# Patient Record
Sex: MALE | Race: WHITE | NOT HISPANIC OR LATINO | Employment: OTHER | URBAN - METROPOLITAN AREA
[De-identification: names, ages, dates, MRNs, and addresses within clinical notes are randomized per-mention and may not be internally consistent; named-entity substitution may affect disease eponyms.]

---

## 2018-07-09 ENCOUNTER — OFFICE VISIT (OUTPATIENT)
Dept: PHYSICAL THERAPY | Facility: CLINIC | Age: 73
End: 2018-07-09
Payer: MEDICARE

## 2018-07-09 DIAGNOSIS — M25.512 LEFT SHOULDER PAIN, UNSPECIFIED CHRONICITY: Primary | ICD-10-CM

## 2018-07-09 PROCEDURE — 97110 THERAPEUTIC EXERCISES: CPT

## 2018-07-09 PROCEDURE — G8985 CARRY GOAL STATUS: HCPCS

## 2018-07-09 PROCEDURE — 97140 MANUAL THERAPY 1/> REGIONS: CPT

## 2018-07-09 PROCEDURE — G8984 CARRY CURRENT STATUS: HCPCS

## 2018-07-09 NOTE — PROGRESS NOTES
Daily Note     Today's date: 2018  Patient name: Valente Rodgers  : 1945  MRN: [id number]  Referring provider: Gerald Webb PA-C  Dx:   Encounter Diagnosis     ICD-10-CM    1  Left shoulder pain, unspecified chronicity M25 512        Start Time: 1430  Stop Time: 1525  Total time in clinic (min): 55 minutes    Subjective: patient reports lifting  Or reaching across the body increases pain 6/10  Notes at rest little pain  Objective: See treatment diary below       Assessment: Tolerated treatment well, tightness and pain with end ranges on all planes gerard FF and rotations  Patient exhibited good technique with therapeutic exercises and would benefit from continued PT      Plan: Continue per plan of care  Progress treatment as tolerated      Precautions: gentle PROM    Daily Treatment Diary     Manual              Left shoulder 10                                                                    Exercise Diary              UE ball fwd,back,cw,ccw 30 ea            Cane press 30x            Cane FF 30x            UBE 4 min            biceps 20/30            triceps 40/30            Ball renard 30x each                                                                                                                                                                                         Modalities               left sh 10  HC

## 2018-07-26 ENCOUNTER — OFFICE VISIT (OUTPATIENT)
Dept: PHYSICAL THERAPY | Facility: CLINIC | Age: 73
End: 2018-07-26
Payer: MEDICARE

## 2018-07-26 DIAGNOSIS — M25.512 LEFT SHOULDER PAIN, UNSPECIFIED CHRONICITY: Primary | ICD-10-CM

## 2018-07-26 PROCEDURE — 97140 MANUAL THERAPY 1/> REGIONS: CPT | Performed by: PHYSICAL THERAPIST

## 2018-07-26 PROCEDURE — 97110 THERAPEUTIC EXERCISES: CPT | Performed by: PHYSICAL THERAPIST

## 2018-07-26 NOTE — PROGRESS NOTES
Daily Note     Today's date: 2018  Patient name: Agnes Elias  : 1945  MRN: [id number]  Referring provider: Juma Gardner PA-C  Dx:   Encounter Diagnosis     ICD-10-CM    1  Left shoulder pain, unspecified chronicity M25 512        Start Time: 1030  Stop Time: 1125  Total time in clinic (min): 55 minutes    Subjective: Patient reports his wife has been ill and unable to make PT appointments  Patient reports no pain at rest but pain with movement, especially the higher he goes  Objective: See treatment diary below  Precautions: gentle PROM    Daily Treatment Diary   Modalities             MH left sh/ HC 10  HC 10'                                           Manual             Left shoulder 10 10'                                                                   Exercise Diary             UE ball fwd,back,cw,ccw 30 ea 30x ea           Cane press 30x 30x           Cane FF 30x 30x                                     UBE 4 min 4'           biceps 20/30 30# 30x           triceps 40/30 50# 30x           Ball renard 30x each 30x ea                                                                                                                                                                                            Assessment: Tolerated treatment fair  Significant pain with PROM with tightness at end ranges  Patient c/o pain with stretching  Patient would benefit from continued PT      Plan: Continue per plan of care

## 2018-07-30 ENCOUNTER — OFFICE VISIT (OUTPATIENT)
Dept: PHYSICAL THERAPY | Facility: CLINIC | Age: 73
End: 2018-07-30
Payer: MEDICARE

## 2018-07-30 DIAGNOSIS — M25.512 LEFT SHOULDER PAIN, UNSPECIFIED CHRONICITY: Primary | ICD-10-CM

## 2018-07-30 PROCEDURE — 97140 MANUAL THERAPY 1/> REGIONS: CPT | Performed by: PHYSICAL THERAPIST

## 2018-07-30 PROCEDURE — 97110 THERAPEUTIC EXERCISES: CPT | Performed by: PHYSICAL THERAPIST

## 2018-07-30 PROCEDURE — G8985 CARRY GOAL STATUS: HCPCS | Performed by: PHYSICAL THERAPIST

## 2018-07-30 PROCEDURE — G8984 CARRY CURRENT STATUS: HCPCS | Performed by: PHYSICAL THERAPIST

## 2018-07-30 NOTE — PROGRESS NOTES
Daily Note     Today's date: 2018  Patient name: Santosh Martinez  : 1945  MRN: [id number]  Referring provider: Rasta Louis PA-C  Dx:   Encounter Diagnosis     ICD-10-CM    1  Left shoulder pain, unspecified chronicity M25 512        Start Time: 1115  Stop Time: 1210  Total time in clinic (min): 55 minutes    Subjective: Patient reports he is stiff, sore but no significant pain, unless he rolls over on his left side  Objective: See treatment diary below  Precautions: gentle PROM    Daily Treatment Diary   Modalities            MH left sh/ HC 10  HC 10' 10'                                          Manual            Left shoulder 10 10' 10'                                                                  Exercise Diary            UE ball fwd,back,cw,ccw 30 ea 30x ea 30x          Cane press 30x 30x 30x          Cane FF 30x 30x 30x                                    UBE 4 min 4' 4'          biceps 20/30 30# 30x 30# 30x          triceps 40/30 50# 30x 50# 30x          Ball renard 30x each 30x ea 20x ea                                                                                                                                                                                         Assessment: Tolerated treatment fair  Advised patient to cushion his bed at shoulder height  Stiff end ranges especially abduction and IR  Patient would benefit from continued PT      Plan: Continue per plan of care

## 2018-08-03 ENCOUNTER — OFFICE VISIT (OUTPATIENT)
Dept: PHYSICAL THERAPY | Facility: CLINIC | Age: 73
End: 2018-08-03
Payer: MEDICARE

## 2018-08-03 DIAGNOSIS — M25.512 LEFT SHOULDER PAIN, UNSPECIFIED CHRONICITY: Primary | ICD-10-CM

## 2018-08-03 PROCEDURE — G8985 CARRY GOAL STATUS: HCPCS

## 2018-08-03 PROCEDURE — G8984 CARRY CURRENT STATUS: HCPCS

## 2018-08-03 PROCEDURE — 97110 THERAPEUTIC EXERCISES: CPT

## 2018-08-03 PROCEDURE — 97140 MANUAL THERAPY 1/> REGIONS: CPT

## 2018-08-03 NOTE — PROGRESS NOTES
1 specimen for pathology, 1 cao brushing to be sent out   Daily Note     Today's date: 8/3/2018  Patient name: Rolan Allen  : 1945  MRN: [id number]  Referring provider: Severo Lien, PA-C  Dx:   Encounter Diagnosis     ICD-10-CM    1  Left shoulder pain, unspecified chronicity M25 512        Start Time:   Stop Time: 1050  Total time in clinic (min): 55 minutes    Subjective: patient reports increased soreness, and tightness today in the left shoulder  Objective: See treatment diary below      Assessment: Tolerated treatment well, increased tightness and pain with end ranges all planes today  Patient demonstrated fatigue post treatment and would benefit from continued PT      Plan: Continue per plan of care  Progress treatment as tolerated          Precautions: gentle PROM    Daily Treatment Diary   Modalities  6/9 7/26 7/30 8/3         MH left sh/ HC 10  HC 10' 10' 10'                                         Manual  6/9 7/26 7/30 8/3         Left shoulder 10 10' 10' 10'                                                                 Exercise Diary  6/9 7/26 7/30 8/3         UE ball fwd,back,cw,ccw 30 ea 30x ea 30x 30x  ea         Cane press 30x 30x 30x 30x         Cane FF 30x 30x 30x 30x                                   UBE 4 min 4' 4' 4'         biceps 20/30 30# 30x 30# 30x 30#  30x         triceps 40/30 50# 30x 50# 30x 50#  30x         Ball renard 30x each 30x ea 20x ea 20x ea

## 2018-10-10 ENCOUNTER — TRANSCRIBE ORDERS (OUTPATIENT)
Dept: ADMINISTRATIVE | Facility: HOSPITAL | Age: 73
End: 2018-10-10

## 2018-10-10 ENCOUNTER — APPOINTMENT (OUTPATIENT)
Dept: LAB | Facility: CLINIC | Age: 73
End: 2018-10-10
Payer: MEDICARE

## 2018-10-10 DIAGNOSIS — D47.2 MONOCLONAL PARAPROTEINEMIA: Primary | ICD-10-CM

## 2018-10-10 DIAGNOSIS — D47.2 MONOCLONAL PARAPROTEINEMIA: ICD-10-CM

## 2018-10-10 LAB
ALBUMIN SERPL BCP-MCNC: 3.4 G/DL (ref 3.5–5)
ALP SERPL-CCNC: 51 U/L (ref 46–116)
ALT SERPL W P-5'-P-CCNC: 18 U/L (ref 12–78)
ANION GAP SERPL CALCULATED.3IONS-SCNC: 3 MMOL/L (ref 4–13)
AST SERPL W P-5'-P-CCNC: 16 U/L (ref 5–45)
BASOPHILS # BLD AUTO: 0.05 THOUSANDS/ΜL (ref 0–0.1)
BASOPHILS NFR BLD AUTO: 1 % (ref 0–1)
BILIRUB SERPL-MCNC: 0.28 MG/DL (ref 0.2–1)
BUN SERPL-MCNC: 19 MG/DL (ref 5–25)
CALCIUM SERPL-MCNC: 9 MG/DL (ref 8.3–10.1)
CHLORIDE SERPL-SCNC: 105 MMOL/L (ref 100–108)
CO2 SERPL-SCNC: 28 MMOL/L (ref 21–32)
CREAT SERPL-MCNC: 1.44 MG/DL (ref 0.6–1.3)
EOSINOPHIL # BLD AUTO: 0.19 THOUSAND/ΜL (ref 0–0.61)
EOSINOPHIL NFR BLD AUTO: 4 % (ref 0–6)
ERYTHROCYTE [DISTWIDTH] IN BLOOD BY AUTOMATED COUNT: 14.6 % (ref 11.6–15.1)
GFR SERPL CREATININE-BSD FRML MDRD: 49 ML/MIN/1.73SQ M
GLUCOSE P FAST SERPL-MCNC: 82 MG/DL (ref 65–99)
HCT VFR BLD AUTO: 39 % (ref 36.5–49.3)
HGB BLD-MCNC: 12.2 G/DL (ref 12–17)
IGA SERPL-MCNC: 63 MG/DL (ref 70–400)
IGG SERPL-MCNC: 1740 MG/DL (ref 700–1600)
IGM SERPL-MCNC: 22 MG/DL (ref 40–230)
IMM GRANULOCYTES # BLD AUTO: 0.01 THOUSAND/UL (ref 0–0.2)
IMM GRANULOCYTES NFR BLD AUTO: 0 % (ref 0–2)
LYMPHOCYTES # BLD AUTO: 1.05 THOUSANDS/ΜL (ref 0.6–4.47)
LYMPHOCYTES NFR BLD AUTO: 21 % (ref 14–44)
MCH RBC QN AUTO: 25.9 PG (ref 26.8–34.3)
MCHC RBC AUTO-ENTMCNC: 31.3 G/DL (ref 31.4–37.4)
MCV RBC AUTO: 83 FL (ref 82–98)
MONOCYTES # BLD AUTO: 0.4 THOUSAND/ΜL (ref 0.17–1.22)
MONOCYTES NFR BLD AUTO: 8 % (ref 4–12)
NEUTROPHILS # BLD AUTO: 3.22 THOUSANDS/ΜL (ref 1.85–7.62)
NEUTS SEG NFR BLD AUTO: 66 % (ref 43–75)
NRBC BLD AUTO-RTO: 0 /100 WBCS
PLATELET # BLD AUTO: 378 THOUSANDS/UL (ref 149–390)
PMV BLD AUTO: 9.3 FL (ref 8.9–12.7)
POTASSIUM SERPL-SCNC: 4.5 MMOL/L (ref 3.5–5.3)
PROT SERPL-MCNC: 7.2 G/DL (ref 6.4–8.2)
RBC # BLD AUTO: 4.71 MILLION/UL (ref 3.88–5.62)
SODIUM SERPL-SCNC: 136 MMOL/L (ref 136–145)
WBC # BLD AUTO: 4.92 THOUSAND/UL (ref 4.31–10.16)

## 2018-10-10 PROCEDURE — 84165 PROTEIN E-PHORESIS SERUM: CPT

## 2018-10-10 PROCEDURE — 82232 ASSAY OF BETA-2 PROTEIN: CPT

## 2018-10-10 PROCEDURE — 84165 PROTEIN E-PHORESIS SERUM: CPT | Performed by: PATHOLOGY

## 2018-10-10 PROCEDURE — 82784 ASSAY IGA/IGD/IGG/IGM EACH: CPT

## 2018-10-10 PROCEDURE — 86334 IMMUNOFIX E-PHORESIS SERUM: CPT

## 2018-10-10 PROCEDURE — 85025 COMPLETE CBC W/AUTO DIFF WBC: CPT

## 2018-10-10 PROCEDURE — 83883 ASSAY NEPHELOMETRY NOT SPEC: CPT

## 2018-10-10 PROCEDURE — 36415 COLL VENOUS BLD VENIPUNCTURE: CPT

## 2018-10-10 PROCEDURE — 80053 COMPREHEN METABOLIC PANEL: CPT

## 2018-10-11 LAB
ALBUMIN SERPL ELPH-MCNC: 3.83 G/DL (ref 3.5–5)
ALBUMIN SERPL ELPH-MCNC: 54.7 % (ref 52–65)
ALPHA1 GLOB SERPL ELPH-MCNC: 0.34 G/DL (ref 0.1–0.4)
ALPHA1 GLOB SERPL ELPH-MCNC: 4.8 % (ref 2.5–5)
ALPHA2 GLOB SERPL ELPH-MCNC: 0.71 G/DL (ref 0.4–1.2)
ALPHA2 GLOB SERPL ELPH-MCNC: 10.2 % (ref 7–13)
B2 MICROGLOB SERPL-MCNC: 2.1 MG/L (ref 0.6–2.4)
BETA GLOB ABNORMAL SERPL ELPH-MCNC: 0.41 G/DL (ref 0.4–0.8)
BETA1 GLOB SERPL ELPH-MCNC: 5.8 % (ref 5–13)
BETA2 GLOB SERPL ELPH-MCNC: 3.5 % (ref 2–8)
BETA2+GAMMA GLOB SERPL ELPH-MCNC: 0.25 G/DL (ref 0.2–0.5)
GAMMA GLOB ABNORMAL SERPL ELPH-MCNC: 1.47 G/DL (ref 0.5–1.6)
GAMMA GLOB SERPL ELPH-MCNC: 21 % (ref 12–22)
IGG/ALB SER: 1.21 {RATIO} (ref 1.1–1.8)
INTERPRETATION UR IFE-IMP: NORMAL
KAPPA LC FREE SER-MCNC: 31.5 MG/L (ref 3.3–19.4)
KAPPA LC FREE/LAMBDA FREE SER: 0.48 {RATIO} (ref 0.26–1.65)
LAMBDA LC FREE SERPL-MCNC: 65.7 MG/L (ref 5.7–26.3)
M PROTEIN 1 MFR SERPL ELPH: 9.2 %
M PROTEIN 1 SERPL ELPH-MCNC: 0.64 G/DL
PROT PATTERN SERPL ELPH-IMP: NORMAL
PROT SERPL-MCNC: 7 G/DL (ref 6.4–8.2)

## 2019-01-10 ENCOUNTER — OFFICE VISIT (OUTPATIENT)
Dept: FAMILY MEDICINE CLINIC | Facility: CLINIC | Age: 74
End: 2019-01-10
Payer: MEDICARE

## 2019-01-10 VITALS
SYSTOLIC BLOOD PRESSURE: 138 MMHG | HEART RATE: 88 BPM | OXYGEN SATURATION: 99 % | DIASTOLIC BLOOD PRESSURE: 94 MMHG | TEMPERATURE: 98.1 F | WEIGHT: 159 LBS | HEIGHT: 69 IN | BODY MASS INDEX: 23.55 KG/M2

## 2019-01-10 DIAGNOSIS — K11.7 EXCESSIVE SALIVATION: Primary | ICD-10-CM

## 2019-01-10 DIAGNOSIS — N28.9 ABNORMAL KIDNEY FUNCTION: ICD-10-CM

## 2019-01-10 DIAGNOSIS — Z11.59 ENCOUNTER FOR HEPATITIS C SCREENING TEST FOR LOW RISK PATIENT: ICD-10-CM

## 2019-01-10 DIAGNOSIS — Z13.220 SCREENING, LIPID: ICD-10-CM

## 2019-01-10 DIAGNOSIS — Z12.11 COLON CANCER SCREENING: ICD-10-CM

## 2019-01-10 DIAGNOSIS — Z00.00 MEDICARE ANNUAL WELLNESS VISIT, SUBSEQUENT: ICD-10-CM

## 2019-01-10 PROCEDURE — G0439 PPPS, SUBSEQ VISIT: HCPCS | Performed by: FAMILY MEDICINE

## 2019-01-10 PROCEDURE — 99203 OFFICE O/P NEW LOW 30 MIN: CPT | Performed by: FAMILY MEDICINE

## 2019-01-10 RX ORDER — GLYCOPYRROLATE 1 MG/1
1 TABLET ORAL 2 TIMES DAILY
Qty: 60 TABLET | Refills: 1 | Status: SHIPPED | OUTPATIENT
Start: 2019-01-10 | End: 2019-02-14 | Stop reason: SDUPTHER

## 2019-01-10 NOTE — PROGRESS NOTES
Assessment/Plan:    No problem-specific Assessment & Plan notes found for this encounter  Diagnoses and all orders for this visit:    Excessive salivation  After discussing risks and benefits of medication along with side effects will start glycopyrrolate at this time  -     glycopyrrolate (ROBINUL) 1 mg tablet; Take 1 tablet (1 mg total) by mouth 2 (two) times a day for 30 days    Medicare annual wellness visit, subsequent  See Medicare note  Colon cancer screening  -     Cologuard; Future    Encounter for hepatitis C screening test for low risk patient  -     Hepatitis C antibody; Future    Screening, lipid  -     Lipid panel; Future    Abnormal kidney function  -     Comprehensive metabolic panel; Future    Other orders  -     aspirin 81 MG tablet; 1 cap(s)      Follow up in 6 months or as needed    Subjective:      Patient ID: Iliana Remy is a 72 y o  male  Patient is here to establish care  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 at [address] [place] [address] in [place]  He has a history of smoking for about 30 years and quit smoking 20 years ago  He said that after his surgery he has been developing a lot of increased elevation in his mouth and he is requesting a medication to stop this  Per patient he had elevated creatinine levels in the past however denies any urinary complaints at this time  He had a fecal occult blood test done several years ago which was normal   He is not interested in any vaccines at this time except Flu  The following portions of the patient's history were reviewed and updated as appropriate:   He  has no past medical history on file    He   Patient Active Problem List    Diagnosis Date Noted    Excessive salivation 01/10/2019    Medicare annual wellness visit, subsequent 01/10/2019    Colon cancer screening 01/10/2019    Encounter for hepatitis C screening test for low risk patient 01/10/2019    Screening, lipid 01/10/2019    Abnormal kidney function 01/10/2019     He  has no past surgical history on file  His family history is not on file  He  reports that he has quit smoking  He has never used smokeless tobacco  His alcohol and drug histories are not on file  Current Outpatient Prescriptions   Medication Sig Dispense Refill    aspirin 81 MG tablet 1 cap(s)      glycopyrrolate (ROBINUL) 1 mg tablet Take 1 tablet (1 mg total) by mouth 2 (two) times a day for 30 days 60 tablet 1     No current facility-administered medications for this visit  No current outpatient prescriptions on file prior to visit  No current facility-administered medications on file prior to visit  He is allergic to iodine       Review of Systems   Constitutional: Negative for activity change, appetite change, fatigue and fever  HENT: Positive for drooling  Negative for congestion and ear discharge  Respiratory: Negative for cough and shortness of breath  Cardiovascular: Negative for chest pain and palpitations  Gastrointestinal: Negative for diarrhea and nausea  Musculoskeletal: Negative for arthralgias and back pain  Skin: Negative for color change and rash  Neurological: Negative for dizziness and headaches  Psychiatric/Behavioral: Negative for agitation and behavioral problems  Objective:      /94   Pulse 88   Temp 98 1 °F (36 7 °C)   Ht 5' 9" (1 753 m)   Wt 72 1 kg (159 lb)   SpO2 99%   BMI 23 48 kg/m²          Physical Exam   Constitutional: He is oriented to person, place, and time  He appears well-developed and well-nourished  No distress  HENT:   Tongue deviated towards his left  Increased salivation noted  Eyes: Pupils are equal, round, and reactive to light  No scleral icterus  Cardiovascular: Normal rate, regular rhythm and normal heart sounds  No murmur heard  Pulmonary/Chest: Effort normal and breath sounds normal  No respiratory distress   He has no wheezes  Abdominal: Soft  Bowel sounds are normal  He exhibits no distension  There is no tenderness  Neurological: He is alert and oriented to person, place, and time  Skin: Skin is warm and dry  No rash noted  He is not diaphoretic  Psychiatric: He has a normal mood and affect

## 2019-01-10 NOTE — PROGRESS NOTES
Assessment and Plan:    Problem List Items Addressed This Visit     None        Health Maintenance Due   Topic Date Due    Hepatitis C Screening  1945    Depression Screening PHQ  1945    Medicare Annual Wellness Visit (AWV)  1945    CRC Screening: Colonoscopy  1945    DTaP,Tdap,and Td Vaccines (1 - Tdap) 02/25/1968    Fall Risk  02/25/2012    Pneumococcal PPSV23/PCV13 65+ Years / Low and Medium Risk (1 of 2 - PCV13) 02/25/2012    INFLUENZA VACCINE  07/01/2018         HPI:  Nelson Gray is a 72 y o  male here for his Subsequent Wellness Visit  There is no problem list on file for this patient  No past medical history on file  No past surgical history on file  No family history on file  History   Smoking Status    Former Smoker   Smokeless Tobacco    Never Used     History   Alcohol use Not on file      History   Drug use: Unknown       Current Outpatient Prescriptions   Medication Sig Dispense Refill    aspirin 81 MG tablet 1 cap(s)       No current facility-administered medications for this visit  Allergies   Allergen Reactions    Iodine      Immunization History   Administered Date(s) Administered    Influenza 02/13/2018, 10/17/2018       Patient Care Team:  Janelle Mcintyre MD as PCP - General (Family Medicine)    Medicare Screening Tests and Risk Assessments:  Liz Maradiaga is here for his Initial Wellness visit  Health Risk Assessment:  Patient rates overall health as good  Patient feels that their physical health rating is Same  Eyesight was rated as Same  Hearing was rated as Same  Patient feels that their emotional and mental health rating is Same  Pain experienced by patient in the last 7 days has been None  Patient states that he has experienced no weight loss or gain in last 6 months  Emotional/Mental Health:  Patient has been feeling nervous/anxious  PHQ-9 Depression Screening:    Frequency of the following problems over the past two weeks:      1  Little interest or pleasure in doing things: 0 - not at all      2  Feeling down, depressed, or hopeless: 0 - not at all  PHQ-2 Score: 0          Broken Bones/Falls: Fall Risk Assessment:    In the past year, patient has experienced: No history of falling in past year          Bladder/Bowel:  Patient has not leaked urine accidently in the last six months  Patient reports no loss of bowel control  Immunizations:  Patient has had a flu vaccination within the last year  Patient has not received a pneumonia shot  Patient has not received a shingles shot  Patient has not received tetanus/diphtheria shot  Home Safety:  Patient does not have trouble with stairs inside or outside of their home  Patient currently reports that there are no safety hazards present in home, working smoke alarms, no working carbon monoxide detectors  Preventative Screenings:   no prostate cancer screen performed, no colon cancer screen completed, no cholesterol screen completed, glaucoma eye exam completed,     Nutrition:  Current diet: Regular with servings of the following:    Medications:  Patient is currently taking over-the-counter supplements  Patient is able to manage medications  Lifestyle Choices:  Patient reports no tobacco use  Patient has not smoked or used tobacco in the past   Patient reports alcohol use  Patient drives a vehicle  Patient does not wear seat belt  Activities of Daily Living:  Can get out of bed by his or her self, able to dress self, able to make own meals, able to do own shopping, able to bathe self, can do own laundry/housekeeping, can manage own money, pay bills and track expenses    Previous Hospitalizations:  No hospitalization or ED visit in past 12 months        Advanced Directives:  Patient has not decided on power of   Patient has not completed advanced directive          Preventative Screening/Counseling:      Cardiovascular:      General: Screening Current Diabetes:      General: Screening Current          Colorectal Cancer:      Due for studies: Fecal Occult Blood          Prostate Cancer:      General: Screening Current          Osteoporosis:      General: Screening Not Indicated          HIV:      General: Screening Not Indicated          Hepatitis C:      General: Risks and Benefits Discussed        Immunizations:      Influenza: Influenza UTD This Year      Pneumococcal: Patient Declines      Shingrix: Patient Declines      Hepatitis B (Medium to high risk patients): Patient Declines      Zostavax: Patient Declines      TD: Patient Declines      TDAP: Patient Declines            Assessment and Plan:    Problem List Items Addressed This Visit     None      Visit Diagnoses     Colon cancer screening    -  Primary    Relevant Orders    Cologuard    Encounter for hepatitis C screening test for low risk patient        Relevant Orders    Hepatitis C antibody    Screening, lipid        Relevant Orders    Lipid panel    Abnormal kidney function        Relevant Orders    Comprehensive metabolic panel    Excessive salivation        Relevant Medications    glycopyrrolate (ROBINUL) 1 mg tablet    Medicare annual wellness visit, subsequent            Health Maintenance Due   Topic Date Due    Hepatitis C Screening  1945    Depression Screening PHQ  1945    Medicare Annual Wellness Visit (AWV)  1945    CRC Screening: Colonoscopy  1945    DTaP,Tdap,and Td Vaccines (1 - Tdap) 02/25/1968    Fall Risk  02/25/2012    Pneumococcal PPSV23/PCV13 65+ Years / Low and Medium Risk (1 of 2 - PCV13) 02/25/2012    INFLUENZA VACCINE  07/01/2018         HPI:  Christophe Kapoor is a 72 y o  male here for his Subsequent Wellness Visit  There is no problem list on file for this patient  No past medical history on file  No past surgical history on file  No family history on file    History   Smoking Status    Former Smoker   Smokeless Tobacco    Never Used History   Alcohol use Not on file      History   Drug use: Unknown       Current Outpatient Prescriptions   Medication Sig Dispense Refill    aspirin 81 MG tablet 1 cap(s)      glycopyrrolate (ROBINUL) 1 mg tablet Take 1 tablet (1 mg total) by mouth 2 (two) times a day for 30 days 60 tablet 1     No current facility-administered medications for this visit        Allergies   Allergen Reactions    Iodine      Immunization History   Administered Date(s) Administered    Influenza 02/13/2018, 10/17/2018       Patient Care Team:  Camilo Gonzalez MD as PCP - General (Family Medicine)    Medicare Screening Tests and Risk Assessments:  Medicare Annual Wellness Visit

## 2019-02-14 ENCOUNTER — OFFICE VISIT (OUTPATIENT)
Dept: FAMILY MEDICINE CLINIC | Facility: CLINIC | Age: 74
End: 2019-02-14
Payer: MEDICARE

## 2019-02-14 VITALS
TEMPERATURE: 97.6 F | BODY MASS INDEX: 23.7 KG/M2 | OXYGEN SATURATION: 99 % | SYSTOLIC BLOOD PRESSURE: 136 MMHG | HEART RATE: 81 BPM | WEIGHT: 160 LBS | HEIGHT: 69 IN | DIASTOLIC BLOOD PRESSURE: 86 MMHG

## 2019-02-14 DIAGNOSIS — C02.9 TONGUE CARCINOMA (HCC): ICD-10-CM

## 2019-02-14 DIAGNOSIS — K11.7 EXCESSIVE SALIVATION: Primary | ICD-10-CM

## 2019-02-14 PROCEDURE — 99213 OFFICE O/P EST LOW 20 MIN: CPT | Performed by: FAMILY MEDICINE

## 2019-02-14 RX ORDER — GLYCOPYRROLATE 2 MG/1
2 TABLET ORAL 2 TIMES DAILY
Qty: 60 TABLET | Refills: 1 | Status: SHIPPED | OUTPATIENT
Start: 2019-02-14 | End: 2019-04-30 | Stop reason: SDUPTHER

## 2019-02-14 NOTE — PROGRESS NOTES
Assessment/Plan:    No problem-specific Assessment & Plan notes found for this encounter  Diagnoses and all orders for this visit:    Excessive salivation  After discussing risks and benefits of medication along with side effects will increase glycopyrrolate to 2 mg po twice daily  -     glycopyrrolate (ROBINUL) 2 MG tablet; Take 1 tablet (2 mg total) by mouth 2 (two) times a day for 30 days    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[facility] [address] )  advised to sign a release of records from doctor at [facility]  Follow up in 3 months or as needed        Subjective:      Patient ID: Mira Thayer is a 72 y o  male  Patient is here to follow up for excessive salivation secondary to cancer removed from the base of his tongue  He says that the medication glycopyrrolate 1 mg twice daily has been helping his symptom some  He would like to see if it can be increased  He gets followed up at [facility]  The following portions of the patient's history were reviewed and updated as appropriate:   He  has no past medical history on file  He   Patient Active Problem List    Diagnosis Date Noted    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 ) 02/14/2019    Excessive salivation 01/10/2019    Medicare annual wellness visit, subsequent 01/10/2019    Colon cancer screening 01/10/2019    Encounter for hepatitis C screening test for low risk patient 01/10/2019    Screening, lipid 01/10/2019    Abnormal kidney function 01/10/2019     He  has no past surgical history on file  His family history is not on file  He  reports that he has quit smoking  He has never used smokeless tobacco  His alcohol and drug histories are not on file    Current Outpatient Medications   Medication Sig Dispense Refill    aspirin 81 MG tablet 1 cap(s)      glycopyrrolate (ROBINUL) 2 MG tablet Take 1 tablet (2 mg total) by mouth 2 (two) times a day for 30 days 60 tablet 1     No current facility-administered medications for this visit  Current Outpatient Medications on File Prior to Visit   Medication Sig    aspirin 81 MG tablet 1 cap(s)    [DISCONTINUED] glycopyrrolate (ROBINUL) 1 mg tablet Take 1 tablet (1 mg total) by mouth 2 (two) times a day for 30 days     No current facility-administered medications on file prior to visit  He is allergic to iodinated diagnostic agents and iodine       Review of Systems   Constitutional: Negative for activity change, appetite change, fatigue and fever  HENT: Positive for drooling  Negative for congestion and ear discharge  Respiratory: Negative for cough and shortness of breath  Cardiovascular: Negative for chest pain and palpitations  Gastrointestinal: Negative for diarrhea and nausea  Musculoskeletal: Negative for arthralgias and back pain  Skin: Negative for color change and rash  Neurological: Negative for dizziness and headaches  Psychiatric/Behavioral: Negative for agitation and behavioral problems  Objective:      /86   Pulse 81   Temp 97 6 °F (36 4 °C)   Ht 5' 9" (1 753 m)   Wt 72 6 kg (160 lb)   SpO2 99%   BMI 23 63 kg/m²          Physical Exam   Constitutional: He is oriented to person, place, and time  He appears well-developed and well-nourished  No distress  HENT:   Increased salivation   Eyes: Pupils are equal, round, and reactive to light  No scleral icterus  Cardiovascular: Normal rate, regular rhythm and normal heart sounds  No murmur heard  Pulmonary/Chest: Effort normal and breath sounds normal  No respiratory distress  He has no wheezes  Abdominal: Soft  Bowel sounds are normal  He exhibits no distension  There is no tenderness  Neurological: He is alert and oriented to person, place, and time  Skin: Skin is warm and dry  No rash noted  He is not diaphoretic  Psychiatric: He has a normal mood and affect

## 2019-02-24 DIAGNOSIS — K11.7 EXCESSIVE SALIVATION: ICD-10-CM

## 2019-02-25 RX ORDER — GLYCOPYRROLATE 1 MG/1
TABLET ORAL
Qty: 60 TABLET | Refills: 1 | Status: SHIPPED | OUTPATIENT
Start: 2019-02-25 | End: 2019-08-25

## 2019-04-16 ENCOUNTER — APPOINTMENT (OUTPATIENT)
Dept: LAB | Facility: CLINIC | Age: 74
End: 2019-04-16
Payer: MEDICARE

## 2019-04-16 DIAGNOSIS — Z11.59 ENCOUNTER FOR HEPATITIS C SCREENING TEST FOR LOW RISK PATIENT: ICD-10-CM

## 2019-04-16 DIAGNOSIS — N28.9 ABNORMAL KIDNEY FUNCTION: ICD-10-CM

## 2019-04-16 DIAGNOSIS — Z13.220 SCREENING, LIPID: ICD-10-CM

## 2019-04-16 LAB
ALBUMIN SERPL BCP-MCNC: 3.6 G/DL (ref 3.5–5)
ALP SERPL-CCNC: 69 U/L (ref 46–116)
ALT SERPL W P-5'-P-CCNC: 12 U/L (ref 12–78)
ANION GAP SERPL CALCULATED.3IONS-SCNC: 5 MMOL/L (ref 4–13)
AST SERPL W P-5'-P-CCNC: 13 U/L (ref 5–45)
BILIRUB SERPL-MCNC: 0.4 MG/DL (ref 0.2–1)
BUN SERPL-MCNC: 19 MG/DL (ref 5–25)
CALCIUM SERPL-MCNC: 9 MG/DL (ref 8.3–10.1)
CHLORIDE SERPL-SCNC: 109 MMOL/L (ref 100–108)
CHOLEST SERPL-MCNC: 184 MG/DL (ref 50–200)
CO2 SERPL-SCNC: 27 MMOL/L (ref 21–32)
CREAT SERPL-MCNC: 1.53 MG/DL (ref 0.6–1.3)
GFR SERPL CREATININE-BSD FRML MDRD: 45 ML/MIN/1.73SQ M
GLUCOSE P FAST SERPL-MCNC: 87 MG/DL (ref 65–99)
HCV AB SER QL: NORMAL
HDLC SERPL-MCNC: 40 MG/DL (ref 40–60)
LDLC SERPL CALC-MCNC: 123 MG/DL (ref 0–100)
NONHDLC SERPL-MCNC: 144 MG/DL
POTASSIUM SERPL-SCNC: 4.6 MMOL/L (ref 3.5–5.3)
PROT SERPL-MCNC: 8 G/DL (ref 6.4–8.2)
SODIUM SERPL-SCNC: 141 MMOL/L (ref 136–145)
TRIGL SERPL-MCNC: 105 MG/DL

## 2019-04-16 PROCEDURE — 80053 COMPREHEN METABOLIC PANEL: CPT

## 2019-04-16 PROCEDURE — 80061 LIPID PANEL: CPT

## 2019-04-16 PROCEDURE — 86803 HEPATITIS C AB TEST: CPT

## 2019-04-16 PROCEDURE — 36415 COLL VENOUS BLD VENIPUNCTURE: CPT

## 2019-04-18 DIAGNOSIS — I10 ESSENTIAL HYPERTENSION: Primary | ICD-10-CM

## 2019-04-18 RX ORDER — OLMESARTAN MEDOXOMIL 20 MG/1
20 TABLET ORAL DAILY
Qty: 30 TABLET | Refills: 3 | Status: SHIPPED | OUTPATIENT
Start: 2019-04-18 | End: 2019-04-30 | Stop reason: ALTCHOICE

## 2019-04-18 RX ORDER — OLMESARTAN MEDOXOMIL 20 MG/1
20 TABLET ORAL DAILY
COMMUNITY
End: 2019-04-18 | Stop reason: SDUPTHER

## 2019-04-30 ENCOUNTER — TELEPHONE (OUTPATIENT)
Dept: FAMILY MEDICINE CLINIC | Facility: CLINIC | Age: 74
End: 2019-04-30

## 2019-04-30 DIAGNOSIS — K11.7 EXCESSIVE SALIVATION: ICD-10-CM

## 2019-04-30 DIAGNOSIS — I10 ESSENTIAL HYPERTENSION: Primary | ICD-10-CM

## 2019-04-30 RX ORDER — GLYCOPYRROLATE 2 MG/1
2 TABLET ORAL 2 TIMES DAILY
Qty: 60 TABLET | Refills: 1 | Status: SHIPPED | OUTPATIENT
Start: 2019-04-30 | End: 2019-07-24 | Stop reason: SDUPTHER

## 2019-04-30 RX ORDER — LOSARTAN POTASSIUM 50 MG/1
50 TABLET ORAL DAILY
Qty: 30 TABLET | Refills: 1 | Status: SHIPPED | OUTPATIENT
Start: 2019-04-30 | End: 2019-05-06 | Stop reason: SDUPTHER

## 2019-05-06 ENCOUNTER — OFFICE VISIT (OUTPATIENT)
Dept: FAMILY MEDICINE CLINIC | Facility: CLINIC | Age: 74
End: 2019-05-06
Payer: COMMERCIAL

## 2019-05-06 VITALS
HEART RATE: 79 BPM | TEMPERATURE: 97.5 F | SYSTOLIC BLOOD PRESSURE: 164 MMHG | BODY MASS INDEX: 23.37 KG/M2 | DIASTOLIC BLOOD PRESSURE: 104 MMHG | WEIGHT: 157.8 LBS | OXYGEN SATURATION: 98 % | HEIGHT: 69 IN

## 2019-05-06 DIAGNOSIS — I10 ESSENTIAL HYPERTENSION: ICD-10-CM

## 2019-05-06 PROCEDURE — 99214 OFFICE O/P EST MOD 30 MIN: CPT | Performed by: FAMILY MEDICINE

## 2019-05-06 RX ORDER — LOSARTAN POTASSIUM 50 MG/1
TABLET ORAL
Qty: 30 TABLET | Refills: 1
Start: 2019-05-06 | End: 2019-05-14 | Stop reason: ALTCHOICE

## 2019-05-14 ENCOUNTER — OFFICE VISIT (OUTPATIENT)
Dept: FAMILY MEDICINE CLINIC | Facility: CLINIC | Age: 74
End: 2019-05-14
Payer: MEDICARE

## 2019-05-14 VITALS
DIASTOLIC BLOOD PRESSURE: 94 MMHG | TEMPERATURE: 98.2 F | BODY MASS INDEX: 23.49 KG/M2 | HEIGHT: 69 IN | WEIGHT: 158.6 LBS | OXYGEN SATURATION: 98 % | HEART RATE: 80 BPM | SYSTOLIC BLOOD PRESSURE: 160 MMHG

## 2019-05-14 DIAGNOSIS — I10 UNCONTROLLED HYPERTENSION: Primary | ICD-10-CM

## 2019-05-14 DIAGNOSIS — Z92.3 HISTORY OF HEAD AND NECK RADIATION: ICD-10-CM

## 2019-05-14 DIAGNOSIS — R42 DIZZINESS: ICD-10-CM

## 2019-05-14 PROCEDURE — 99214 OFFICE O/P EST MOD 30 MIN: CPT | Performed by: FAMILY MEDICINE

## 2019-05-14 RX ORDER — AMLODIPINE BESYLATE 2.5 MG/1
2.5 TABLET ORAL DAILY
Qty: 30 TABLET | Refills: 1 | Status: SHIPPED | OUTPATIENT
Start: 2019-05-14 | End: 2019-05-23 | Stop reason: SDUPTHER

## 2019-05-23 ENCOUNTER — OFFICE VISIT (OUTPATIENT)
Dept: FAMILY MEDICINE CLINIC | Facility: CLINIC | Age: 74
End: 2019-05-23
Payer: MEDICARE

## 2019-05-23 VITALS
BODY MASS INDEX: 23.46 KG/M2 | HEIGHT: 69 IN | WEIGHT: 158.4 LBS | SYSTOLIC BLOOD PRESSURE: 138 MMHG | HEART RATE: 80 BPM | DIASTOLIC BLOOD PRESSURE: 88 MMHG | TEMPERATURE: 97.4 F | OXYGEN SATURATION: 99 %

## 2019-05-23 DIAGNOSIS — I10 UNCONTROLLED HYPERTENSION: ICD-10-CM

## 2019-05-23 DIAGNOSIS — I10 ESSENTIAL HYPERTENSION: Primary | ICD-10-CM

## 2019-05-23 PROCEDURE — 99213 OFFICE O/P EST LOW 20 MIN: CPT | Performed by: FAMILY MEDICINE

## 2019-05-23 RX ORDER — AMLODIPINE BESYLATE 5 MG/1
5 TABLET ORAL DAILY
Qty: 30 TABLET | Refills: 1 | Status: SHIPPED | OUTPATIENT
Start: 2019-05-23 | End: 2019-07-25 | Stop reason: SDUPTHER

## 2019-05-28 ENCOUNTER — HOSPITAL ENCOUNTER (OUTPATIENT)
Dept: ULTRASOUND IMAGING | Facility: HOSPITAL | Age: 74
Discharge: HOME | End: 2019-05-28
Attending: FAMILY MEDICINE
Payer: MEDICARE

## 2019-05-28 DIAGNOSIS — I10 UNCONTROLLED HYPERTENSION: ICD-10-CM

## 2019-05-28 DIAGNOSIS — Z92.3 HISTORY OF HEAD AND NECK RADIATION: ICD-10-CM

## 2019-05-28 DIAGNOSIS — R42 DIZZINESS: ICD-10-CM

## 2019-05-28 PROCEDURE — 93880 EXTRACRANIAL BILAT STUDY: CPT | Performed by: SURGERY

## 2019-05-28 PROCEDURE — 93880 EXTRACRANIAL BILAT STUDY: CPT

## 2019-07-24 DIAGNOSIS — K11.7 EXCESSIVE SALIVATION: ICD-10-CM

## 2019-07-24 RX ORDER — GLYCOPYRROLATE 2 MG/1
2 TABLET ORAL 2 TIMES DAILY
Qty: 60 TABLET | Refills: 1 | Status: SHIPPED | OUTPATIENT
Start: 2019-07-24 | End: 2019-10-09 | Stop reason: ALTCHOICE

## 2019-07-25 DIAGNOSIS — I10 UNCONTROLLED HYPERTENSION: ICD-10-CM

## 2019-07-25 RX ORDER — AMLODIPINE BESYLATE 5 MG/1
TABLET ORAL
Qty: 30 TABLET | Refills: 1 | Status: SHIPPED | OUTPATIENT
Start: 2019-07-25 | End: 2019-07-31 | Stop reason: ALTCHOICE

## 2019-07-31 ENCOUNTER — OFFICE VISIT (OUTPATIENT)
Dept: FAMILY MEDICINE CLINIC | Facility: CLINIC | Age: 74
End: 2019-07-31
Payer: MEDICARE

## 2019-07-31 VITALS
OXYGEN SATURATION: 91 % | BODY MASS INDEX: 22.93 KG/M2 | HEIGHT: 69 IN | HEART RATE: 88 BPM | DIASTOLIC BLOOD PRESSURE: 64 MMHG | TEMPERATURE: 98.7 F | SYSTOLIC BLOOD PRESSURE: 104 MMHG | WEIGHT: 154.8 LBS | RESPIRATION RATE: 18 BRPM

## 2019-07-31 DIAGNOSIS — R30.0 DYSURIA: ICD-10-CM

## 2019-07-31 DIAGNOSIS — R63.4 WEIGHT LOSS: ICD-10-CM

## 2019-07-31 DIAGNOSIS — I95.0 IDIOPATHIC HYPOTENSION: Primary | ICD-10-CM

## 2019-07-31 PROCEDURE — 99214 OFFICE O/P EST MOD 30 MIN: CPT | Performed by: FAMILY MEDICINE

## 2019-07-31 NOTE — PROGRESS NOTES
Assessment/Plan:    No problem-specific Assessment & Plan notes found for this encounter  Diagnoses and all orders for this visit:    Idiopathic hypotension  after discussing risks and benefits advised to stop amlodipine and measure BP daily and call or make an appt if elevated above 140/90 mmHg or below 90/60 mmHg  Dysuria  -     Urinalysis with microscopic  -     Urine culture; Future    Weight loss  advised to increase caloric intake and supplement his diet with high calorie foods such as protein shakes with high carb high calorie   Follow up in 1 month        Subjective:      Patient ID: Becca Bunch is a 69 y o  male  Patient is here to follow-up for high blood pressure  His blood pressure has actually been running low he said that this morning it was 80/40 mm of mercury  Today at his appt was 106/64 mmhg  He is currently taking amlodipine 5 mg p  O  Once daily denies any side effects from it  He says that over the past several months he has been losing weight he does not have much of an appetite as it is hard for him to swallow given history of tongue cancer surgery  Also he has been having some urinary discomfort symptoms as well  No blood in the urine however  The following portions of the patient's history were reviewed and updated as appropriate:   He  has no past medical history on file    He   Patient Active Problem List    Diagnosis Date Noted    Idiopathic hypotension 07/31/2019    Dysuria 07/31/2019    Weight loss 07/31/2019    Uncontrolled hypertension 05/14/2019    Dizziness 05/14/2019    History of head and neck radiation 05/14/2019    Essential hypertension 05/06/2019    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[facility] [address] ) 02/14/2019    Excessive salivation 01/10/2019    Medicare annual wellness visit, subsequent 01/10/2019    Colon cancer screening 01/10/2019    Encounter for hepatitis C screening test for low risk patient 01/10/2019    Screening, lipid 01/10/2019    Abnormal kidney function 01/10/2019     He  has no past surgical history on file  His family history is not on file  He  reports that he has quit smoking  He has never used smokeless tobacco  His alcohol and drug histories are not on file  Current Outpatient Medications   Medication Sig Dispense Refill    aspirin 81 MG tablet 1 cap(s)      glycopyrrolate (ROBINUL) 2 MG tablet TAKE 1 TABLET (2 MG TOTAL) BY MOUTH 2 (TWO) TIMES A DAY FOR 30 DAYS 60 tablet 1    glycopyrrolate (ROBINUL) 1 mg tablet TAKE 1 TABLET (1 MG TOTAL) BY MOUTH 2 (TWO) TIMES A DAY (Patient not taking: Reported on 5/14/2019) 60 tablet 1     No current facility-administered medications for this visit  Current Outpatient Medications on File Prior to Visit   Medication Sig    aspirin 81 MG tablet 1 cap(s)    glycopyrrolate (ROBINUL) 2 MG tablet TAKE 1 TABLET (2 MG TOTAL) BY MOUTH 2 (TWO) TIMES A DAY FOR 30 DAYS    [DISCONTINUED] amLODIPine (NORVASC) 5 mg tablet TAKE 1 TABLET BY MOUTH EVERY DAY    glycopyrrolate (ROBINUL) 1 mg tablet TAKE 1 TABLET (1 MG TOTAL) BY MOUTH 2 (TWO) TIMES A DAY (Patient not taking: Reported on 5/14/2019)     No current facility-administered medications on file prior to visit  He is allergic to iodinated diagnostic agents and iodine       Review of Systems   Constitutional: Positive for unexpected weight change  Negative for activity change, appetite change, fatigue and fever  HENT: Negative for congestion and ear discharge  Respiratory: Negative for cough and shortness of breath  Cardiovascular: Negative for chest pain and palpitations  Gastrointestinal: Negative for diarrhea and nausea  Genitourinary: Positive for dysuria  Negative for discharge, enuresis, frequency, genital sores, hematuria, penile pain and penile swelling  Musculoskeletal: Negative for arthralgias and back pain  Skin: Negative for color change and rash  Neurological: Negative for dizziness and headaches     Psychiatric/Behavioral: Negative for agitation and behavioral problems  Objective:      /64 (BP Location: Left arm, Patient Position: Sitting, Cuff Size: Standard)   Pulse 88   Temp 98 7 °F (37 1 °C) (Tympanic)   Resp 18   Ht 5' 9" (1 753 m)   Wt 70 2 kg (154 lb 12 8 oz)   SpO2 91%   BMI 22 86 kg/m²          Physical Exam   Constitutional: He is oriented to person, place, and time  He appears well-developed and well-nourished  No distress  Eyes: Pupils are equal, round, and reactive to light  No scleral icterus  Cardiovascular: Normal rate, regular rhythm and normal heart sounds  No murmur heard  Pulmonary/Chest: Effort normal and breath sounds normal  No respiratory distress  He has no wheezes  Abdominal: Soft  Bowel sounds are normal  He exhibits no distension  There is no tenderness  Neurological: He is alert and oriented to person, place, and time  Skin: Skin is warm and dry  No rash noted  He is not diaphoretic  Psychiatric: He has a normal mood and affect

## 2019-08-02 ENCOUNTER — APPOINTMENT (OUTPATIENT)
Dept: LAB | Facility: CLINIC | Age: 74
End: 2019-08-02
Payer: MEDICARE

## 2019-08-02 DIAGNOSIS — R30.0 DYSURIA: ICD-10-CM

## 2019-08-02 PROCEDURE — 87186 SC STD MICRODIL/AGAR DIL: CPT

## 2019-08-02 PROCEDURE — 87077 CULTURE AEROBIC IDENTIFY: CPT

## 2019-08-02 PROCEDURE — 87147 CULTURE TYPE IMMUNOLOGIC: CPT

## 2019-08-02 PROCEDURE — 87086 URINE CULTURE/COLONY COUNT: CPT

## 2019-08-04 LAB — BACTERIA UR CULT: ABNORMAL

## 2019-08-05 DIAGNOSIS — R30.0 DYSURIA: Primary | ICD-10-CM

## 2019-08-05 RX ORDER — NITROFURANTOIN 25; 75 MG/1; MG/1
100 CAPSULE ORAL 2 TIMES DAILY
Qty: 10 CAPSULE | Refills: 0 | Status: SHIPPED | OUTPATIENT
Start: 2019-08-05 | End: 2019-08-10

## 2019-08-12 ENCOUNTER — TELEPHONE (OUTPATIENT)
Dept: FAMILY MEDICINE CLINIC | Facility: CLINIC | Age: 74
End: 2019-08-12

## 2019-08-12 DIAGNOSIS — R30.0 DYSURIA: Primary | ICD-10-CM

## 2019-08-12 RX ORDER — LEVOFLOXACIN 500 MG/1
500 TABLET, FILM COATED ORAL EVERY 24 HOURS
Qty: 5 TABLET | Refills: 0 | Status: SHIPPED | OUTPATIENT
Start: 2019-08-12 | End: 2019-08-17

## 2019-08-12 NOTE — TELEPHONE ENCOUNTER
Pt finished the antibiotic sat morning  He still has burning with urination, dark urine  What to do?  Call [phone] home   [facility]

## 2019-08-25 ENCOUNTER — HOSPITAL ENCOUNTER (EMERGENCY)
Facility: HOSPITAL | Age: 74
Discharge: HOME | End: 2019-08-25
Attending: EMERGENCY MEDICINE | Admitting: EMERGENCY MEDICINE
Payer: MEDICARE

## 2019-08-25 VITALS
OXYGEN SATURATION: 97 % | TEMPERATURE: 97.6 F | RESPIRATION RATE: 20 BRPM | HEART RATE: 97 BPM | WEIGHT: 160 LBS | BODY MASS INDEX: 23.7 KG/M2 | HEIGHT: 69 IN | SYSTOLIC BLOOD PRESSURE: 143 MMHG | DIASTOLIC BLOOD PRESSURE: 88 MMHG

## 2019-08-25 DIAGNOSIS — N39.0 UTI (URINARY TRACT INFECTION): ICD-10-CM

## 2019-08-25 DIAGNOSIS — R31.9 HEMATURIA: Primary | ICD-10-CM

## 2019-08-25 LAB
BACTERIA UR QL AUTO: ABNORMAL /HPF
BILIRUB UR QL STRIP: NEGATIVE
CLARITY UR: ABNORMAL
COLOR UR: ABNORMAL
GLUCOSE UR STRIP-MCNC: ABNORMAL MG/DL
HGB UR QL STRIP.AUTO: ABNORMAL
KETONES UR STRIP-MCNC: NEGATIVE MG/DL
LEUKOCYTE ESTERASE UR QL STRIP: ABNORMAL
NITRITE UR QL STRIP: NEGATIVE
NON-SQ EPI CELLS URNS QL MICRO: ABNORMAL /HPF
PH UR STRIP.AUTO: 7.5 [PH]
PROT UR STRIP-MCNC: >=300 MG/DL
RBC #/AREA URNS AUTO: ABNORMAL /HPF
SP GR UR STRIP.AUTO: 1.02 (ref 1–1.03)
UROBILINOGEN UR QL STRIP.AUTO: 0.2 E.U./DL
WBC #/AREA URNS AUTO: ABNORMAL /HPF

## 2019-08-25 PROCEDURE — 81001 URINALYSIS AUTO W/SCOPE: CPT | Performed by: EMERGENCY MEDICINE

## 2019-08-25 PROCEDURE — 99284 EMERGENCY DEPT VISIT MOD MDM: CPT | Performed by: EMERGENCY MEDICINE

## 2019-08-25 PROCEDURE — 99283 EMERGENCY DEPT VISIT LOW MDM: CPT

## 2019-08-25 RX ORDER — PHENAZOPYRIDINE HYDROCHLORIDE 100 MG/1
200 TABLET, FILM COATED ORAL ONCE
Status: COMPLETED | OUTPATIENT
Start: 2019-08-25 | End: 2019-08-25

## 2019-08-25 RX ORDER — SULFAMETHOXAZOLE AND TRIMETHOPRIM 800; 160 MG/1; MG/1
1 TABLET ORAL 2 TIMES DAILY
Qty: 28 TABLET | Refills: 0 | Status: SHIPPED | OUTPATIENT
Start: 2019-08-25 | End: 2019-09-08

## 2019-08-25 RX ORDER — SULFAMETHOXAZOLE AND TRIMETHOPRIM 800; 160 MG/1; MG/1
1 TABLET ORAL ONCE
Status: COMPLETED | OUTPATIENT
Start: 2019-08-25 | End: 2019-08-25

## 2019-08-25 RX ORDER — PHENAZOPYRIDINE HYDROCHLORIDE 200 MG/1
200 TABLET, FILM COATED ORAL 3 TIMES DAILY
Qty: 6 TABLET | Refills: 0 | Status: SHIPPED | OUTPATIENT
Start: 2019-08-25 | End: 2019-08-27

## 2019-08-25 RX ADMIN — PHENAZOPYRIDINE 200 MG: 100 TABLET ORAL at 05:19

## 2019-08-25 RX ADMIN — SULFAMETHOXAZOLE AND TRIMETHOPRIM 1 TABLET: 800; 160 TABLET ORAL at 05:19

## 2019-08-25 NOTE — ED PROVIDER NOTES
History  Chief Complaint   Patient presents with    Blood in Urine     Patient c/o blood in urine that just started today  Patient states"he is having urinary frequency and burning"     68 yo male with hx of recent UTI  Was started on macrobid and switched to levaquin on   Pt woke up at 0200 and felt dysuria and noted hematuria with clots  NO issues with flow  History provided by:  Patient   used: No    Blood in Urine   This is a recurrent problem  The current episode started today  The problem is unchanged  He describes the hematuria as gross hematuria  His pain is at a severity of 2/10  The pain is mild  He describes his urine color as dark red  Irritative symptoms include frequency and urgency  Associated symptoms include dysuria  Pertinent negatives include no abdominal pain, chills, fever, flank pain, inability to urinate, nausea or vomiting  Prior to Admission Medications   Prescriptions Last Dose Informant Patient Reported? Taking?   aspirin 81 MG tablet   Yes No   Si cap(s)   glycopyrrolate (ROBINUL) 2 MG tablet   No Yes   Sig: TAKE 1 TABLET (2 MG TOTAL) BY MOUTH 2 (TWO) TIMES A DAY FOR 30 DAYS      Facility-Administered Medications: None       Past Medical History:   Diagnosis Date    Cancer ([facility] [address] )     tongue cancer       Past Surgical History:   Procedure Laterality Date    EYELID CLOSURE      HERNIA REPAIR      TONGUE SURGERY         History reviewed  No pertinent family history  I have reviewed and agree with the history as documented  Social History     Tobacco Use    Smoking status: Former Smoker    Smokeless tobacco: Former User   Substance Use Topics    Alcohol use: Never     Frequency: Never    Drug use: Never        Review of Systems   Constitutional: Negative for chills and fever  HENT: Negative for congestion and sore throat  Eyes: Negative for visual disturbance  Respiratory: Negative for shortness of breath and wheezing  Cardiovascular: Negative for chest pain and palpitations  Gastrointestinal: Negative for abdominal pain, diarrhea, nausea and vomiting  Genitourinary: Positive for dysuria, frequency, hematuria and urgency  Negative for flank pain  Musculoskeletal: Negative for neck pain and neck stiffness  Skin: Negative for pallor and rash  Neurological: Negative for headaches  Psychiatric/Behavioral: Negative for confusion  All other systems reviewed and are negative  Physical Exam  Physical Exam   Constitutional: He is oriented to person, place, and time  He appears well-developed and well-nourished  No distress  HENT:   Head: Normocephalic and atraumatic  Mouth/Throat: Oropharynx is clear and moist    Eyes: EOM are normal    Neck: Neck supple  Cardiovascular: Normal rate and regular rhythm  No murmur heard  Pulmonary/Chest: Effort normal and breath sounds normal    Abdominal: Soft  Bowel sounds are normal  He exhibits no distension  There is no tenderness  Musculoskeletal: Normal range of motion  He exhibits no edema  Neurological: He is alert and oriented to person, place, and time  Skin: Skin is warm  No rash noted  No pallor  Psychiatric: He has a normal mood and affect  His behavior is normal    Nursing note and vitals reviewed        Vital Signs  ED Triage Vitals [08/25/19 0451]   Temperature Pulse Respirations Blood Pressure SpO2   97 6 °F (36 4 °C) 97 20 143/88 97 %      Temp Source Heart Rate Source Patient Position - Orthostatic VS BP Location FiO2 (%)   Oral Monitor -- -- --      Pain Score       No Pain           Vitals:    08/25/19 0451   BP: 143/88   Pulse: 97         Visual Acuity      ED Medications  Medications   sulfamethoxazole-trimethoprim (BACTRIM DS) 800-160 mg per tablet 1 tablet (1 tablet Oral Given 8/25/19 0519)   phenazopyridine (PYRIDIUM) tablet 200 mg (200 mg Oral Given 8/25/19 0519)       Diagnostic Studies  Results Reviewed     Procedure Component Value Units Date/Time    UA w Reflex to Microscopic w Reflex to Culture [[id number]]  (Abnormal) Collected:  08/25/19 0457    Lab Status:  Final result Specimen:  Urine, Clean Catch Updated:  08/25/19 0506     Color, UA Red     Clarity, UA Turbid     Specific [place], UA 1 020     pH, UA 7 5     Leukocytes, UA Small     Nitrite, UA Negative     Protein, UA >=300 mg/dl      Glucose,  (1/10%) mg/dl      Ketones, UA Negative mg/dl      Urobilinogen, UA 0 2 E U /dl      Bilirubin, UA Negative     Blood, UA Large    Urine Microscopic [[id number]] Collected:  08/25/19 0457    Lab Status: In process Specimen:  Urine, Clean Catch Updated:  08/25/19 0506                 No orders to display              Procedures  Procedures       ED Course  ED Course as of Aug 25 0522   Hernan Shaver Aug 25, 2019   0454 Pt seen and examined  70 yo male with hx of recent UTI  Was started on macrobid and switched to levaquin on 8/11  Pt woke up at 0200 and felt dysuria and noted hematuria with clots  NO issues with flow  Will send urine which is gross hematuria with clots for u/a, cx       [id number] Urine confirms UTI - will treat with               Identification of Seniors at Risk      Most Recent Value   (ISAR) Identification of Seniors at Risk   Before the illness or injury that brought you to the Emergency, did you need someone to help you on a regular basis? 0 Filed at: 08/25/2019 0454   In the last 24 hours, have you needed more help than usual?  0 Filed at: 08/25/2019 0454   Have you been hospitalized for one or more nights during the past 6 months? 0 Filed at: 08/25/2019 0454   In general, do you see well?  0 Filed at: 08/25/2019 0454   In general, do you have serious problems with your memory?   0 Filed at: 08/25/2019 0454   Do you take more than three different medications every day?  0 Filed at: 08/25/2019 0454   ISAR Score  0 Filed at: 08/25/2019 0454                          MDM    Disposition  Final diagnoses:   Hematuria   UTI (urinary tract infection)     Time reflects when diagnosis was documented in both MDM as applicable and the Disposition within this note     Time User Action Codes Description Comment    8/25/2019  5:15 AM Rad ESTEBAN Add [R31 9] Hematuria     8/25/2019  5:15 AM Rad ESTEBAN Add [N39 0] UTI (urinary tract infection)       ED Disposition     ED Disposition Condition Date/Time Comment    Discharge Stable Sun Aug 25, 2019  5:15 AM August Dillardkayleen discharge to home/self care  Follow-up Information     Follow up With Specialties Details Why Contact Info Additional 244 Ramses Mendieta MD Family Medicine Schedule an appointment as soon as possible for a visit   [phone] [facility] Schedule an appointment as soon as possible for a visit   [address]  [address] [facility] [address]  70  [facility], [address] [facility],  [address], [place], [place], [address]          Discharge Medication List as of 8/25/2019  5:16 AM      START taking these medications    Details   phenazopyridine (PYRIDIUM) 200 mg tablet Take 1 tablet (200 mg total) by mouth 3 (three) times a day for 2 days, Starting Sun 8/25/2019, Until Tue 8/27/2019, Print      sulfamethoxazole-trimethoprim (BACTRIM DS) 800-160 mg per tablet Take 1 tablet by mouth 2 (two) times a day for 14 days smx-tmp DS (BACTRIM) 800-160 mg tabs (1tab q12 D10), Starting Sun 8/25/2019, Until Sun 9/8/2019, Print         CONTINUE these medications which have NOT CHANGED    Details   glycopyrrolate (ROBINUL) 2 MG tablet TAKE 1 TABLET (2 MG TOTAL) BY MOUTH 2 (TWO) TIMES A DAY FOR 30 DAYS, Starting Wed 7/24/2019, Until Sun 8/25/2019, Normal      aspirin 81 MG tablet 1 cap(s), Historical Med           No discharge procedures on file      ED Provider  Electronically Signed by           Muna Carpenter DO  08/25/19 0522

## 2019-08-26 ENCOUNTER — OFFICE VISIT (OUTPATIENT)
Dept: FAMILY MEDICINE CLINIC | Facility: CLINIC | Age: 74
End: 2019-08-26
Payer: MEDICARE

## 2019-08-26 VITALS
WEIGHT: 158 LBS | SYSTOLIC BLOOD PRESSURE: 132 MMHG | DIASTOLIC BLOOD PRESSURE: 78 MMHG | HEIGHT: 69 IN | RESPIRATION RATE: 18 BRPM | TEMPERATURE: 97.7 F | BODY MASS INDEX: 23.4 KG/M2 | OXYGEN SATURATION: 96 % | HEART RATE: 84 BPM

## 2019-08-26 DIAGNOSIS — N30.01 ACUTE CYSTITIS WITH HEMATURIA: Primary | ICD-10-CM

## 2019-08-26 PROCEDURE — 99214 OFFICE O/P EST MOD 30 MIN: CPT | Performed by: FAMILY MEDICINE

## 2019-08-26 NOTE — PROGRESS NOTES
Assessment/Plan:       Diagnoses and all orders for this visit:    Acute cystitis with hematuria  Comments:  on bactrim and pyridium  Sx improved today  13 more days of Bactrim  repeat UA in 2 weeks -if persistent hematuria will need to see Urology   Orders:  -     UA (URINE) with reflex to Microscopic; Future          Subjective:      Patient ID: Spring Fenton is a 69 y o  male  Patient was in the ER over the weekend for hematuria  Was recently diagnosed with UTI 1 month ago and was originally treated with Macrobid then switched to [address]  He was in the ED and received 2 weeks of Bactrim and pyridium  States his sx have come and gone over the past month  He denies flank pain and fever  He has no hx of UTI or renal stone  Today he denies hematuria, frequency  He still endorses urgency  He is former smoker, has history tongue cancer  He says he has a history of microscopic hematuria which was attributed to an underlying blood disorder, 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  Unclear history  He says he follow up with hematology but never saw urology  The following portions of the patient's history were reviewed and updated as appropriate:   He  has a past medical history of Cancer ([facility] [address] )  He   Patient Active Problem List    Diagnosis Date Noted    Idiopathic hypotension 07/31/2019    Dysuria 07/31/2019    Weight loss 07/31/2019    Uncontrolled hypertension 05/14/2019    Dizziness 05/14/2019    History of head and neck radiation 05/14/2019    Essential hypertension 05/06/2019    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[facility] [address] ) 02/14/2019    Excessive salivation 01/10/2019    Medicare annual wellness visit, subsequent 01/10/2019    Colon cancer screening 01/10/2019    Encounter for hepatitis C screening test for low risk patient 01/10/2019    Screening, lipid 01/10/2019    Abnormal kidney function 01/10/2019     He  has a past surgical history that includes Tongue surgery; Hernia repair; and Eyelid closure    His family history is not on file  He  reports that he has quit smoking  He has quit using smokeless tobacco  He reports that he does not drink alcohol or use drugs  Current Outpatient Medications   Medication Sig Dispense Refill    aspirin 81 MG tablet 1 cap(s)      glycopyrrolate (ROBINUL) 2 MG tablet TAKE 1 TABLET (2 MG TOTAL) BY MOUTH 2 (TWO) TIMES A DAY FOR 30 DAYS 60 tablet 1    phenazopyridine (PYRIDIUM) 200 mg tablet Take 1 tablet (200 mg total) by mouth 3 (three) times a day for 2 days 6 tablet 0    sulfamethoxazole-trimethoprim (BACTRIM DS) 800-160 mg per tablet Take 1 tablet by mouth 2 (two) times a day for 14 days smx-tmp DS (BACTRIM) 800-160 mg tabs (1tab q12 D10) 28 tablet 0     No current facility-administered medications for this visit  Current Outpatient Medications on File Prior to Visit   Medication Sig    aspirin 81 MG tablet 1 cap(s)    glycopyrrolate (ROBINUL) 2 MG tablet TAKE 1 TABLET (2 MG TOTAL) BY MOUTH 2 (TWO) TIMES A DAY FOR 30 DAYS    phenazopyridine (PYRIDIUM) 200 mg tablet Take 1 tablet (200 mg total) by mouth 3 (three) times a day for 2 days    sulfamethoxazole-trimethoprim (BACTRIM DS) 800-160 mg per tablet Take 1 tablet by mouth 2 (two) times a day for 14 days smx-tmp DS (BACTRIM) 800-160 mg tabs (1tab q12 D10)     No current facility-administered medications on file prior to visit  He is allergic to iodinated diagnostic agents and iodine       Review of Systems   Constitutional: Negative for activity change, appetite change, chills, fatigue, fever and unexpected weight change  HENT: Negative for congestion, ear discharge, ear pain, postnasal drip, sinus pressure and sore throat  Eyes: Negative for discharge and visual disturbance  Respiratory: Negative for cough, shortness of breath and wheezing  Cardiovascular: Negative for chest pain, palpitations and leg swelling     Gastrointestinal: Negative for abdominal pain, constipation, diarrhea, nausea and vomiting  Endocrine: Negative for cold intolerance, heat intolerance, polydipsia and polyuria  Genitourinary: Negative for difficulty urinating, flank pain, frequency, hematuria and urgency  Musculoskeletal: Negative for arthralgias, back pain, joint swelling and myalgias  Skin: Negative for rash  Neurological: Negative for dizziness, weakness, light-headedness, numbness and headaches  Hematological: Negative for adenopathy  Psychiatric/Behavioral: Negative for behavioral problems, confusion, dysphoric mood, sleep disturbance and suicidal ideas  The patient is not nervous/anxious  Objective:      /78   Pulse 84   Temp 97 7 °F (36 5 °C)   Resp 18   Ht 5' 9" (1 753 m)   Wt 71 7 kg (158 lb)   SpO2 96%   BMI 23 33 kg/m²          Physical Exam   Constitutional: He is oriented to person, place, and time  He appears well-developed and well-nourished  No distress  HENT:   Head: Normocephalic and atraumatic  Cardiovascular: Normal rate, regular rhythm and normal heart sounds  Exam reveals no gallop and no friction rub  No murmur heard  Pulmonary/Chest: Effort normal and breath sounds normal  No respiratory distress  He has no wheezes  He has no rales  He exhibits no tenderness  Abdominal: Soft  Bowel sounds are normal  He exhibits no distension  There is no tenderness  Musculoskeletal: He exhibits no edema  Neurological: He is alert and oriented to person, place, and time  Skin: He is not diaphoretic  Psychiatric: He has a normal mood and affect  His behavior is normal  Judgment and thought content normal    Nursing note and vitals reviewed

## 2019-09-05 ENCOUNTER — TELEPHONE (OUTPATIENT)
Dept: FAMILY MEDICINE CLINIC | Facility: CLINIC | Age: 74
End: 2019-09-05

## 2019-09-05 NOTE — TELEPHONE ENCOUNTER
Pt calls asking for advice - he has been experiencing leg cramps  In both legs - worse at night but gets them during the day as well  Theres no swelling  hes not sure what to try or do   He never had this before    Call pt   Thanks rb

## 2019-09-06 NOTE — TELEPHONE ENCOUNTER
Please advise him he can take Vitamin B complex, Vitamin E, iron and magnesium for leg cramps if symptoms continue needs an appt   I believe Ranjana Strickland spoke with him already

## 2019-09-09 ENCOUNTER — APPOINTMENT (OUTPATIENT)
Dept: LAB | Facility: CLINIC | Age: 74
End: 2019-09-09
Payer: MEDICARE

## 2019-09-09 DIAGNOSIS — N30.01 ACUTE CYSTITIS WITH HEMATURIA: ICD-10-CM

## 2019-09-09 LAB
BACTERIA UR QL AUTO: ABNORMAL /HPF
BILIRUB UR QL STRIP: NEGATIVE
CLARITY UR: CLEAR
COLOR UR: YELLOW
GLUCOSE UR STRIP-MCNC: NEGATIVE MG/DL
HGB UR QL STRIP.AUTO: ABNORMAL
KETONES UR STRIP-MCNC: NEGATIVE MG/DL
LEUKOCYTE ESTERASE UR QL STRIP: NEGATIVE
NITRITE UR QL STRIP: NEGATIVE
NON-SQ EPI CELLS URNS QL MICRO: ABNORMAL /HPF
PH UR STRIP.AUTO: 6 [PH]
PROT UR STRIP-MCNC: NEGATIVE MG/DL
RBC #/AREA URNS AUTO: ABNORMAL /HPF
SP GR UR STRIP.AUTO: 1.02 (ref 1–1.03)
UROBILINOGEN UR QL STRIP.AUTO: 0.2 E.U./DL
WBC #/AREA URNS AUTO: ABNORMAL /HPF

## 2019-09-09 PROCEDURE — 81001 URINALYSIS AUTO W/SCOPE: CPT | Performed by: FAMILY MEDICINE

## 2019-09-10 DIAGNOSIS — N30.01 ACUTE CYSTITIS WITH HEMATURIA: Primary | ICD-10-CM

## 2019-09-12 ENCOUNTER — CLINICAL SUPPORT (OUTPATIENT)
Dept: FAMILY MEDICINE CLINIC | Facility: CLINIC | Age: 74
End: 2019-09-12

## 2019-09-12 DIAGNOSIS — N30.01 ACUTE CYSTITIS WITH HEMATURIA: Primary | ICD-10-CM

## 2019-09-12 PROCEDURE — RECHECK: Performed by: FAMILY MEDICINE

## 2019-09-12 PROCEDURE — 87086 URINE CULTURE/COLONY COUNT: CPT | Performed by: FAMILY MEDICINE

## 2019-09-13 LAB — BACTERIA UR CULT: NORMAL

## 2019-09-25 ENCOUNTER — TRANSCRIBE ORDERS (OUTPATIENT)
Dept: ADMINISTRATIVE | Facility: HOSPITAL | Age: 74
End: 2019-09-25

## 2019-09-25 ENCOUNTER — APPOINTMENT (OUTPATIENT)
Dept: LAB | Facility: CLINIC | Age: 74
End: 2019-09-25
Payer: MEDICARE

## 2019-09-25 DIAGNOSIS — D47.2 MONOCLONAL GAMMOPATHY OF UNKNOWN SIGNIFICANCE: ICD-10-CM

## 2019-09-25 DIAGNOSIS — D47.2 MONOCLONAL GAMMOPATHY OF UNKNOWN SIGNIFICANCE: Primary | ICD-10-CM

## 2019-09-25 LAB
ALBUMIN SERPL BCP-MCNC: 3.8 G/DL (ref 3.5–5)
ALP SERPL-CCNC: 57 U/L (ref 46–116)
ALT SERPL W P-5'-P-CCNC: 19 U/L (ref 12–78)
ANION GAP SERPL CALCULATED.3IONS-SCNC: 6 MMOL/L (ref 4–13)
AST SERPL W P-5'-P-CCNC: 14 U/L (ref 5–45)
BASOPHILS # BLD AUTO: 0.06 THOUSANDS/ΜL (ref 0–0.1)
BASOPHILS NFR BLD AUTO: 1 % (ref 0–1)
BILIRUB SERPL-MCNC: 0.26 MG/DL (ref 0.2–1)
BUN SERPL-MCNC: 27 MG/DL (ref 5–25)
CALCIUM SERPL-MCNC: 9.4 MG/DL (ref 8.3–10.1)
CHLORIDE SERPL-SCNC: 108 MMOL/L (ref 100–108)
CO2 SERPL-SCNC: 28 MMOL/L (ref 21–32)
CREAT SERPL-MCNC: 1.5 MG/DL (ref 0.6–1.3)
EOSINOPHIL # BLD AUTO: 0.23 THOUSAND/ΜL (ref 0–0.61)
EOSINOPHIL NFR BLD AUTO: 4 % (ref 0–6)
ERYTHROCYTE [DISTWIDTH] IN BLOOD BY AUTOMATED COUNT: 14.6 % (ref 11.6–15.1)
GFR SERPL CREATININE-BSD FRML MDRD: 46 ML/MIN/1.73SQ M
GLUCOSE SERPL-MCNC: 67 MG/DL (ref 65–140)
HCT VFR BLD AUTO: 39.1 % (ref 36.5–49.3)
HGB BLD-MCNC: 12.1 G/DL (ref 12–17)
IGA SERPL-MCNC: 72 MG/DL (ref 70–400)
IGG SERPL-MCNC: 1650 MG/DL (ref 700–1600)
IGM SERPL-MCNC: 29 MG/DL (ref 40–230)
IMM GRANULOCYTES # BLD AUTO: 0.02 THOUSAND/UL (ref 0–0.2)
IMM GRANULOCYTES NFR BLD AUTO: 0 % (ref 0–2)
LYMPHOCYTES # BLD AUTO: 1.02 THOUSANDS/ΜL (ref 0.6–4.47)
LYMPHOCYTES NFR BLD AUTO: 20 % (ref 14–44)
MCH RBC QN AUTO: 26.2 PG (ref 26.8–34.3)
MCHC RBC AUTO-ENTMCNC: 30.9 G/DL (ref 31.4–37.4)
MCV RBC AUTO: 85 FL (ref 82–98)
MONOCYTES # BLD AUTO: 0.48 THOUSAND/ΜL (ref 0.17–1.22)
MONOCYTES NFR BLD AUTO: 9 % (ref 4–12)
NEUTROPHILS # BLD AUTO: 3.38 THOUSANDS/ΜL (ref 1.85–7.62)
NEUTS SEG NFR BLD AUTO: 66 % (ref 43–75)
NRBC BLD AUTO-RTO: 0 /100 WBCS
PLATELET # BLD AUTO: 364 THOUSANDS/UL (ref 149–390)
PMV BLD AUTO: 9.1 FL (ref 8.9–12.7)
POTASSIUM SERPL-SCNC: 4.6 MMOL/L (ref 3.5–5.3)
PROT SERPL-MCNC: 7.6 G/DL (ref 6.4–8.2)
RBC # BLD AUTO: 4.61 MILLION/UL (ref 3.88–5.62)
SODIUM SERPL-SCNC: 142 MMOL/L (ref 136–145)
WBC # BLD AUTO: 5.19 THOUSAND/UL (ref 4.31–10.16)

## 2019-09-25 PROCEDURE — 84165 PROTEIN E-PHORESIS SERUM: CPT | Performed by: PATHOLOGY

## 2019-09-25 PROCEDURE — 82784 ASSAY IGA/IGD/IGG/IGM EACH: CPT

## 2019-09-25 PROCEDURE — 86334 IMMUNOFIX E-PHORESIS SERUM: CPT | Performed by: PATHOLOGY

## 2019-09-25 PROCEDURE — 83883 ASSAY NEPHELOMETRY NOT SPEC: CPT

## 2019-09-25 PROCEDURE — 84165 PROTEIN E-PHORESIS SERUM: CPT

## 2019-09-25 PROCEDURE — 80053 COMPREHEN METABOLIC PANEL: CPT

## 2019-09-25 PROCEDURE — 85025 COMPLETE CBC W/AUTO DIFF WBC: CPT

## 2019-09-25 PROCEDURE — 36415 COLL VENOUS BLD VENIPUNCTURE: CPT

## 2019-09-25 PROCEDURE — 82232 ASSAY OF BETA-2 PROTEIN: CPT

## 2019-09-25 PROCEDURE — 86334 IMMUNOFIX E-PHORESIS SERUM: CPT

## 2019-09-26 LAB
ALBUMIN SERPL ELPH-MCNC: 3.96 G/DL (ref 3.5–5)
ALBUMIN SERPL ELPH-MCNC: 54.2 % (ref 52–65)
ALPHA1 GLOB SERPL ELPH-MCNC: 0.37 G/DL (ref 0.1–0.4)
ALPHA1 GLOB SERPL ELPH-MCNC: 5 % (ref 2.5–5)
ALPHA2 GLOB SERPL ELPH-MCNC: 0.78 G/DL (ref 0.4–1.2)
ALPHA2 GLOB SERPL ELPH-MCNC: 10.7 % (ref 7–13)
BETA GLOB ABNORMAL SERPL ELPH-MCNC: 0.4 G/DL (ref 0.4–0.8)
BETA1 GLOB SERPL ELPH-MCNC: 5.5 % (ref 5–13)
BETA2 GLOB SERPL ELPH-MCNC: 3.7 % (ref 2–8)
BETA2+GAMMA GLOB SERPL ELPH-MCNC: 0.27 G/DL (ref 0.2–0.5)
GAMMA GLOB ABNORMAL SERPL ELPH-MCNC: 1.53 G/DL (ref 0.5–1.6)
GAMMA GLOB SERPL ELPH-MCNC: 20.9 % (ref 12–22)
IGG/ALB SER: 1.18 {RATIO} (ref 1.1–1.8)
INTERPRETATION UR IFE-IMP: NORMAL
KAPPA LC FREE SER-MCNC: 53.3 MG/L (ref 3.3–19.4)
KAPPA LC FREE/LAMBDA FREE SER: 0.77 {RATIO} (ref 0.26–1.65)
LAMBDA LC FREE SERPL-MCNC: 69.3 MG/L (ref 5.7–26.3)
M PROTEIN 1 MFR SERPL ELPH: 8 %
M PROTEIN 1 SERPL ELPH-MCNC: 0.58 G/DL
PROT PATTERN SERPL ELPH-IMP: NORMAL
PROT SERPL-MCNC: 7.3 G/DL (ref 6.4–8.2)

## 2019-09-27 LAB — B2 MICROGLOB SERPL-MCNC: 2.7 MG/L (ref 0.6–2.4)

## 2019-10-09 ENCOUNTER — OFFICE VISIT (OUTPATIENT)
Dept: FAMILY MEDICINE CLINIC | Facility: CLINIC | Age: 74
End: 2019-10-09
Payer: MEDICARE

## 2019-10-09 VITALS
DIASTOLIC BLOOD PRESSURE: 80 MMHG | TEMPERATURE: 95.6 F | HEIGHT: 69 IN | BODY MASS INDEX: 23.58 KG/M2 | WEIGHT: 159.2 LBS | RESPIRATION RATE: 18 BRPM | SYSTOLIC BLOOD PRESSURE: 142 MMHG | HEART RATE: 71 BPM | OXYGEN SATURATION: 95 %

## 2019-10-09 DIAGNOSIS — N32.81 OVERACTIVE BLADDER: Primary | ICD-10-CM

## 2019-10-09 PROCEDURE — 99214 OFFICE O/P EST MOD 30 MIN: CPT | Performed by: FAMILY MEDICINE

## 2019-10-09 RX ORDER — AMLODIPINE BESYLATE 5 MG/1
5 TABLET ORAL DAILY
COMMUNITY
End: 2020-07-13 | Stop reason: SDUPTHER

## 2019-10-09 RX ORDER — OXYBUTYNIN CHLORIDE 5 MG/1
5 TABLET ORAL 2 TIMES DAILY
Qty: 60 TABLET | Refills: 1 | Status: SHIPPED | OUTPATIENT
Start: 2019-10-09 | End: 2019-12-24 | Stop reason: SDUPTHER

## 2019-10-09 NOTE — PROGRESS NOTES
Assessment/Plan:    No problem-specific Assessment & Plan notes found for this encounter  Diagnoses and all orders for this visit:    Overactive bladder  After discussing risks and benefits of medication along with side effects will start oxybutynin at this time  -     oxybutynin (DITROPAN) 5 mg tablet; Take 1 tablet (5 mg total) by mouth 2 (two) times a day  -     Ambulatory referral to Urology; Future    Other orders  -     amLODIPine (NORVASC) 5 mg tablet; Take 5 mg by mouth daily      follow-up in 1 month or as needed  Subjective:      Patient ID: Gigi Correa is a 69 y o  male  Patient is here because he has been having urinary urgency and frequency over the past several months  He has been tested for urinary infection in the past however his urine culture has been negative  He does have evidence of trace blood in the urine which per the patient has been going on for many years he is 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 by Dr Katelyn Silva hematologist oncologist from [facility]  He denies any gross blood in the urine he denies any burning with urination he denies feelings of incomplete voiding or pushing and straining when he urinates  He denies any other complaints related to this  The following portions of the patient's history were reviewed and updated as appropriate:   He  has a past medical history of Cancer ([address] )    He   Patient Active Problem List    Diagnosis Date Noted    Overactive bladder 10/09/2019    Idiopathic hypotension 07/31/2019    Dysuria 07/31/2019    Weight loss 07/31/2019    Uncontrolled hypertension 05/14/2019    Dizziness 05/14/2019    History of head and neck radiation 05/14/2019    Essential hypertension 05/06/2019    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[facility] [address] ) 02/14/2019    Excessive salivation 01/10/2019    Medicare annual wellness visit, subsequent 01/10/2019    Colon cancer screening 01/10/2019    Encounter for hepatitis C screening test for low risk patient 01/10/2019    Screening, lipid 01/10/2019    Abnormal kidney function 01/10/2019     He  has a past surgical history that includes Tongue surgery; Hernia repair; and Eyelid closure  His family history is not on file  He  reports that he has quit smoking  He has quit using smokeless tobacco  He reports that he does not drink alcohol or use drugs  Current Outpatient Medications   Medication Sig Dispense Refill    amLODIPine (NORVASC) 5 mg tablet Take 5 mg by mouth daily      aspirin 81 MG tablet 1 cap(s)      oxybutynin (DITROPAN) 5 mg tablet Take 1 tablet (5 mg total) by mouth 2 (two) times a day 60 tablet 1     No current facility-administered medications for this visit  Current Outpatient Medications on File Prior to Visit   Medication Sig    amLODIPine (NORVASC) 5 mg tablet Take 5 mg by mouth daily    aspirin 81 MG tablet 1 cap(s)    [DISCONTINUED] glycopyrrolate (ROBINUL) 2 MG tablet TAKE 1 TABLET (2 MG TOTAL) BY MOUTH 2 (TWO) TIMES A DAY FOR 30 DAYS (Patient not taking: Reported on 10/9/2019)     No current facility-administered medications on file prior to visit  He is allergic to iodinated diagnostic agents and iodine       Review of Systems   Constitutional: Negative for activity change, appetite change, fatigue and fever  HENT: Negative for congestion and ear discharge  Respiratory: Negative for cough and shortness of breath  Cardiovascular: Negative for chest pain and palpitations  Gastrointestinal: Negative for diarrhea and nausea  Genitourinary: Positive for frequency and urgency  Negative for decreased urine volume, difficulty urinating, discharge, dysuria, enuresis, flank pain, genital sores, hematuria, penile pain, penile swelling, scrotal swelling and testicular pain  Musculoskeletal: Negative for arthralgias and back pain  Skin: Negative for color change and rash  Neurological: Negative for dizziness and headaches  Psychiatric/Behavioral: Negative for agitation and behavioral problems  Objective:      /80   Pulse 71   Temp (!) 95 6 °F (35 3 °C)   Resp 18   Ht 5' 9" (1 753 m)   Wt 72 2 kg (159 lb 3 2 oz)   SpO2 95%   BMI 23 51 kg/m²          Physical Exam   Constitutional: He is oriented to person, place, and time  He appears well-developed and well-nourished  No distress  Eyes: Pupils are equal, round, and reactive to light  No scleral icterus  Cardiovascular: Normal rate, regular rhythm and normal heart sounds  No murmur heard  Pulmonary/Chest: Effort normal and breath sounds normal  No respiratory distress  He has no wheezes  Abdominal: Soft  Bowel sounds are normal  He exhibits no distension  There is no tenderness  Neurological: He is alert and oriented to person, place, and time  Skin: Skin is warm and dry  No rash noted  He is not diaphoretic  Psychiatric: He has a normal mood and affect

## 2019-10-14 NOTE — PATIENT INSTRUCTIONS
Overactive Bladder   WHAT YOU NEED TO KNOW:   What is overactive bladder? Overactive bladder is a sudden urge to urinate that is difficult for you to control  It occurs when the muscles of the bladder contract (tighten) more than normal  This causes a frequent or sudden need to urinate  You usually have to urinate more than 8 times in 24 hours  You may need to get up more than once in the middle of the night to urinate  You may also leak urine before you are able to make it to the bathroom  What increases my risk for overactive bladder? Your risk for overactive bladder increases as you get older  Previous vaginal birth, chronic constipation, and diabetes increase your risk  Obesity, nerve injury, stroke, and spinal cord problems also increase your risk  How is overactive bladder diagnosed? Your healthcare provider will ask questions about your symptoms  He will also ask you about any medical conditions you have and the medicines you take  He may examine your pelvic area and abdomen to look for problems that may be causing your symptoms  You may need any of the following:  · Blood and urine tests  may be done to look for signs of infection or blood or glucose (sugar) in your urine  · You may be asked to keep a record  about your urination patterns for a few days  Write down the number of times you urinate over 24 hours, the amount, and if you have urine leakage  Your healthcare provider may also want you to record the type and amount of liquids you drink  · An ultrasound  of your bladder may be done  The amount of urine left in your bladder after you urinate will be measured  · A cystoscopy  may show problems inside your bladder  A cystoscope is put into your bladder through your urethra  The urethra is the tube that urine flows through when you urinate  The cystoscope is a long tube with a lens and a light on the end  · Urodynamic testing  may show how well your bladder works   A narrow tube is placed in your urethra and another is placed in your rectum  Ask for more information about this test   How is overactive bladder managed? · Limit liquids as directed  Limit liquids to decrease the amount you urinate  Ask how much liquid to drink each day and which liquids are best for you  You may need to avoid drinking liquids several hours before you go to sleep  Your healthcare provider may also recommend that you limit caffeine and alcohol  · Exercise regularly and maintain a healthy weight  Ask your healthcare provider how much you should weigh and about the best exercise plan for you  Extra weight puts pressure on your bladder and may make your symptoms worse  Ask him to help you create a weight loss plan if you are overweight  · Do pelvic muscle exercises often  Your pelvic muscles help you stop urinating  Squeeze these muscles tightly for 5 seconds, then relax for 5 seconds  Gradually work up to squeezing for 10 seconds  Do 3 sets of 15 repetitions a day, or as directed  This will help strengthen your pelvic muscles and improve bladder control  · Train your bladder  Go to the bathroom at set times, such as every 2 hours, even if you do not feel the urge to go  You can also try to hold your urine when you feel the urge to go  For example, hold your urine for 5 minutes when you feel the urge to go  As that becomes easier, hold your urine for 10 minutes  Work up to every 3 or 4 hours to help control your bladder  How is overactive bladder treated? The following treatments may be done if other methods are not working:  · Medicines  may be given to relax your bladder and decrease urination  · Sacral nerve stimulation sends electrical signals to your sacral nerve through a small device implanted under your skin  Your sacral nerve controls your bladder, sphincter, and pelvic floor muscles  · Surgery  may be done if all other treatments cannot help you control your bladder    When should I contact my healthcare provider? · Your urine is pink, or you notice blood in your urine  · You have pain with urination  · You continue to have symptoms even after you take your medicine  · You have questions or concerns about your condition or care  CARE AGREEMENT:   You have the right to help plan your care  Learn about your health condition and how it may be treated  Discuss treatment options with your caregivers to decide what care you want to receive  You always have the right to refuse treatment  The above information is an  only  It is not intended as medical advice for individual conditions or treatments  Talk to your doctor, nurse or pharmacist before following any medical regimen to see if it is safe and effective for you  © 2017 [address] Information is for End User's use only and may not be sold, redistributed or otherwise used for commercial purposes  All illustrations and images included in CareNotes® are the copyrighted property of [employer] M [employer] Inc  or [employer]  Decision Aid for Benign Prostatic Hyperplasia   WHAT YOU NEED TO KNOW:   What do I need to know about decisions for benign prostatic hyperplasia (BPH)? You can work with your healthcare provider to make decisions about being screened or treated for BPH  Screening is a test done to find BPH early  Screening is different from diagnosis because screening is used before you first start to have signs or symptoms  This means management or treatment can start early  You can also help plan treatment if BPH is found with screening, or you develop it later on  Your treatment choices include nonsurgical options and surgery  Your healthcare provider may recommend nonsurgical treatments first  Learn about the benefits and risks of nonsurgical treatment and surgery so you can make an informed choice  What do I need to know about BPH?   · BPH is an enlarged prostate    The prostate is a small gland that is part of the reproductive system  It sits around your urethra (tube that carries urine out of your bladder)  The prostate is usually about the size of a walnut  An enlarged prostate will press on the urethra  This can cause problems with storing urine or emptying your bladder completely  · BPH is common in men older than 40 years  The risk increases with age  · Benign means it is not cancer  BPH is not a life-threatening condition, but it can cause problems with your daily activities  BPH usually gets worse over time  Left untreated, BPH can also lead to blood in your urine, bladder stones, or kidney failure  · Talk to your healthcare provider if you think you have signs or symptoms of BPH  Examples include problems starting your urine to flow, or an urgent need to urinate  You may be woken from sleep by the need to urinate  Am I a good candidate for BPH screening? Screening may be helpful for you if any of the following is true:  · You are 40 years or older  · You have a family history of BPH or other prostate problems  · You have symptoms of BPH that cause problems with your daily activities or quality of life  · You want to have treatment as early as possible if needed  · You have heart disease or take a beta-blocker medicine  How is screening done? · The International Prostate Symptom Score  is a set of questions about your ability to urinate over the past month   You will be asked how often you have any of the following:     ¨ A feeling of not fully emptying your bladder when you urinate    ¨ A need to urinate again within 2 hours after you last urinated    ¨ Urine that stops and starts several times when you urinate    ¨ An urgent need to urinate that you could not put off    ¨ A weak urine stream    ¨ Trouble starting your urine flow, or a need to push or strain to get it to start    ¨ Being woken from sleep because you needed to urinate    · A digital rectal exam  is used to check the size of your prostate  Your healthcare provider will insert a gloved finger into your rectum  The provider will be able to feel your prostate  The exam may be repeated over time to check the prostate size  · A PSA test  is used to measure the amount of a protein made by your prostate gland  A blood sample is taken for this test  A high PSA level can increase your risk for more severe urination problems or the need for surgery  What are the benefits and risks of screening? Talk with your healthcare provider about the risks and benefits of screening:  · Benefits  include finding BPH early  This means you can make more decisions about treatments  The PSA test can also find prostate cancer early  Treatment of prostate cancer is more successful when it starts early  · Risks  include a false belief that you will not develop BPH if your screening result is negative  Even though your screening result is negative, you may still develop it later on  You may also need more tests if you have problems urinating but screening shows you do not have BPH  What questions should I ask my healthcare provider to help me make decisions about screening? · How high is my risk for BPH? · How often do I need to have screening? · Where is the screening done? · Do I need to do anything to get ready to have screening? What happens after I have screening? You will meet with your healthcare provider to go over the results of your screening  You may need more tests to diagnose anything that showed up on the screening test  Common tests include a urine test to check for an infection or a biopsy (tissue sample) to check for cancer  You may also need tests to measure the amount of urine left in your bladder after you urinate  The force of your urine flow may also be measured  You and your healthcare provider can talk about your treatment options   Together you can decide which treatment is right for you    How is BPH treated, and what are the benefits of treatment? · Watchful waiting  means you do not receive treatment right away  Your signs and symptoms will be monitored over time to see if they get worse  Your healthcare provider may recommend ways to improve or track your symptoms during watchful waiting  Examples include drinking less liquid or urinating on a regular schedule  Your provider may also recommend lifestyle changes  For example, your symptoms may improve if you lose weight or have less caffeine if needed  You may be asked to keep a record of your symptoms and when you urinate  The record will include when you urinate, how easy or difficult it was, and any changes in urination  You will bring the record to follow-up visits to help you and your provider decide on treatment you may want to try  · Medicines  may be given to help your symptoms and to prevent BPH from getting worse  Medicines may help relax certain muscles to make it easier for you to urinate  You may also need medicine to make your prostate smaller or to relieve an overactive bladder  Medicines may start to relieve your symptoms quickly  Medicines can improve your quality of life  You may also be able to manage your symptoms without surgery if medicine keeps your BPH from getting worse  · Surgery  may be used to relieve your symptoms if other treatments do not work  An ablation is surgery that uses a needle to destroy extra tissue that is causing your symptoms  A laser may instead be used to destroy the tissue  Your prostate may be heated  A tool that gives off heat is inserted into your urethra  Prostate tissue is destroyed, and no other tissues are damaged  Parts of your prostate may be removed during another type of surgery  What are the risks of BPH treatment? · Watchful waiting  may allow BPH to get worse and be more difficult to treat than at an early stage   If you have a high PSA level, you may need to have BPH treated right away  · Medicines  can cause certain side effects, such as erectile dysfunction (ED) or a lowered sex drive  You may also develop urinary retention (trouble starting your urine to flow)  Some medicines can cause hypotension (low blood pressure) when you stand, or dizziness  · Surgery  can damage tissue around your prostate  Surgery can also increase your risk for trouble urinating, incontinence (leaking), or urinary retention  You may bleed more than expected during surgery or develop an infection  You may also need to be treated again if the surgery you have does not relieve your symptoms  What questions should I ask my provider to help me make decisions about treatment? · How will I feel if I continue to have these symptoms for the rest of my life? · Which medicines may work best to treat my symptoms? · What other steps can I take to decrease my symptoms? · Will I have to take medicine for the rest of my life? · What side effects might happen with each medicine I can try? · Am I a good candidate for surgery? · Which surgery may work best to treat my symptoms? · Where is the surgery done? · How long is recovery after surgery? · What are the possible side effects of surgery? CARE AGREEMENT:   You have the right to help plan your care  Learn about your health condition and how it may be treated  Discuss treatment options with your caregivers to decide what care you want to receive  You always have the right to refuse treatment  The above information is an  only  It is not intended as medical advice for individual conditions or treatments  Talk to your doctor, nurse or pharmacist before following any medical regimen to see if it is safe and effective for you  © 2017 [address] Information is for End User's use only and may not be sold, redistributed or otherwise used for commercial purposes   All illustrations and images included in CareNotes® are the copyrighted property of A D A M , Inc  or Val[employer]

## 2019-10-14 NOTE — PROGRESS NOTES
Problem List Items Addressed This Visit        Genitourinary    Overactive bladder - Primary     Patient with some urgency of urination, his symptoms have improved greatly since being on oxybutynin, he does not mention any untoward side effects from this medication apart from some dry mouth  AUA symptom score is 13 with a bother score of 3, he would not want any procedural therapies at this time and is happy on his current medical therapy  Relevant Orders    POCT Measure PVR (Completed)    POCT urine dip (Completed)    Urinalysis with microscopic    Urine culture    Benign localized prostatic hyperplasia with lower urinary tract symptoms (LUTS)     Patient's prostate is 40-45 grams and smooth without nodules, a diagnostic PSA has been ordered given his lower urinary tract symptoms and he has been having  Plan:  Spoke with him briefly about procedural therapies for his lower urinary tract symptoms, his bother score is currently 3, he would like to avoid any surgeries if possible  Relevant Orders    PSA Total, Diagnostic    Urinalysis with microscopic    Urine culture       Other    Hematuria     Patient with a history of microscopic hematuria for many years per his report, also had 1 episode of gross hematuria associated with a urinary tract infection  Spoke with him about the workup for gross hematuria, unfortunately he has a severe contrast allergy as well as some chronic kidney disease, as such he is best served with a combination of cystoscopy, renal bladder ultrasound, and transrectal ultrasound which will be ordered for the coming weeks           Relevant Orders    US retroperitoneal complete    Cystoscopy    US transrectal                Assessment and plan:       Please see problem oriented charting for the assessment plan of today's urological complaints    Radha Medina MD      Chief Complaint     Chief Complaint   Patient presents with   Jeral Hose Urinary Urgency    Lower urinary tract symptoms  Enlarged prostate  Hematuria      History of Present Illness     Neris Koch is a 69 y o  gentleman referred in consultation by Dr Holly Olsen for chief complaint of urinary urgency and frequency over the past several months  A copy of today's consultation has been sent to the referring provider in the name of continuity of care  With regard to this complaint it is localized to the penis and the prostate and bladder  The quality is described as mild to moderately annoying and the severity of this complaint is described as moderate  These symptoms have been present for many years and the timing is ongoing  Previous treatments include oxybutynin 5 milligrams b i d , since starting this medication he notes that his symptoms have improved and previous work-up includes evaluation by his primary care provider  The patient mentions nothing particular and his new medication as aggravating and alleviating factors, respectively  The following associated signs and symptoms are mentioned:  None  In terms of a family history of urologic problems or malignancy he  denies these  In terms of urologic surgery history he denies this    PVR is 112 milliliters, urinalysis shows some leukocyte esterase as well as some moderate blood  The following portions of the patient's history were reviewed and updated as appropriate: allergies, current medications, past family history, past medical history, past social history, past surgical history and problem list       Detailed Urologic History     - please refer to HPI    Review of Systems     Review of Systems   Constitutional: Negative  HENT: Negative  Eyes: Negative  Respiratory: Negative  Cardiovascular: Negative  Gastrointestinal: Negative  Endocrine: Negative  Genitourinary:        As per HPI   Musculoskeletal: Negative  Skin: Negative  Allergic/Immunologic: Negative  Neurological: Negative  Hematological: Negative  Psychiatric/Behavioral: Negative  Allergies     Allergies   Allergen Reactions    Iodinated Diagnostic Agents Other (See Comments)    Iodine        Physical Exam     Physical Exam   Constitutional: He is oriented to person, place, and time  He appears well-developed and well-nourished  No distress  Patient appears stated age, appears nontoxic, well-dressed, speaks with some dysarthria due to history of tongue cancer   HENT:   Head: Normocephalic and atraumatic  Right Ear: External ear normal    Left Ear: External ear normal    Eyes: Right eye exhibits no discharge  Left eye exhibits no discharge  Neck: No tracheal deviation present  Cardiovascular: Intact distal pulses  Pulmonary/Chest: Effort normal  No stridor  No respiratory distress  Abdominal: Soft  He exhibits no distension and no mass  There is no tenderness  There is no rebound and no guarding  No hernia  Genitourinary:   Genitourinary Comments: No hernias, normal testes, normal spermatic cords bilaterally, normal penis, normal meatus, normal perineum, prostate is 40-45 grams and smooth without nodules   Musculoskeletal: He exhibits no edema, tenderness or deformity  Neurological: He is alert and oriented to person, place, and time  No cranial nerve deficit  Coordination normal    Skin: No rash noted  He is not diaphoretic  No erythema  No pallor  Psychiatric: He has a normal mood and affect  His behavior is normal  Judgment and thought content normal    Nursing note and vitals reviewed            Vital Signs  Vitals:    10/15/19 0823   BP: 136/82   BP Location: Left arm   Patient Position: Sitting   Cuff Size: Standard   Pulse: 70   Weight: 73 5 kg (162 lb)   Height: 5' 9" (1 753 m)         Current Medications       Current Outpatient Medications:     amLODIPine (NORVASC) 5 mg tablet, Take 5 mg by mouth daily, Disp: , Rfl:     aspirin 81 MG tablet, 1 cap(s), Disp: , Rfl:     oxybutynin (DITROPAN) 5 mg tablet, Take 1 tablet (5 mg total) by mouth 2 (two) times a day, Disp: 60 tablet, Rfl: 1      Active Problems     Patient Active Problem List   Diagnosis    Excessive salivation    Medicare annual wellness visit, subsequent    Colon cancer screening    Encounter for hepatitis C screening test for low risk patient    Screening, lipid    Abnormal kidney function    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[id number] )    Essential hypertension    Uncontrolled hypertension    Dizziness    History of head and neck radiation    Idiopathic hypotension    Dysuria    Weight loss    Overactive bladder    Benign localized prostatic hyperplasia with lower urinary tract symptoms (LUTS)    Hematuria         Past Medical History     Past Medical History:   Diagnosis Date    Cancer ([facility] )     tongue cancer         Surgical History     Past Surgical History:   Procedure Laterality Date    EYELID CLOSURE      HERNIA REPAIR      TONGUE SURGERY           Family History     History reviewed  No pertinent family history        Social History     Social History     Social History     Tobacco Use   Smoking Status Former Smoker   Smokeless Tobacco Former User         Pertinent Lab Values     Lab Results   Component Value Date    CREATININE 1 50 (H) 09/25/2019       No results found for: PSA          Pertinent Imaging      There is no pertinent urological imaging for my review

## 2019-10-15 ENCOUNTER — OFFICE VISIT (OUTPATIENT)
Dept: UROLOGY | Facility: CLINIC | Age: 74
End: 2019-10-15
Payer: MEDICARE

## 2019-10-15 VITALS
WEIGHT: 162 LBS | SYSTOLIC BLOOD PRESSURE: 136 MMHG | HEART RATE: 70 BPM | HEIGHT: 69 IN | DIASTOLIC BLOOD PRESSURE: 82 MMHG | BODY MASS INDEX: 23.99 KG/M2

## 2019-10-15 DIAGNOSIS — N40.1 BENIGN LOCALIZED PROSTATIC HYPERPLASIA WITH LOWER URINARY TRACT SYMPTOMS (LUTS): ICD-10-CM

## 2019-10-15 DIAGNOSIS — R31.9 HEMATURIA, UNSPECIFIED TYPE: ICD-10-CM

## 2019-10-15 DIAGNOSIS — N32.81 OVERACTIVE BLADDER: Primary | ICD-10-CM

## 2019-10-15 LAB
BACTERIA UR QL AUTO: ABNORMAL /HPF
BILIRUB UR QL STRIP: NEGATIVE
CLARITY UR: CLEAR
COLOR UR: YELLOW
GLUCOSE UR STRIP-MCNC: NEGATIVE MG/DL
HGB UR QL STRIP.AUTO: ABNORMAL
HYALINE CASTS #/AREA URNS LPF: ABNORMAL /LPF
KETONES UR STRIP-MCNC: NEGATIVE MG/DL
LEUKOCYTE ESTERASE UR QL STRIP: NEGATIVE
NITRITE UR QL STRIP: NEGATIVE
NON-SQ EPI CELLS URNS QL MICRO: ABNORMAL /HPF
PH UR STRIP.AUTO: 6.5 [PH]
POST-VOID RESIDUAL VOLUME, ML POC: 112 ML
PROT UR STRIP-MCNC: NEGATIVE MG/DL
RBC #/AREA URNS AUTO: ABNORMAL /HPF
SL AMB  POCT GLUCOSE, UA: NORMAL
SL AMB LEUKOCYTE ESTERASE,UA: NORMAL
SL AMB POCT BILIRUBIN,UA: NORMAL
SL AMB POCT BLOOD,UA: NORMAL
SL AMB POCT CLARITY,UA: CLEAR
SL AMB POCT COLOR,UA: YELLOW
SL AMB POCT KETONES,UA: NORMAL
SL AMB POCT NITRITE,UA: NORMAL
SL AMB POCT PH,UA: 6.5
SL AMB POCT SPECIFIC GRAVITY,UA: 1.01
SL AMB POCT URINE PROTEIN: NORMAL
SL AMB POCT UROBILINOGEN: 0.2
SP GR UR STRIP.AUTO: 1.02 (ref 1–1.03)
UROBILINOGEN UR QL STRIP.AUTO: 0.2 E.U./DL
WBC #/AREA URNS AUTO: ABNORMAL /HPF

## 2019-10-15 PROCEDURE — 51798 US URINE CAPACITY MEASURE: CPT | Performed by: UROLOGY

## 2019-10-15 PROCEDURE — 81002 URINALYSIS NONAUTO W/O SCOPE: CPT | Performed by: UROLOGY

## 2019-10-15 PROCEDURE — 81001 URINALYSIS AUTO W/SCOPE: CPT | Performed by: UROLOGY

## 2019-10-15 PROCEDURE — 99204 OFFICE O/P NEW MOD 45 MIN: CPT | Performed by: UROLOGY

## 2019-10-15 PROCEDURE — 87086 URINE CULTURE/COLONY COUNT: CPT | Performed by: UROLOGY

## 2019-10-15 NOTE — ASSESSMENT & PLAN NOTE
Patient's prostate is 40-45 grams and smooth without nodules, a diagnostic PSA has been ordered given his lower urinary tract symptoms and he has been having  Plan:  Spoke with him briefly about procedural therapies for his lower urinary tract symptoms, his bother score is currently 3, he would like to avoid any surgeries if possible

## 2019-10-15 NOTE — ASSESSMENT & PLAN NOTE
Patient with a history of microscopic hematuria for many years per his report, also had 1 episode of gross hematuria associated with a urinary tract infection  Spoke with him about the workup for gross hematuria, unfortunately he has a severe contrast allergy as well as some chronic kidney disease, as such he is best served with a combination of cystoscopy, renal bladder ultrasound, and transrectal ultrasound which will be ordered for the coming weeks

## 2019-10-15 NOTE — ASSESSMENT & PLAN NOTE
Patient with some urgency of urination, his symptoms have improved greatly since being on oxybutynin, he does not mention any untoward side effects from this medication apart from some dry mouth  AUA symptom score is 13 with a bother score of 3, he would not want any procedural therapies at this time and is happy on his current medical therapy

## 2019-10-16 LAB — BACTERIA UR CULT: NORMAL

## 2019-11-11 DIAGNOSIS — I10 UNCONTROLLED HYPERTENSION: ICD-10-CM

## 2019-11-11 RX ORDER — AMLODIPINE BESYLATE 5 MG/1
TABLET ORAL
Qty: 30 TABLET | Refills: 1 | Status: SHIPPED | OUTPATIENT
Start: 2019-11-11 | End: 2020-01-08

## 2019-11-15 ENCOUNTER — HOSPITAL ENCOUNTER (OUTPATIENT)
Dept: ULTRASOUND IMAGING | Facility: HOSPITAL | Age: 74
Discharge: HOME | End: 2019-11-15
Attending: UROLOGY
Payer: MEDICARE

## 2019-11-15 DIAGNOSIS — R31.9 HEMATURIA, UNSPECIFIED TYPE: ICD-10-CM

## 2019-11-15 PROCEDURE — 76770 US EXAM ABDO BACK WALL COMP: CPT

## 2019-12-24 DIAGNOSIS — N32.81 OVERACTIVE BLADDER: ICD-10-CM

## 2019-12-24 RX ORDER — OXYBUTYNIN CHLORIDE 5 MG/1
TABLET ORAL
Qty: 60 TABLET | Refills: 1 | Status: SHIPPED | OUTPATIENT
Start: 2019-12-24 | End: 2020-04-27

## 2019-12-30 ENCOUNTER — TELEPHONE (OUTPATIENT)
Dept: UROLOGY | Facility: AMBULATORY SURGERY CENTER | Age: 74
End: 2019-12-30

## 2019-12-30 NOTE — TELEPHONE ENCOUNTER
Patient managed my Dr Ulis Bence is calling for his results of his ultrasound done in November  He stated to leave message with details if no answer

## 2019-12-30 NOTE — TELEPHONE ENCOUNTER
No concerning findings on renal US, transrectal US was not completed  Renal US showed evidence of medical kidney disease but no concerns for malignancy

## 2019-12-30 NOTE — TELEPHONE ENCOUNTER
Called and spoke to patient  Explained to patient that there are No concerning findings on renal US, transrectal US was not completed  Renal US showed evidence of medical kidney disease but no concerns for malignancy  Patient understood and was happy with the news

## 2020-01-08 DIAGNOSIS — I10 UNCONTROLLED HYPERTENSION: ICD-10-CM

## 2020-01-08 RX ORDER — AMLODIPINE BESYLATE 5 MG/1
TABLET ORAL
Qty: 30 TABLET | Refills: 3 | Status: SHIPPED | OUTPATIENT
Start: 2020-01-08 | End: 2020-05-04

## 2020-04-25 DIAGNOSIS — N32.81 OVERACTIVE BLADDER: ICD-10-CM

## 2020-04-27 RX ORDER — OXYBUTYNIN CHLORIDE 5 MG/1
TABLET ORAL
Qty: 60 TABLET | Refills: 1 | Status: SHIPPED | OUTPATIENT
Start: 2020-04-27 | End: 2020-08-20

## 2020-05-04 DIAGNOSIS — I10 UNCONTROLLED HYPERTENSION: ICD-10-CM

## 2020-05-04 RX ORDER — AMLODIPINE BESYLATE 5 MG/1
TABLET ORAL
Qty: 30 TABLET | Refills: 3 | Status: SHIPPED | OUTPATIENT
Start: 2020-05-04 | End: 2020-06-29 | Stop reason: SDUPTHER

## 2020-05-26 ENCOUNTER — TELEMEDICINE (OUTPATIENT)
Dept: FAMILY MEDICINE CLINIC | Facility: CLINIC | Age: 75
End: 2020-05-26
Payer: MEDICARE

## 2020-05-26 VITALS — HEIGHT: 69 IN | WEIGHT: 160 LBS | BODY MASS INDEX: 23.7 KG/M2

## 2020-05-26 DIAGNOSIS — Z20.828 EXPOSURE TO SARS-ASSOCIATED CORONAVIRUS: ICD-10-CM

## 2020-05-26 DIAGNOSIS — N18.30 CKD (CHRONIC KIDNEY DISEASE) STAGE 3, GFR 30-59 ML/MIN (HCC): ICD-10-CM

## 2020-05-26 DIAGNOSIS — R53.83 FATIGUE, UNSPECIFIED TYPE: ICD-10-CM

## 2020-05-26 DIAGNOSIS — Z20.828 EXPOSURE TO SARS-ASSOCIATED CORONAVIRUS: Primary | ICD-10-CM

## 2020-05-26 DIAGNOSIS — Z13.220 SCREENING FOR LIPID DISORDERS: ICD-10-CM

## 2020-05-26 PROCEDURE — 99442 PR PHYS/QHP TELEPHONE EVALUATION 11-20 MIN: CPT | Performed by: PHYSICIAN ASSISTANT

## 2020-05-26 PROCEDURE — U0003 INFECTIOUS AGENT DETECTION BY NUCLEIC ACID (DNA OR RNA); SEVERE ACUTE RESPIRATORY SYNDROME CORONAVIRUS 2 (SARS-COV-2) (CORONAVIRUS DISEASE [COVID-19]), AMPLIFIED PROBE TECHNIQUE, MAKING USE OF HIGH THROUGHPUT TECHNOLOGIES AS DESCRIBED BY CMS-2020-01-R: HCPCS

## 2020-05-28 LAB — SARS-COV-2 RNA SPEC QL NAA+PROBE: NOT DETECTED

## 2020-06-01 ENCOUNTER — APPOINTMENT (OUTPATIENT)
Dept: LAB | Facility: CLINIC | Age: 75
End: 2020-06-01
Payer: MEDICARE

## 2020-06-01 DIAGNOSIS — N18.30 CKD (CHRONIC KIDNEY DISEASE) STAGE 3, GFR 30-59 ML/MIN (HCC): ICD-10-CM

## 2020-06-01 DIAGNOSIS — Z13.220 SCREENING FOR LIPID DISORDERS: ICD-10-CM

## 2020-06-01 DIAGNOSIS — N40.1 BENIGN LOCALIZED PROSTATIC HYPERPLASIA WITH LOWER URINARY TRACT SYMPTOMS (LUTS): ICD-10-CM

## 2020-06-01 DIAGNOSIS — R53.83 FATIGUE, UNSPECIFIED TYPE: ICD-10-CM

## 2020-06-01 LAB
25(OH)D3 SERPL-MCNC: 28.1 NG/ML (ref 30–100)
ALBUMIN SERPL BCP-MCNC: 3.6 G/DL (ref 3.5–5)
ALP SERPL-CCNC: 54 U/L (ref 46–116)
ALT SERPL W P-5'-P-CCNC: 15 U/L (ref 12–78)
ANION GAP SERPL CALCULATED.3IONS-SCNC: 4 MMOL/L (ref 4–13)
AST SERPL W P-5'-P-CCNC: 14 U/L (ref 5–45)
BACTERIA UR QL AUTO: ABNORMAL /HPF
BASOPHILS # BLD AUTO: 0.08 THOUSANDS/ΜL (ref 0–0.1)
BASOPHILS NFR BLD AUTO: 1 % (ref 0–1)
BILIRUB SERPL-MCNC: 0.32 MG/DL (ref 0.2–1)
BILIRUB UR QL STRIP: NEGATIVE
BUN SERPL-MCNC: 24 MG/DL (ref 5–25)
CALCIUM SERPL-MCNC: 9.6 MG/DL (ref 8.3–10.1)
CHLORIDE SERPL-SCNC: 111 MMOL/L (ref 100–108)
CHOLEST SERPL-MCNC: 189 MG/DL (ref 50–200)
CLARITY UR: CLEAR
CO2 SERPL-SCNC: 25 MMOL/L (ref 21–32)
COLOR UR: YELLOW
CREAT SERPL-MCNC: 1.64 MG/DL (ref 0.6–1.3)
EOSINOPHIL # BLD AUTO: 0.54 THOUSAND/ΜL (ref 0–0.61)
EOSINOPHIL NFR BLD AUTO: 9 % (ref 0–6)
ERYTHROCYTE [DISTWIDTH] IN BLOOD BY AUTOMATED COUNT: 14.7 % (ref 11.6–15.1)
GFR SERPL CREATININE-BSD FRML MDRD: 41 ML/MIN/1.73SQ M
GLUCOSE P FAST SERPL-MCNC: 77 MG/DL (ref 65–99)
GLUCOSE UR STRIP-MCNC: NEGATIVE MG/DL
HCT VFR BLD AUTO: 42.2 % (ref 36.5–49.3)
HDLC SERPL-MCNC: 43 MG/DL
HGB BLD-MCNC: 12.9 G/DL (ref 12–17)
HGB UR QL STRIP.AUTO: ABNORMAL
HYALINE CASTS #/AREA URNS LPF: ABNORMAL /LPF
IMM GRANULOCYTES # BLD AUTO: 0.02 THOUSAND/UL (ref 0–0.2)
IMM GRANULOCYTES NFR BLD AUTO: 0 % (ref 0–2)
KETONES UR STRIP-MCNC: NEGATIVE MG/DL
LDLC SERPL CALC-MCNC: 124 MG/DL (ref 0–100)
LEUKOCYTE ESTERASE UR QL STRIP: NEGATIVE
LYMPHOCYTES # BLD AUTO: 1.33 THOUSANDS/ΜL (ref 0.6–4.47)
LYMPHOCYTES NFR BLD AUTO: 23 % (ref 14–44)
MCH RBC QN AUTO: 25.6 PG (ref 26.8–34.3)
MCHC RBC AUTO-ENTMCNC: 30.6 G/DL (ref 31.4–37.4)
MCV RBC AUTO: 84 FL (ref 82–98)
MONOCYTES # BLD AUTO: 0.46 THOUSAND/ΜL (ref 0.17–1.22)
MONOCYTES NFR BLD AUTO: 8 % (ref 4–12)
NEUTROPHILS # BLD AUTO: 3.44 THOUSANDS/ΜL (ref 1.85–7.62)
NEUTS SEG NFR BLD AUTO: 59 % (ref 43–75)
NITRITE UR QL STRIP: NEGATIVE
NON-SQ EPI CELLS URNS QL MICRO: ABNORMAL /HPF
NRBC BLD AUTO-RTO: 0 /100 WBCS
PH UR STRIP.AUTO: 6.5 [PH]
PLATELET # BLD AUTO: 422 THOUSANDS/UL (ref 149–390)
PMV BLD AUTO: 9.2 FL (ref 8.9–12.7)
POTASSIUM SERPL-SCNC: 4.8 MMOL/L (ref 3.5–5.3)
PROT SERPL-MCNC: 8 G/DL (ref 6.4–8.2)
PROT UR STRIP-MCNC: NEGATIVE MG/DL
PSA SERPL-MCNC: 2.1 NG/ML (ref 0–4)
RBC # BLD AUTO: 5.03 MILLION/UL (ref 3.88–5.62)
RBC #/AREA URNS AUTO: ABNORMAL /HPF
SODIUM SERPL-SCNC: 140 MMOL/L (ref 136–145)
SP GR UR STRIP.AUTO: 1.02 (ref 1–1.03)
TRIGL SERPL-MCNC: 112 MG/DL
UROBILINOGEN UR QL STRIP.AUTO: 0.2 E.U./DL
WBC # BLD AUTO: 5.87 THOUSAND/UL (ref 4.31–10.16)
WBC #/AREA URNS AUTO: ABNORMAL /HPF

## 2020-06-01 PROCEDURE — 82306 VITAMIN D 25 HYDROXY: CPT

## 2020-06-01 PROCEDURE — 36415 COLL VENOUS BLD VENIPUNCTURE: CPT

## 2020-06-01 PROCEDURE — 80053 COMPREHEN METABOLIC PANEL: CPT

## 2020-06-01 PROCEDURE — 84153 ASSAY OF PSA TOTAL: CPT

## 2020-06-01 PROCEDURE — 81001 URINALYSIS AUTO W/SCOPE: CPT | Performed by: PHYSICIAN ASSISTANT

## 2020-06-01 PROCEDURE — 86618 LYME DISEASE ANTIBODY: CPT

## 2020-06-01 PROCEDURE — 85025 COMPLETE CBC W/AUTO DIFF WBC: CPT

## 2020-06-01 PROCEDURE — 80061 LIPID PANEL: CPT

## 2020-06-02 DIAGNOSIS — N18.30 CKD (CHRONIC KIDNEY DISEASE) STAGE 3, GFR 30-59 ML/MIN (HCC): Primary | ICD-10-CM

## 2020-06-02 LAB — B BURGDOR IGG+IGM SER-ACNC: <0.91 ISR (ref 0–0.9)

## 2020-06-17 ENCOUNTER — APPOINTMENT (OUTPATIENT)
Dept: RADIOLOGY | Facility: CLINIC | Age: 75
End: 2020-06-17
Payer: MEDICARE

## 2020-06-17 ENCOUNTER — OFFICE VISIT (OUTPATIENT)
Dept: FAMILY MEDICINE CLINIC | Facility: CLINIC | Age: 75
End: 2020-06-17
Payer: MEDICARE

## 2020-06-17 VITALS
BODY MASS INDEX: 23.85 KG/M2 | WEIGHT: 161 LBS | HEART RATE: 70 BPM | SYSTOLIC BLOOD PRESSURE: 132 MMHG | OXYGEN SATURATION: 96 % | HEIGHT: 69 IN | DIASTOLIC BLOOD PRESSURE: 76 MMHG | TEMPERATURE: 97.3 F

## 2020-06-17 DIAGNOSIS — R06.02 SOB (SHORTNESS OF BREATH) ON EXERTION: ICD-10-CM

## 2020-06-17 DIAGNOSIS — R06.02 SOB (SHORTNESS OF BREATH) ON EXERTION: Primary | ICD-10-CM

## 2020-06-17 PROCEDURE — 1036F TOBACCO NON-USER: CPT | Performed by: FAMILY MEDICINE

## 2020-06-17 PROCEDURE — 3008F BODY MASS INDEX DOCD: CPT | Performed by: FAMILY MEDICINE

## 2020-06-17 PROCEDURE — 3075F SYST BP GE 130 - 139MM HG: CPT | Performed by: FAMILY MEDICINE

## 2020-06-17 PROCEDURE — 1160F RVW MEDS BY RX/DR IN RCRD: CPT | Performed by: FAMILY MEDICINE

## 2020-06-17 PROCEDURE — 3078F DIAST BP <80 MM HG: CPT | Performed by: FAMILY MEDICINE

## 2020-06-17 PROCEDURE — 93000 ELECTROCARDIOGRAM COMPLETE: CPT | Performed by: FAMILY MEDICINE

## 2020-06-17 PROCEDURE — 99214 OFFICE O/P EST MOD 30 MIN: CPT | Performed by: FAMILY MEDICINE

## 2020-06-17 PROCEDURE — 71046 X-RAY EXAM CHEST 2 VIEWS: CPT

## 2020-06-17 RX ORDER — SIMVASTATIN 20 MG
20 TABLET ORAL EVERY OTHER DAY
Qty: 30 TABLET | Refills: 2 | Status: SHIPPED | OUTPATIENT
Start: 2020-06-17 | End: 2020-09-30 | Stop reason: ALTCHOICE

## 2020-06-29 ENCOUNTER — CONSULT (OUTPATIENT)
Dept: CARDIOLOGY CLINIC | Facility: CLINIC | Age: 75
End: 2020-06-29
Payer: MEDICARE

## 2020-06-29 VITALS
HEART RATE: 81 BPM | WEIGHT: 160 LBS | BODY MASS INDEX: 23.7 KG/M2 | DIASTOLIC BLOOD PRESSURE: 70 MMHG | SYSTOLIC BLOOD PRESSURE: 112 MMHG | HEIGHT: 69 IN

## 2020-06-29 DIAGNOSIS — N18.9 CKD (CHRONIC KIDNEY DISEASE): ICD-10-CM

## 2020-06-29 DIAGNOSIS — I10 HYPERTENSION: ICD-10-CM

## 2020-06-29 DIAGNOSIS — E78.5 HYPERLIPIDEMIA: ICD-10-CM

## 2020-06-29 DIAGNOSIS — R06.02 SOB (SHORTNESS OF BREATH) ON EXERTION: Primary | ICD-10-CM

## 2020-06-29 DIAGNOSIS — I44.4 LAFB (LEFT ANTERIOR FASCICULAR BLOCK): ICD-10-CM

## 2020-06-29 PROCEDURE — 1036F TOBACCO NON-USER: CPT | Performed by: INTERNAL MEDICINE

## 2020-06-29 PROCEDURE — 3078F DIAST BP <80 MM HG: CPT | Performed by: INTERNAL MEDICINE

## 2020-06-29 PROCEDURE — 99204 OFFICE O/P NEW MOD 45 MIN: CPT | Performed by: INTERNAL MEDICINE

## 2020-06-29 PROCEDURE — 3008F BODY MASS INDEX DOCD: CPT | Performed by: INTERNAL MEDICINE

## 2020-06-29 PROCEDURE — 1160F RVW MEDS BY RX/DR IN RCRD: CPT | Performed by: INTERNAL MEDICINE

## 2020-06-29 PROCEDURE — 3074F SYST BP LT 130 MM HG: CPT | Performed by: INTERNAL MEDICINE

## 2020-07-13 DIAGNOSIS — I10 UNCONTROLLED HYPERTENSION: Primary | ICD-10-CM

## 2020-07-13 RX ORDER — AMLODIPINE BESYLATE 5 MG/1
5 TABLET ORAL DAILY
Qty: 90 TABLET | Refills: 2 | Status: SHIPPED | OUTPATIENT
Start: 2020-07-13 | End: 2021-04-26 | Stop reason: ALTCHOICE

## 2020-07-23 ENCOUNTER — HOSPITAL ENCOUNTER (OUTPATIENT)
Dept: NON INVASIVE DIAGNOSTICS | Facility: HOSPITAL | Age: 75
Discharge: HOME | End: 2020-07-23
Attending: INTERNAL MEDICINE
Payer: MEDICARE

## 2020-07-23 DIAGNOSIS — E78.5 HYPERLIPIDEMIA: ICD-10-CM

## 2020-07-23 DIAGNOSIS — R06.02 SOB (SHORTNESS OF BREATH) ON EXERTION: ICD-10-CM

## 2020-07-23 DIAGNOSIS — I44.4 LAFB (LEFT ANTERIOR FASCICULAR BLOCK): ICD-10-CM

## 2020-07-23 DIAGNOSIS — N18.9 CKD (CHRONIC KIDNEY DISEASE): ICD-10-CM

## 2020-07-23 DIAGNOSIS — I10 HYPERTENSION: ICD-10-CM

## 2020-07-23 LAB
CHEST PAIN STATEMENT: NORMAL
MAX DIASTOLIC BP: 90 MMHG
MAX HEART RATE: 142 BPM
MAX PREDICTED HEART RATE: 147 BPM
MAX. SYSTOLIC BP: 170 MMHG
PROTOCOL NAME: NORMAL
REASON FOR TERMINATION: NORMAL
TARGET HR FORMULA: NORMAL
TEST INDICATION: NORMAL
TIME IN EXERCISE PHASE: NORMAL

## 2020-07-23 PROCEDURE — 93320 DOPPLER ECHO COMPLETE: CPT

## 2020-07-23 PROCEDURE — 93325 DOPPLER ECHO COLOR FLOW MAPG: CPT

## 2020-07-23 PROCEDURE — 93351 STRESS TTE COMPLETE: CPT | Performed by: INTERNAL MEDICINE

## 2020-07-23 PROCEDURE — 93351 STRESS TTE COMPLETE: CPT

## 2020-07-23 PROCEDURE — 93306 TTE W/DOPPLER COMPLETE: CPT | Performed by: INTERNAL MEDICINE

## 2020-08-10 ENCOUNTER — TELEPHONE (OUTPATIENT)
Dept: FAMILY MEDICINE CLINIC | Facility: CLINIC | Age: 75
End: 2020-08-10

## 2020-08-10 NOTE — TELEPHONE ENCOUNTER
Agus Llanes has been very depressed, clumsy and tired and thinks it is from the Oxybutynin  Is it okay to stop this for a few days to see if it is the medication causing the sx or does it need to be weaned? ?

## 2020-08-12 ENCOUNTER — TRANSCRIBE ORDERS (OUTPATIENT)
Dept: ADMINISTRATIVE | Facility: HOSPITAL | Age: 75
End: 2020-08-12

## 2020-08-12 ENCOUNTER — APPOINTMENT (OUTPATIENT)
Dept: LAB | Facility: CLINIC | Age: 75
End: 2020-08-12
Payer: MEDICARE

## 2020-08-12 DIAGNOSIS — D47.2 MONOCLONAL PARAPROTEINEMIA: Primary | ICD-10-CM

## 2020-08-12 DIAGNOSIS — N18.30 CKD (CHRONIC KIDNEY DISEASE) STAGE 3, GFR 30-59 ML/MIN (HCC): ICD-10-CM

## 2020-08-12 DIAGNOSIS — D47.2 MONOCLONAL PARAPROTEINEMIA: ICD-10-CM

## 2020-08-12 LAB
25(OH)D3 SERPL-MCNC: 33.8 NG/ML (ref 30–100)
ALBUMIN SERPL BCP-MCNC: 3.9 G/DL (ref 3.5–5)
ALP SERPL-CCNC: 45 U/L (ref 46–116)
ALT SERPL W P-5'-P-CCNC: 17 U/L (ref 12–78)
ANION GAP SERPL CALCULATED.3IONS-SCNC: 7 MMOL/L (ref 4–13)
AST SERPL W P-5'-P-CCNC: 17 U/L (ref 5–45)
BASOPHILS # BLD AUTO: 0.06 THOUSANDS/ΜL (ref 0–0.1)
BASOPHILS NFR BLD AUTO: 1 % (ref 0–1)
BILIRUB SERPL-MCNC: 0.4 MG/DL (ref 0.2–1)
BUN SERPL-MCNC: 22 MG/DL (ref 5–25)
CALCIUM SERPL-MCNC: 9.2 MG/DL (ref 8.3–10.1)
CHLORIDE SERPL-SCNC: 108 MMOL/L (ref 100–108)
CO2 SERPL-SCNC: 26 MMOL/L (ref 21–32)
CREAT SERPL-MCNC: 1.55 MG/DL (ref 0.6–1.3)
EOSINOPHIL # BLD AUTO: 0.37 THOUSAND/ΜL (ref 0–0.61)
EOSINOPHIL NFR BLD AUTO: 7 % (ref 0–6)
ERYTHROCYTE [DISTWIDTH] IN BLOOD BY AUTOMATED COUNT: 15.1 % (ref 11.6–15.1)
GFR SERPL CREATININE-BSD FRML MDRD: 44 ML/MIN/1.73SQ M
GLUCOSE SERPL-MCNC: 75 MG/DL (ref 65–140)
HCT VFR BLD AUTO: 40 % (ref 36.5–49.3)
HGB BLD-MCNC: 12.4 G/DL (ref 12–17)
IGA SERPL-MCNC: 62 MG/DL (ref 70–400)
IGG SERPL-MCNC: 1670 MG/DL (ref 700–1600)
IGM SERPL-MCNC: 22 MG/DL (ref 40–230)
IMM GRANULOCYTES # BLD AUTO: 0.02 THOUSAND/UL (ref 0–0.2)
IMM GRANULOCYTES NFR BLD AUTO: 0 % (ref 0–2)
LYMPHOCYTES # BLD AUTO: 1.11 THOUSANDS/ΜL (ref 0.6–4.47)
LYMPHOCYTES NFR BLD AUTO: 20 % (ref 14–44)
MCH RBC QN AUTO: 26.3 PG (ref 26.8–34.3)
MCHC RBC AUTO-ENTMCNC: 31 G/DL (ref 31.4–37.4)
MCV RBC AUTO: 85 FL (ref 82–98)
MONOCYTES # BLD AUTO: 0.46 THOUSAND/ΜL (ref 0.17–1.22)
MONOCYTES NFR BLD AUTO: 8 % (ref 4–12)
NEUTROPHILS # BLD AUTO: 3.62 THOUSANDS/ΜL (ref 1.85–7.62)
NEUTS SEG NFR BLD AUTO: 64 % (ref 43–75)
NRBC BLD AUTO-RTO: 0 /100 WBCS
PLATELET # BLD AUTO: 341 THOUSANDS/UL (ref 149–390)
PMV BLD AUTO: 9.4 FL (ref 8.9–12.7)
POTASSIUM SERPL-SCNC: 4.7 MMOL/L (ref 3.5–5.3)
PROT SERPL-MCNC: 7.7 G/DL (ref 6.4–8.2)
RBC # BLD AUTO: 4.72 MILLION/UL (ref 3.88–5.62)
SODIUM SERPL-SCNC: 141 MMOL/L (ref 136–145)
WBC # BLD AUTO: 5.64 THOUSAND/UL (ref 4.31–10.16)

## 2020-08-12 PROCEDURE — 83883 ASSAY NEPHELOMETRY NOT SPEC: CPT

## 2020-08-12 PROCEDURE — 82784 ASSAY IGA/IGD/IGG/IGM EACH: CPT

## 2020-08-12 PROCEDURE — 80053 COMPREHEN METABOLIC PANEL: CPT

## 2020-08-12 PROCEDURE — 84165 PROTEIN E-PHORESIS SERUM: CPT

## 2020-08-12 PROCEDURE — 85025 COMPLETE CBC W/AUTO DIFF WBC: CPT

## 2020-08-12 PROCEDURE — 82232 ASSAY OF BETA-2 PROTEIN: CPT

## 2020-08-12 PROCEDURE — 84165 PROTEIN E-PHORESIS SERUM: CPT | Performed by: PATHOLOGY

## 2020-08-12 PROCEDURE — 82306 VITAMIN D 25 HYDROXY: CPT

## 2020-08-12 PROCEDURE — 36415 COLL VENOUS BLD VENIPUNCTURE: CPT

## 2020-08-13 LAB
KAPPA LC FREE SER-MCNC: 50.4 MG/L (ref 3.3–19.4)
KAPPA LC FREE/LAMBDA FREE SER: 0.83 {RATIO} (ref 0.26–1.65)
LAMBDA LC FREE SERPL-MCNC: 60.9 MG/L (ref 5.7–26.3)

## 2020-08-14 LAB
ALBUMIN SERPL ELPH-MCNC: 4.17 G/DL (ref 3.5–5)
ALBUMIN SERPL ELPH-MCNC: 56.3 % (ref 52–65)
ALPHA1 GLOB SERPL ELPH-MCNC: 0.35 G/DL (ref 0.1–0.4)
ALPHA1 GLOB SERPL ELPH-MCNC: 4.7 % (ref 2.5–5)
ALPHA2 GLOB SERPL ELPH-MCNC: 0.72 G/DL (ref 0.4–1.2)
ALPHA2 GLOB SERPL ELPH-MCNC: 9.7 % (ref 7–13)
B2 MICROGLOB SERPL-MCNC: 2.6 MG/L (ref 0.6–2.4)
BETA GLOB ABNORMAL SERPL ELPH-MCNC: 0.39 G/DL (ref 0.4–0.8)
BETA1 GLOB SERPL ELPH-MCNC: 5.3 % (ref 5–13)
BETA2 GLOB SERPL ELPH-MCNC: 3.2 % (ref 2–8)
BETA2+GAMMA GLOB SERPL ELPH-MCNC: 0.24 G/DL (ref 0.2–0.5)
GAMMA GLOB ABNORMAL SERPL ELPH-MCNC: 1.54 G/DL (ref 0.5–1.6)
GAMMA GLOB SERPL ELPH-MCNC: 20.8 % (ref 12–22)
IGG/ALB SER: 1.29 {RATIO} (ref 1.1–1.8)
M PROTEIN 1 MFR SERPL ELPH: 9.9 %
M PROTEIN 1 SERPL ELPH-MCNC: 0.73 G/DL
PROT PATTERN SERPL ELPH-IMP: ABNORMAL
PROT SERPL-MCNC: 7.4 G/DL (ref 6.4–8.2)

## 2020-08-20 DIAGNOSIS — N32.81 OVERACTIVE BLADDER: ICD-10-CM

## 2020-08-20 RX ORDER — OXYBUTYNIN CHLORIDE 5 MG/1
TABLET ORAL
Qty: 60 TABLET | Refills: 1 | Status: SHIPPED | OUTPATIENT
Start: 2020-08-20 | End: 2021-08-31 | Stop reason: ALTCHOICE

## 2020-09-30 ENCOUNTER — OFFICE VISIT (OUTPATIENT)
Dept: CARDIOLOGY CLINIC | Facility: CLINIC | Age: 75
End: 2020-09-30
Payer: MEDICARE

## 2020-09-30 VITALS
SYSTOLIC BLOOD PRESSURE: 120 MMHG | HEIGHT: 69 IN | HEART RATE: 82 BPM | BODY MASS INDEX: 24.73 KG/M2 | DIASTOLIC BLOOD PRESSURE: 80 MMHG | WEIGHT: 167 LBS

## 2020-09-30 DIAGNOSIS — R06.02 SOB (SHORTNESS OF BREATH) ON EXERTION: ICD-10-CM

## 2020-09-30 DIAGNOSIS — E78.5 HYPERLIPIDEMIA: Primary | ICD-10-CM

## 2020-09-30 PROCEDURE — 99213 OFFICE O/P EST LOW 20 MIN: CPT | Performed by: INTERNAL MEDICINE

## 2020-09-30 RX ORDER — PRAVASTATIN SODIUM 20 MG
20 TABLET ORAL DAILY
Qty: 90 TABLET | Refills: 0 | Status: SHIPPED | OUTPATIENT
Start: 2020-09-30 | End: 2021-04-02 | Stop reason: SDUPTHER

## 2020-09-30 NOTE — PROGRESS NOTES
[facility] [address] [facility] [id number] [phone]                                              Cardiology Office Consult   Elle Vaughn, 70 y o  male  YOB: 1945  MRN: [id number] Encounter: [id number]      PCP - Luis Armando Leahy MD    Assessment and Plan  1  Shortness of breath  2  Left anterior fascicular block (LAFB)  3  Hypertension  4  Hyperlipidemia  5  CKD, baseline Cr 1 4-1 6  6  History of tongue carcinoma status post radical neck dissection, radiation  7  Monoclonal gammopathy of unknown significance    Plan  Shortness of breath, LAFB  · Shortness of breath is now resolved, was possibly related to mild diastolic dysfunction  · ECG from 06/17/2020 reviewed   Normal sinus rhythm , left anterior fascicular block  · Echo - LVEF normal, grade 1 DD, no significant valvular abnormalities  · Exercise stress echo was within normal limits  · Encouraged continued exercise and monitoring    Hypertension  · Blood pressure today 112/70  · Continue amlodipine 5 mg    Hyperlipidemia  · Lipid panel 06/01/2020 showed total cholesterol 189, triglycerides 112, HDL 43,   · Ten year ASCVD risk score is 21%  · He had previously not tolerated statins, but was tolerating simvastatin 20 mg every other day with Co Q10  · But after his stress test came back normal, he he felt he did not need this anymore and as a result was discontinued  · Considering the interaction between simvastatin and amlodipine, would prefer to use alternative  · His cholesterol levels are borderline, but his 10 year ASCVD risk is 20+%  · Suggest starting on pravastatin 20 mg every other day, and lb increase to daily    History of Present Illness   70year-old gentleman comes in as a new patient for evaluation of new onset shortness of breath over the past 2 weeks    At baseline, he is very active for his age, and mows several acres of his land with a self-propelled push mower   But over the last week, he noticed getting out of breath with lifting up a chainsaw and trying to cut a tree  He feels he is still able to climb up a flight of stair, but shortness of breath with this level of exertion was unusual for him  No complains of chest pain, palpitations, dizziness or lightheadedness  No prior history of CAD  He had tongue cancer back in 1999, and underwent radical neck dissection and radiation therapy at [facility]  He has remained in remission, and has been getting routine follow-up  Interval history - 9/30/2020  He returns for follow-up, and has been doing well overall  He does not report any active symptoms of chest pain or shortness of breath  He has been fairly active, and takes care of his 4 acres, including cutting, trimming lawn mowing  Historical Information   Past Medical History:   Diagnosis Date    Cancer ([facility] [address] )     tongue cancer     Past Surgical History:   Procedure Laterality Date    EYELID CLOSURE      HERNIA REPAIR      TONGUE SURGERY       Family History   Problem Relation Age of Onset    Hypertension Mother     Stroke Mother      Current Outpatient Medications on File Prior to Visit   Medication Sig Dispense Refill    amLODIPine (NORVASC) 5 mg tablet Take 1 tablet (5 mg total) by mouth daily 90 tablet 2    aspirin 81 MG tablet 1 cap(s)      Coenzyme Q10 (CO Q 10 PO) Take by mouth      Multiple Vitamins-Minerals (MULTIVITAMIN ADULTS 50+ PO) Take by mouth      VITAMIN D, CHOLECALCIFEROL, PO Take by mouth      oxybutynin (DITROPAN) 5 mg tablet TAKE 1 TABLET BY MOUTH TWICE A DAY (Patient not taking: Reported on 9/30/2020) 60 tablet 1    simvastatin (ZOCOR) 20 mg tablet Take 1 tablet (20 mg total) by mouth every other day (Patient not taking: Reported on 9/30/2020) 30 tablet 2     No current facility-administered medications on file prior to visit        Allergies   Allergen Reactions    Iodinated Diagnostic Agents Other (See Comments)    Iodine      Social History     Socioeconomic History    Marital status: /Civil Union     Spouse name: None    Number of children: None    Years of education: None    Highest education level: None   Occupational History    None   Social Needs    Financial resource strain: None    Food insecurity     Worry: None     Inability: None    Transportation needs     Medical: None     Non-medical: None   Tobacco Use    Smoking status: Former Smoker    Smokeless tobacco: Former User   Substance and Sexual Activity    Alcohol use: Never     Frequency: Never    Drug use: Never    Sexual activity: None   Lifestyle    Physical activity     Days per week: None     Minutes per session: None    Stress: None   Relationships    Social connections     Talks on phone: None     Gets together: None     Attends Advent service: None     Active member of club or organization: None     Attends meetings of clubs or organizations: None     Relationship status: None    Intimate partner violence     Fear of current or ex partner: None     Emotionally abused: None     Physically abused: None     Forced sexual activity: None   Other Topics Concern    None   Social History Narrative    None        Review of Systems   HENT: Positive for voice change  All other systems reviewed and are negative  Vitals:  Vitals:    09/30/20 1301   BP: 120/80   Pulse: 82   Weight: 75 8 kg (167 lb)   Height: 5' 9" (1 753 m)     BMI - Body mass index is 24 66 kg/m²  Wt Readings from Last 7 Encounters:   09/30/20 75 8 kg (167 lb)   06/29/20 72 6 kg (160 lb)   06/17/20 73 kg (161 lb)   05/26/20 72 6 kg (160 lb)   10/15/19 73 5 kg (162 lb)   10/09/19 72 2 kg (159 lb 3 2 oz)   08/26/19 71 7 kg (158 lb)       Physical Exam  Vitals signs and nursing note reviewed  Constitutional:       General: He is not in acute distress  Appearance: He is well-developed  HENT:      Head: Normocephalic and atraumatic     Eyes: General: No scleral icterus  Conjunctiva/sclera: Conjunctivae normal    Neck:      Vascular: No carotid bruit or JVD  Cardiovascular:      Rate and Rhythm: Normal rate and regular rhythm  Heart sounds: Normal heart sounds  No murmur  No friction rub  No gallop  Pulmonary:      Effort: Pulmonary effort is normal  No respiratory distress  Breath sounds: Normal breath sounds  No wheezing or rales  Chest:      Chest wall: No tenderness  Abdominal:      General: There is no distension  Palpations: Abdomen is soft  Tenderness: There is no abdominal tenderness  Musculoskeletal:         General: No deformity  Skin:     General: Skin is warm  Neurological:      General: No focal deficit present  Mental Status: He is alert and oriented to person, place, and time  Mental status is at baseline  Psychiatric:         Mood and Affect: Mood normal          Behavior: Behavior normal          Thought Content: Thought content normal            Labs:  CBC:   Lab Results   Component Value Date    WBC 5 64 08/12/2020    RBC 4 72 08/12/2020    HGB 12 4 08/12/2020    HCT 40 0 08/12/2020    MCV 85 08/12/2020     08/12/2020    RDW 15 1 08/12/2020       CMP:   Lab Results   Component Value Date    K 4 7 08/12/2020     08/12/2020    CO2 26 08/12/2020    BUN 22 08/12/2020    CREATININE 1 55 (H) 08/12/2020    EGFR 44 08/12/2020    CALCIUM 9 2 08/12/2020    AST 17 08/12/2020    ALT 17 08/12/2020    ALKPHOS 45 (L) 08/12/2020       Magnesium:  No results found for: MG    Lipid Profile:   Lab Results   Component Value Date    HDL 43 06/01/2020    TRIG 112 06/01/2020    LDLCALC 124 (H) 06/01/2020       Thyroid Studies: No results found for: KNV9NZANVSJD, T3FREE, FREET4, I7XWGDX, X9RJPMD    No components found for: HGA1C    No results found for: GTK4    Imaging: Xr Chest Pa & Lateral    Result Date: 6/17/2020  Narrative: CHEST INDICATION:   R06 02: Shortness of breath   COMPARISON:  6/25/2013 EXAM PERFORMED/VIEWS:  XR CHEST PA & LATERAL Images: 3 FINDINGS: Retrocardiac air-fluid level consistent with moderate to large hiatal hernia Strand-like opacities left lung base likely scarring, unchanged Cardiomediastinal silhouette appears unremarkable  The lungs are otherwise clear  No pneumothorax or pleural effusion  Osseous structures appear within normal limits for patient age  Impression: Persistent left lung base linear strand-like scarring Increased conspicuity of moderate to large hiatal hernia with air-fluid level No acute cardiopulmonary disease  Workstation performed: [id number]       Cardiac testing:   No results found for this or any previous visit  No results found for this or any previous visit  No results found for this or any previous visit  No results found for this or any previous visit

## 2021-01-06 ENCOUNTER — TRANSCRIBE ORDERS (OUTPATIENT)
Dept: ADMINISTRATIVE | Facility: HOSPITAL | Age: 76
End: 2021-01-06

## 2021-01-06 ENCOUNTER — LAB (OUTPATIENT)
Dept: LAB | Facility: CLINIC | Age: 76
End: 2021-01-06
Payer: MEDICARE

## 2021-01-06 DIAGNOSIS — E78.5 HYPERLIPIDEMIA: ICD-10-CM

## 2021-01-06 DIAGNOSIS — D47.2 MONOCLONAL PARAPROTEINEMIA: Primary | ICD-10-CM

## 2021-01-06 DIAGNOSIS — D47.2 MONOCLONAL PARAPROTEINEMIA: ICD-10-CM

## 2021-01-06 LAB
ALBUMIN SERPL BCP-MCNC: 4 G/DL (ref 3.5–5)
ALP SERPL-CCNC: 67 U/L (ref 46–116)
ALT SERPL W P-5'-P-CCNC: 17 U/L (ref 12–78)
ANION GAP SERPL CALCULATED.3IONS-SCNC: 1 MMOL/L (ref 4–13)
AST SERPL W P-5'-P-CCNC: 13 U/L (ref 5–45)
BASOPHILS # BLD AUTO: 0.06 THOUSANDS/ΜL (ref 0–0.1)
BASOPHILS NFR BLD AUTO: 1 % (ref 0–1)
BILIRUB SERPL-MCNC: 0.25 MG/DL (ref 0.2–1)
BUN SERPL-MCNC: 25 MG/DL (ref 5–25)
CALCIUM SERPL-MCNC: 10 MG/DL (ref 8.3–10.1)
CHLORIDE SERPL-SCNC: 111 MMOL/L (ref 100–108)
CHOLEST SERPL-MCNC: 169 MG/DL (ref 50–200)
CO2 SERPL-SCNC: 30 MMOL/L (ref 21–32)
CREAT SERPL-MCNC: 1.45 MG/DL (ref 0.6–1.3)
EOSINOPHIL # BLD AUTO: 0.31 THOUSAND/ΜL (ref 0–0.61)
EOSINOPHIL NFR BLD AUTO: 5 % (ref 0–6)
ERYTHROCYTE [DISTWIDTH] IN BLOOD BY AUTOMATED COUNT: 14.3 % (ref 11.6–15.1)
GFR SERPL CREATININE-BSD FRML MDRD: 47 ML/MIN/1.73SQ M
GLUCOSE SERPL-MCNC: 79 MG/DL (ref 65–140)
HCT VFR BLD AUTO: 44.4 % (ref 36.5–49.3)
HDLC SERPL-MCNC: 47 MG/DL
HGB BLD-MCNC: 14.1 G/DL (ref 12–17)
IGA SERPL-MCNC: 62 MG/DL (ref 70–400)
IGG SERPL-MCNC: 1450 MG/DL (ref 700–1600)
IGM SERPL-MCNC: 21 MG/DL (ref 40–230)
IMM GRANULOCYTES # BLD AUTO: 0.03 THOUSAND/UL (ref 0–0.2)
IMM GRANULOCYTES NFR BLD AUTO: 1 % (ref 0–2)
LDLC SERPL CALC-MCNC: 97 MG/DL (ref 0–100)
LYMPHOCYTES # BLD AUTO: 1.27 THOUSANDS/ΜL (ref 0.6–4.47)
LYMPHOCYTES NFR BLD AUTO: 21 % (ref 14–44)
MCH RBC QN AUTO: 27.1 PG (ref 26.8–34.3)
MCHC RBC AUTO-ENTMCNC: 31.8 G/DL (ref 31.4–37.4)
MCV RBC AUTO: 85 FL (ref 82–98)
MONOCYTES # BLD AUTO: 0.53 THOUSAND/ΜL (ref 0.17–1.22)
MONOCYTES NFR BLD AUTO: 9 % (ref 4–12)
NEUTROPHILS # BLD AUTO: 3.9 THOUSANDS/ΜL (ref 1.85–7.62)
NEUTS SEG NFR BLD AUTO: 63 % (ref 43–75)
NRBC BLD AUTO-RTO: 0 /100 WBCS
PLATELET # BLD AUTO: 350 THOUSANDS/UL (ref 149–390)
PMV BLD AUTO: 9.2 FL (ref 8.9–12.7)
POTASSIUM SERPL-SCNC: 5.2 MMOL/L (ref 3.5–5.3)
PROT SERPL-MCNC: 8 G/DL (ref 6.4–8.2)
RBC # BLD AUTO: 5.21 MILLION/UL (ref 3.88–5.62)
SODIUM SERPL-SCNC: 142 MMOL/L (ref 136–145)
TRIGL SERPL-MCNC: 126 MG/DL
WBC # BLD AUTO: 6.1 THOUSAND/UL (ref 4.31–10.16)

## 2021-01-06 PROCEDURE — 86334 IMMUNOFIX E-PHORESIS SERUM: CPT

## 2021-01-06 PROCEDURE — 86334 IMMUNOFIX E-PHORESIS SERUM: CPT | Performed by: PATHOLOGY

## 2021-01-06 PROCEDURE — 84165 PROTEIN E-PHORESIS SERUM: CPT

## 2021-01-06 PROCEDURE — 80053 COMPREHEN METABOLIC PANEL: CPT

## 2021-01-06 PROCEDURE — 80061 LIPID PANEL: CPT

## 2021-01-06 PROCEDURE — 84165 PROTEIN E-PHORESIS SERUM: CPT | Performed by: PATHOLOGY

## 2021-01-06 PROCEDURE — 85025 COMPLETE CBC W/AUTO DIFF WBC: CPT

## 2021-01-06 PROCEDURE — 36415 COLL VENOUS BLD VENIPUNCTURE: CPT

## 2021-01-06 PROCEDURE — 83883 ASSAY NEPHELOMETRY NOT SPEC: CPT

## 2021-01-06 PROCEDURE — 82784 ASSAY IGA/IGD/IGG/IGM EACH: CPT

## 2021-01-07 LAB
ALBUMIN SERPL ELPH-MCNC: 4.29 G/DL (ref 3.5–5)
ALBUMIN SERPL ELPH-MCNC: 57.2 % (ref 52–65)
ALPHA1 GLOB SERPL ELPH-MCNC: 0.35 G/DL (ref 0.1–0.4)
ALPHA1 GLOB SERPL ELPH-MCNC: 4.7 % (ref 2.5–5)
ALPHA2 GLOB SERPL ELPH-MCNC: 0.82 G/DL (ref 0.4–1.2)
ALPHA2 GLOB SERPL ELPH-MCNC: 10.9 % (ref 7–13)
BETA GLOB ABNORMAL SERPL ELPH-MCNC: 0.39 G/DL (ref 0.4–0.8)
BETA1 GLOB SERPL ELPH-MCNC: 5.2 % (ref 5–13)
BETA2 GLOB SERPL ELPH-MCNC: 3.7 % (ref 2–8)
BETA2+GAMMA GLOB SERPL ELPH-MCNC: 0.28 G/DL (ref 0.2–0.5)
GAMMA GLOB ABNORMAL SERPL ELPH-MCNC: 1.37 G/DL (ref 0.5–1.6)
GAMMA GLOB SERPL ELPH-MCNC: 18.3 % (ref 12–22)
IGG/ALB SER: 1.34 {RATIO} (ref 1.1–1.8)
INTERPRETATION UR IFE-IMP: NORMAL
KAPPA LC FREE SER-MCNC: 51.1 MG/L (ref 3.3–19.4)
KAPPA LC FREE/LAMBDA FREE SER: 0.4 {RATIO} (ref 0.26–1.65)
LAMBDA LC FREE SERPL-MCNC: 129.2 MG/L (ref 5.7–26.3)
M PROTEIN 1 MFR SERPL ELPH: 4.9 %
M PROTEIN 1 SERPL ELPH-MCNC: 0.37 G/DL
PROT PATTERN SERPL ELPH-IMP: ABNORMAL
PROT SERPL-MCNC: 7.5 G/DL (ref 6.4–8.2)

## 2021-02-19 ENCOUNTER — NURSE TRIAGE (OUTPATIENT)
Dept: OTHER | Facility: OTHER | Age: 76
End: 2021-02-19

## 2021-02-19 NOTE — TELEPHONE ENCOUNTER
Regardin69 y/o - fatigued  ----- Message from Caprice Archibald sent at 2021  6:26 PM EST -----  My  Tra Casas has been very tired and I'm not sure if he should get a Covid test

## 2021-03-04 RX ORDER — MELOXICAM 15 MG/1
15 TABLET ORAL DAILY
COMMUNITY
Start: 2020-10-15 | End: 2021-08-31 | Stop reason: ALTCHOICE

## 2021-03-04 RX ORDER — CYCLOBENZAPRINE HCL 10 MG
10 TABLET ORAL 3 TIMES DAILY
COMMUNITY
End: 2021-08-31 | Stop reason: ALTCHOICE

## 2021-03-05 DIAGNOSIS — Z23 ENCOUNTER FOR IMMUNIZATION: ICD-10-CM

## 2021-03-09 ENCOUNTER — OFFICE VISIT (OUTPATIENT)
Dept: FAMILY MEDICINE CLINIC | Facility: CLINIC | Age: 76
End: 2021-03-09
Payer: MEDICARE

## 2021-03-09 ENCOUNTER — APPOINTMENT (OUTPATIENT)
Dept: RADIOLOGY | Facility: CLINIC | Age: 76
End: 2021-03-09
Payer: MEDICARE

## 2021-03-09 ENCOUNTER — LAB (OUTPATIENT)
Dept: LAB | Facility: CLINIC | Age: 76
End: 2021-03-09
Payer: MEDICARE

## 2021-03-09 VITALS
SYSTOLIC BLOOD PRESSURE: 133 MMHG | TEMPERATURE: 97 F | DIASTOLIC BLOOD PRESSURE: 78 MMHG | OXYGEN SATURATION: 97 % | BODY MASS INDEX: 24.25 KG/M2 | WEIGHT: 164.2 LBS | HEART RATE: 80 BPM

## 2021-03-09 DIAGNOSIS — R53.83 FATIGUE, UNSPECIFIED TYPE: ICD-10-CM

## 2021-03-09 DIAGNOSIS — N18.32 STAGE 3B CHRONIC KIDNEY DISEASE (HCC): ICD-10-CM

## 2021-03-09 DIAGNOSIS — L60.9 NAIL ABNORMALITY: ICD-10-CM

## 2021-03-09 DIAGNOSIS — R53.83 FATIGUE, UNSPECIFIED TYPE: Primary | ICD-10-CM

## 2021-03-09 DIAGNOSIS — Z79.899 OTHER LONG TERM (CURRENT) DRUG THERAPY: ICD-10-CM

## 2021-03-09 DIAGNOSIS — R06.2 WHEEZING: ICD-10-CM

## 2021-03-09 DIAGNOSIS — I10 ESSENTIAL HYPERTENSION: ICD-10-CM

## 2021-03-09 LAB
25(OH)D3 SERPL-MCNC: 31.2 NG/ML (ref 30–100)
ALBUMIN SERPL BCP-MCNC: 3.6 G/DL (ref 3.5–5)
ALP SERPL-CCNC: 65 U/L (ref 46–116)
ALT SERPL W P-5'-P-CCNC: 13 U/L (ref 12–78)
ANION GAP SERPL CALCULATED.3IONS-SCNC: 2 MMOL/L (ref 4–13)
AST SERPL W P-5'-P-CCNC: 11 U/L (ref 5–45)
BACTERIA UR QL AUTO: ABNORMAL /HPF
BASOPHILS # BLD AUTO: 0.06 THOUSANDS/ΜL (ref 0–0.1)
BASOPHILS NFR BLD AUTO: 1 % (ref 0–1)
BILIRUB SERPL-MCNC: <0.1 MG/DL (ref 0.2–1)
BILIRUB UR QL STRIP: NEGATIVE
BUN SERPL-MCNC: 28 MG/DL (ref 5–25)
CALCIUM SERPL-MCNC: 9.6 MG/DL (ref 8.3–10.1)
CHLORIDE SERPL-SCNC: 109 MMOL/L (ref 100–108)
CLARITY UR: CLEAR
CO2 SERPL-SCNC: 31 MMOL/L (ref 21–32)
COLOR UR: YELLOW
CREAT SERPL-MCNC: 1.59 MG/DL (ref 0.6–1.3)
EOSINOPHIL # BLD AUTO: 0.31 THOUSAND/ΜL (ref 0–0.61)
EOSINOPHIL NFR BLD AUTO: 4 % (ref 0–6)
ERYTHROCYTE [DISTWIDTH] IN BLOOD BY AUTOMATED COUNT: 13.6 % (ref 11.6–15.1)
GFR SERPL CREATININE-BSD FRML MDRD: 42 ML/MIN/1.73SQ M
GLUCOSE SERPL-MCNC: 95 MG/DL (ref 65–140)
GLUCOSE UR STRIP-MCNC: NEGATIVE MG/DL
HCT VFR BLD AUTO: 40.7 % (ref 36.5–49.3)
HGB BLD-MCNC: 13.1 G/DL (ref 12–17)
HGB UR QL STRIP.AUTO: ABNORMAL
HYALINE CASTS #/AREA URNS LPF: ABNORMAL /LPF
IMM GRANULOCYTES # BLD AUTO: 0.03 THOUSAND/UL (ref 0–0.2)
IMM GRANULOCYTES NFR BLD AUTO: 0 % (ref 0–2)
KETONES UR STRIP-MCNC: NEGATIVE MG/DL
LEUKOCYTE ESTERASE UR QL STRIP: NEGATIVE
LYMPHOCYTES # BLD AUTO: 1.22 THOUSANDS/ΜL (ref 0.6–4.47)
LYMPHOCYTES NFR BLD AUTO: 15 % (ref 14–44)
MCH RBC QN AUTO: 27.8 PG (ref 26.8–34.3)
MCHC RBC AUTO-ENTMCNC: 32.2 G/DL (ref 31.4–37.4)
MCV RBC AUTO: 86 FL (ref 82–98)
MONOCYTES # BLD AUTO: 0.56 THOUSAND/ΜL (ref 0.17–1.22)
MONOCYTES NFR BLD AUTO: 7 % (ref 4–12)
NEUTROPHILS # BLD AUTO: 5.73 THOUSANDS/ΜL (ref 1.85–7.62)
NEUTS SEG NFR BLD AUTO: 73 % (ref 43–75)
NITRITE UR QL STRIP: NEGATIVE
NON-SQ EPI CELLS URNS QL MICRO: ABNORMAL /HPF
NRBC BLD AUTO-RTO: 0 /100 WBCS
PH UR STRIP.AUTO: 6.5 [PH]
PHOSPHATE SERPL-MCNC: 3.3 MG/DL (ref 2.3–4.1)
PLATELET # BLD AUTO: 363 THOUSANDS/UL (ref 149–390)
PMV BLD AUTO: 9.1 FL (ref 8.9–12.7)
POTASSIUM SERPL-SCNC: 4.6 MMOL/L (ref 3.5–5.3)
PROT SERPL-MCNC: 7.4 G/DL (ref 6.4–8.2)
PROT UR STRIP-MCNC: NEGATIVE MG/DL
PTH-INTACT SERPL-MCNC: 44.6 PG/ML (ref 18.4–80.1)
RBC # BLD AUTO: 4.72 MILLION/UL (ref 3.88–5.62)
RBC #/AREA URNS AUTO: ABNORMAL /HPF
SODIUM SERPL-SCNC: 142 MMOL/L (ref 136–145)
SP GR UR STRIP.AUTO: 1.02 (ref 1–1.03)
TSH SERPL DL<=0.05 MIU/L-ACNC: 1.56 UIU/ML (ref 0.36–3.74)
UROBILINOGEN UR QL STRIP.AUTO: 0.2 E.U./DL
VIT B12 SERPL-MCNC: 788 PG/ML (ref 100–900)
WBC # BLD AUTO: 7.91 THOUSAND/UL (ref 4.31–10.16)
WBC #/AREA URNS AUTO: ABNORMAL /HPF

## 2021-03-09 PROCEDURE — 82607 VITAMIN B-12: CPT

## 2021-03-09 PROCEDURE — 84100 ASSAY OF PHOSPHORUS: CPT

## 2021-03-09 PROCEDURE — 80053 COMPREHEN METABOLIC PANEL: CPT

## 2021-03-09 PROCEDURE — 83970 ASSAY OF PARATHORMONE: CPT

## 2021-03-09 PROCEDURE — 81001 URINALYSIS AUTO W/SCOPE: CPT | Performed by: PHYSICIAN ASSISTANT

## 2021-03-09 PROCEDURE — 99214 OFFICE O/P EST MOD 30 MIN: CPT | Performed by: PHYSICIAN ASSISTANT

## 2021-03-09 PROCEDURE — 82306 VITAMIN D 25 HYDROXY: CPT

## 2021-03-09 PROCEDURE — 85025 COMPLETE CBC W/AUTO DIFF WBC: CPT

## 2021-03-09 PROCEDURE — 84443 ASSAY THYROID STIM HORMONE: CPT

## 2021-03-09 PROCEDURE — 71046 X-RAY EXAM CHEST 2 VIEWS: CPT

## 2021-03-09 PROCEDURE — 36415 COLL VENOUS BLD VENIPUNCTURE: CPT

## 2021-03-09 NOTE — PROGRESS NOTES
Assessment/Plan:       Problem List Items Addressed This Visit        Cardiovascular and Mediastinum    Essential hypertension       Genitourinary    CKD (chronic kidney disease) stage 3, GFR 30-59 ml/min    Relevant Orders    Comprehensive metabolic panel    Vitamin B12    Vitamin D 25 hydroxy    Phosphorus    UA (URINE) with reflex to Scope    PTH, intact       Other    Fatigue - Primary    Relevant Orders    Comprehensive metabolic panel    CBC and differential    TSH, 3rd generation with Free T4 reflex    Vitamin B12    XR chest pa & lateral      Other Visit Diagnoses     Wheezing        Relevant Orders    XR chest pa & lateral    Other long term (current) drug therapy         Relevant Orders    Vitamin B12    Nail abnormality            unclear etiology of chronic fatigue, likely multifactoral given chronic conditions, occasional hypotension, age  Will order extended set of labs  Monitor CBC for possible splinter hemorrhages of nails  Low concern for subungual melanomas given appearance  CXR for wheezing on exam  Hold amlodipine for BP <120/80 to avoid hypotension, especially in CKD  Subjective:      Patient ID: Meghna Hernandez is a 78 y o  male  Pt presents with complaints of chronic fatigue  Pt has a hx of tongue carcinoma s/p radical dissection 20 years ago and monoclonal gammopathy of undetermined significance  Pt also has a hx of CKD3 and HTN on amlodipine  He notes his BP is occasionally low and he has been taking 1/2 tab of amlodipine (2 5mg) because of this  He shares at times his blood pressure has been as low as 80s/50s  He shares he feels "his body is fighting a virus" and then fights it off  He has been tested for lyme multiple times and it was negative  He had a recent normal cardiac workup  He denies CP, SOB, syncope       He also notes he has black linear markings on the tips of his nails, this is new within the past month, he is on ASA       The following portions of the patient's history were reviewed and updated as appropriate:   He  has a past medical history of Cancer ([facility] [address] )  He   Patient Active Problem List    Diagnosis Date Noted    SOB (shortness of breath) on exertion 06/17/2020    Exposure to SARS-associated coronavirus 05/26/2020    CKD (chronic kidney disease) stage 3, GFR 30-59 ml/min 05/26/2020    Fatigue 05/26/2020    Screening for lipid disorders 05/26/2020    Benign localized prostatic hyperplasia with lower urinary tract symptoms (LUTS) 10/15/2019    Hematuria 10/15/2019    Overactive bladder 10/09/2019    Idiopathic hypotension 07/31/2019    Dysuria 07/31/2019    Weight loss 07/31/2019    Uncontrolled hypertension 05/14/2019    Dizziness 05/14/2019    History of head and neck radiation 05/14/2019    Essential hypertension 05/06/2019    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[facility] [address] ) 02/14/2019    Excessive salivation 01/10/2019    Medicare annual wellness visit, subsequent 01/10/2019    Colon cancer screening 01/10/2019    Encounter for hepatitis C screening test for low risk patient 01/10/2019    Screening, lipid 01/10/2019    Abnormal kidney function 01/10/2019     He  has a past surgical history that includes Tongue surgery; Hernia repair; and Eyelid closure  His family history includes Hypertension in his mother; Stroke in his mother  He  reports that he has quit smoking  He has quit using smokeless tobacco  He reports that he does not drink alcohol or use drugs    Current Outpatient Medications   Medication Sig Dispense Refill    aspirin 81 MG tablet 1 cap(s)      Coenzyme Q10 (CO Q 10 PO) Take by mouth      Multiple Vitamins-Minerals (MULTIVITAMIN ADULTS 50+ PO) Take by mouth      pravastatin (PRAVACHOL) 20 mg tablet Take 1 tablet (20 mg total) by mouth daily 90 tablet 0    VITAMIN D, CHOLECALCIFEROL, PO Take by mouth      amLODIPine (NORVASC) 5 mg tablet Take 1 tablet (5 mg total) by mouth daily 90 tablet 2    cyclobenzaprine (FLEXERIL) 10 mg tablet Take 10 mg by mouth 3 (three) times a day      meloxicam (MOBIC) 15 mg tablet Take 15 mg by mouth daily      oxybutynin (DITROPAN) 5 mg tablet TAKE 1 TABLET BY MOUTH TWICE A DAY (Patient not taking: Reported on 9/30/2020) 60 tablet 1     No current facility-administered medications for this visit  Current Outpatient Medications on File Prior to Visit   Medication Sig    aspirin 81 MG tablet 1 cap(s)    Coenzyme Q10 (CO Q 10 PO) Take by mouth    Multiple Vitamins-Minerals (MULTIVITAMIN ADULTS 50+ PO) Take by mouth    pravastatin (PRAVACHOL) 20 mg tablet Take 1 tablet (20 mg total) by mouth daily    VITAMIN D, CHOLECALCIFEROL, PO Take by mouth    amLODIPine (NORVASC) 5 mg tablet Take 1 tablet (5 mg total) by mouth daily    cyclobenzaprine (FLEXERIL) 10 mg tablet Take 10 mg by mouth 3 (three) times a day    meloxicam (MOBIC) 15 mg tablet Take 15 mg by mouth daily    oxybutynin (DITROPAN) 5 mg tablet TAKE 1 TABLET BY MOUTH TWICE A DAY (Patient not taking: Reported on 9/30/2020)     No current facility-administered medications on file prior to visit  He is allergic to iodinated diagnostic agents and iodine       Review of Systems   Constitutional: Positive for fatigue  Negative for chills and fever  HENT: Negative for congestion, ear pain, hearing loss, nosebleeds, postnasal drip, rhinorrhea, sinus pressure, sinus pain, sneezing and sore throat  Eyes: Negative for pain, discharge, itching and visual disturbance  Respiratory: Negative for cough, chest tightness, shortness of breath and wheezing  Cardiovascular: Negative for chest pain, palpitations and leg swelling  Gastrointestinal: Negative for abdominal pain, blood in stool, constipation, diarrhea, nausea and vomiting  Genitourinary: Negative for frequency and urgency  Neurological: Negative for dizziness, light-headedness and numbness           Objective:      /78   Pulse 80   Temp (!) 97 °F (36 1 °C)   Wt 74 5 kg (164 lb 3 2 oz)   SpO2 97% BMI 24 25 kg/m²          Physical Exam  Vitals signs and nursing note reviewed  Constitutional:       General: He is not in acute distress  Appearance: Normal appearance  HENT:      Head: Normocephalic and atraumatic  Nose: Nose normal       Mouth/Throat:      Mouth: Mucous membranes are moist       Pharynx: Oropharynx is clear  No oropharyngeal exudate or posterior oropharyngeal erythema  Eyes:      Pupils: Pupils are equal, round, and reactive to light  Neck:      Musculoskeletal: Normal range of motion and neck supple  Cardiovascular:      Rate and Rhythm: Normal rate and regular rhythm  Heart sounds: Normal heart sounds  No murmur  Pulmonary:      Effort: Pulmonary effort is normal  No respiratory distress  Breath sounds: Normal breath sounds  No wheezing, rhonchi or rales  Abdominal:      General: Bowel sounds are normal       Palpations: Abdomen is soft  Musculoskeletal: Normal range of motion  Skin:     General: Skin is warm and dry  Coloration: Skin is not pale  Findings: No erythema or rash  Comments: Multiple small linear black steaks on tips of fingernails    Neurological:      Mental Status: He is alert and oriented to person, place, and time     Psychiatric:         Mood and Affect: Mood and affect normal

## 2021-03-09 NOTE — PROGRESS NOTES
Assessment and Plan:     Problem List Items Addressed This Visit     None           Preventive health issues were discussed with patient, and age appropriate screening tests were ordered as noted in patient's After Visit Summary  Personalized health advice and appropriate referrals for health education or preventive services given if needed, as noted in patient's After Visit Summary       History of Present Illness:     Patient presents for Medicare Annual Wellness visit    Patient Care Team:  Lacie High MD as PCP - General (Family Medicine)     Problem List:     Patient Active Problem List   Diagnosis    Excessive salivation    Medicare annual wellness visit, subsequent    Colon cancer screening    Encounter for hepatitis C screening test for low risk patient    Screening, lipid    Abnormal kidney function    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[place] [address] )    Essential hypertension    Uncontrolled hypertension    Dizziness    History of head and neck radiation    Idiopathic hypotension    Dysuria    Weight loss    Overactive bladder    Benign localized prostatic hyperplasia with lower urinary tract symptoms (LUTS)    Hematuria    Exposure to SARS-associated coronavirus    CKD (chronic kidney disease) stage 3, GFR 30-59 ml/min    Fatigue    Screening for lipid disorders    SOB (shortness of breath) on exertion      Past Medical and Surgical History:     Past Medical History:   Diagnosis Date    Cancer ([facility] )     tongue cancer     Past Surgical History:   Procedure Laterality Date    EYELID CLOSURE      HERNIA REPAIR      TONGUE SURGERY        Family History:     Family History   Problem Relation Age of Onset    Hypertension Mother     Stroke Mother       Social History:        Social History     Socioeconomic History    Marital status: /Civil Union     Spouse name: None    Number of children: None    Years of education: None    Highest education level: None   Occupational History    None   Social Needs    Financial resource strain: None    Food insecurity     Worry: None     Inability: None    Transportation needs     Medical: None     Non-medical: None   Tobacco Use    Smoking status: Former Smoker    Smokeless tobacco: Former User   Substance and Sexual Activity    Alcohol use: Never     Frequency: Never    Drug use: Never    Sexual activity: None   Lifestyle    Physical activity     Days per week: None     Minutes per session: None    Stress: None   Relationships    Social connections     Talks on phone: None     Gets together: None     Attends Bahai service: None     Active member of club or organization: None     Attends meetings of clubs or organizations: None     Relationship status: None    Intimate partner violence     Fear of current or ex partner: None     Emotionally abused: None     Physically abused: None     Forced sexual activity: None   Other Topics Concern    None   Social History Narrative    None      Medications and Allergies:     Current Outpatient Medications   Medication Sig Dispense Refill    aspirin 81 MG tablet 1 cap(s)      Coenzyme Q10 (CO Q 10 PO) Take by mouth      Multiple Vitamins-Minerals (MULTIVITAMIN ADULTS 50+ PO) Take by mouth      pravastatin (PRAVACHOL) 20 mg tablet Take 1 tablet (20 mg total) by mouth daily 90 tablet 0    VITAMIN D, CHOLECALCIFEROL, PO Take by mouth      amLODIPine (NORVASC) 5 mg tablet Take 1 tablet (5 mg total) by mouth daily 90 tablet 2    cyclobenzaprine (FLEXERIL) 10 mg tablet Take 10 mg by mouth 3 (three) times a day      meloxicam (MOBIC) 15 mg tablet Take 15 mg by mouth daily      oxybutynin (DITROPAN) 5 mg tablet TAKE 1 TABLET BY MOUTH TWICE A DAY (Patient not taking: Reported on 9/30/2020) 60 tablet 1     No current facility-administered medications for this visit        Allergies   Allergen Reactions    Iodinated Diagnostic Agents Other (See Comments)    Iodine       Immunizations:     Immunization History Administered Date(s) Administered    INFLUENZA 02/13/2018, 10/17/2018, 10/14/2019    Influenza, high dose seasonal 0 7 mL 09/15/2020      Health Maintenance:         Topic Date Due    Colorectal Cancer Screening  01/28/2022    Hepatitis C Screening  Completed         Topic Date Due    COVID-19 Vaccine (1 of 2) 02[birth year]/1963    DTaP,Tdap,and Td Vaccines (1 - Tdap) 02/25/1968    Pneumococcal Vaccine: 65+ Years (1 of 1 - PPSV23) 02/25/2012      Medicare Health Risk Assessment:     /78   Pulse 80   Temp (!) 97 °F (36 1 °C)   Wt 74 5 kg (164 lb 3 2 oz)   SpO2 97%   BMI 24 25 kg/m²      Franck Velasco is here for his Initial Wellness visit  Health Risk Assessment:   Patient rates overall health as good  Patient feels that their physical health rating is same  Eyesight was rated as same  Hearing was rated as same  Patient feels that their emotional and mental health rating is same  Pain experienced in the last 7 days has been none  Patient states that he has experienced no weight loss or gain in last 6 months  Depression Screening:   PHQ-2 Score: 0      Fall Risk Screening: In the past year, patient has experienced: no history of falling in past year      Home Safety:  Patient has trouble with stairs inside or outside of their home  Patient has working smoke alarms and has working carbon monoxide detector  Home safety hazards include: none  Nutrition:   Current diet is Regular  Medications:   Patient is currently taking over-the-counter supplements  OTC medications include: see medication list  Patient is able to manage medications  Activities of Daily Living (ADLs)/Instrumental Activities of Daily Living (IADLs):   Walk and transfer into and out of bed and chair?: Yes  Dress and groom yourself?: Yes    Bathe or shower yourself?: Yes    Feed yourself?  Yes  Do your laundry/housekeeping?: Yes  Manage your money, pay your bills and track your expenses?: Yes  Make your own meals?: Yes    Do your own shopping?: Yes    Previous Hospitalizations:   Any hospitalizations or ED visits within the last 12 months?: No      PREVENTIVE SCREENINGS      Cardiovascular Screening:    General: Screening Not Indicated and History Lipid Disorder      Diabetes Screening:     General: Screening Current      Colorectal Cancer Screening:     General: Screening Current      Prostate Cancer Screening:    General: Screening Current      Abdominal Aortic Aneurysm (AAA) Screening:    Risk factors include: age between 73-67 yo and tobacco use        Lung Cancer Screening:     General: Screening Not Indicated      Hepatitis C Screening:    General: Screening Current    Screening, Brief Intervention, and Referral to Treatment (SBIRT)    Screening  Typical number of drinks in a day: 0  Typical number of drinks in a week: 0      Chanell lEizabeth PA-C

## 2021-03-10 DIAGNOSIS — R31.29 MICROSCOPIC HEMATURIA: Primary | ICD-10-CM

## 2021-03-12 ENCOUNTER — TELEPHONE (OUTPATIENT)
Dept: FAMILY MEDICINE CLINIC | Facility: CLINIC | Age: 76
End: 2021-03-12

## 2021-03-12 NOTE — TELEPHONE ENCOUNTER
Patient would like to know if the blood work ruled out endocarditis  He googled the symptoms and was concerned

## 2021-03-12 NOTE — TELEPHONE ENCOUNTER
Do not suspect endocarditis as I would expect fevers, more heart complaints, abnormalities when examining patient and also abnormalities on his labs

## 2021-03-15 ENCOUNTER — TELEPHONE (OUTPATIENT)
Dept: UROLOGY | Facility: MEDICAL CENTER | Age: 76
End: 2021-03-15

## 2021-03-15 DIAGNOSIS — R31.29 MICROSCOPIC HEMATURIA: Primary | ICD-10-CM

## 2021-03-15 NOTE — TELEPHONE ENCOUNTER
Patient seen in consult by Dr Kim Broussard on 1/0/15/19 for OAB, microscopic hematuria and BPH  It was advised patient proceed with cysto/Trus  Patient cancelled appointment and was not seen back by urology since  Recent UA showed continued microscopic hematuria, with RBCs of 4-10  Most recent imaging was from November 2019 with showed bilateral simple cysts  Please advise on appropriate follow up

## 2021-03-15 NOTE — TELEPHONE ENCOUNTER
Patient seen By Dr Lois Causey in [facility]    Patient called stating he has an MRI done and he has a cyst on his kidney and he has blood in his urine  He  was referred to see Urology         Patient can be reached at [phone] (h) Adequate: hears normal conversation without difficulty

## 2021-03-16 NOTE — TELEPHONE ENCOUNTER
Call placed to patient to discuss  Left message to call office back  Office number provided on voicemail  When patient returns call, will advise that US order for evaluation for microscopic hematuria per provider

## 2021-03-17 NOTE — TELEPHONE ENCOUNTER
Patient called and information for ultrasound was given  He will call central scheduling to schedule appointment

## 2021-03-26 ENCOUNTER — TELEPHONE (OUTPATIENT)
Dept: UROLOGY | Facility: CLINIC | Age: 76
End: 2021-03-26

## 2021-03-26 ENCOUNTER — HOSPITAL ENCOUNTER (OUTPATIENT)
Dept: ULTRASOUND IMAGING | Facility: HOSPITAL | Age: 76
Discharge: HOME | End: 2021-03-26
Payer: MEDICARE

## 2021-03-26 DIAGNOSIS — R31.29 MICROSCOPIC HEMATURIA: ICD-10-CM

## 2021-03-26 PROCEDURE — 76770 US EXAM ABDO BACK WALL COMP: CPT

## 2021-04-02 DIAGNOSIS — E78.5 HYPERLIPIDEMIA: ICD-10-CM

## 2021-04-02 RX ORDER — PRAVASTATIN SODIUM 20 MG
20 TABLET ORAL DAILY
Qty: 90 TABLET | Refills: 0 | Status: SHIPPED | OUTPATIENT
Start: 2021-04-02 | End: 2021-06-28

## 2021-04-05 NOTE — TELEPHONE ENCOUNTER
US to eval microscopic hematuria:  IMPRESSION:        1  3 8 cm cyst lower pole the right kidney likely correlates to the recent MR findings are similar to the prior 2019 ultrasound  2   No hydronephrosis  3   Nonobstructing 0 6 cm calculus in the midpole of left kidney suspected       Workstation performed: [id number]    Please review previous notes, patient had previously cancelled cysto/TRUS  Please advise

## 2021-04-05 NOTE — TELEPHONE ENCOUNTER
Reviewed ultrasound from 2019 and current as well as MRI from last month  There is a dictation error on the most recent ultrasound I notified reading room and changing it  (The renal cyst is on the left side not the right)  It is unchanged over last few years just over 3cm  I do recommend he follow through with the cystoscopy and TRUS as previously planned for completing the the micro heme workup  He will not need additional imaging at this time

## 2021-04-05 NOTE — TELEPHONE ENCOUNTER
Called and left message for return call, number provided  When patient returns call, can advise renal cyst is unchanged over the last few years  Providers recommend rescheduling cysto/TRUS to complete microscopic hematuria work up  Can assist with scheduling next available

## 2021-04-06 NOTE — TELEPHONE ENCOUNTER
Patient called back was unsure why he needed a cysto  Tried to explain but patient was not understanding  Patient requesting call back from Jc

## 2021-04-09 ENCOUNTER — TELEPHONE (OUTPATIENT)
Dept: FAMILY MEDICINE CLINIC | Facility: CLINIC | Age: 76
End: 2021-04-09

## 2021-04-09 NOTE — TELEPHONE ENCOUNTER
Patient calling back in at this time and transferred to [facility]  Patient reports he would like a less invasive option than cystoscopy  He asked if we could do a urine cytology first and then if that is abnormal he would be agreeable to cystoscopy  Advised his US looked good, and not concerning for malignant causes of hematuria  He did state if the doctors are insistent he gets a cystoscopy he will  Reviewed it is part of the hematuria workup and we recommend he have cystoscopy  With that being said he has the right to refuse anything  Advised I will consult with providers in regards to cystology and call him back next week

## 2021-04-09 NOTE — TELEPHONE ENCOUNTER
This test was ordered by Urology, I spoke with Te Thomas  It appears that Urology wanted to follow up with him regarding their results  I gave him the number to call

## 2021-04-13 ENCOUNTER — IMMUNIZATIONS (OUTPATIENT)
Dept: FAMILY MEDICINE CLINIC | Facility: HOSPITAL | Age: 76
End: 2021-04-13

## 2021-04-13 DIAGNOSIS — Z23 ENCOUNTER FOR IMMUNIZATION: Primary | ICD-10-CM

## 2021-04-13 PROCEDURE — 0001A SARS-COV-2 / COVID-19 MRNA VACCINE (PFIZER-BIONTECH) 30 MCG: CPT

## 2021-04-13 PROCEDURE — 91300 SARS-COV-2 / COVID-19 MRNA VACCINE (PFIZER-BIONTECH) 30 MCG: CPT

## 2021-04-16 NOTE — TELEPHONE ENCOUNTER
Called and left message for patient, advised urine cytology test not indicated  If he does not wish to have a cysto, we recommend scheduling routine follow up  Asking for a call back  When patient returns call can schedule routine follow up with AP in the next few months

## 2021-04-19 NOTE — TELEPHONE ENCOUNTER
Attempted to call patient at this time, no answer  Left message with office number for call back       When patient returns call can schedule routine follow up with AP in the next few months

## 2021-04-26 ENCOUNTER — OFFICE VISIT (OUTPATIENT)
Dept: FAMILY MEDICINE CLINIC | Facility: CLINIC | Age: 76
End: 2021-04-26
Payer: MEDICARE

## 2021-04-26 VITALS
DIASTOLIC BLOOD PRESSURE: 80 MMHG | TEMPERATURE: 97 F | SYSTOLIC BLOOD PRESSURE: 128 MMHG | OXYGEN SATURATION: 96 % | WEIGHT: 160 LBS | HEART RATE: 88 BPM | HEIGHT: 69 IN | BODY MASS INDEX: 23.7 KG/M2

## 2021-04-26 DIAGNOSIS — Z00.00 MEDICARE ANNUAL WELLNESS VISIT, SUBSEQUENT: Primary | ICD-10-CM

## 2021-04-26 PROCEDURE — G0439 PPPS, SUBSEQ VISIT: HCPCS | Performed by: PHYSICIAN ASSISTANT

## 2021-04-26 PROCEDURE — 1123F ACP DISCUSS/DSCN MKR DOCD: CPT | Performed by: PHYSICIAN ASSISTANT

## 2021-04-26 NOTE — PROGRESS NOTES
Assessment and Plan:     Problem List Items Addressed This Visit        Other    Medicare annual wellness visit, subsequent - Primary           Preventive health issues were discussed with patient, and age appropriate screening tests were ordered as noted in patient's After Visit Summary  Personalized health advice and appropriate referrals for health education or preventive services given if needed, as noted in patient's After Visit Summary  History of Present Illness:     Patient presents for Welcome to Medicare visit  He has chronic fatigue otherwise no acute concerns to discuss at today's wellness visit  There have been no major changes since our last visit  Patient Care Team:  Jesus Manuel Villalta MD as PCP - General (Family Medicine)     Review of Systems:     Review of Systems   Constitutional: Positive for fatigue  Negative for chills and fever  HENT: Negative for congestion, ear pain, hearing loss, nosebleeds, postnasal drip, rhinorrhea, sinus pressure, sinus pain, sneezing and sore throat  Eyes: Negative for pain, discharge, itching and visual disturbance  Respiratory: Negative for cough, chest tightness, shortness of breath and wheezing  Cardiovascular: Negative for chest pain, palpitations and leg swelling  Gastrointestinal: Negative for abdominal pain, blood in stool, constipation, diarrhea, nausea and vomiting  Genitourinary: Negative for frequency and urgency  Neurological: Negative for dizziness, light-headedness and numbness        Problem List:     Patient Active Problem List   Diagnosis    Excessive salivation    Medicare annual wellness visit, subsequent    Colon cancer screening    Encounter for hepatitis C screening test for low risk patient    Screening, lipid    Abnormal kidney function    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[facility] [address]    Essential hypertension    Uncontrolled hypertension    Dizziness    History of head and neck radiation    Idiopathic hypotension    Dysuria    Weight loss    Overactive bladder    Benign localized prostatic hyperplasia with lower urinary tract symptoms (LUTS)    Hematuria    Exposure to SARS-associated coronavirus    CKD (chronic kidney disease) stage 3, GFR 30-59 ml/min ([facility])    Fatigue    Screening for lipid disorders    SOB (shortness of breath) on exertion      Past Medical and Surgical History:     Past Medical History:   Diagnosis Date    Cancer ([facility] [address] )     tongue cancer     Past Surgical History:   Procedure Laterality Date    EYELID CLOSURE      HERNIA REPAIR      TONGUE SURGERY        Family History:     Family History   Problem Relation Age of Onset    Hypertension Mother     Stroke Mother       Social History:        Social History     Socioeconomic History    Marital status: /Civil Union     Spouse name: None    Number of children: None    Years of education: None    Highest education level: None   Occupational History    None   Social Needs    Financial resource strain: None    Food insecurity     Worry: None     Inability: None    Transportation needs     Medical: None     Non-medical: None   Tobacco Use    Smoking status: Former Smoker    Smokeless tobacco: Former User   Substance and Sexual Activity    Alcohol use: Never     Frequency: Never    Drug use: Never    Sexual activity: None   Lifestyle    Physical activity     Days per week: None     Minutes per session: None    Stress: None   Relationships    Social connections     Talks on phone: None     Gets together: None     Attends [employer] service: None     Active member of club or organization: None     Attends meetings of clubs or organizations: None     Relationship status: None    Intimate partner violence     Fear of current or ex partner: None     Emotionally abused: None     Physically abused: None     Forced sexual activity: None   Other Topics Concern    None   Social History Narrative    None      Medications and Allergies:     Current Outpatient Medications   Medication Sig Dispense Refill    aspirin 81 MG tablet 1 cap(s)      Coenzyme Q10 (CO Q 10 PO) Take by mouth      Multiple Vitamins-Minerals (MULTIVITAMIN ADULTS 50+ PO) Take by mouth      pravastatin (PRAVACHOL) 20 mg tablet Take 1 tablet (20 mg total) by mouth daily (Patient taking differently: Take 20 mg by mouth every other day ) 90 tablet 0    VITAMIN D, CHOLECALCIFEROL, PO Take by mouth      cyclobenzaprine (FLEXERIL) 10 mg tablet Take 10 mg by mouth 3 (three) times a day      meloxicam (MOBIC) 15 mg tablet Take 15 mg by mouth daily      oxybutynin (DITROPAN) 5 mg tablet TAKE 1 TABLET BY MOUTH TWICE A DAY (Patient not taking: Reported on 9/30/2020) 60 tablet 1     No current facility-administered medications for this visit  Allergies   Allergen Reactions    Iodinated Diagnostic Agents Other (See Comments)    Iodine - Food Allergy       Immunizations:     Immunization History   Administered Date(s) Administered    INFLUENZA 02/13/2018, 10/17/2018, 10/14/2019    Influenza, high dose seasonal 0 7 mL 09/15/2020    SARS-CoV-2 / COVID-19 mRNA IM (Pfizer-BioNTech) 04/13/2021    influenza, trivalent, adjuvanted 09/15/2020      Health Maintenance:         Topic Date Due    Colorectal Cancer Screening  01/28/2022    Hepatitis C Screening  Completed         Topic Date Due    DTaP,Tdap,and Td Vaccines (1 - Tdap) Never done    Pneumococcal Vaccine: 65+ Years (1 of 1 - PPSV23) Never done      Medicare Screening Tests and Risk Assessments:     Julieta Winkler is here for his Subsequent Wellness visit  Health Risk Assessment:   Patient rates overall health as fair  Patient feels that their physical health rating is slightly worse  Patient is satisfied with their life  Eyesight was rated as same  Hearing was rated as same  Patient feels that their emotional and mental health rating is slightly worse  Patients states they are never, rarely angry   Patient states they are often unusually tired/fatigued  Pain experienced in the last 7 days has been none  Patient states that he has experienced no weight loss or gain in last 6 months  Depression Screening:   PHQ-2 Score: 0      Fall Risk Screening: In the past year, patient has experienced: no history of falling in past year      Home Safety:  Patient does not have trouble with stairs inside or outside of their home  Patient has working smoke alarms and has working carbon monoxide detector  Home safety hazards include: none  Nutrition:   Current diet is Regular  Medications:   Patient is currently taking over-the-counter supplements  OTC medications include: see medication list  Patient is able to manage medications  Activities of Daily Living (ADLs)/Instrumental Activities of Daily Living (IADLs):   Walk and transfer into and out of bed and chair?: Yes  Dress and groom yourself?: Yes    Bathe or shower yourself?: Yes    Feed yourself?  Yes  Do your laundry/housekeeping?: Yes  Manage your money, pay your bills and track your expenses?: Yes  Make your own meals?: Yes    Do your own shopping?: Yes    Previous Hospitalizations:   Any hospitalizations or ED visits within the last 12 months?: No      Advance Care Planning:   Living will: No    Durable POA for healthcare: No    Advanced directive: No      PREVENTIVE SCREENINGS      Cardiovascular Screening:    General: Screening Not Indicated, History Lipid Disorder and Risks and Benefits Discussed      Diabetes Screening:     General: Screening Current and Risks and Benefits Discussed      Colorectal Cancer Screening:     General: Screening Current and Risks and Benefits Discussed      Prostate Cancer Screening:    General: Screening Current and Risks and Benefits Discussed      Osteoporosis Screening:    General: Risks and Benefits Discussed and Screening Not Indicated      Abdominal Aortic Aneurysm (AAA) Screening:    Risk factors include: age between 73-67 yo and tobacco use General: Risks and Benefits Discussed and Screening Not Indicated      Lung Cancer Screening:     General: Screening Not Indicated and Risks and Benefits Discussed      Hepatitis C Screening:    General: Screening Current    Screening, Brief Intervention, and Referral to Treatment (SBIRT)    Screening  Typical number of drinks in a day: 0  Typical number of drinks in a week: 0  Interpretation: Low risk drinking behavior  Single Item Drug Screening:  How often have you used an illegal drug (including marijuana) or a prescription medication for non-medical reasons in the past year? never    Single Item Drug Screen Score: 0  Interpretation: Negative screen for possible drug use disorder    No exam data present     Physical Exam:     /80   Pulse 88   Temp (!) 97 °F (36 1 °C)   Ht 5' 9" (1 753 m)   Wt 72 6 kg (160 lb)   SpO2 96%   BMI 23 63 kg/m²     Physical Exam  Vitals signs and nursing note reviewed  Constitutional:       Appearance: He is well-developed  HENT:      Head: Normocephalic and atraumatic  Right Ear: Tympanic membrane, ear canal and external ear normal       Left Ear: Tympanic membrane, ear canal and external ear normal    Eyes:      Conjunctiva/sclera: Conjunctivae normal    Neck:      Musculoskeletal: Normal range of motion and neck supple  Cardiovascular:      Rate and Rhythm: Normal rate and regular rhythm  Pulses: Normal pulses  Heart sounds: No murmur  Pulmonary:      Effort: Pulmonary effort is normal  No respiratory distress  Breath sounds: Normal breath sounds  No wheezing, rhonchi or rales  Abdominal:      General: Abdomen is flat  There is no distension  Palpations: Abdomen is soft  Tenderness: There is no abdominal tenderness  There is no guarding  Hernia: No hernia is present  Musculoskeletal: Normal range of motion  Right lower leg: No edema  Left lower leg: No edema  Skin:     General: Skin is warm and dry  Neurological:      Mental Status: He is alert and oriented to person, place, and time     Psychiatric:         Mood and Affect: Mood normal          Behavior: Behavior normal           Aggie Phelan PA-C

## 2021-04-26 NOTE — PATIENT INSTRUCTIONS
Medicare Preventive Visit Patient Instructions  Thank you for completing your Welcome to Medicare Visit or Medicare Annual Wellness Visit today  Your next wellness visit will be due in one year (4/27/2022)  The screening/preventive services that you may require over the next 5-10 years are detailed below  Some tests may not apply to you based off risk factors and/or age  Screening tests ordered at today's visit but not completed yet may show as past due  Also, please note that scanned in results may not display below  Preventive Screenings:  Service Recommendations Previous Testing/Comments   Colorectal Cancer Screening  · Colonoscopy    · Fecal Occult Blood Test (FOBT)/Fecal Immunochemical Test (FIT)  · Fecal DNA/Cologuard Test  · Flexible Sigmoidoscopy Age: 54-65 years old   Colonoscopy: every 10 years (May be performed more frequently if at higher risk)  OR  FOBT/FIT: every 1 year  OR  Cologuard: every 3 years  OR  Sigmoidoscopy: every 5 years  Screening may be recommended earlier than age 48 if at higher risk for colorectal cancer  Also, an individualized decision between you and your healthcare provider will decide whether screening between the ages of 74-80 would be appropriate   Colonoscopy: Not on file  FOBT/FIT: Not on file  Cologuard: 01/28/2019  Sigmoidoscopy: Not on file    Screening Current     Prostate Cancer Screening Individualized decision between patient and health care provider in men between ages of 53-78   Medicare will cover every 12 months beginning on the day after your 50th birthday PSA: 2 1 ng/mL     Screening Current     Hepatitis C Screening Once for adults born between 1945 and 1965  More frequently in patients at high risk for Hepatitis C Hep C Antibody: 04/16/2019    Screening Current   Diabetes Screening 1-2 times per year if you're at risk for diabetes or have pre-diabetes Fasting glucose: 77 mg/dL   A1C: No results in last 5 years    Screening Current   Cholesterol Screening Once every 5 years if you don't have a lipid disorder  May order more often based on risk factors  Lipid panel: 01/06/2021    Screening Not Indicated  History Lipid Disorder      Other Preventive Screenings Covered by Medicare:  1  Abdominal Aortic Aneurysm (AAA) Screening: covered once if your at risk  You're considered to be at risk if you have a family history of AAA or a male between the age of 73-68 who smoking at least 100 cigarettes in your lifetime  2  Lung Cancer Screening: covers low dose CT scan once per year if you meet all of the following conditions: (1) Age 50-69; (2) No signs or symptoms of lung cancer; (3) Current smoker or have quit smoking within the last 15 years; (4) You have a tobacco smoking history of at least 30 pack years (packs per day x number of years you smoked); (5) You get a written order from a healthcare provider  3  Glaucoma Screening: covered annually if you're considered high risk: (1) You have diabetes OR (2) Family history of glaucoma OR (3)  aged 48 and older OR (3)  American aged 72 and older  3  Osteoporosis Screening: covered every 2 years if you meet one of the following conditions: (1) Have a vertebral abnormality; (2) On glucocorticoid therapy for more than 3 months; (3) Have primary hyperparathyroidism; (4) On osteoporosis medications and need to assess response to drug therapy  5  HIV Screening: covered annually if you're between the age of 12-76  Also covered annually if you are younger than 13 and older than 72 with risk factors for HIV infection  For pregnant patients, it is covered up to 3 times per pregnancy      Immunizations:  Immunization Recommendations   Influenza Vaccine Annual influenza vaccination during flu season is recommended for all persons aged >= 6 months who do not have contraindications   Pneumococcal Vaccine (Prevnar and Pneumovax)  * Prevnar = PCV13  * Pneumovax = PPSV23 Adults 25-60 years old: 1-3 doses may be recommended based on certain risk factors  Adults 72 years old: Prevnar (PCV13) vaccine recommended followed by Pneumovax (PPSV23) vaccine  If already received PPSV23 since turning 65, then PCV13 recommended at least one year after PPSV23 dose  Hepatitis B Vaccine 3 dose series if at intermediate or high risk (ex: diabetes, end stage renal disease, liver disease)   Tetanus (Td) Vaccine - COST NOT COVERED BY MEDICARE PART B Following completion of primary series, a booster dose should be given every 10 years to maintain immunity against tetanus  Td may also be given as tetanus wound prophylaxis  Tdap Vaccine - COST NOT COVERED BY MEDICARE PART B Recommended at least once for all adults  For pregnant patients, recommended with each pregnancy  Shingles Vaccine (Shingrix) - COST NOT COVERED BY MEDICARE PART B  2 shot series recommended in those aged 48 and above     Health Maintenance Due:      Topic Date Due    Colorectal Cancer Screening  01/28/2022    Hepatitis C Screening  Completed     Immunizations Due:      Topic Date Due    DTaP,Tdap,and Td Vaccines (1 - Tdap) Never done    Pneumococcal Vaccine: 65+ Years (1 of 1 - PPSV23) Never done     Advance Directives   What are advance directives? Advance directives are legal documents that state your wishes and plans for medical care  These plans are made ahead of time in case you lose your ability to make decisions for yourself  Advance directives can apply to any medical decision, such as the treatments you want, and if you want to donate organs  What are the types of advance directives? There are many types of advance directives, and each state has rules about how to use them  You may choose a combination of any of the following:  · Living will: This is a written record of the treatment you want  You can also choose which treatments you do not want, which to limit, and which to stop at a certain time  This includes surgery, medicine, IV fluid, and tube feedings  · Durable power of  for healthcare [facility]): This is a written record that states who you want to make healthcare choices for you when you are unable to make them for yourself  This person, called a proxy, is usually a family member or a friend  You may choose more than 1 proxy  · Do not resuscitate (DNR) order:  A DNR order is used in case your heart stops beating or you stop breathing  It is a request not to have certain forms of treatment, such as CPR  A DNR order may be included in other types of advance directives  · Medical directive: This covers the care that you want if you are in a coma, near death, or unable to make decisions for yourself  You can list the treatments you want for each condition  Treatment may include pain medicine, surgery, blood transfusions, dialysis, IV or tube feedings, and a ventilator (breathing machine)  · Values history: This document has questions about your views, beliefs, and how you feel and think about life  This information can help others choose the care that you would choose  Why are advance directives important? An advance directive helps you control your care  Although spoken wishes may be used, it is better to have your wishes written down  Spoken wishes can be misunderstood, or not followed  Treatments may be given even if you do not want them  An advance directive may make it easier for your family to make difficult choices about your care  © Copyright [employer] 2018 Information is for End User's use only and may not be sold, redistributed or otherwise used for commercial purposes  All illustrations and images included in CareNotes® are the copyrighted property of [employer]  or [facility] Preventive Visit Patient Instructions  Thank you for completing your Welcome to Medicare Visit or Medicare Annual Wellness Visit today  Your next wellness visit will be due in one year (4/27/2022)    The screening/preventive services that you may require over the next 5-10 years are detailed below  Some tests may not apply to you based off risk factors and/or age  Screening tests ordered at today's visit but not completed yet may show as past due  Also, please note that scanned in results may not display below  Preventive Screenings:  Service Recommendations Previous Testing/Comments   Colorectal Cancer Screening  · Colonoscopy    · Fecal Occult Blood Test (FOBT)/Fecal Immunochemical Test (FIT)  · Fecal DNA/Cologuard Test  · Flexible Sigmoidoscopy Age: 54-65 years old   Colonoscopy: every 10 years (May be performed more frequently if at higher risk)  OR  FOBT/FIT: every 1 year  OR  Cologuard: every 3 years  OR  Sigmoidoscopy: every 5 years  Screening may be recommended earlier than age 48 if at higher risk for colorectal cancer  Also, an individualized decision between you and your healthcare provider will decide whether screening between the ages of 74-80 would be appropriate  Colonoscopy: Not on file  FOBT/FIT: Not on file  Cologuard: 01/28/2019  Sigmoidoscopy: Not on file    Screening Current  Screening Current     Prostate Cancer Screening Individualized decision between patient and health care provider in men between ages of 53-78   Medicare will cover every 12 months beginning on the day after your 50th birthday PSA: 2 1 ng/mL     Screening Current  Risks and Benefits Discussed  Screening Current     Hepatitis C Screening Once for adults born between 1945 and 1965  More frequently in patients at high risk for Hepatitis C Hep C Antibody: 04/16/2019    Screening Current  Screening Current   Diabetes Screening 1-2 times per year if you're at risk for diabetes or have pre-diabetes Fasting glucose: 77 mg/dL   A1C: No results in last 5 years    Screening Current  Risks and Benefits Discussed  Screening Current   Cholesterol Screening Once every 5 years if you don't have a lipid disorder  May order more often based on risk factors   Lipid panel: 01/06/2021    Screening Not Indicated  History Lipid Disorder  Screening Not Indicated  History Lipid Disorder      Other Preventive Screenings Covered by Medicare:  6  Abdominal Aortic Aneurysm (AAA) Screening: covered once if your at risk  You're considered to be at risk if you have a family history of AAA or a male between the age of 73-68 who smoking at least 100 cigarettes in your lifetime  7  Lung Cancer Screening: covers low dose CT scan once per year if you meet all of the following conditions: (1) Age 50-69; (2) No signs or symptoms of lung cancer; (3) Current smoker or have quit smoking within the last 15 years; (4) You have a tobacco smoking history of at least 30 pack years (packs per day x number of years you smoked); (5) You get a written order from a healthcare provider  8  Glaucoma Screening: covered annually if you're considered high risk: (1) You have diabetes OR (2) Family history of glaucoma OR (3)  aged 48 and older OR (3)  American aged 72 and older  5  Osteoporosis Screening: covered every 2 years if you meet one of the following conditions: (1) Have a vertebral abnormality; (2) On glucocorticoid therapy for more than 3 months; (3) Have primary hyperparathyroidism; (4) On osteoporosis medications and need to assess response to drug therapy  10  HIV Screening: covered annually if you're between the age of 12-76  Also covered annually if you are younger than 13 and older than 72 with risk factors for HIV infection  For pregnant patients, it is covered up to 3 times per pregnancy      Immunizations:  Immunization Recommendations   Influenza Vaccine Annual influenza vaccination during flu season is recommended for all persons aged >= 6 months who do not have contraindications   Pneumococcal Vaccine (Prevnar and Pneumovax)  * Prevnar = PCV13  * Pneumovax = PPSV23 Adults 25-60 years old: 1-3 doses may be recommended based on certain risk factors  Adults 72 years old: Prevnar (PCV13) vaccine recommended followed by Pneumovax (PPSV23) vaccine  If already received PPSV23 since turning 65, then PCV13 recommended at least one year after PPSV23 dose  Hepatitis B Vaccine 3 dose series if at intermediate or high risk (ex: diabetes, end stage renal disease, liver disease)   Tetanus (Td) Vaccine - COST NOT COVERED BY MEDICARE PART B Following completion of primary series, a booster dose should be given every 10 years to maintain immunity against tetanus  Td may also be given as tetanus wound prophylaxis  Tdap Vaccine - COST NOT COVERED BY MEDICARE PART B Recommended at least once for all adults  For pregnant patients, recommended with each pregnancy  Shingles Vaccine (Shingrix) - COST NOT COVERED BY MEDICARE PART B  2 shot series recommended in those aged 48 and above     Health Maintenance Due:      Topic Date Due    Colorectal Cancer Screening  01/28/2022    Hepatitis C Screening  Completed     Immunizations Due:      Topic Date Due    DTaP,Tdap,and Td Vaccines (1 - Tdap) Never done    Pneumococcal Vaccine: 65+ Years (1 of 1 - PPSV23) Never done     Advance Directives   What are advance directives? Advance directives are legal documents that state your wishes and plans for medical care  These plans are made ahead of time in case you lose your ability to make decisions for yourself  Advance directives can apply to any medical decision, such as the treatments you want, and if you want to donate organs  What are the types of advance directives? There are many types of advance directives, and each state has rules about how to use them  You may choose a combination of any of the following:  · Living will: This is a written record of the treatment you want  You can also choose which treatments you do not want, which to limit, and which to stop at a certain time  This includes surgery, medicine, IV fluid, and tube feedings     · Durable power of  for healthcare [facility]): This is a written record that states who you want to make healthcare choices for you when you are unable to make them for yourself  This person, called a proxy, is usually a family member or a friend  You may choose more than 1 proxy  · Do not resuscitate (DNR) order:  A DNR order is used in case your heart stops beating or you stop breathing  It is a request not to have certain forms of treatment, such as CPR  A DNR order may be included in other types of advance directives  · Medical directive: This covers the care that you want if you are in a coma, near death, or unable to make decisions for yourself  You can list the treatments you want for each condition  Treatment may include pain medicine, surgery, blood transfusions, dialysis, IV or tube feedings, and a ventilator (breathing machine)  · Values history: This document has questions about your views, beliefs, and how you feel and think about life  This information can help others choose the care that you would choose  Why are advance directives important? An advance directive helps you control your care  Although spoken wishes may be used, it is better to have your wishes written down  Spoken wishes can be misunderstood, or not followed  Treatments may be given even if you do not want them  An advance directive may make it easier for your family to make difficult choices about your care  © Copyright [employer] 2018 Information is for End User's use only and may not be sold, redistributed or otherwise used for commercial purposes   All illustrations and images included in CareNotes® are the copyrighted property of [employer]  or [address] [place] [address] [employer]

## 2021-04-26 NOTE — PROGRESS NOTES
Assessment and Plan:     Problem List Items Addressed This Visit        Other    Medicare annual wellness visit, subsequent - Primary           Preventive health issues were discussed with patient, and age appropriate screening tests were ordered as noted in patient's After Visit Summary  Personalized health advice and appropriate referrals for health education or preventive services given if needed, as noted in patient's After Visit Summary       History of Present Illness:     Patient presents for Medicare Annual Wellness visit    Patient Care Team:  Jesus Manuel Villalta MD as PCP - General (Family Medicine)     Problem List:     Patient Active Problem List   Diagnosis    Excessive salivation    Medicare annual wellness visit, subsequent    Colon cancer screening    Encounter for hepatitis C screening test for low risk patient    Screening, lipid    Abnormal kidney function    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[facility] )    Essential hypertension    Uncontrolled hypertension    Dizziness    History of head and neck radiation    Idiopathic hypotension    Dysuria    Weight loss    Overactive bladder    Benign localized prostatic hyperplasia with lower urinary tract symptoms (LUTS)    Hematuria    Exposure to SARS-associated coronavirus    CKD (chronic kidney disease) stage 3, GFR 30-59 ml/min (HCC)    Fatigue    Screening for lipid disorders    SOB (shortness of breath) on exertion      Past Medical and Surgical History:     Past Medical History:   Diagnosis Date    Cancer ([address] )     tongue cancer     Past Surgical History:   Procedure Laterality Date    EYELID CLOSURE      HERNIA REPAIR      TONGUE SURGERY        Family History:     Family History   Problem Relation Age of Onset    Hypertension Mother     Stroke Mother       Social History:        Social History     Socioeconomic History    Marital status: /Civil Union     Spouse name: None    Number of children: None    Years of education: None    Highest education level: None   Occupational History    None   Social Needs    Financial resource strain: None    Food insecurity     Worry: None     Inability: None    Transportation needs     Medical: None     Non-medical: None   Tobacco Use    Smoking status: Former Smoker    Smokeless tobacco: Former User   Substance and Sexual Activity    Alcohol use: Never     Frequency: Never    Drug use: Never    Sexual activity: None   Lifestyle    Physical activity     Days per week: None     Minutes per session: None    Stress: None   Relationships    Social connections     Talks on phone: None     Gets together: None     Attends [employer] service: None     Active member of club or organization: None     Attends meetings of clubs or organizations: None     Relationship status: None    Intimate partner violence     Fear of current or ex partner: None     Emotionally abused: None     Physically abused: None     Forced sexual activity: None   Other Topics Concern    None   Social History Narrative    None      Medications and Allergies:     Current Outpatient Medications   Medication Sig Dispense Refill    aspirin 81 MG tablet 1 cap(s)      Coenzyme Q10 (CO Q 10 PO) Take by mouth      Multiple Vitamins-Minerals (MULTIVITAMIN ADULTS 50+ PO) Take by mouth      pravastatin (PRAVACHOL) 20 mg tablet Take 1 tablet (20 mg total) by mouth daily (Patient taking differently: Take 20 mg by mouth every other day ) 90 tablet 0    VITAMIN D, CHOLECALCIFEROL, PO Take by mouth      cyclobenzaprine (FLEXERIL) 10 mg tablet Take 10 mg by mouth 3 (three) times a day      meloxicam (MOBIC) 15 mg tablet Take 15 mg by mouth daily      oxybutynin (DITROPAN) 5 mg tablet TAKE 1 TABLET BY MOUTH TWICE A DAY (Patient not taking: Reported on 9/30/2020) 60 tablet 1     No current facility-administered medications for this visit        Allergies   Allergen Reactions    Iodinated Diagnostic Agents Other (See Comments)    Iodine - Food Allergy       Immunizations:     Immunization History   Administered Date(s) Administered    INFLUENZA 02/13/2018, 10/17/2018, 10/14/2019    Influenza, high dose seasonal 0 7 mL 09/15/2020    SARS-CoV-2 / COVID-19 mRNA IM (Pfizer-BioNTech) 04/13/2021    influenza, trivalent, adjuvanted 09/15/2020      Health Maintenance:         Topic Date Due    Colorectal Cancer Screening  01/28/2022    Hepatitis C Screening  Completed         Topic Date Due    DTaP,Tdap,and Td Vaccines (1 - Tdap) Never done    Pneumococcal Vaccine: 65+ Years (1 of 1 - PPSV23) Never done      Medicare Health Risk Assessment:     /80   Pulse 88   Temp (!) 97 °F (36 1 °C)   Ht 5' 9" (1 753 m)   Wt 72 6 kg (160 lb)   SpO2 96%   BMI 23 63 kg/m²      Leah Gan is here for his Subsequent Wellness visit  Health Risk Assessment:   Patient rates overall health as fair  Patient feels that their physical health rating is slightly worse  Patient is satisfied with their life  Eyesight was rated as same  Hearing was rated as same  Patient feels that their emotional and mental health rating is slightly worse  Patients states they are never, rarely angry  Patient states they are often unusually tired/fatigued  Pain experienced in the last 7 days has been none  Patient states that he has experienced no weight loss or gain in last 6 months  Depression Screening:   PHQ-2 Score: 0      Fall Risk Screening: In the past year, patient has experienced: no history of falling in past year      Home Safety:  Patient does not have trouble with stairs inside or outside of their home  Patient has working smoke alarms and has working carbon monoxide detector  Home safety hazards include: none  Nutrition:   Current diet is Regular  Medications:   Patient is currently taking over-the-counter supplements  OTC medications include: see medication list  Patient is able to manage medications       Activities of Daily Living (ADLs)/Instrumental Activities of Daily Living (IADLs):   Walk and transfer into and out of bed and chair?: Yes  Dress and groom yourself?: Yes    Bathe or shower yourself?: Yes    Feed yourself? Yes  Do your laundry/housekeeping?: Yes  Manage your money, pay your bills and track your expenses?: Yes  Make your own meals?: Yes    Do your own shopping?: Yes    Previous Hospitalizations:   Any hospitalizations or ED visits within the last 12 months?: No      PREVENTIVE SCREENINGS      Cardiovascular Screening:    General: Screening Not Indicated and History Lipid Disorder      Diabetes Screening:     General: Screening Current and Risks and Benefits Discussed      Colorectal Cancer Screening:     General: Screening Current      Prostate Cancer Screening:    General: Screening Current and Risks and Benefits Discussed      Osteoporosis Screening:    General: Risks and Benefits Discussed and Screening Not Indicated      Abdominal Aortic Aneurysm (AAA) Screening:    Risk factors include: age between 73-69 yo and tobacco use        General: Risks and Benefits Discussed and Screening Not Indicated      Lung Cancer Screening:     General: Screening Not Indicated      Hepatitis C Screening:    General: Screening Current    Screening, Brief Intervention, and Referral to Treatment (SBIRT)    Screening  Typical number of drinks in a day: 0  Typical number of drinks in a week: 0  Interpretation: Low risk drinking behavior      Single Item Drug Screening:  How often have you used an illegal drug (including marijuana) or a prescription medication for non-medical reasons in the past year? never    Single Item Drug Screen Score: 0  Interpretation: Negative screen for possible drug use disorder      Violetta Smith PA-C

## 2021-05-04 ENCOUNTER — IMMUNIZATIONS (OUTPATIENT)
Dept: FAMILY MEDICINE CLINIC | Facility: HOSPITAL | Age: 76
End: 2021-05-04

## 2021-05-04 DIAGNOSIS — Z23 ENCOUNTER FOR IMMUNIZATION: Primary | ICD-10-CM

## 2021-05-04 PROCEDURE — 91300 SARS-COV-2 / COVID-19 MRNA VACCINE (PFIZER-BIONTECH) 30 MCG: CPT

## 2021-05-04 PROCEDURE — 0002A SARS-COV-2 / COVID-19 MRNA VACCINE (PFIZER-BIONTECH) 30 MCG: CPT

## 2021-05-27 ENCOUNTER — OFFICE VISIT (OUTPATIENT)
Dept: UROLOGY | Facility: CLINIC | Age: 76
End: 2021-05-27
Payer: MEDICARE

## 2021-05-27 VITALS
DIASTOLIC BLOOD PRESSURE: 80 MMHG | BODY MASS INDEX: 23.4 KG/M2 | HEIGHT: 69 IN | HEART RATE: 82 BPM | SYSTOLIC BLOOD PRESSURE: 132 MMHG | WEIGHT: 158 LBS

## 2021-05-27 DIAGNOSIS — R31.29 MICROSCOPIC HEMATURIA: Primary | ICD-10-CM

## 2021-05-27 LAB
SL AMB  POCT GLUCOSE, UA: ABNORMAL
SL AMB LEUKOCYTE ESTERASE,UA: ABNORMAL
SL AMB POCT BILIRUBIN,UA: ABNORMAL
SL AMB POCT BLOOD,UA: ABNORMAL
SL AMB POCT CLARITY,UA: CLEAR
SL AMB POCT COLOR,UA: YELLOW
SL AMB POCT KETONES,UA: ABNORMAL
SL AMB POCT NITRITE,UA: ABNORMAL
SL AMB POCT PH,UA: 5
SL AMB POCT SPECIFIC GRAVITY,UA: 1.03
SL AMB POCT URINE PROTEIN: ABNORMAL
SL AMB POCT UROBILINOGEN: 0.2

## 2021-05-27 PROCEDURE — 81002 URINALYSIS NONAUTO W/O SCOPE: CPT | Performed by: PHYSICIAN ASSISTANT

## 2021-05-27 PROCEDURE — 99213 OFFICE O/P EST LOW 20 MIN: CPT | Performed by: PHYSICIAN ASSISTANT

## 2021-05-27 NOTE — PROGRESS NOTES
UROLOGY PROGRESS NOTE   Patient Identifiers: Natanael Cazares (MRN [id number])  Date of Service: 5/27/2021    Subjective:     81-year-old man with history of BPH and microscopic hematuria  He was last seen 2019  Because his chronic kidney disease ultrasound was done which was essentially unremarkable  He was scheduled for cystoscopy at that time which he canceled  His PSA was 2 4  He has no significant outlet complaints  He is here today for follow-up  He reports no new problem  His ultrasound is stable  He has  ahistory of head and neck cancer and a former smoker        Reason for visit:  Microscopic hematuria and BPH follow-up     Objective:     VITALS:    Vitals:    05/27/21 1130   BP: 132/80   Pulse: 82           LABS:  Lab Results   Component Value Date    HGB 13 1 03/09/2021    HCT 40 7 03/09/2021    WBC 7 91 03/09/2021     03/09/2021   ]    Lab Results   Component Value Date    K 4 6 03/09/2021     (H) 03/09/2021    CO2 31 03/09/2021    BUN 28 (H) 03/09/2021    CREATININE 1 59 (H) 03/09/2021    CALCIUM 9 6 03/09/2021   ]        INPATIENT MEDS:    Current Outpatient Medications:     aspirin 81 MG tablet, 1 cap(s), Disp: , Rfl:     Coenzyme Q10 (CO Q 10 PO), Take by mouth, Disp: , Rfl:     Multiple Vitamins-Minerals (MULTIVITAMIN ADULTS 50+ PO), Take by mouth, Disp: , Rfl:     pravastatin (PRAVACHOL) 20 mg tablet, Take 1 tablet (20 mg total) by mouth daily (Patient taking differently: Take 20 mg by mouth every other day ), Disp: 90 tablet, Rfl: 0    VITAMIN D, CHOLECALCIFEROL, PO, Take by mouth, Disp: , Rfl:     cyclobenzaprine (FLEXERIL) 10 mg tablet, Take 10 mg by mouth 3 (three) times a day, Disp: , Rfl:     meloxicam (MOBIC) 15 mg tablet, Take 15 mg by mouth daily, Disp: , Rfl:     oxybutynin (DITROPAN) 5 mg tablet, TAKE 1 TABLET BY MOUTH TWICE A DAY (Patient not taking: Reported on 9/30/2020), Disp: 60 tablet, Rfl: 1      Physical Exam:   /80 (BP Location: Left arm, Patient Position: Sitting, Cuff Size: Adult)   Pulse 82   Ht 5' 9" (1 753 m)   Wt 71 7 kg (158 lb)   BMI 23 33 kg/m²   GEN: no acute distress    RESP: breathing comfortably with no accessory muscle use    ABD: soft, non-tender, non-distended   INCISION:    EXT: no significant peripheral edema   (Male): Penis circumcised, phallus normal, meatus patent  Testicles descended into scrotum bilaterally without masses nor tenderness  No inguinal hernias bilaterally  ROSY: Prostate is enlarged at 40 grams  The prostate is not boggy  The prostate is not tender  No nodules noted      RADIOLOGY:   RENAL ULTRASOUND   ADDENDUM:     Please note there are typographical errors in the 1st impression  The word RIGHT should be replaced with the word LEFT  Additionally the words 'in the" and "of" were omitted as well as a " "  The corrected impression should read:     1   3 8 cm cyst in the lower pole of the LEFT kidney likely correlates to the recent MR  Findings are similar to the prior 2019 ultrasound  IMPRESSION:        1   3 8 cm cyst lower pole the right kidney likely correlates to the recent MR findings are similar to the prior 2019 ultrasound  2   No hydronephrosis  3   Nonobstructing 0 6 cm calculus in the midpole of left kidney suspected      Assessment:    1  Microscopic hematuria   2   BPH     Plan:   - patient will follow-up in 3 months for office cystoscopy  - he agrees to the plan  -  -

## 2021-06-08 ENCOUNTER — OFFICE VISIT (OUTPATIENT)
Dept: FAMILY MEDICINE CLINIC | Facility: CLINIC | Age: 76
End: 2021-06-08
Payer: MEDICARE

## 2021-06-08 ENCOUNTER — APPOINTMENT (OUTPATIENT)
Dept: LAB | Facility: CLINIC | Age: 76
End: 2021-06-08
Payer: MEDICARE

## 2021-06-08 VITALS
TEMPERATURE: 98 F | HEIGHT: 69 IN | HEART RATE: 82 BPM | RESPIRATION RATE: 18 BRPM | OXYGEN SATURATION: 97 % | WEIGHT: 156 LBS | DIASTOLIC BLOOD PRESSURE: 90 MMHG | BODY MASS INDEX: 23.11 KG/M2 | SYSTOLIC BLOOD PRESSURE: 122 MMHG

## 2021-06-08 DIAGNOSIS — F32.A DEPRESSION, UNSPECIFIED DEPRESSION TYPE: ICD-10-CM

## 2021-06-08 DIAGNOSIS — R13.10 DYSPHAGIA, UNSPECIFIED TYPE: ICD-10-CM

## 2021-06-08 DIAGNOSIS — R63.0 LOSS OF APPETITE: ICD-10-CM

## 2021-06-08 DIAGNOSIS — R63.0 LOSS OF APPETITE: Primary | ICD-10-CM

## 2021-06-08 DIAGNOSIS — R63.4 UNINTENTIONAL WEIGHT LOSS: ICD-10-CM

## 2021-06-08 DIAGNOSIS — C02.9 TONGUE CARCINOMA (HCC): ICD-10-CM

## 2021-06-08 LAB
ALBUMIN SERPL BCP-MCNC: 3.8 G/DL (ref 3.5–5)
ALP SERPL-CCNC: 62 U/L (ref 46–116)
ALT SERPL W P-5'-P-CCNC: 14 U/L (ref 12–78)
ANION GAP SERPL CALCULATED.3IONS-SCNC: 4 MMOL/L (ref 4–13)
AST SERPL W P-5'-P-CCNC: 12 U/L (ref 5–45)
BASOPHILS # BLD AUTO: 0.06 THOUSANDS/ΜL (ref 0–0.1)
BASOPHILS NFR BLD AUTO: 1 % (ref 0–1)
BILIRUB SERPL-MCNC: 0.3 MG/DL (ref 0.2–1)
BUN SERPL-MCNC: 26 MG/DL (ref 5–25)
CALCIUM SERPL-MCNC: 9.7 MG/DL (ref 8.3–10.1)
CHLORIDE SERPL-SCNC: 109 MMOL/L (ref 100–108)
CO2 SERPL-SCNC: 29 MMOL/L (ref 21–32)
CREAT SERPL-MCNC: 1.6 MG/DL (ref 0.6–1.3)
EOSINOPHIL # BLD AUTO: 0.28 THOUSAND/ΜL (ref 0–0.61)
EOSINOPHIL NFR BLD AUTO: 5 % (ref 0–6)
ERYTHROCYTE [DISTWIDTH] IN BLOOD BY AUTOMATED COUNT: 13.4 % (ref 11.6–15.1)
GFR SERPL CREATININE-BSD FRML MDRD: 42 ML/MIN/1.73SQ M
GLUCOSE SERPL-MCNC: 77 MG/DL (ref 65–140)
HCT VFR BLD AUTO: 43.4 % (ref 36.5–49.3)
HGB BLD-MCNC: 13.8 G/DL (ref 12–17)
IMM GRANULOCYTES # BLD AUTO: 0.02 THOUSAND/UL (ref 0–0.2)
IMM GRANULOCYTES NFR BLD AUTO: 0 % (ref 0–2)
LIPASE SERPL-CCNC: 114 U/L (ref 73–393)
LYMPHOCYTES # BLD AUTO: 1.41 THOUSANDS/ΜL (ref 0.6–4.47)
LYMPHOCYTES NFR BLD AUTO: 23 % (ref 14–44)
MCH RBC QN AUTO: 27.2 PG (ref 26.8–34.3)
MCHC RBC AUTO-ENTMCNC: 31.8 G/DL (ref 31.4–37.4)
MCV RBC AUTO: 85 FL (ref 82–98)
MONOCYTES # BLD AUTO: 0.55 THOUSAND/ΜL (ref 0.17–1.22)
MONOCYTES NFR BLD AUTO: 9 % (ref 4–12)
NEUTROPHILS # BLD AUTO: 3.9 THOUSANDS/ΜL (ref 1.85–7.62)
NEUTS SEG NFR BLD AUTO: 62 % (ref 43–75)
NRBC BLD AUTO-RTO: 0 /100 WBCS
PLATELET # BLD AUTO: 355 THOUSANDS/UL (ref 149–390)
PMV BLD AUTO: 9.1 FL (ref 8.9–12.7)
POTASSIUM SERPL-SCNC: 4.5 MMOL/L (ref 3.5–5.3)
PROT SERPL-MCNC: 7.8 G/DL (ref 6.4–8.2)
RBC # BLD AUTO: 5.08 MILLION/UL (ref 3.88–5.62)
SODIUM SERPL-SCNC: 142 MMOL/L (ref 136–145)
WBC # BLD AUTO: 6.22 THOUSAND/UL (ref 4.31–10.16)

## 2021-06-08 PROCEDURE — 80053 COMPREHEN METABOLIC PANEL: CPT

## 2021-06-08 PROCEDURE — 83690 ASSAY OF LIPASE: CPT

## 2021-06-08 PROCEDURE — 36415 COLL VENOUS BLD VENIPUNCTURE: CPT

## 2021-06-08 PROCEDURE — 99214 OFFICE O/P EST MOD 30 MIN: CPT | Performed by: PHYSICIAN ASSISTANT

## 2021-06-08 PROCEDURE — 85025 COMPLETE CBC W/AUTO DIFF WBC: CPT

## 2021-06-08 RX ORDER — MIRTAZAPINE 7.5 MG/1
7.5 TABLET, FILM COATED ORAL
Qty: 30 TABLET | Refills: 5 | Status: SHIPPED | OUTPATIENT
Start: 2021-06-08 | End: 2021-08-31 | Stop reason: ALTCHOICE

## 2021-06-08 NOTE — PROGRESS NOTES
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[facility])    Relevant Orders    Ambulatory Referral to Otolaryngology      Other Visit Diagnoses     Loss of appetite    -  Primary    Relevant Medications    mirtazapine (REMERON) 7 5 MG tablet    Other Relevant Orders    Comprehensive metabolic panel    CBC and differential    Lipase    Ambulatory Referral to Otolaryngology    Unintentional weight loss        Relevant Medications    mirtazapine (REMERON) 7 5 MG tablet    Other Relevant Orders    Comprehensive metabolic panel    CBC and differential    Lipase    Depression, unspecified depression type        Relevant Medications    mirtazapine (REMERON) 7 5 MG tablet    Dysphagia, unspecified type        Relevant Orders    Ambulatory Referral to Otolaryngology        recommend starting remeron 7 5mg nightly for depression/appetite stimulation  Appetite loss possibly 2/2 restricted mechanical diet and depression? recommend follow up with ENT to r/o suspicious findings with worsening dysphagia  Update labs  Subjective:      Patient ID: Linda Franklin is a 78 y o  male  Pt with a hx of tongue carcinoma s/p radical removal presents with complaints of months of loss of appetite and progressive dysphagia  He shares food does not appeal to him  Denies overt earlier satiety  He shares eating is often embarrassing to him due to regurgitation and having to re swallow  This makes him feel down/depressed  He has no fevers, chills, nausea, vomiting, abdominal pain  Recent labs in march were unremarkable  He has no cough or SOB  A CXR in march showed no pulmonary findings  He has not seen an ENT in roughly 2 years  He had been traveling to [place] [employer] for his cancer care however has not follow up recently due to stability   He list 4 pounds in the past 1-2 months       The following portions of the patient's history were reviewed and updated as appropriate:   He has a past medical history of Cancer ([facility] )  He   Patient Active Problem List    Diagnosis Date Noted    SOB (shortness of breath) on exertion 06/17/2020    Exposure to SARS-associated coronavirus 05/26/2020    CKD (chronic kidney disease) stage 3, GFR 30-59 ml/min ([facility] ) 05/26/2020    Fatigue 05/26/2020    Screening for lipid disorders 05/26/2020    Benign localized prostatic hyperplasia with lower urinary tract symptoms (LUTS) 10/15/2019    Hematuria 10/15/2019    Overactive bladder 10/09/2019    Idiopathic hypotension 07/31/2019    Dysuria 07/31/2019    Weight loss 07/31/2019    Uncontrolled hypertension 05/14/2019    Dizziness 05/14/2019    History of head and neck radiation 05/14/2019    Essential hypertension 05/06/2019    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[facility] ) 02/14/2019    Excessive salivation 01/10/2019    Medicare annual wellness visit, subsequent 01/10/2019    Colon cancer screening 01/10/2019    Encounter for hepatitis C screening test for low risk patient 01/10/2019    Screening, lipid 01/10/2019    Abnormal kidney function 01/10/2019     He  has a past surgical history that includes Tongue surgery; Hernia repair; and Eyelid closure  His family history includes Hypertension in his mother; Stroke in his mother  He  reports that he has quit smoking  He has quit using smokeless tobacco  He reports that he does not drink alcohol or use drugs    Current Outpatient Medications   Medication Sig Dispense Refill    aspirin 81 MG tablet 1 cap(s)      Coenzyme Q10 (CO Q 10 PO) Take by mouth      cyclobenzaprine (FLEXERIL) 10 mg tablet Take 10 mg by mouth 3 (three) times a day      meloxicam (MOBIC) 15 mg tablet Take 15 mg by mouth daily      mirtazapine (REMERON) 7 5 MG tablet Take 1 tablet (7 5 mg total) by mouth daily at bedtime 30 tablet 5    Multiple Vitamins-Minerals (MULTIVITAMIN ADULTS 50+ PO) Take by mouth      oxybutynin (DITROPAN) 5 mg tablet TAKE 1 TABLET BY MOUTH TWICE A DAY (Patient not taking: Reported on 9/30/2020) 60 tablet 1    pravastatin (PRAVACHOL) 20 mg tablet Take 1 tablet (20 mg total) by mouth daily (Patient taking differently: Take 20 mg by mouth every other day ) 90 tablet 0    VITAMIN D, CHOLECALCIFEROL, PO Take by mouth       No current facility-administered medications for this visit  Current Outpatient Medications on File Prior to Visit   Medication Sig    aspirin 81 MG tablet 1 cap(s)    Coenzyme Q10 (CO Q 10 PO) Take by mouth    cyclobenzaprine (FLEXERIL) 10 mg tablet Take 10 mg by mouth 3 (three) times a day    meloxicam (MOBIC) 15 mg tablet Take 15 mg by mouth daily    Multiple Vitamins-Minerals (MULTIVITAMIN ADULTS 50+ PO) Take by mouth    oxybutynin (DITROPAN) 5 mg tablet TAKE 1 TABLET BY MOUTH TWICE A DAY (Patient not taking: Reported on 9/30/2020)    pravastatin (PRAVACHOL) 20 mg tablet Take 1 tablet (20 mg total) by mouth daily (Patient taking differently: Take 20 mg by mouth every other day )    VITAMIN D, CHOLECALCIFEROL, PO Take by mouth     No current facility-administered medications on file prior to visit  He is allergic to iodinated diagnostic agents and iodine - food allergy       Review of Systems   Constitutional: Negative for chills, fatigue and fever  HENT: Negative for congestion, ear pain, hearing loss, nosebleeds, postnasal drip, rhinorrhea, sinus pressure, sinus pain, sneezing and sore throat  Eyes: Negative for pain, discharge, itching and visual disturbance  Respiratory: Negative for cough, chest tightness, shortness of breath and wheezing  Cardiovascular: Negative for chest pain, palpitations and leg swelling  Gastrointestinal: Negative for abdominal pain, blood in stool, constipation, diarrhea, nausea and vomiting  Genitourinary: Negative for frequency and urgency  Neurological: Negative for dizziness, light-headedness and numbness           Objective:      /90 (BP Location: Left arm, Patient Position: Sitting) Pulse 82   Temp 98 °F (36 7 °C)   Resp 18   Ht 5' 9" (1 753 m)   Wt 70 8 kg (156 lb)   SpO2 97%   BMI 23 04 kg/m²          Physical Exam  Vitals signs and nursing note reviewed  Constitutional:       General: He is not in acute distress  Appearance: Normal appearance  HENT:      Head: Normocephalic and atraumatic  Nose: Nose normal       Mouth/Throat:      Mouth: Mucous membranes are moist       Pharynx: Oropharynx is clear  No oropharyngeal exudate or posterior oropharyngeal erythema  Eyes:      Pupils: Pupils are equal, round, and reactive to light  Neck:      Musculoskeletal: Normal range of motion and neck supple  Cardiovascular:      Rate and Rhythm: Normal rate and regular rhythm  Pulses: Normal pulses  Heart sounds: Normal heart sounds  No murmur  Pulmonary:      Effort: Pulmonary effort is normal  No respiratory distress  Breath sounds: Normal breath sounds  No wheezing, rhonchi or rales  Abdominal:      General: Bowel sounds are normal       Palpations: Abdomen is soft  Musculoskeletal: Normal range of motion  Skin:     General: Skin is warm and dry  Neurological:      Mental Status: He is alert and oriented to person, place, and time     Psychiatric:         Mood and Affect: Mood and affect normal

## 2021-06-27 DIAGNOSIS — E78.5 HYPERLIPIDEMIA: ICD-10-CM

## 2021-06-28 RX ORDER — PRAVASTATIN SODIUM 20 MG
20 TABLET ORAL EVERY OTHER DAY
Qty: 30 TABLET | Refills: 3 | Status: SHIPPED | OUTPATIENT
Start: 2021-06-28 | End: 2022-03-28

## 2021-07-01 ENCOUNTER — APPOINTMENT (OUTPATIENT)
Dept: LAB | Facility: CLINIC | Age: 76
End: 2021-07-01
Payer: MEDICARE

## 2021-07-01 DIAGNOSIS — D47.2 MONOCLONAL GAMMOPATHY OF UNKNOWN SIGNIFICANCE: ICD-10-CM

## 2021-07-01 LAB
ALBUMIN SERPL BCP-MCNC: 3.5 G/DL (ref 3.5–5)
ALP SERPL-CCNC: 60 U/L (ref 46–116)
ALT SERPL W P-5'-P-CCNC: 17 U/L (ref 12–78)
ANION GAP SERPL CALCULATED.3IONS-SCNC: 4 MMOL/L (ref 4–13)
AST SERPL W P-5'-P-CCNC: 9 U/L (ref 5–45)
BASOPHILS # BLD AUTO: 0.05 THOUSANDS/ΜL (ref 0–0.1)
BASOPHILS NFR BLD AUTO: 1 % (ref 0–1)
BILIRUB SERPL-MCNC: 0.38 MG/DL (ref 0.2–1)
BUN SERPL-MCNC: 23 MG/DL (ref 5–25)
CALCIUM SERPL-MCNC: 9.5 MG/DL (ref 8.3–10.1)
CHLORIDE SERPL-SCNC: 107 MMOL/L (ref 100–108)
CO2 SERPL-SCNC: 28 MMOL/L (ref 21–32)
CREAT SERPL-MCNC: 1.59 MG/DL (ref 0.6–1.3)
EOSINOPHIL # BLD AUTO: 0.3 THOUSAND/ΜL (ref 0–0.61)
EOSINOPHIL NFR BLD AUTO: 5 % (ref 0–6)
ERYTHROCYTE [DISTWIDTH] IN BLOOD BY AUTOMATED COUNT: 13.5 % (ref 11.6–15.1)
GFR SERPL CREATININE-BSD FRML MDRD: 42 ML/MIN/1.73SQ M
GLUCOSE P FAST SERPL-MCNC: 74 MG/DL (ref 65–99)
HCT VFR BLD AUTO: 43.7 % (ref 36.5–49.3)
HGB BLD-MCNC: 14 G/DL (ref 12–17)
IGA SERPL-MCNC: 61 MG/DL (ref 70–400)
IGG SERPL-MCNC: 1550 MG/DL (ref 700–1600)
IGM SERPL-MCNC: 24 MG/DL (ref 40–230)
IMM GRANULOCYTES # BLD AUTO: 0.02 THOUSAND/UL (ref 0–0.2)
IMM GRANULOCYTES NFR BLD AUTO: 0 % (ref 0–2)
LYMPHOCYTES # BLD AUTO: 1.22 THOUSANDS/ΜL (ref 0.6–4.47)
LYMPHOCYTES NFR BLD AUTO: 21 % (ref 14–44)
MCH RBC QN AUTO: 27.4 PG (ref 26.8–34.3)
MCHC RBC AUTO-ENTMCNC: 32 G/DL (ref 31.4–37.4)
MCV RBC AUTO: 86 FL (ref 82–98)
MONOCYTES # BLD AUTO: 0.59 THOUSAND/ΜL (ref 0.17–1.22)
MONOCYTES NFR BLD AUTO: 10 % (ref 4–12)
NEUTROPHILS # BLD AUTO: 3.78 THOUSANDS/ΜL (ref 1.85–7.62)
NEUTS SEG NFR BLD AUTO: 63 % (ref 43–75)
NRBC BLD AUTO-RTO: 0 /100 WBCS
PLATELET # BLD AUTO: 355 THOUSANDS/UL (ref 149–390)
PMV BLD AUTO: 8.9 FL (ref 8.9–12.7)
POTASSIUM SERPL-SCNC: 4.7 MMOL/L (ref 3.5–5.3)
PROT SERPL-MCNC: 7.8 G/DL (ref 6.4–8.2)
RBC # BLD AUTO: 5.11 MILLION/UL (ref 3.88–5.62)
SODIUM SERPL-SCNC: 139 MMOL/L (ref 136–145)
WBC # BLD AUTO: 5.96 THOUSAND/UL (ref 4.31–10.16)

## 2021-07-01 PROCEDURE — 82784 ASSAY IGA/IGD/IGG/IGM EACH: CPT

## 2021-07-01 PROCEDURE — 84165 PROTEIN E-PHORESIS SERUM: CPT

## 2021-07-01 PROCEDURE — 84165 PROTEIN E-PHORESIS SERUM: CPT | Performed by: PATHOLOGY

## 2021-07-01 PROCEDURE — 85025 COMPLETE CBC W/AUTO DIFF WBC: CPT

## 2021-07-01 PROCEDURE — 80053 COMPREHEN METABOLIC PANEL: CPT

## 2021-07-01 PROCEDURE — 36415 COLL VENOUS BLD VENIPUNCTURE: CPT

## 2021-07-01 PROCEDURE — 83883 ASSAY NEPHELOMETRY NOT SPEC: CPT

## 2021-07-02 LAB
ALBUMIN SERPL ELPH-MCNC: 4.07 G/DL (ref 3.5–5)
ALBUMIN SERPL ELPH-MCNC: 55.7 % (ref 52–65)
ALPHA1 GLOB SERPL ELPH-MCNC: 0.34 G/DL (ref 0.1–0.4)
ALPHA1 GLOB SERPL ELPH-MCNC: 4.6 % (ref 2.5–5)
ALPHA2 GLOB SERPL ELPH-MCNC: 0.77 G/DL (ref 0.4–1.2)
ALPHA2 GLOB SERPL ELPH-MCNC: 10.6 % (ref 7–13)
BETA GLOB ABNORMAL SERPL ELPH-MCNC: 0.41 G/DL (ref 0.4–0.8)
BETA1 GLOB SERPL ELPH-MCNC: 5.6 % (ref 5–13)
BETA2 GLOB SERPL ELPH-MCNC: 3.5 % (ref 2–8)
BETA2+GAMMA GLOB SERPL ELPH-MCNC: 0.26 G/DL (ref 0.2–0.5)
GAMMA GLOB ABNORMAL SERPL ELPH-MCNC: 1.46 G/DL (ref 0.5–1.6)
GAMMA GLOB SERPL ELPH-MCNC: 20 % (ref 12–22)
IGG/ALB SER: 1.26 {RATIO} (ref 1.1–1.8)
KAPPA LC FREE SER-MCNC: 49.4 MG/L (ref 3.3–19.4)
KAPPA LC FREE/LAMBDA FREE SER: 0.35 {RATIO} (ref 0.26–1.65)
LAMBDA LC FREE SERPL-MCNC: 143.1 MG/L (ref 5.7–26.3)
M PROTEIN 1 MFR SERPL ELPH: 4.4 %
M PROTEIN 1 SERPL ELPH-MCNC: 0.32 G/DL
PROT PATTERN SERPL ELPH-IMP: NORMAL
PROT SERPL-MCNC: 7.3 G/DL (ref 6.4–8.2)

## 2021-08-30 ENCOUNTER — PROCEDURE VISIT (OUTPATIENT)
Dept: UROLOGY | Facility: CLINIC | Age: 76
End: 2021-08-30
Payer: MEDICARE

## 2021-08-30 VITALS
WEIGHT: 156 LBS | SYSTOLIC BLOOD PRESSURE: 140 MMHG | DIASTOLIC BLOOD PRESSURE: 100 MMHG | HEIGHT: 69 IN | BODY MASS INDEX: 23.11 KG/M2

## 2021-08-30 DIAGNOSIS — N28.9 ABNORMAL KIDNEY FUNCTION: ICD-10-CM

## 2021-08-30 DIAGNOSIS — R31.9 HEMATURIA, UNSPECIFIED TYPE: Primary | ICD-10-CM

## 2021-08-30 DIAGNOSIS — Z12.5 SCREENING FOR PROSTATE CANCER: ICD-10-CM

## 2021-08-30 PROCEDURE — 52000 CYSTOURETHROSCOPY: CPT | Performed by: UROLOGY

## 2021-08-30 RX ORDER — UBIDECARENONE 75 MG
CAPSULE ORAL DAILY
COMMUNITY

## 2021-08-30 NOTE — ASSESSMENT & PLAN NOTE
The patient has had a negative hematuria workup  Recommend annual urinalysis with PCP and consideration for repeat workup in 3-5 years if micro hematuria persists  If the patient has gross hematuria then they should see us immediately  He had an ultrasound with this workup because of kidney disease preventing a CT scan    If he continues to have microscopic hematuria recommend axial imaging via MRI in the future

## 2021-08-30 NOTE — PATIENT INSTRUCTIONS
Cystoscopy   AMBULATORY CARE:   A cystoscopy  is a procedure to look inside of your urethra and bladder using a cystoscope  A cystoscope is a small tube with a light and magnifying camera on the end  The procedure is used to diagnose and treat conditions of the bladder, urethra, and prostate  The procedure is also done to remove stones or blood clots from the urethra or bladder  Your healthcare provider may do other tests, such as ureteroscopy, during a cystoscopy  Prepare for your cystoscopy: You may need to stop smoking several days before your procedure, if you are having general anesthesia  Tell your healthcare provider what medicines you take  Your healthcare provider will tell you what medicines to take and not to take on the day of your procedure  You may need to stop taking medicines such as anticoagulants, aspirin, and ibuprofen several days before your procedure  He may tell you stop eating after midnight the night before your procedure  You may be asked to drink a large amount of liquids before your procedure  Make plans for someone to drive you home after your procedure  During your cystoscopy:   · You may be given general anesthesia to keep you asleep and pain free during your procedure  Your healthcare provider may give you anesthesia in your spine  With spinal anesthesia the lower part of your body will be numb  You will not feel pain during your procedure  Your healthcare provider may instead use local anesthesia that is put into your urethra and bladder  You will not feel pain, but you may be able to feel some pressure during your procedure  With local anesthesia, you may feel burning or need to urinate when the cystoscope is put in and removed  · You will be placed on your back and your feet may be placed in stirrups  The cystoscope will be will be placed through your urethra and into your bladder   The urologist will look at the walls of your urethra as the scope goes through to your bladder  Your bladder may be filled with an irrigation liquid to help your urologist see inside of your bladder more clearly   Special tools may used to remove tissue or stones  Your urologist may use a special tool to stop bleeding in your bladder  If there are blood clots in your bladder, your healthcare provider will inject an irrigation fluid into your bladder  Then he will use suction to remove the fluid and blood clots  After a cystoscopy:  After you are fully awake, you will go home  After your cystoscopy, it is normal to have pink-colored urine  It is also normal to have an increased need to urinate  You may have burning when you urinate  If you had general anesthesia, it may take at least 24 hours before you feel like your usual self  Risks of a cystoscopy: You may bleed more than expected or develop an infection  Swelling caused by the cystoscopy may cause a blockage or slow urine flow  Seek care immediately if:   · Your urine turns from pink to red, or you have clots in your urine  · You cannot urinate and your bladder feels full  · Your pain or burning becomes worse or lasts longer than 2 days  Contact your healthcare provider or urologist if:   · Your urine stays pink for longer than 3 days  · Your pain or burning becomes worse  · Your skin is itchy, swollen, or has a new rash  · You have a fever and chills  · You have questions or concerns about your condition or care  After your cystoscopy: It is normal to have pink-colored urine  It is also normal to have an increased need to urinate and burning when you urinate  If you had general anesthesia, it may take at least 24 hours before you feel like your usual self  · Drink at least 3 to 4 glasses of water daily for 2 days after your procedure  Avoid acidic juices such as orange juice and lemonade  Water can help prevent blood clots from forming   It can also help decrease the amount of acid in your urine that can cause burning  · Sit in a warm tub of water  Warm baths relieve pain and bladder spasms  · Do not have sex  until your healthcare provider tells you it is okay  Sex may increase your risk for a urinary tract infection  Medicines: You may  be given any of the following:  · Antibiotics  help treat or prevent a bacterial infection  · Acetaminophen  decreases pain and fever  It is available without a doctor's order  Ask how much to take and how often to take it  Follow directions  Read the labels of all other medicines you are using to see if they also contain acetaminophen, or ask your doctor or pharmacist  Acetaminophen can cause liver damage if not taken correctly  Do not use more than 4 grams (4,000 milligrams) total of acetaminophen in one day  · Take your medicine as directed  Contact your healthcare provider if you think your medicine is not helping or if you have side effects  Tell him or her if you are allergic to any medicine  Keep a list of the medicines, vitamins, and herbs you take  Include the amounts, and when and why you take them  Bring the list or the pill bottles to follow-up visits  Carry your medicine list with you in case of an emergency  Follow up with your healthcare provider as directed: You may need to have another cystoscopy  Write down your questions so you remember to ask them during your visits  © 2017 [address] [employer]  Information is for End User's use only and may not be sold, redistributed or otherwise used for commercial purposes  All illustrations and images included in CareNotes® are the copyrighted property of [employer]  or [employer]  The above information is an  only  It is not intended as medical advice for individual conditions or treatments  Talk to your doctor, nurse or pharmacist before following any medical regimen to see if it is safe and effective for you

## 2021-08-30 NOTE — PROGRESS NOTES
Assessment/Plan:    Hematuria  The patient has had a negative hematuria workup  Recommend annual urinalysis with PCP and consideration for repeat workup in 3-5 years if micro hematuria persists  If the patient has gross hematuria then they should see us immediately  He had an ultrasound with this workup because of kidney disease preventing a CT scan  If he continues to have microscopic hematuria recommend axial imaging via MRI in the future    Screening for prostate cancer  Last PSA within normal range for his age  Recommend annual PSA with PCP         Problem List Items Addressed This Visit        Other    Abnormal kidney function    Hematuria - Primary     The patient has had a negative hematuria workup  Recommend annual urinalysis with PCP and consideration for repeat workup in 3-5 years if micro hematuria persists  If the patient has gross hematuria then they should see us immediately  He had an ultrasound with this workup because of kidney disease preventing a CT scan  If he continues to have microscopic hematuria recommend axial imaging via MRI in the future         Relevant Orders    Cystoscopy    Screening for prostate cancer     Last PSA within normal range for his age  Recommend annual PSA with PCP                 Subjective:      Patient ID: Silverio Gilmore is a 78 y o  male  HPI    42-year-old man with history of BPH and microscopic hematuria  UA in March 2021 showed 4-10 rbc/hpf  Because his chronic kidney disease ultrasound was done which was essentially unremarkable  He was scheduled for cystoscopy which he canceled  His PSA was 2 4  He has no significant outlet complaints  He has  a history of head and neck cancer and a former smoker  Review of Systems   Constitutional: Negative for chills and fever  HENT: Positive for trouble swallowing  Negative for ear pain and sore throat  Eyes: Negative for pain and visual disturbance     Respiratory: Negative for cough and shortness of breath  Cardiovascular: Negative for chest pain and palpitations  Gastrointestinal: Negative for abdominal pain and vomiting  Genitourinary: Negative for dysuria and hematuria  Musculoskeletal: Negative for arthralgias and back pain  Skin: Negative for color change and rash  Neurological: Negative for seizures and syncope  All other systems reviewed and are negative  Objective:      /100   Ht 5' 9" (1 753 m)   Wt 70 8 kg (156 lb)   BMI 23 04 kg/m²          Physical Exam  Vitals reviewed  Constitutional:       Appearance: Normal appearance  He is normal weight  HENT:      Head: Normocephalic and atraumatic  Eyes:      Pupils: Pupils are equal, round, and reactive to light  Abdominal:      General: Abdomen is flat  Genitourinary:     Prostate: Normal    Neurological:      Mental Status: He is alert  Cystoscopy     Date/Time 8/30/2021 7:24 AM     Performed by  Frantz Mccord MD     Authorized by Frantz Mccord MD      Universal Protocol:  Consent: Written consent obtained  Risks and benefits: risks, benefits and alternatives were discussed  Consent given by: patient  Time out: Immediately prior to procedure a "time out" was called to verify the correct patient, procedure, equipment, support staff and site/side marked as required  Patient understanding: patient states understanding of the procedure being performed  Patient consent: the patient's understanding of the procedure matches consent given  Procedure consent: procedure consent matches procedure scheduled  Patient identity confirmed: verbally with patient        Procedure Details:  Procedure type: cystoscopy    Patient tolerance: Patient tolerated the procedure well with no immediate complications    Additional Procedure Details: A time-out was performed identifying the correct patient site and procedure  A MA chaperone was in the room    A flexible cystoscope was introduced into the urethra  The pendulous urethra was normal   The prostatic urethra showed mild bilateral lobar hypertrophy without a median lobe  The bladder did not have any lesions concerning for malignancy  There were mild trabeculations and a small diverticula on posterior wall  The ureteral orifices were in orthotopic position

## 2021-08-31 ENCOUNTER — OFFICE VISIT (OUTPATIENT)
Dept: FAMILY MEDICINE CLINIC | Facility: CLINIC | Age: 76
End: 2021-08-31
Payer: MEDICARE

## 2021-08-31 VITALS
RESPIRATION RATE: 18 BRPM | WEIGHT: 155 LBS | DIASTOLIC BLOOD PRESSURE: 78 MMHG | TEMPERATURE: 98 F | HEART RATE: 83 BPM | SYSTOLIC BLOOD PRESSURE: 110 MMHG | BODY MASS INDEX: 22.96 KG/M2 | HEIGHT: 69 IN | OXYGEN SATURATION: 98 %

## 2021-08-31 DIAGNOSIS — I10 ESSENTIAL HYPERTENSION: Primary | ICD-10-CM

## 2021-08-31 DIAGNOSIS — R53.83 FATIGUE, UNSPECIFIED TYPE: ICD-10-CM

## 2021-08-31 DIAGNOSIS — R63.4 WEIGHT LOSS: ICD-10-CM

## 2021-08-31 DIAGNOSIS — Z13.220 SCREENING, LIPID: ICD-10-CM

## 2021-08-31 DIAGNOSIS — N18.30 STAGE 3 CHRONIC KIDNEY DISEASE, UNSPECIFIED WHETHER STAGE 3A OR 3B CKD (HCC): ICD-10-CM

## 2021-08-31 DIAGNOSIS — R13.10 DYSPHAGIA, UNSPECIFIED TYPE: ICD-10-CM

## 2021-08-31 DIAGNOSIS — Z12.5 SCREENING FOR PROSTATE CANCER: ICD-10-CM

## 2021-08-31 DIAGNOSIS — R31.9 HEMATURIA, UNSPECIFIED TYPE: ICD-10-CM

## 2021-08-31 DIAGNOSIS — Z92.3 HISTORY OF HEAD AND NECK RADIATION: ICD-10-CM

## 2021-08-31 DIAGNOSIS — K11.7 EXCESSIVE SALIVATION: ICD-10-CM

## 2021-08-31 DIAGNOSIS — R63.0 LOSS OF APPETITE: ICD-10-CM

## 2021-08-31 DIAGNOSIS — C02.9 TONGUE CARCINOMA (HCC): ICD-10-CM

## 2021-08-31 PROCEDURE — 99214 OFFICE O/P EST MOD 30 MIN: CPT | Performed by: PHYSICIAN ASSISTANT

## 2021-08-31 NOTE — PROGRESS NOTES
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[address] )       Cardiovascular and Mediastinum    Essential hypertension - Primary    Relevant Orders    Comprehensive metabolic panel       Genitourinary    CKD (chronic kidney disease) stage 3, GFR 30-59 ml/min (HCC)    Relevant Orders    Comprehensive metabolic panel       Other    Screening, lipid    Relevant Orders    Lipid Panel with Direct LDL reflex    History of head and neck radiation    Weight loss    Hematuria    Fatigue    Screening for prostate cancer    Relevant Orders    PSA, Total Screen      Other Visit Diagnoses     Loss of appetite        Dysphagia, unspecified type            BP well controlled, if uncontrolled consider ACE in setting of CKD  Renal function stable, avoid NSAIDs and dehydration  weight stable, continue small, frequent meals, soft foods  Follow up with GI for EGD  Hematuria workup and plan reviewed with pt  Update labs and return in 6 months     Subjective:      Patient ID: Krish Allen is a 78 y o  male  Pt presents for follow up    Since our last visit he has visited with ENT due to worsening dysphagia in the setting of hx of tongue cancer s/p resection and radiation  He had a normal examination and there were no concerning findings  It is believed his dysphagia is due to esophageal narrowing and he plans to undergo EDG with possible dilation in the near future  He shares he continues to have trouble swallowing solids  He had a normal hematuria workup with urology, plan to repeat UA annually  No gross hematuria  His appetite is still poor, he shares he stopped taking the remeron prescribed at last visit after a few days due to lack of benefit  He shares he is taking b12 with some benefit  His weight has been stable around 155    His BP today is well controlled at 110/78, currently not on antihypertensives       Recent labs shows Cr of 1 59 GFR 42, which is stable at his baseline  He is avoiding nsaids and does not follow with neprhology currently not on ACE      The following portions of the patient's history were reviewed and updated as appropriate:   He  has a past medical history of 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[facility] [address] ) (03/1999)  He   Patient Active Problem List    Diagnosis Date Noted    Screening for prostate cancer 08/30/2021    SOB (shortness of breath) on exertion 06/17/2020    Exposure to SARS-associated coronavirus 05/26/2020    CKD (chronic kidney disease) stage 3, GFR 30-59 ml/min ([facility] [address] ) 05/26/2020    Fatigue 05/26/2020    Screening for lipid disorders 05/26/2020    Benign localized prostatic hyperplasia with lower urinary tract symptoms (LUTS) 10/15/2019    Hematuria 10/15/2019    Overactive bladder 10/09/2019    Idiopathic hypotension 07/31/2019    Dysuria 07/31/2019    Weight loss 07/31/2019    Uncontrolled hypertension 05/14/2019    Dizziness 05/14/2019    History of head and neck radiation 05/14/2019    Essential hypertension 05/06/2019    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[facility] [address] ) 02/14/2019    Excessive salivation 01/10/2019    Medicare annual wellness visit, subsequent 01/10/2019    Colon cancer screening 01/10/2019    Encounter for hepatitis C screening test for low risk patient 01/10/2019    Screening, lipid 01/10/2019    Abnormal kidney function 01/10/2019     He  has a past surgical history that includes Hernia repair (Left); Eyelid closure (Left); Modified radical neck dissection (Left, 03/03/1999); Tonsillectomy; and Cystoscopy (08/30/2021)  His family history includes Hypertension in his mother; Stroke in his mother  He  reports that he has quit smoking  He has quit using smokeless tobacco  He reports current alcohol use  He reports that he does not use drugs    Current Outpatient Medications   Medication Sig Dispense Refill    aspirin 81 MG tablet 1 cap(s)      Coenzyme Q10 (CO Q 10 PO) Take by mouth      cyanocobalamin (VITAMIN B-12) 100 mcg tablet Take by mouth daily      Multiple Vitamins-Minerals (MULTIVITAMIN ADULTS 50+ PO) Take by mouth      pravastatin (PRAVACHOL) 20 mg tablet Take 1 tablet (20 mg total) by mouth every other day 30 tablet 3    VITAMIN D, CHOLECALCIFEROL, PO Take by mouth       No current facility-administered medications for this visit  Current Outpatient Medications on File Prior to Visit   Medication Sig    aspirin 81 MG tablet 1 cap(s)    Coenzyme Q10 (CO Q 10 PO) Take by mouth    cyanocobalamin (VITAMIN B-12) 100 mcg tablet Take by mouth daily    Multiple Vitamins-Minerals (MULTIVITAMIN ADULTS 50+ PO) Take by mouth    pravastatin (PRAVACHOL) 20 mg tablet Take 1 tablet (20 mg total) by mouth every other day    VITAMIN D, CHOLECALCIFEROL, PO Take by mouth    [DISCONTINUED] cyclobenzaprine (FLEXERIL) 10 mg tablet Take 10 mg by mouth 3 (three) times a day (Patient not taking: Reported on 7/26/2021)    [DISCONTINUED] meloxicam (MOBIC) 15 mg tablet Take 15 mg by mouth daily (Patient not taking: Reported on 7/26/2021)    [DISCONTINUED] mirtazapine (REMERON) 7 5 MG tablet Take 1 tablet (7 5 mg total) by mouth daily at bedtime (Patient not taking: Reported on 7/26/2021)    [DISCONTINUED] oxybutynin (DITROPAN) 5 mg tablet TAKE 1 TABLET BY MOUTH TWICE A DAY (Patient not taking: Reported on 9/30/2020)     No current facility-administered medications on file prior to visit  He is allergic to iodinated diagnostic agents and iodine - food allergy       Review of Systems   Constitutional: Positive for fatigue  Negative for chills and fever  HENT: Negative for congestion, ear pain, hearing loss, nosebleeds, postnasal drip, rhinorrhea, sinus pressure, sinus pain, sneezing and sore throat  Eyes: Negative for pain, discharge, itching and visual disturbance  Respiratory: Negative for cough, chest tightness, shortness of breath and wheezing      Cardiovascular: Negative for chest pain, palpitations and leg swelling  Gastrointestinal: Negative for abdominal pain, blood in stool, constipation, diarrhea, nausea and vomiting  Dysphagia   Genitourinary: Negative for frequency and urgency  Musculoskeletal: Negative  Skin: Negative  Neurological: Negative for dizziness, light-headedness and numbness  Psychiatric/Behavioral: Negative  Objective:      /78 (BP Location: Left arm, Patient Position: Sitting)   Pulse 83   Temp 98 °F (36 7 °C)   Resp 18   Ht 5' 9" (1 753 m)   Wt 70 3 kg (155 lb)   SpO2 98%   BMI 22 89 kg/m²          Physical Exam  Vitals and nursing note reviewed  Constitutional:       General: He is not in acute distress  Appearance: Normal appearance  HENT:      Head: Normocephalic and atraumatic  Nose: Nose normal       Mouth/Throat:      Mouth: Mucous membranes are moist       Pharynx: Oropharynx is clear  No oropharyngeal exudate or posterior oropharyngeal erythema  Eyes:      Pupils: Pupils are equal, round, and reactive to light  Cardiovascular:      Rate and Rhythm: Normal rate and regular rhythm  Pulses: Normal pulses  Heart sounds: Normal heart sounds  No murmur heard  Pulmonary:      Effort: Pulmonary effort is normal  No respiratory distress  Breath sounds: Normal breath sounds  No wheezing, rhonchi or rales  Musculoskeletal:         General: Normal range of motion  Cervical back: Normal range of motion and neck supple  Skin:     General: Skin is warm and dry  Neurological:      Mental Status: He is alert and oriented to person, place, and time     Psychiatric:         Mood and Affect: Mood and affect normal

## 2021-09-07 ENCOUNTER — TELEPHONE (OUTPATIENT)
Dept: SURGERY | Facility: HOSPITAL | Age: 76
End: 2021-09-07

## 2021-09-08 ENCOUNTER — ANESTHESIA (OUTPATIENT)
Dept: GASTROENTEROLOGY | Facility: HOSPITAL | Age: 76
End: 2021-09-08

## 2021-09-08 ENCOUNTER — HOSPITAL ENCOUNTER (OUTPATIENT)
Dept: GASTROENTEROLOGY | Facility: HOSPITAL | Age: 76
Setting detail: OUTPATIENT SURGERY
Discharge: HOME | End: 2021-09-08
Attending: INTERNAL MEDICINE
Payer: MEDICARE

## 2021-09-08 ENCOUNTER — ANESTHESIA EVENT (OUTPATIENT)
Dept: GASTROENTEROLOGY | Facility: HOSPITAL | Age: 76
End: 2021-09-08

## 2021-09-08 VITALS
RESPIRATION RATE: 18 BRPM | TEMPERATURE: 98 F | SYSTOLIC BLOOD PRESSURE: 170 MMHG | WEIGHT: 155 LBS | DIASTOLIC BLOOD PRESSURE: 87 MMHG | HEART RATE: 64 BPM | BODY MASS INDEX: 22.96 KG/M2 | OXYGEN SATURATION: 94 % | HEIGHT: 69 IN

## 2021-09-08 DIAGNOSIS — R13.19 ESOPHAGEAL DYSPHAGIA: Primary | ICD-10-CM

## 2021-09-08 DIAGNOSIS — R63.4 ABNORMAL WEIGHT LOSS: ICD-10-CM

## 2021-09-08 DIAGNOSIS — R13.10 DYSPHAGIA, UNSPECIFIED: ICD-10-CM

## 2021-09-08 PROCEDURE — C1726 CATH, BAL DIL, NON-VASCULAR: HCPCS

## 2021-09-08 PROCEDURE — 88312 SPECIAL STAINS GROUP 1: CPT | Performed by: PATHOLOGY

## 2021-09-08 PROCEDURE — 88305 TISSUE EXAM BY PATHOLOGIST: CPT | Performed by: PATHOLOGY

## 2021-09-08 RX ORDER — PROPOFOL 10 MG/ML
INJECTION, EMULSION INTRAVENOUS AS NEEDED
Status: DISCONTINUED | OUTPATIENT
Start: 2021-09-08 | End: 2021-09-08

## 2021-09-08 RX ORDER — SODIUM CHLORIDE, SODIUM LACTATE, POTASSIUM CHLORIDE, CALCIUM CHLORIDE 600; 310; 30; 20 MG/100ML; MG/100ML; MG/100ML; MG/100ML
125 INJECTION, SOLUTION INTRAVENOUS CONTINUOUS
Status: DISCONTINUED | OUTPATIENT
Start: 2021-09-08 | End: 2021-09-12 | Stop reason: HOSPADM

## 2021-09-08 RX ORDER — LIDOCAINE HYDROCHLORIDE 10 MG/ML
INJECTION, SOLUTION EPIDURAL; INFILTRATION; INTRACAUDAL; PERINEURAL AS NEEDED
Status: DISCONTINUED | OUTPATIENT
Start: 2021-09-08 | End: 2021-09-08

## 2021-09-08 RX ORDER — OMEPRAZOLE 20 MG/1
20 TABLET, DELAYED RELEASE ORAL DAILY
Qty: 30 TABLET | Refills: 0 | Status: SHIPPED | OUTPATIENT
Start: 2021-09-08 | End: 2022-01-19

## 2021-09-08 RX ADMIN — SODIUM CHLORIDE, SODIUM LACTATE, POTASSIUM CHLORIDE, AND CALCIUM CHLORIDE 125 ML/HR: .6; .31; .03; .02 INJECTION, SOLUTION INTRAVENOUS at 08:24

## 2021-09-08 RX ADMIN — PROPOFOL 100 MG: 10 INJECTION, EMULSION INTRAVENOUS at 09:17

## 2021-09-08 RX ADMIN — LIDOCAINE HYDROCHLORIDE 50 MG: 10 INJECTION, SOLUTION EPIDURAL; INFILTRATION; INTRACAUDAL; PERINEURAL at 09:16

## 2021-09-08 RX ADMIN — PROPOFOL 50 MG: 10 INJECTION, EMULSION INTRAVENOUS at 09:22

## 2021-09-08 NOTE — INTERVAL H&P NOTE
H&P reviewed  After examining the patient I find no changes in the patients condition since the H&P had been written      Vitals:    09/08/21 0811   BP: 170/87   Pulse: 70   Resp: 16   Temp: (!) 96 7 °F (35 9 °C)   SpO2: 99%

## 2021-09-08 NOTE — ANESTHESIA PREPROCEDURE EVALUATION
Procedure:  EGD    Relevant Problems   ANESTHESIA   (-) History of anesthesia complications      CARDIO   (+) Essential hypertension   (+) SOB (shortness of breath) on exertion   (+) Uncontrolled hypertension      ENDO (within normal limits)      GI/HEPATIC  Confirmed NPO appropriate   (+) Esophageal dysphagia      /RENAL   (+) Benign localized prostatic hyperplasia with lower urinary tract symptoms (LUTS)   (+) CKD (chronic kidney disease) stage 3, GFR 30-59 ml/min (HCC)      HEMATOLOGY  SCCA of base of tongue, s/p surgical resection and XRT      MUSCULOSKELETAL (within normal limits)      NEURO/PSYCH   (+) History of head and neck radiation      PULMONARY   (+) SOB (shortness of breath) on exertion   (-) Smoking (former smoker, quit 20+ years ago)   (-) URI (upper respiratory infection)        Physical Exam    Airway  Comment: Limited tongue mobility  Mallampati score: I  TM Distance: <3 FB  Neck ROM: full     Dental   lower dentures,     Cardiovascular  Rhythm: regular, Rate: normal,     Pulmonary  Breath sounds clear to auscultation,     Other Findings        Anesthesia Plan  ASA Score- 3     Anesthesia Type- IV sedation with anesthesia with ASA Monitors  Additional Monitors:   Airway Plan:     Comment: I discussed the risks and benefits of IV sedation anesthesia including the possibility of the need to convert to general anesthesia and the potential risk of awareness  The patient was given the opportunity to ask questions, which were answered          Plan Factors-Exercise tolerance (METS): >4 METS  Chart reviewed  Patient is not a current smoker  Induction-     Postoperative Plan-     Informed Consent- Anesthetic plan and risks discussed with patient  I personally reviewed this patient with the CRNA  Discussed and agreed on the Anesthesia Plan with the CRNA  Lucretia Alvarado

## 2021-09-15 ENCOUNTER — OFFICE VISIT (OUTPATIENT)
Dept: CARDIOLOGY CLINIC | Facility: CLINIC | Age: 76
End: 2021-09-15
Payer: MEDICARE

## 2021-09-15 VITALS
HEIGHT: 69 IN | BODY MASS INDEX: 22.96 KG/M2 | HEART RATE: 78 BPM | SYSTOLIC BLOOD PRESSURE: 142 MMHG | WEIGHT: 155 LBS | DIASTOLIC BLOOD PRESSURE: 72 MMHG

## 2021-09-15 DIAGNOSIS — N18.9 CKD (CHRONIC KIDNEY DISEASE): ICD-10-CM

## 2021-09-15 DIAGNOSIS — I44.4 LAFB (LEFT ANTERIOR FASCICULAR BLOCK): ICD-10-CM

## 2021-09-15 DIAGNOSIS — E78.5 HYPERLIPIDEMIA: Primary | ICD-10-CM

## 2021-09-15 DIAGNOSIS — I10 ESSENTIAL HYPERTENSION: ICD-10-CM

## 2021-09-15 PROCEDURE — 99213 OFFICE O/P EST LOW 20 MIN: CPT | Performed by: INTERNAL MEDICINE

## 2021-09-15 PROCEDURE — 93000 ELECTROCARDIOGRAM COMPLETE: CPT | Performed by: INTERNAL MEDICINE

## 2021-09-15 NOTE — PROGRESS NOTES
[facility] [id number] [phone]                                              Cardiology Office Follow up  Kelly Cordero, 78 y o  male  YOB: 1945  MRN: [id number] Encounter: [id number]      PCP - Ji Arroyo MD    Assessment and Plan  1  Hypertension  · Stress echo - 7/2020 - 6m17s, 96% MPHR, stress ECG and echo normal  LVEF 65%  2  Hyperlipidemia  3  Left anterior fascicular block (LAFB)  4  CKD, baseline Cr 1 4-1 6  5  H/O tongue carcinoma status post radical neck dissection, radiation  · 20+ yrs ago  · Has some dysphagia, and recently had esophageal dilatation  6  Monoclonal gammopathy of unknown significance    Plan  Hypertension  · BP today mildly elevated at 142/72, but recently has been controlled - he thinks its mostly whitecoat hypertension, and BP is better at home  · Previously was on amlodipine 5 mg, which was discontinued  · Low salt diet for now  · Continue exercise  · Monitor    Hyperlipidemia   4/16/2019 08:14 6/1/2020 07:13 1/6/2021 07:27   Cholesterol 184 189 169   Triglycerides 105 112 126   HDL 40 43 47   Non-HDL Cholesterol 144     LDL Calculated 123 (H) 124 (H) 97     · 10-yr ASCVD risk score 34 3%  · He had previously not tolerated statins, but was tolerating simvastatin 20 mg every other day with Co Q10  · Reasonably controlled  · Continue pravastatin 20 mg every other day, and uses Co-Q10 daily    Left anterior fascicular block  · Stable, asymptomatic  · no dizziness or light-headedness  · monitor    Orders Placed This Encounter   Procedures    POCT ECG     Results for orders placed or performed in visit on 09/15/21   POCT ECG    Impression    Sinus rhythm with occasional PVC   Left anterior fascicular block  Cannot rule out old septal infarct     Return in about 1 year (around 9/15/2022), or if symptoms worsen or fail to improve        History of Present Illness   70year-old gentleman comes in as a new patient for evaluation of new onset shortness of breath over the past 2 weeks  At baseline, he is very active for his age, and mows several acres of his land with a self-propelled push mower  But over the last week, he noticed getting out of breath with lifting up a chainsaw and trying to cut a tree  He feels he is still able to climb up a flight of stair, but shortness of breath with this level of exertion was unusual for him  No complains of chest pain, palpitations, dizziness or lightheadedness  No prior history of CAD  He had tongue cancer back in 1999, and underwent radical neck dissection and radiation [facility]  He has remained in remission, and has been getting routine follow-up  Interval history - 9/30/2020  He returns for follow-up, and has been doing well overall  He does not report any active symptoms of chest pain or shortness of breath  He has been fairly active, and takes care of his 4 acres, including cutting, trimming lawn mowing  Interval history - 9/15/2021   he comes back for follow-up after 1 year  He continues to do well in terms of which shortness of breath, and remains very active taking care of his for acres  No current complains of chest pain, palpitations, dizziness or lightheadedness  No recent history of near-syncope or syncope     He has had dysphagia, and as a result recently underwent EGD with esophageal dilatation        Historical Information   Past Medical History:   Diagnosis Date    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[facility] [address] ) 03/1999    SCCA left tongue base - [id number]- treated at [facility] - had surgery and XRT    Hyperlipidemia     Hypertension     Left anterior fascicular block     Stage 3 chronic kidney disease ([facility] [address] )      Past Surgical History:   Procedure Laterality Date    CYSTOSCOPY  08/30/2021    EYELID CLOSURE Left     to prevent eyelashes from rubbing cornea    HERNIA REPAIR Left     MODIFIED RADICAL NECK DISSECTION Left 03/03/1999    [facility] - with mandibulotomy and resection SCCA left tongue base - Dr Chandni Magdaleno History   Problem Relation Age of Onset    Hypertension Mother     Stroke Mother      Current Outpatient Medications on File Prior to Visit   Medication Sig Dispense Refill    aspirin 81 MG tablet 1 cap(s)      Coenzyme Q10 (CO Q 10 PO) Take by mouth      cyanocobalamin (VITAMIN B-12) 100 mcg tablet Take by mouth daily      Multiple Vitamins-Minerals (MULTIVITAMIN ADULTS 50+ PO) Take by mouth      omeprazole (PriLOSEC OTC) 20 MG tablet Take 1 tablet (20 mg total) by mouth daily 30 tablet 0    pravastatin (PRAVACHOL) 20 mg tablet Take 1 tablet (20 mg total) by mouth every other day 30 tablet 3    VITAMIN D, CHOLECALCIFEROL, PO Take by mouth       No current facility-administered medications on file prior to visit  Allergies   Allergen Reactions    Iodinated Diagnostic Agents Other (See Comments)    Iodine - Food Allergy      Social History     Socioeconomic History    Marital status: /Civil Union     Spouse name: None    Number of children: None    Years of education: None    Highest education level: None   Occupational History    None   Tobacco Use    Smoking status: Former Smoker    Smokeless tobacco: Former User   Vaping Use    Vaping Use: Never used   Substance and Sexual Activity    Alcohol use: Yes     Comment: occasional    Drug use: Never    Sexual activity: None   Other Topics Concern    None   Social History Narrative    None     Social Determinants of Health     Financial Resource Strain:     Difficulty of Paying Living Expenses:    Food Insecurity:     Worried About Running Out of Food in the Last Year:     Ran Out of Food in the Last Year:    Transportation Needs:     Lack of Transportation (Medical):      Lack of Transportation (Non-Medical):    Physical Activity:     Days of Exercise per Week:     Minutes of Exercise per Session:    Stress:     Feeling of Stress :    Social Connections:     Frequency of Communication with Friends and Family:     Frequency of Social Gatherings with Friends and Family:     Attends [employer] Services:     Active Member of Clubs or Organizations:     Attends Club or Organization Meetings:     Marital Status:    Intimate Partner Violence:     Fear of Current or Ex-Partner:     Emotionally Abused:     Physically Abused:     Sexually Abused:         Review of Systems   HENT: Positive for voice change  All other systems reviewed and are negative  Vitals:  Vitals:    09/15/21 1440   BP: 142/72   Pulse: 78   Weight: 70 3 kg (155 lb)   Height: 5' 9" (1 753 m)     BMI - Body mass index is 22 89 kg/m²  Wt Readings from Last 7 Encounters:   09/15/21 70 3 kg (155 lb)   09/08/21 70 3 kg (155 lb)   08/31/21 70 3 kg (155 lb)   08/30/21 70 8 kg (156 lb)   07/26/21 70 8 kg (156 lb)   06/08/21 70 8 kg (156 lb)   05/27/21 71 7 kg (158 lb)       Physical Exam  Vitals and nursing note reviewed  Constitutional:       General: He is not in acute distress  Appearance: He is well-developed  He is not ill-appearing or diaphoretic  HENT:      Head: Normocephalic and atraumatic  Eyes:      General: No scleral icterus  Conjunctiva/sclera: Conjunctivae normal    Neck:      Vascular: No carotid bruit or JVD  Cardiovascular:      Rate and Rhythm: Normal rate and regular rhythm  Heart sounds: Normal heart sounds  No murmur heard  No friction rub  No gallop  Pulmonary:      Effort: Pulmonary effort is normal  No respiratory distress  Breath sounds: Normal breath sounds  No wheezing or rales  Chest:      Chest wall: No tenderness  Abdominal:      General: There is no distension  Palpations: Abdomen is soft  Tenderness: There is no abdominal tenderness  Musculoskeletal:         General: No deformity  Skin:     General: Skin is warm     Neurological:      General: No focal deficit present  Mental Status: He is alert and oriented to person, place, and time  Mental status is at baseline  Comments: hoarseness   Psychiatric:         Mood and Affect: Mood normal          Behavior: Behavior normal          Thought Content: Thought content normal          Labs:  CBC:   Lab Results   Component Value Date    WBC 5 96 07/01/2021    RBC 5 11 07/01/2021    HGB 14 0 07/01/2021    HCT 43 7 07/01/2021    MCV 86 07/01/2021     07/01/2021    RDW 13 5 07/01/2021       CMP:   Lab Results   Component Value Date    K 4 7 07/01/2021     07/01/2021    CO2 28 07/01/2021    BUN 23 07/01/2021    CREATININE 1 59 (H) 07/01/2021    EGFR 42 07/01/2021    CALCIUM 9 5 07/01/2021    AST 9 07/01/2021    ALT 17 07/01/2021    ALKPHOS 60 07/01/2021       Magnesium:  No results found for: MG    Lipid Profile:   Lab Results   Component Value Date    HDL 47 01/06/2021    TRIG 126 01/06/2021    LDLCALC 97 01/06/2021       Thyroid Studies:   Lab Results   Component Value Date    CZR3VACOVJIK 1 560 03/09/2021       No components found for: HGA1C    No results found for: PDL2    Imaging: Xr Chest Pa & Lateral    Result Date: 6/17/2020  Narrative: CHEST INDICATION:   R06 02: Shortness of breath  COMPARISON:  6/25/2013 EXAM PERFORMED/VIEWS:  XR CHEST PA & LATERAL Images: 3 FINDINGS: Retrocardiac air-fluid level consistent with moderate to large hiatal hernia Strand-like opacities left lung base likely scarring, unchanged Cardiomediastinal silhouette appears unremarkable  The lungs are otherwise clear  No pneumothorax or pleural effusion  Osseous structures appear within normal limits for patient age  Impression: Persistent left lung base linear strand-like scarring Increased conspicuity of moderate to large hiatal hernia with air-fluid level No acute cardiopulmonary disease  Workstation performed: [id number]       Cardiac testing:   No results found for this or any previous visit    No results found for this or any previous visit  No results found for this or any previous visit  No results found for this or any previous visit

## 2021-12-14 ENCOUNTER — TELEPHONE (OUTPATIENT)
Dept: FAMILY MEDICINE CLINIC | Facility: CLINIC | Age: 76
End: 2021-12-14

## 2021-12-14 DIAGNOSIS — R50.9 FEVER, UNSPECIFIED FEVER CAUSE: Primary | ICD-10-CM

## 2021-12-14 PROCEDURE — U0005 INFEC AGEN DETEC AMPLI PROBE: HCPCS | Performed by: FAMILY MEDICINE

## 2021-12-14 PROCEDURE — U0003 INFECTIOUS AGENT DETECTION BY NUCLEIC ACID (DNA OR RNA); SEVERE ACUTE RESPIRATORY SYNDROME CORONAVIRUS 2 (SARS-COV-2) (CORONAVIRUS DISEASE [COVID-19]), AMPLIFIED PROBE TECHNIQUE, MAKING USE OF HIGH THROUGHPUT TECHNOLOGIES AS DESCRIBED BY CMS-2020-01-R: HCPCS | Performed by: FAMILY MEDICINE

## 2021-12-15 LAB — SARS-COV-2 RNA RESP QL NAA+PROBE: NEGATIVE

## 2021-12-18 ENCOUNTER — OFFICE VISIT (OUTPATIENT)
Dept: URGENT CARE | Facility: CLINIC | Age: 76
End: 2021-12-18
Payer: MEDICARE

## 2021-12-18 ENCOUNTER — TELEPHONE (OUTPATIENT)
Dept: OTHER | Facility: OTHER | Age: 76
End: 2021-12-18

## 2021-12-18 VITALS
RESPIRATION RATE: 18 BRPM | HEART RATE: 101 BPM | TEMPERATURE: 98.1 F | SYSTOLIC BLOOD PRESSURE: 142 MMHG | DIASTOLIC BLOOD PRESSURE: 90 MMHG | OXYGEN SATURATION: 98 %

## 2021-12-18 DIAGNOSIS — J18.9 PNEUMONIA OF LEFT LOWER LOBE DUE TO INFECTIOUS ORGANISM: Primary | ICD-10-CM

## 2021-12-18 PROCEDURE — 99213 OFFICE O/P EST LOW 20 MIN: CPT | Performed by: PHYSICIAN ASSISTANT

## 2021-12-18 PROCEDURE — G0463 HOSPITAL OUTPT CLINIC VISIT: HCPCS | Performed by: PHYSICIAN ASSISTANT

## 2021-12-18 RX ORDER — BENZONATATE 200 MG/1
200 CAPSULE ORAL 3 TIMES DAILY PRN
Qty: 20 CAPSULE | Refills: 0 | Status: SHIPPED | OUTPATIENT
Start: 2021-12-18 | End: 2022-01-19

## 2021-12-18 RX ORDER — LEVOFLOXACIN 500 MG/1
500 TABLET, FILM COATED ORAL EVERY 24 HOURS
Qty: 7 TABLET | Refills: 0 | Status: SHIPPED | OUTPATIENT
Start: 2021-12-18 | End: 2021-12-25

## 2021-12-18 RX ORDER — ALBUTEROL SULFATE 90 UG/1
2 AEROSOL, METERED RESPIRATORY (INHALATION) EVERY 6 HOURS PRN
Qty: 8.5 G | Refills: 0 | Status: SHIPPED | OUTPATIENT
Start: 2021-12-18

## 2021-12-30 ENCOUNTER — APPOINTMENT (OUTPATIENT)
Dept: RADIOLOGY | Facility: CLINIC | Age: 76
DRG: 177 | End: 2021-12-30
Payer: MEDICARE

## 2021-12-30 ENCOUNTER — OFFICE VISIT (OUTPATIENT)
Dept: FAMILY MEDICINE CLINIC | Facility: CLINIC | Age: 76
End: 2021-12-30
Payer: MEDICARE

## 2021-12-30 VITALS
DIASTOLIC BLOOD PRESSURE: 74 MMHG | SYSTOLIC BLOOD PRESSURE: 128 MMHG | OXYGEN SATURATION: 98 % | HEIGHT: 69 IN | BODY MASS INDEX: 22.07 KG/M2 | WEIGHT: 149 LBS | HEART RATE: 94 BPM | TEMPERATURE: 97.5 F

## 2021-12-30 DIAGNOSIS — B34.9 VIRAL INFECTION: Primary | ICD-10-CM

## 2021-12-30 DIAGNOSIS — B34.9 VIRAL INFECTION: ICD-10-CM

## 2021-12-30 DIAGNOSIS — Z11.59 SCREENING FOR VIRAL DISEASE: Primary | ICD-10-CM

## 2021-12-30 PROCEDURE — U0005 INFEC AGEN DETEC AMPLI PROBE: HCPCS | Performed by: FAMILY MEDICINE

## 2021-12-30 PROCEDURE — U0003 INFECTIOUS AGENT DETECTION BY NUCLEIC ACID (DNA OR RNA); SEVERE ACUTE RESPIRATORY SYNDROME CORONAVIRUS 2 (SARS-COV-2) (CORONAVIRUS DISEASE [COVID-19]), AMPLIFIED PROBE TECHNIQUE, MAKING USE OF HIGH THROUGHPUT TECHNOLOGIES AS DESCRIBED BY CMS-2020-01-R: HCPCS | Performed by: FAMILY MEDICINE

## 2021-12-30 PROCEDURE — 71046 X-RAY EXAM CHEST 2 VIEWS: CPT

## 2021-12-30 PROCEDURE — 99213 OFFICE O/P EST LOW 20 MIN: CPT | Performed by: PHYSICIAN ASSISTANT

## 2021-12-31 ENCOUNTER — HOSPITAL ENCOUNTER (INPATIENT)
Facility: HOSPITAL | Age: 76
LOS: 3 days | Discharge: HOME HEALTH | DRG: 177 | End: 2022-01-04
Attending: EMERGENCY MEDICINE | Admitting: STUDENT IN AN ORGANIZED HEALTH CARE EDUCATION/TRAINING PROGRAM
Payer: MEDICARE

## 2021-12-31 ENCOUNTER — NURSE TRIAGE (OUTPATIENT)
Dept: OTHER | Facility: OTHER | Age: 76
End: 2021-12-31

## 2021-12-31 ENCOUNTER — APPOINTMENT (EMERGENCY)
Dept: RADIOLOGY | Facility: HOSPITAL | Age: 76
DRG: 177 | End: 2021-12-31
Payer: MEDICARE

## 2021-12-31 DIAGNOSIS — N28.9 ACUTE ON CHRONIC RENAL INSUFFICIENCY: ICD-10-CM

## 2021-12-31 DIAGNOSIS — N18.30 CKD (CHRONIC KIDNEY DISEASE) STAGE 3, GFR 30-59 ML/MIN (HCC): ICD-10-CM

## 2021-12-31 DIAGNOSIS — J12.82 PNEUMONIA DUE TO COVID-19 VIRUS: Primary | ICD-10-CM

## 2021-12-31 DIAGNOSIS — R53.1 GENERALIZED WEAKNESS: ICD-10-CM

## 2021-12-31 DIAGNOSIS — N18.9 ACUTE ON CHRONIC RENAL INSUFFICIENCY: ICD-10-CM

## 2021-12-31 DIAGNOSIS — R13.19 ESOPHAGEAL DYSPHAGIA: ICD-10-CM

## 2021-12-31 DIAGNOSIS — E46 PROTEIN CALORIE MALNUTRITION (HCC): ICD-10-CM

## 2021-12-31 DIAGNOSIS — U07.1 PNEUMONIA DUE TO COVID-19 VIRUS: Primary | ICD-10-CM

## 2021-12-31 PROBLEM — N17.9 ACUTE RENAL FAILURE SUPERIMPOSED ON CHRONIC KIDNEY DISEASE (HCC): Status: ACTIVE | Noted: 2021-12-31

## 2021-12-31 PROBLEM — J18.9 PNEUMONIA DUE TO INFECTIOUS ORGANISM: Status: ACTIVE | Noted: 2021-12-31

## 2021-12-31 LAB
ALBUMIN SERPL BCP-MCNC: 3.2 G/DL (ref 3.5–5)
ALP SERPL-CCNC: 53 U/L (ref 46–116)
ALT SERPL W P-5'-P-CCNC: 14 U/L (ref 12–78)
ANION GAP SERPL CALCULATED.3IONS-SCNC: 9 MMOL/L (ref 4–13)
APTT PPP: 35 SECONDS (ref 23–37)
AST SERPL W P-5'-P-CCNC: 20 U/L (ref 5–45)
ATRIAL RATE: 86 BPM
BACTERIA UR QL AUTO: ABNORMAL /HPF
BASOPHILS # BLD AUTO: 0.01 THOUSANDS/ΜL (ref 0–0.1)
BASOPHILS NFR BLD AUTO: 0 % (ref 0–1)
BILIRUB SERPL-MCNC: 0.29 MG/DL (ref 0.2–1)
BILIRUB UR QL STRIP: NEGATIVE
BUN SERPL-MCNC: 43 MG/DL (ref 5–25)
CALCIUM ALBUM COR SERPL-MCNC: 9.2 MG/DL (ref 8.3–10.1)
CALCIUM SERPL-MCNC: 8.6 MG/DL (ref 8.3–10.1)
CARDIAC TROPONIN I PNL SERPL HS: 9 NG/L
CHLORIDE SERPL-SCNC: 103 MMOL/L (ref 100–108)
CK SERPL-CCNC: 54 U/L (ref 39–308)
CLARITY UR: CLEAR
CO2 SERPL-SCNC: 27 MMOL/L (ref 21–32)
COARSE GRAN CASTS URNS QL MICRO: ABNORMAL /LPF
COLOR UR: YELLOW
CREAT SERPL-MCNC: 2.12 MG/DL (ref 0.6–1.3)
CRP SERPL QL: 113.5 MG/L
EOSINOPHIL # BLD AUTO: 0 THOUSAND/ΜL (ref 0–0.61)
EOSINOPHIL NFR BLD AUTO: 0 % (ref 0–6)
ERYTHROCYTE [DISTWIDTH] IN BLOOD BY AUTOMATED COUNT: 14.1 % (ref 11.6–15.1)
FLUAV RNA RESP QL NAA+PROBE: NEGATIVE
FLUBV RNA RESP QL NAA+PROBE: NEGATIVE
GFR SERPL CREATININE-BSD FRML MDRD: 29 ML/MIN/1.73SQ M
GLUCOSE SERPL-MCNC: 84 MG/DL (ref 65–140)
GLUCOSE UR STRIP-MCNC: NEGATIVE MG/DL
HCT VFR BLD AUTO: 43.6 % (ref 36.5–49.3)
HGB BLD-MCNC: 14.1 G/DL (ref 12–17)
HGB UR QL STRIP.AUTO: ABNORMAL
HYALINE CASTS #/AREA URNS LPF: ABNORMAL /LPF
IMM GRANULOCYTES # BLD AUTO: 0.02 THOUSAND/UL (ref 0–0.2)
IMM GRANULOCYTES NFR BLD AUTO: 1 % (ref 0–2)
INR PPP: 1.06 (ref 0.84–1.19)
KETONES UR STRIP-MCNC: ABNORMAL MG/DL
LACTATE SERPL-SCNC: 1.1 MMOL/L (ref 0.5–2)
LEUKOCYTE ESTERASE UR QL STRIP: NEGATIVE
LYMPHOCYTES # BLD AUTO: 0.33 THOUSANDS/ΜL (ref 0.6–4.47)
LYMPHOCYTES NFR BLD AUTO: 11 % (ref 14–44)
MCH RBC QN AUTO: 27.4 PG (ref 26.8–34.3)
MCHC RBC AUTO-ENTMCNC: 32.3 G/DL (ref 31.4–37.4)
MCV RBC AUTO: 85 FL (ref 82–98)
MONOCYTES # BLD AUTO: 0.15 THOUSAND/ΜL (ref 0.17–1.22)
MONOCYTES NFR BLD AUTO: 5 % (ref 4–12)
NEUTROPHILS # BLD AUTO: 2.56 THOUSANDS/ΜL (ref 1.85–7.62)
NEUTS SEG NFR BLD AUTO: 83 % (ref 43–75)
NITRITE UR QL STRIP: NEGATIVE
NON-SQ EPI CELLS URNS QL MICRO: ABNORMAL /HPF
NRBC BLD AUTO-RTO: 0 /100 WBCS
NT-PROBNP SERPL-MCNC: 211 PG/ML
P AXIS: 44 DEGREES
PH UR STRIP.AUTO: 5.5 [PH]
PLATELET # BLD AUTO: 265 THOUSANDS/UL (ref 149–390)
PLATELET # BLD AUTO: 291 THOUSANDS/UL (ref 149–390)
PMV BLD AUTO: 8.3 FL (ref 8.9–12.7)
PMV BLD AUTO: 8.7 FL (ref 8.9–12.7)
POTASSIUM SERPL-SCNC: 4.1 MMOL/L (ref 3.5–5.3)
PR INTERVAL: 156 MS
PROT SERPL-MCNC: 7 G/DL (ref 6.4–8.2)
PROT UR STRIP-MCNC: ABNORMAL MG/DL
PROTHROMBIN TIME: 13.4 SECONDS (ref 11.6–14.5)
QRS AXIS: -58 DEGREES
QRSD INTERVAL: 94 MS
QT INTERVAL: 344 MS
QTC INTERVAL: 411 MS
RBC # BLD AUTO: 5.15 MILLION/UL (ref 3.88–5.62)
RBC #/AREA URNS AUTO: ABNORMAL /HPF
RSV RNA RESP QL NAA+PROBE: NEGATIVE
SARS-COV-2 RNA RESP QL NAA+PROBE: POSITIVE
SARS-COV-2 RNA RESP QL NAA+PROBE: POSITIVE
SODIUM SERPL-SCNC: 139 MMOL/L (ref 136–145)
SP GR UR STRIP.AUTO: 1.02 (ref 1–1.03)
T WAVE AXIS: 36 DEGREES
UROBILINOGEN UR QL STRIP.AUTO: 0.2 E.U./DL
VENTRICULAR RATE: 86 BPM
WBC # BLD AUTO: 3.07 THOUSAND/UL (ref 4.31–10.16)
WBC #/AREA URNS AUTO: ABNORMAL /HPF

## 2021-12-31 PROCEDURE — 83605 ASSAY OF LACTIC ACID: CPT | Performed by: EMERGENCY MEDICINE

## 2021-12-31 PROCEDURE — 84484 ASSAY OF TROPONIN QUANT: CPT | Performed by: EMERGENCY MEDICINE

## 2021-12-31 PROCEDURE — 71046 X-RAY EXAM CHEST 2 VIEWS: CPT

## 2021-12-31 PROCEDURE — 83880 ASSAY OF NATRIURETIC PEPTIDE: CPT | Performed by: EMERGENCY MEDICINE

## 2021-12-31 PROCEDURE — 99220 PR INITIAL OBSERVATION CARE/DAY 70 MINUTES: CPT | Performed by: STUDENT IN AN ORGANIZED HEALTH CARE EDUCATION/TRAINING PROGRAM

## 2021-12-31 PROCEDURE — 85025 COMPLETE CBC W/AUTO DIFF WBC: CPT | Performed by: EMERGENCY MEDICINE

## 2021-12-31 PROCEDURE — 87040 BLOOD CULTURE FOR BACTERIA: CPT | Performed by: EMERGENCY MEDICINE

## 2021-12-31 PROCEDURE — 80053 COMPREHEN METABOLIC PANEL: CPT | Performed by: EMERGENCY MEDICINE

## 2021-12-31 PROCEDURE — 85730 THROMBOPLASTIN TIME PARTIAL: CPT | Performed by: EMERGENCY MEDICINE

## 2021-12-31 PROCEDURE — 99285 EMERGENCY DEPT VISIT HI MDM: CPT | Performed by: EMERGENCY MEDICINE

## 2021-12-31 PROCEDURE — 86140 C-REACTIVE PROTEIN: CPT

## 2021-12-31 PROCEDURE — 0241U HB NFCT DS VIR RESP RNA 4 TRGT: CPT | Performed by: EMERGENCY MEDICINE

## 2021-12-31 PROCEDURE — 84145 PROCALCITONIN (PCT): CPT | Performed by: EMERGENCY MEDICINE

## 2021-12-31 PROCEDURE — 85049 AUTOMATED PLATELET COUNT: CPT

## 2021-12-31 PROCEDURE — 99285 EMERGENCY DEPT VISIT HI MDM: CPT

## 2021-12-31 PROCEDURE — 82550 ASSAY OF CK (CPK): CPT

## 2021-12-31 PROCEDURE — 1124F ACP DISCUSS-NO DSCNMKR DOCD: CPT | Performed by: EMERGENCY MEDICINE

## 2021-12-31 PROCEDURE — 85610 PROTHROMBIN TIME: CPT | Performed by: EMERGENCY MEDICINE

## 2021-12-31 PROCEDURE — 81001 URINALYSIS AUTO W/SCOPE: CPT

## 2021-12-31 PROCEDURE — 93005 ELECTROCARDIOGRAM TRACING: CPT

## 2021-12-31 PROCEDURE — 93010 ELECTROCARDIOGRAM REPORT: CPT | Performed by: INTERNAL MEDICINE

## 2021-12-31 PROCEDURE — 36415 COLL VENOUS BLD VENIPUNCTURE: CPT | Performed by: EMERGENCY MEDICINE

## 2021-12-31 RX ORDER — SODIUM CHLORIDE, SODIUM GLUCONATE, SODIUM ACETATE, POTASSIUM CHLORIDE, MAGNESIUM CHLORIDE, SODIUM PHOSPHATE, DIBASIC, AND POTASSIUM PHOSPHATE .53; .5; .37; .037; .03; .012; .00082 G/100ML; G/100ML; G/100ML; G/100ML; G/100ML; G/100ML; G/100ML
75 INJECTION, SOLUTION INTRAVENOUS CONTINUOUS
Status: DISCONTINUED | OUTPATIENT
Start: 2021-12-31 | End: 2021-12-31

## 2021-12-31 RX ORDER — SODIUM CHLORIDE, SODIUM LACTATE, POTASSIUM CHLORIDE, CALCIUM CHLORIDE 600; 310; 30; 20 MG/100ML; MG/100ML; MG/100ML; MG/100ML
100 INJECTION, SOLUTION INTRAVENOUS CONTINUOUS
Status: DISCONTINUED | OUTPATIENT
Start: 2021-12-31 | End: 2022-01-01

## 2021-12-31 RX ORDER — ALBUTEROL SULFATE 90 UG/1
2 AEROSOL, METERED RESPIRATORY (INHALATION) EVERY 6 HOURS PRN
Status: DISCONTINUED | OUTPATIENT
Start: 2021-12-31 | End: 2022-01-04 | Stop reason: HOSPADM

## 2021-12-31 RX ORDER — SODIUM CHLORIDE 9 MG/ML
3 INJECTION INTRAVENOUS EVERY 12 HOURS SCHEDULED
Status: DISCONTINUED | OUTPATIENT
Start: 2021-12-31 | End: 2022-01-04 | Stop reason: HOSPADM

## 2021-12-31 RX ORDER — ASPIRIN 81 MG/1
81 TABLET ORAL ONCE
Status: COMPLETED | OUTPATIENT
Start: 2021-12-31 | End: 2021-12-31

## 2021-12-31 RX ORDER — PRAVASTATIN SODIUM 20 MG
20 TABLET ORAL EVERY OTHER DAY
Status: DISCONTINUED | OUTPATIENT
Start: 2021-12-31 | End: 2022-01-04 | Stop reason: HOSPADM

## 2021-12-31 RX ORDER — HEPARIN SODIUM 5000 [USP'U]/ML
5000 INJECTION, SOLUTION INTRAVENOUS; SUBCUTANEOUS EVERY 8 HOURS SCHEDULED
Status: DISCONTINUED | OUTPATIENT
Start: 2021-12-31 | End: 2022-01-04 | Stop reason: HOSPADM

## 2021-12-31 RX ORDER — ONDANSETRON 2 MG/ML
4 INJECTION INTRAMUSCULAR; INTRAVENOUS EVERY 6 HOURS PRN
Status: DISCONTINUED | OUTPATIENT
Start: 2021-12-31 | End: 2022-01-04 | Stop reason: HOSPADM

## 2021-12-31 RX ADMIN — ASPIRIN 81 MG: 81 TABLET, COATED ORAL at 16:44

## 2021-12-31 RX ADMIN — SODIUM CHLORIDE, SODIUM LACTATE, POTASSIUM CHLORIDE, AND CALCIUM CHLORIDE 100 ML/HR: .6; .31; .03; .02 INJECTION, SOLUTION INTRAVENOUS at 18:24

## 2021-12-31 RX ADMIN — CEFTRIAXONE SODIUM 2000 MG: 10 INJECTION, POWDER, FOR SOLUTION INTRAVENOUS at 16:20

## 2021-12-31 RX ADMIN — SODIUM CHLORIDE, SODIUM GLUCONATE, SODIUM ACETATE, POTASSIUM CHLORIDE, MAGNESIUM CHLORIDE, SODIUM PHOSPHATE, DIBASIC, AND POTASSIUM PHOSPHATE 75 ML/HR: .53; .5; .37; .037; .03; .012; .00082 INJECTION, SOLUTION INTRAVENOUS at 16:21

## 2021-12-31 RX ADMIN — PRAVASTATIN SODIUM 20 MG: 20 TABLET ORAL at 16:44

## 2021-12-31 RX ADMIN — SODIUM CHLORIDE 3 ML: 9 INJECTION INTRAMUSCULAR; INTRAVENOUS; SUBCUTANEOUS at 16:16

## 2021-12-31 NOTE — ED PROVIDER NOTES
Pt Name: Gabbi Tucker  MRN: [id number]  [place] 1945  Age/Sex: 78 y o  male  Date of evaluation: 12/31/2021  PCP: Huber Pate MD    [address] [place] [address]    Chief Complaint   Patient presents with    Breathing Difficulty     PT arrives to the ED complaining of SOB, pts family member is covid+  Pt was tested yesterday, pending results  HPI    78 y o  male presenting with worsening shortness of breath  Patient's wife is noted COVID positive, patient was tested few days ago with negative results that point  He has been treated for pneumonia with level Floxin, is on day 7 that treatment with steadily worsening symptoms  He also complains of generalized weakness, worsening over the past few days  He denies fever, trauma, nausea, vomiting , diarrhea, other symptoms    HPI      Past Medical and Surgical History    Past Medical History:   Diagnosis Date    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[facility] [address] ) 03/1999    SCCA left tongue base - T2N1M0- treated at [facility] - had surgery and XRT    Hyperlipidemia     Hypertension     Left anterior fascicular block     Stage 3 chronic kidney disease ([facility] [address] )        Past Surgical History:   Procedure Laterality Date    CYSTOSCOPY  08/30/2021    EYELID CLOSURE Left     to prevent eyelashes from rubbing cornea    HERNIA REPAIR Left     MODIFIED RADICAL NECK DISSECTION Left 03/03/1999    [facility] - with mandibulotomy and resection SCCA left tongue base - Dr Lola Delcid History   Problem Relation Age of Onset    Hypertension Mother     Stroke Mother        Social History     Tobacco Use    Smoking status: Former Smoker    Smokeless tobacco: Former User   Vaping Use    Vaping Use: Never used   Substance Use Topics    Alcohol use: Yes     Comment: occasional    Drug use: Never           Allergies    Allergies   Allergen Reactions    Iodinated Diagnostic Agents Other (See Comments)    Iodine - [facility] Medications    Prior to Admission medications    Medication Sig Start Date End Date Taking? Authorizing Provider   albuterol (ProAir HFA) 90 mcg/act inhaler Inhale 2 puffs every 6 (six) hours as needed for wheezing 12/18/21   Noemi Underwood PA-C   aspirin 81 MG tablet 1 cap(s) 1/1/1901   Historical Provider, MD   benzonatate (TESSALON) 200 MG capsule Take 1 capsule (200 mg total) by mouth 3 (three) times a day as needed for cough  Patient not taking: Reported on 12/30/2021 12/18/21   Noemi Underwood PA-C   Coenzyme Q10 (CO Q 10 PO) Take by mouth    Historical Provider, MD   cyanocobalamin (VITAMIN B-12) 100 mcg tablet Take by mouth daily    Historical Provider, MD   Multiple Vitamins-Minerals (MULTIVITAMIN ADULTS 50+ PO) Take by mouth    Historical Provider, MD   omeprazole (PriLOSEC OTC) 20 MG tablet Take 1 tablet (20 mg total) by mouth daily  Patient not taking: Reported on 12/30/2021 9/8/21   Adrienne Briones MD   pravastatin (PRAVACHOL) 20 mg tablet Take 1 tablet (20 mg total) by mouth every other day 6/28/21   Johanne Hernandez MD   VITAMIN D, CHOLECALCIFEROL, PO Take by mouth    Historical Provider, MD           Review of Systems    Review of Systems   Constitutional: Positive for fatigue  Negative for appetite change, chills and diaphoresis  HENT: Negative for drooling, facial swelling, trouble swallowing and voice change  Respiratory: Positive for shortness of breath  Negative for apnea and wheezing  Cardiovascular: Negative for chest pain and leg swelling  Gastrointestinal: Negative for abdominal distention, abdominal pain, diarrhea, nausea and vomiting  Genitourinary: Negative for dysuria and urgency  Musculoskeletal: Negative for arthralgias, back pain, gait problem and neck pain  Skin: Negative for color change, rash and wound  Neurological: Negative for seizures, speech difficulty, weakness and headaches     Psychiatric/Behavioral: Negative for agitation, behavioral problems and dysphoric mood  The patient is not nervous/anxious  All other systems reviewed and negative  Physical Exam      ED Triage Vitals   Temperature Pulse Respirations Blood Pressure SpO2   12/31/21 1345 12/31/21 1344 12/31/21 1344 12/31/21 1344 12/31/21 1344   97 7 °F (36 5 °C) 93 20 116/67 96 %      Temp Source Heart Rate Source Patient Position - Orthostatic VS BP Location FiO2 (%)   12/31/21 1345 12/31/21 1344 12/31/21 1344 12/31/21 1344 --   Oral Monitor Lying Left arm       Pain Score       --                      Physical Exam  Vitals and nursing note reviewed  Constitutional:       Appearance: He is well-developed  HENT:      Head: Normocephalic and atraumatic  Right Ear: External ear normal       Left Ear: External ear normal       Mouth/Throat:      Mouth: Mucous membranes are dry  Pharynx: Oropharynx is clear  Eyes:      Conjunctiva/sclera: Conjunctivae normal       Pupils: Pupils are equal, round, and reactive to light  Neck:      Trachea: No tracheal deviation  Cardiovascular:      Rate and Rhythm: Normal rate and regular rhythm  Heart sounds: Normal heart sounds  No murmur heard  Pulmonary:      Effort: Pulmonary effort is normal  No respiratory distress  Breath sounds: No stridor  Rales present  No wheezing  Comments: Bibasilar crackles, frequent coughing, mild respiratory distress  Abdominal:      General: There is no distension  Palpations: Abdomen is soft  Tenderness: There is no abdominal tenderness  There is no guarding or rebound  Musculoskeletal:         General: No deformity  Normal range of motion  Cervical back: Normal range of motion and neck supple  Skin:     General: Skin is warm and dry  Findings: No rash  Neurological:      Mental Status: He is alert and oriented to person, place, and time  Psychiatric:         Behavior: Behavior normal          Thought Content:  Thought content normal          Judgment: Judgment normal               Diagnostic Results    EKG Interpretation    Rate:  86  BPM  Rhythm:  Sinus rhythm with occasional PVCs   Axis:  Left axis deviation  Intervals: Normal, no blocks, QTc  411 ms  Q waves:  No pathologic Q waves   T waves:  Normal   ST segments:  No significant elevations or depressions     Impression:  Sinus rhythm with occasional PVCs and left axis deviation without evidence of acute ischemia or significant arrhythmia      EKG for comparison:  None available    EKG interpreted by me       Labs:    Results Reviewed     Procedure Component Value Units Date/Time    Urinalysis with microscopic [[id number]]  (Abnormal) Collected: 12/31/21 1707    Lab Status: Final result Specimen: Urine, Clean Catch Updated: 12/31/21 1725     Clarity, UA Clear     Color, UA Yellow     Specific Gravity, UA 1 025     pH, UA 5 5     Glucose, UA Negative mg/dl      Ketones, UA Trace mg/dl      Blood, UA Small     Protein, UA 30 (1+) mg/dl      Nitrite, UA Negative     Bilirubin, UA Negative     Urobilinogen, UA 0 2 E U /dl      Leukocytes, UA Negative     WBC, UA 1-2 /hpf      RBC, UA 4-10 /hpf      Hyaline Casts, UA 0-1 /lpf      Bacteria, UA Occasional /hpf      COARSE GRANULAR CASTS 2-3 /lpf      Epithelial Cells Occasional /hpf     HS Troponin I 4hr [[id number]]     Lab Status: No result Specimen: Blood     Sputum culture and Gram stain [[id number]]     Lab Status: No result Specimen: Expectorated Sputum     Platelet count [[id number]]     Lab Status: No result Specimen: Blood     C-reactive protein [[id number]]     Lab Status: No result Specimen: Blood     CK (with reflex to MB) [[id number]]     Lab Status: No result Specimen: Blood     COVID/FLU/RSV - 2 hour TAT [[id number]]  (Abnormal) Collected: 12/31/21 1353    Lab Status: Final result Specimen: Nares from Nose Updated: 12/31/21 1447     SARS-CoV-2 Positive     INFLUENZA A PCR Negative     INFLUENZA B PCR Negative     RSV PCR Negative    Narrative:      FOR PEDIATRIC PATIENTS - copy/paste COVID Guidelines URL to browser: [email or web address]     This test has been authorized by FDA under an EUA (Emergency Use Assay) for use by authorized laboratories  Clinical caution and judgement should be used with the interpretation of these results with consideration of the clinical impression and other laboratory testing  Testing reported as "Positive" or "Negative" has been proven to be accurate according to standard laboratory validation requirements  All testing is performed with control materials showing appropriate reactivity at standard intervals  Lactic acid [[id number]]  (Normal) Collected: 12/31/21 1350    Lab Status: Final result Specimen: Blood from Arm, Left Updated: 12/31/21 1435     LACTIC ACID 1 1 mmol/L     Narrative:      Result may be elevated if tourniquet was used during collection      HS Troponin I 2hr [[id number]]     Lab Status: No result Specimen: Blood     HS Troponin 0hr (reflex protocol) [[id number]]  (Normal) Collected: 12/31/21 1350    Lab Status: Final result Specimen: Blood from Arm, Left Updated: 12/31/21 1431     hs TnI 0hr 9 ng/L     NT-BNP PRO [[id number]]  (Abnormal) Collected: 12/31/21 1350    Lab Status: Final result Specimen: Blood from Arm, Left Updated: 12/31/21 1429     NT-proBNP 211 pg/mL     Comprehensive metabolic panel [[id number]]  (Abnormal) Collected: 12/31/21 1350    Lab Status: Final result Specimen: Blood from Arm, Left Updated: 12/31/21 1420     Sodium 139 mmol/L      Potassium 4 1 mmol/L      Chloride 103 mmol/L      CO2 27 mmol/L      ANION GAP 9 mmol/L      BUN 43 mg/dL      Creatinine 2 12 mg/dL      Glucose 84 mg/dL      Calcium 8 6 mg/dL      Corrected Calcium 9 2 mg/dL      AST 20 U/L      ALT 14 U/L      Alkaline Phosphatase 53 U/L      Total Protein 7 0 g/dL      Albumin 3 2 g/dL      Total Bilirubin 0 29 mg/dL      eGFR 29 ml/min/1 73sq m     Narrative: National Kidney Disease Foundation guidelines for Chronic Kidney Disease (CKD):     Stage 1 with normal or high GFR (GFR > 90 mL/min/1 73 square meters)    Stage 2 Mild CKD (GFR = 60-89 mL/min/1 73 square meters)    Stage 3A Moderate CKD (GFR = 45-59 mL/min/1 73 square meters)    Stage 3B Moderate CKD (GFR = 30-44 mL/min/1 73 square meters)    Stage 4 Severe CKD (GFR = 15-29 mL/min/1 73 square meters)    Stage 5 End Stage CKD (GFR <15 mL/min/1 73 square meters)  Note: GFR calculation is accurate only with a steady state creatinine    Protime-INR [[id number]]  (Normal) Collected: 12/31/21 1350    Lab Status: Final result Specimen: Blood from Arm, Left Updated: 12/31/21 1415     Protime 13 4 seconds      INR 1 06    APTT [[id number]]  (Normal) Collected: 12/31/21 1350    Lab Status: Final result Specimen: Blood from Arm, Left Updated: 12/31/21 1415     PTT 35 seconds     CBC and differential [[id number]]  (Abnormal) Collected: 12/31/21 1350    Lab Status: Final result Specimen: Blood from Arm, Left Updated: 12/31/21 1402     WBC 3 07 Thousand/uL      RBC 5 15 Million/uL      Hemoglobin 14 1 g/dL      Hematocrit 43 6 %      MCV 85 fL      MCH 27 4 pg      MCHC 32 3 g/dL      RDW 14 1 %      MPV 8 7 fL      Platelets 986 Thousands/uL      nRBC 0 /100 WBCs      Neutrophils Relative 83 %      Immat GRANS % 1 %      Lymphocytes Relative 11 %      Monocytes Relative 5 %      Eosinophils Relative 0 %      Basophils Relative 0 %      Neutrophils Absolute 2 56 Thousands/µL      Immature Grans Absolute 0 02 Thousand/uL      Lymphocytes Absolute 0 33 Thousands/µL      Monocytes Absolute 0 15 Thousand/µL      Eosinophils Absolute 0 00 Thousand/µL      Basophils Absolute 0 01 Thousands/µL     Procalcitonin with AM Reflex [[id number]] Collected: 12/31/21 1350    Lab Status: In process Specimen: Blood from Arm, Left Updated: 12/31/21 1359    Blood culture #2 [[id number]] Collected: 12/31/21 1350    Lab Status:  In process Specimen: Blood from Arm, Right Updated: 12/31/21 1358    Blood culture #1 [[id number]] Collected: 12/31/21 1350    Lab Status: In process Specimen: Blood from Arm, Left Updated: 12/31/21 1358    UA w Reflex to Microscopic w Reflex to Culture [[id number]]     Lab Status: No result Specimen: Urine           All labs reviewed and utilized in the medical decision making process    Radiology:    XR chest 2 views   Final Result      Hiatal hernia and chronic parenchymal disease and left lung base which may be representative of scarring  No acute pulmonary disease detected                  Workstation performed: [id number]             All radiology studies independently viewed by me and interpreted by the radiologist     Procedure    Procedures        ED Course of Care and Re-Assessments      Patient experiencing respiratory symptoms, started on ceftriaxone based on wet read from chest x-ray, COVID test resulted positive later, suspect shortness of breath secondary to COVID  Oxygen saturation reassuring but patient generally weak and fatigued and not performing ADLs at home, acute on chronic kidney injury with significant increase in creatinine, admitted Internal Medicine    Medications   sodium chloride (PF) 0 9 % injection 3 mL (3 mL Intravenous Given 12/31/21 1616)   albuterol (PROVENTIL HFA,VENTOLIN HFA) inhaler 2 puff (has no administration in time range)   pravastatin (PRAVACHOL) tablet 20 mg (20 mg Oral Given 12/31/21 1644)   multi-electrolyte (PLASMALYTE-A/ISOLYTE-S PH 7 4) IV solution (75 mL/hr Intravenous New Bag 12/31/21 1621)   heparin (porcine) subcutaneous injection 5,000 Units (5,000 Units Subcutaneous Not Given 12/31/21 1617)   cefTRIAXone (ROCEPHIN) 2,000 mg in dextrose 5 % 50 mL IVPB (2,000 mg Intravenous New Bag 12/31/21 1620)   ondansetron (ZOFRAN) injection 4 mg (has no administration in time range)   aspirin (ECOTRIN LOW STRENGTH) EC tablet 81 mg (81 mg Oral Given 12/31/21 1644)           FINAL IMPRESSION    Final diagnoses:   Acute on chronic renal insufficiency   Pneumonia due to COVID-19 virus   Generalized weakness         DISPOSITION/PLAN    Presentation as above with generalized weakness and acute on chronic renal insufficiency in the setting of COVID-19 pneumonia  Vital signs reassuring, examination concerning for findings as above  After initial treatment and resuscitation in emergency department , admitted to Internal Medicine for further care, hemodynamically stable and comfortable at that time  Time reflects when diagnosis was documented in both MDM as applicable and the Disposition within this note     Time User Action Codes Description Comment    12/31/2021  2:40 PM [place] T Add [J18 9] Pneumonia of both lower lobes due to infectious organism     12/31/2021  2:42 PM [place] T Add [N28 9,  N18 9] Acute on chronic renal insufficiency     12/31/2021  2:42 PM [place] Angel T Add [A41 9] Sepsis ([facility] [address] )     12/31/2021  2:53 PM [place] Angel T Add [U07 1,  J12 82] Pneumonia due to COVID-19 virus     12/31/2021  2:53 PM [place] Angel T Modify [J18 9] Pneumonia of both lower lobes due to infectious organism     12/31/2021  2:53 PM [place] T Modify [U07 1,  J12 82] Pneumonia due to COVID-19 virus     12/31/2021  2:53 PM Raymond Leslie Remove [J18 9] Pneumonia of both lower lobes due to infectious organism     12/31/2021  2:53 PM Raymond Leslie Remove [A41 9] Sepsis ([facility] [address] )     12/31/2021  2:53 PM [place] Angel T Add [R53 1] Generalized weakness     12/31/2021  2:57 PM Andrea [place] Add [N18 30] CKD (chronic kidney disease) stage 3, GFR 30-59 ml/min [facility])       ED Disposition     ED Disposition Condition Date/Time Comment    Admit Stable Fri Dec 31, 2021  2:36 PM Case was discussed with MAYCOL and the patient's admission status was agreed to be Admission Status: inpatient status to the service of Dr Beatrice Rizvi           Follow-up Information None           PATIENT REFERRED TO:    No follow-up provider specified  DISCHARGE MEDICATIONS:    Patient's Medications   Discharge Prescriptions    No medications on file       No discharge procedures on file  Odin Lisa MD    Portions of the record may have been created with voice recognition software  Occasional wrong word or "sound alike" substitutions may have occurred due to the inherent limitations of voice recognition software    Please read the chart carefully and recognize, using context, where substitutions have occurred     Odin Lisa MD  12/31/21 4277

## 2021-12-31 NOTE — H&P
[address] 1945, 78 y o  male MRN: [id number]  Unit/Bed#: FT 03 Encounter: [id number]  Primary Care Provider: Herb Colon MD   Date and time admitted to hospital: 12/31/2021  1:39 PM    * Pneumonia due to infectious organism  Assessment & Plan  · Patient patient was seen in urgent care on 12/18/2021  Diagnosed with bacterial pneumonia tested negative for COVID  He was started on Levaquin 500 mg p o  Patient states that he did finish the course of the Levaquin but that he is still having symptoms of pneumonia including weakness, fevers, chills, and a productive cough with purulent sputum  · Chest x-ray showed no consolidation or ground-glass appearance  · Sputum culture ordered  · Awaiting procalcitonin results  · Lactic acid normal  · White count decreased at 3 07  · Patient started on IV Rocephin in the ER  · Will continue this course of treatment inpatient  COVID-19  Assessment & Plan  · Patient was exposed to COVID-19 on 12/25/2021  Entire family current has COVID  Patient did receive 1st 2 doses of Pfizer vaccine  · Patient tested positive today for COVID-19  · Chest x-ray showed no evidence of COVID-19 pneumonia  · Current oxygen saturation 97%  · Not on any supplemental oxygen  · Started on heparin  · According COVID protocols will not begin remdesivir, corticosteroids unless patient begins to require supplemental oxygen  · Will monitor for any increased oxygen demand  P r n  Nasal cannula oxygen order in place      Acute renal failure superimposed on chronic kidney disease [facility])  Assessment & Plan  Lab Results   Component Value Date    EGFR 29 12/31/2021    EGFR 42 07/01/2021    EGFR 42 06/08/2021    CREATININE 2 12 (H) 12/31/2021    CREATININE 1 59 (H) 07/01/2021    CREATININE 1 60 (H) 06/08/2021   · Current creatinine 2 12  · Baseline is 1 6  · Patient reports that due to illness with pneumonia he has not been drinking very much water at home  · Begun IV hydration received normal saline in ER  Will begin isolyte IV  · Will continue to monitor with a m  WADE      Esophageal dysphagia  Assessment & Plan  · Patient reports esophageal dysphagia  History was difficult to obtain due to speech difficulties  Patient did say that he needs pureed diet  · Placed on pureed diet  · Consult speech therapy    Essential hypertension  Assessment & Plan  · Patient is not on any home medications for hypertension  · Most recent blood pressure is 90/56  · Will continue to monitor blood pressures with regular vitals checks  VTE Pharmacologic Prophylaxis: VTE Score: 3 Moderate Risk (Score 3-4) - Pharmacological DVT Prophylaxis Ordered: heparin  Code Status: Level 1 - Full Code Wife  Discussion with family: Updated  (wife) via phone  Anticipated Length of Stay: Patient will be admitted on an observation basis with an anticipated length of stay of less than 2 midnights secondary to Pneumonia, SVITLANA  Total Time for Visit, including Counseling / Coordination of Care: 45 minutes Greater than 50% of this total time spent on direct patient counseling and coordination of care  Chief Complaint: Fatigue, cough     History of Present Illness:  Hiram Rg is a 78 y o  male with a PMH of cancer of the tongue, hyperlipidemia, hypertension, stage 3 chronic kidney disease, recent pneumonia who presents with cough and fatigue  The patient was seen on 12/18/2021 for similar symptoms at urgent care  He was diagnosed with bacterial pneumonia and tested negative for COVID-19  The patient was started on Levaquin 500 mg p o  For 1 week  He states that he completed the course of treatment but he is still having the symptoms  As well he report/that he was exposed to COVID at a family gathering  He reports that he is on vaccinated with his 1st 2 doses of the Pfizer vaccine  But did not receive his posterior    He reports fatigue, weakness, productive cough with purulent sputum  He denies any sore throat loss of taste or smell headaches or lightheadedness  In the ER he tested positive for COVID-19  He has not been on any oxygen since arriving in the ER, and his current saturation is 97% on room air  He states that he did have fevers and chills earlier this week but does not feel them anymore  His main symptom is the productive cough and fatigue  As well patient states that he has not been drinking any water due to feeling ill and has not been eating much since his illness began  His creatinine was at 2 12  Which is elevated from his baseline of 1 6  Review of Systems:  Review of Systems   Constitutional: Positive for appetite change and fatigue  Negative for chills and fever  HENT: Positive for trouble swallowing  Negative for rhinorrhea and sore throat  Eyes: Negative for photophobia and visual disturbance  Respiratory: Positive for cough  Negative for chest tightness, shortness of breath and wheezing  Cardiovascular: Negative for chest pain, palpitations and leg swelling  Gastrointestinal: Positive for nausea  Negative for abdominal pain, blood in stool, diarrhea and vomiting  Endocrine: Negative for polydipsia and polyuria  Genitourinary: Negative for dysuria and hematuria  Musculoskeletal: Negative for neck pain and neck stiffness  Skin: Negative for color change and pallor  Neurological: Positive for speech difficulty  Negative for dizziness, tremors, seizures, weakness, numbness and headaches  Psychiatric/Behavioral: Negative for behavioral problems and confusion         Past Medical and Surgical History:   Past Medical History:   Diagnosis Date    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[facility] ) 03/1999    SCCA left tongue base - T2N1M0- treated at [facility] - had surgery and XRT    Hyperlipidemia     Hypertension     Left anterior fascicular block     Stage 3 chronic kidney disease ([facility] )        Past Surgical History:   Procedure Laterality Date    CYSTOSCOPY  08/30/2021    EYELID CLOSURE Left     to prevent eyelashes from rubbing cornea    HERNIA REPAIR Left     MODIFIED RADICAL NECK DISSECTION Left 03/03/1999    [facility] - with mandibulotomy and resection SCCA left tongue base - Dr Chris Szymanski         Meds/Allergies:  Prior to Admission medications    Medication Sig Start Date End Date Taking? Authorizing Provider   albuterol (ProAir HFA) 90 mcg/act inhaler Inhale 2 puffs every 6 (six) hours as needed for wheezing 12/18/21   Agus Juan PA-C   aspirin 81 MG tablet 1 cap(s) 1/1/1901   Historical Provider, MD   benzonatate (TESSALON) 200 MG capsule Take 1 capsule (200 mg total) by mouth 3 (three) times a day as needed for cough  Patient not taking: Reported on 12/30/2021 12/18/21   Agus Juan PA-C   Coenzyme Q10 (CO Q 10 PO) Take by mouth    Historical Provider, MD   cyanocobalamin (VITAMIN B-12) 100 mcg tablet Take by mouth daily    Historical Provider, MD   Multiple Vitamins-Minerals (MULTIVITAMIN ADULTS 50+ PO) Take by mouth    Historical Provider, MD   omeprazole (PriLOSEC OTC) 20 MG tablet Take 1 tablet (20 mg total) by mouth daily  Patient not taking: Reported on 12/30/2021 9/8/21   Johnny Beaulieu MD   pravastatin (PRAVACHOL) 20 mg tablet Take 1 tablet (20 mg total) by mouth every other day 6/28/21   Americo Bell MD   VITAMIN D, CHOLECALCIFEROL, PO Take by mouth    Historical Provider, MD     I have reviewed home medications with patient personally  Allergies:    Allergies   Allergen Reactions    Iodinated Diagnostic Agents Other (See Comments)    Iodine - Food Allergy        Social History:  Marital Status: /Civil Union   Occupation: retired  Patient Pre-hospital Living Situation: Home  Patient Pre-hospital Level of Mobility: walks  Patient Pre-hospital Diet Restrictions: Pureed foods  Substance Use History:   Social History     Substance and Sexual Activity Alcohol Use Yes    Comment: occasional     Social History     Tobacco Use   Smoking Status Former Smoker   Smokeless Tobacco Former User     Social History     Substance and Sexual Activity   Drug Use Never       Family History:  Family History   Problem Relation Age of Onset    Hypertension Mother     Stroke Mother        Physical Exam:     Vitals:   Blood Pressure: 90/56 (12/31/21 1449)  Pulse: 85 (12/31/21 1449)  Temperature: 97 7 °F (36 5 °C) (12/31/21 1345)  Temp Source: Oral (12/31/21 1345)  Respirations: 20 (12/31/21 1449)  SpO2: 97 % (12/31/21 1449)    Physical Exam  Constitutional:       General: He is not in acute distress  Appearance: Normal appearance  He is not ill-appearing  HENT:      Head: Normocephalic and atraumatic  Nose: Nose normal  No congestion or rhinorrhea  Mouth/Throat:      Mouth: Mucous membranes are dry  Pharynx: Oropharynx is clear  Eyes:      Extraocular Movements: Extraocular movements intact  Conjunctiva/sclera: Conjunctivae normal       Pupils: Pupils are equal, round, and reactive to light  Cardiovascular:      Rate and Rhythm: Regular rhythm  Tachycardia present  Pulses: Normal pulses  Heart sounds: Normal heart sounds  No murmur heard  No friction rub  No gallop  Pulmonary:      Effort: Pulmonary effort is normal       Breath sounds: Examination of the left-lower field reveals rhonchi  Rhonchi present  No wheezing or rales  Abdominal:      General: Abdomen is flat  Bowel sounds are normal       Tenderness: There is no abdominal tenderness  Musculoskeletal:         General: Normal range of motion  Cervical back: Normal range of motion and neck supple  No tenderness  Right lower leg: No edema  Left lower leg: No edema  Skin:     General: Skin is warm and dry  Neurological:      General: No focal deficit present  Mental Status: He is alert and oriented to person, place, and time   Mental status is at baseline  Psychiatric:         Mood and Affect: Mood normal          Behavior: Behavior normal           Additional Data:     Lab Results:  Results from last 7 days   Lab Units 12/31/21  1350   WBC Thousand/uL 3 07*   HEMOGLOBIN g/dL 14 1   HEMATOCRIT % 43 6   PLATELETS Thousands/uL 291   NEUTROS PCT % 83*   LYMPHS PCT % 11*   MONOS PCT % 5   EOS PCT % 0     Results from last 7 days   Lab Units 12/31/21  1350   SODIUM mmol/L 139   POTASSIUM mmol/L 4 1   CHLORIDE mmol/L 103   CO2 mmol/L 27   BUN mg/dL 43*   CREATININE mg/dL 2 12*   ANION GAP mmol/L 9   CALCIUM mg/dL 8 6   ALBUMIN g/dL 3 2*   TOTAL BILIRUBIN mg/dL 0 29   ALK PHOS U/L 53   ALT U/L 14   AST U/L 20   GLUCOSE RANDOM mg/dL 84     Results from last 7 days   Lab Units 12/31/21  1350   INR  1 06             Results from last 7 days   Lab Units 12/31/21  1350   LACTIC ACID mmol/L 1 1       Imaging: Reviewed radiology reports from this admission including: chest xray  XR chest 2 views   Final Result by Jerry Almanzar MD (12/31 6315)      Hiatal hernia and chronic parenchymal disease and left lung base which may be representative of scarring  No acute pulmonary disease detected                  Workstation performed: [id number]             EKG and Other Studies Reviewed on Admission:   · EKG: No EKG obtained  ** Please Note: This note has been constructed using a voice recognition system   **

## 2021-12-31 NOTE — PLAN OF CARE
Problem: MOBILITY - ADULT  Goal: Maintain or return to baseline ADL function  Description: INTERVENTIONS:  -  Assess patient's ability to carry out ADLs; assess patient's baseline for ADL function and identify physical deficits which impact ability to perform ADLs (bathing, care of mouth/teeth, toileting, grooming, dressing, etc )  - Assess/evaluate cause of self-care deficits   - Assess range of motion  - Assess patient's mobility; develop plan if impaired  - Assess patient's need for assistive devices and provide as appropriate  - Encourage maximum independence but intervene and supervise when necessary  - Involve family in performance of ADLs  - Assess for home care needs following discharge   - Consider OT consult to assist with ADL evaluation and planning for discharge  - Provide patient education as appropriate  Outcome: Progressing  Goal: Maintains/Returns to pre admission functional level  Description: INTERVENTIONS:  - Perform BMAT or MOVE assessment daily    - Set and communicate daily mobility goal to care team and patient/family/caregiver  - Collaborate with rehabilitation services on mobility goals if consulted  - Perform Range of Motion  times a day  - Reposition patient every  hours    - Dangle patient  times a day  - Stand patient  times a day  - Ambulate patient  times a day  - Out of bed to chair  times a day   - Out of bed for meals  times a day  - Out of bed for toileting  - Record patient progress and toleration of activity level   Outcome: Progressing     Problem: Potential for Falls  Goal: Patient will remain free of falls  Description: INTERVENTIONS:  - Educate patient/family on patient safety including physical limitations  - Instruct patient to call for assistance with activity   - Consult OT/PT to assist with strengthening/mobility   - Keep Call bell within reach  - Keep bed low and locked with side rails adjusted as appropriate  - Keep care items and personal belongings within reach  - Initiate and maintain comfort rounds  - Make Fall Risk Sign visible to staff  - Offer Toileting every  Hours, in advance of need  - Initiate/Maintain alarm  - Obtain necessary fall risk management equipment  - Apply yellow socks and bracelet for high fall risk patients  - Consider moving patient to room near nurses station  Outcome: Progressing     Problem: PAIN - ADULT  Goal: Verbalizes/displays adequate comfort level or baseline comfort level  Description: Interventions:  - Encourage patient to monitor pain and request assistance  - Assess pain using appropriate pain scale  - Administer analgesics based on type and severity of pain and evaluate response  - Implement non-pharmacological measures as appropriate and evaluate response  - Consider cultural and social influences on pain and pain management  - Notify physician/advanced practitioner if interventions unsuccessful or patient reports new pain  Outcome: Progressing     Problem: INFECTION - ADULT  Goal: Absence or prevention of progression during hospitalization  Description: INTERVENTIONS:  - Assess and monitor for signs and symptoms of infection  - Monitor lab/diagnostic results  - Monitor all insertion sites, i e  indwelling lines, tubes, and drains  - Monitor endotracheal if appropriate and nasal secretions for changes in amount and color  - [place] appropriate cooling/warming therapies per order  - Administer medications as ordered  - Instruct and encourage patient and family to use good hand hygiene technique  - Identify and instruct in appropriate isolation precautions for identified infection/condition  Outcome: Progressing  Goal: Absence of fever/infection during neutropenic period  Description: INTERVENTIONS:  - Monitor WBC    Outcome: Progressing     Problem: DISCHARGE PLANNING  Goal: Discharge to home or other facility with appropriate resources  Description: INTERVENTIONS:  - Identify barriers to discharge w/patient and caregiver  - Arrange for needed discharge resources and transportation as appropriate  - Identify discharge learning needs (meds, wound care, etc )  - Arrange for interpretive services to assist at discharge as needed  - Refer to Case Management Department for coordinating discharge planning if the patient needs post-hospital services based on physician/advanced practitioner order or complex needs related to functional status, cognitive ability, or social support system  Outcome: Progressing     Problem: CARDIOVASCULAR - ADULT  Goal: Maintains optimal cardiac output and hemodynamic stability  Description: INTERVENTIONS:  - Monitor I/O, vital signs and rhythm  - Monitor for S/S and trends of decreased cardiac output  - Administer and titrate ordered vasoactive medications to optimize hemodynamic stability  - Assess quality of pulses, skin color and temperature  - Assess for signs of decreased coronary artery perfusion  - Instruct patient to report change in severity of symptoms  Outcome: Progressing     Problem: RESPIRATORY - ADULT  Goal: Achieves optimal ventilation and oxygenation  Description: INTERVENTIONS:  - Assess for changes in respiratory status  - Assess for changes in mentation and behavior  - Position to facilitate oxygenation and minimize respiratory effort  - Oxygen administered by appropriate delivery if ordered  - Initiate smoking cessation education as indicated  - Encourage broncho-pulmonary hygiene including cough, deep breathe, Incentive Spirometry  - Assess the need for suctioning and aspirate as needed  - Assess and instruct to report SOB or any respiratory difficulty  - Respiratory Therapy support as indicated  Outcome: Progressing     Problem: METABOLIC, FLUID AND ELECTROLYTES - ADULT  Goal: Electrolytes maintained within normal limits  Description: INTERVENTIONS:  - Monitor labs and assess patient for signs and symptoms of electrolyte imbalances  - Administer electrolyte replacement as ordered  - Monitor response to electrolyte replacements, including repeat lab results as appropriate  - Instruct patient on fluid and nutrition as appropriate  Outcome: Progressing     Problem: HEMATOLOGIC - ADULT  Goal: Maintains hematologic stability  Description: INTERVENTIONS  - Assess for signs and symptoms of bleeding or hemorrhage  - Monitor labs  - Administer supportive blood products/factors as ordered and appropriate  Outcome: Progressing

## 2021-12-31 NOTE — ASSESSMENT & PLAN NOTE
· Patient is not on any home medications for hypertension  · Most recent blood pressure is 90/56  · Will continue to monitor blood pressures with regular vitals checks

## 2021-12-31 NOTE — ASSESSMENT & PLAN NOTE
· Patient was exposed to COVID-19 on 12/25/2021  Entire family current has COVID  Patient did receive 1st 2 doses of Pfizer vaccine  · Patient tested positive today for COVID-19  · Chest x-ray showed no evidence of COVID-19 pneumonia  · Current oxygen saturation 97%  · Not on any supplemental oxygen  · Started on heparin  · According COVID protocols will not begin remdesivir, corticosteroids unless patient begins to require supplemental oxygen  · Will monitor for any increased oxygen demand  P r n  Nasal cannula oxygen order in place

## 2021-12-31 NOTE — ASSESSMENT & PLAN NOTE
Lab Results   Component Value Date    EGFR 29 12/31/2021    EGFR 42 07/01/2021    EGFR 42 06/08/2021    CREATININE 2 12 (H) 12/31/2021    CREATININE 1 59 (H) 07/01/2021    CREATININE 1 60 (H) 06/08/2021   · Current creatinine 2 12  · Baseline is 1 6  · Patient reports that due to illness with pneumonia he has not been drinking very much water at home  · Begun IV hydration received normal saline in ER  Will begin isolyte IV  · Will continue to monitor with ryan hernandez CMP

## 2021-12-31 NOTE — ASSESSMENT & PLAN NOTE
· Patient reports esophageal dysphagia  This is due to a past diagnosis of cancer of the tongue  Patient did say that he needs pureed diet  · Placed on pureed diet    · Consult speech therapy

## 2021-12-31 NOTE — ASSESSMENT & PLAN NOTE
· Patient patient was seen in urgent care on 12/18/2021  Diagnosed with bacterial pneumonia tested negative for COVID  He was started on Levaquin 500 mg p o  Patient states that he did finish the course of the Levaquin but that he is still having symptoms of pneumonia including weakness, fevers, chills, and a productive cough with purulent sputum  · Chest x-ray showed no consolidation or ground-glass appearance  · Sputum culture ordered  · Awaiting procalcitonin results  · Lactic acid normal  · White count decreased at 3 07  · Patient started on IV Rocephin in the ER  · Will continue this course of treatment inpatient

## 2022-01-01 PROBLEM — D72.819 LEUKOPENIA: Status: ACTIVE | Noted: 2022-01-01

## 2022-01-01 LAB
ALBUMIN SERPL BCP-MCNC: 2.7 G/DL (ref 3.5–5)
ALP SERPL-CCNC: 48 U/L (ref 46–116)
ALT SERPL W P-5'-P-CCNC: 11 U/L (ref 12–78)
ANION GAP SERPL CALCULATED.3IONS-SCNC: 11 MMOL/L (ref 4–13)
AST SERPL W P-5'-P-CCNC: 24 U/L (ref 5–45)
BASOPHILS # BLD MANUAL: 0.02 THOUSAND/UL (ref 0–0.1)
BASOPHILS NFR MAR MANUAL: 1 % (ref 0–1)
BILIRUB SERPL-MCNC: 0.22 MG/DL (ref 0.2–1)
BUN SERPL-MCNC: 35 MG/DL (ref 5–25)
CALCIUM ALBUM COR SERPL-MCNC: 9.4 MG/DL (ref 8.3–10.1)
CALCIUM SERPL-MCNC: 8.4 MG/DL (ref 8.3–10.1)
CHLORIDE SERPL-SCNC: 104 MMOL/L (ref 100–108)
CO2 SERPL-SCNC: 24 MMOL/L (ref 21–32)
CREAT SERPL-MCNC: 1.59 MG/DL (ref 0.6–1.3)
EOSINOPHIL # BLD MANUAL: 0 THOUSAND/UL (ref 0–0.4)
EOSINOPHIL NFR BLD MANUAL: 0 % (ref 0–6)
ERYTHROCYTE [DISTWIDTH] IN BLOOD BY AUTOMATED COUNT: 13.8 % (ref 11.6–15.1)
GFR SERPL CREATININE-BSD FRML MDRD: 42 ML/MIN/1.73SQ M
GLUCOSE SERPL-MCNC: 81 MG/DL (ref 65–140)
HCT VFR BLD AUTO: 40.9 % (ref 36.5–49.3)
HGB BLD-MCNC: 13.3 G/DL (ref 12–17)
LYMPHOCYTES # BLD AUTO: 0.46 THOUSAND/UL (ref 0.6–4.47)
LYMPHOCYTES # BLD AUTO: 25 % (ref 14–44)
MCH RBC QN AUTO: 27 PG (ref 26.8–34.3)
MCHC RBC AUTO-ENTMCNC: 32.5 G/DL (ref 31.4–37.4)
MCV RBC AUTO: 83 FL (ref 82–98)
MONOCYTES # BLD AUTO: 0.04 THOUSAND/UL (ref 0–1.22)
MONOCYTES NFR BLD: 2 % (ref 4–12)
NEUTROPHILS # BLD MANUAL: 1.31 THOUSAND/UL (ref 1.85–7.62)
NEUTS BAND NFR BLD MANUAL: 20 % (ref 0–8)
NEUTS SEG NFR BLD AUTO: 51 % (ref 43–75)
PLATELET # BLD AUTO: 265 THOUSANDS/UL (ref 149–390)
PLATELET BLD QL SMEAR: ADEQUATE
PMV BLD AUTO: 8.7 FL (ref 8.9–12.7)
POTASSIUM SERPL-SCNC: 4 MMOL/L (ref 3.5–5.3)
PROCALCITONIN SERPL-MCNC: 0.22 NG/ML
PROCALCITONIN SERPL-MCNC: 0.31 NG/ML
PROT SERPL-MCNC: 6.1 G/DL (ref 6.4–8.2)
RBC # BLD AUTO: 4.93 MILLION/UL (ref 3.88–5.62)
RBC MORPH BLD: NORMAL
SODIUM SERPL-SCNC: 139 MMOL/L (ref 136–145)
VARIANT LYMPHS # BLD AUTO: 1 %
WBC # BLD AUTO: 1.85 THOUSAND/UL (ref 4.31–10.16)

## 2022-01-01 PROCEDURE — 99233 SBSQ HOSP IP/OBS HIGH 50: CPT | Performed by: STUDENT IN AN ORGANIZED HEALTH CARE EDUCATION/TRAINING PROGRAM

## 2022-01-01 PROCEDURE — 84145 PROCALCITONIN (PCT): CPT | Performed by: EMERGENCY MEDICINE

## 2022-01-01 PROCEDURE — 92610 EVALUATE SWALLOWING FUNCTION: CPT

## 2022-01-01 PROCEDURE — XW033E5 INTRODUCTION OF REMDESIVIR ANTI-INFECTIVE INTO PERIPHERAL VEIN, PERCUTANEOUS APPROACH, NEW TECHNOLOGY GROUP 5: ICD-10-PCS | Performed by: STUDENT IN AN ORGANIZED HEALTH CARE EDUCATION/TRAINING PROGRAM

## 2022-01-01 PROCEDURE — 85027 COMPLETE CBC AUTOMATED: CPT

## 2022-01-01 PROCEDURE — 80053 COMPREHEN METABOLIC PANEL: CPT

## 2022-01-01 PROCEDURE — 85007 BL SMEAR W/DIFF WBC COUNT: CPT

## 2022-01-01 RX ORDER — ASPIRIN 81 MG/1
81 TABLET ORAL DAILY
Status: DISCONTINUED | OUTPATIENT
Start: 2022-01-01 | End: 2022-01-04 | Stop reason: HOSPADM

## 2022-01-01 RX ORDER — DEXAMETHASONE SODIUM PHOSPHATE 4 MG/ML
6 INJECTION, SOLUTION INTRA-ARTICULAR; INTRALESIONAL; INTRAMUSCULAR; INTRAVENOUS; SOFT TISSUE EVERY 24 HOURS
Status: DISCONTINUED | OUTPATIENT
Start: 2022-01-01 | End: 2022-01-04 | Stop reason: HOSPADM

## 2022-01-01 RX ADMIN — SODIUM CHLORIDE 3 ML: 9 INJECTION INTRAMUSCULAR; INTRAVENOUS; SUBCUTANEOUS at 21:11

## 2022-01-01 RX ADMIN — REMDESIVIR 200 MG: 100 INJECTION, POWDER, LYOPHILIZED, FOR SOLUTION INTRAVENOUS at 16:34

## 2022-01-01 RX ADMIN — SODIUM CHLORIDE 3 ML: 9 INJECTION INTRAMUSCULAR; INTRAVENOUS; SUBCUTANEOUS at 04:15

## 2022-01-01 RX ADMIN — ASPIRIN 81 MG: 81 TABLET, COATED ORAL at 21:10

## 2022-01-01 RX ADMIN — HEPARIN SODIUM 5000 UNITS: 5000 INJECTION INTRAVENOUS; SUBCUTANEOUS at 13:21

## 2022-01-01 RX ADMIN — CEFTRIAXONE SODIUM 2000 MG: 10 INJECTION, POWDER, FOR SOLUTION INTRAVENOUS at 16:37

## 2022-01-01 RX ADMIN — SODIUM CHLORIDE 3 ML: 9 INJECTION INTRAMUSCULAR; INTRAVENOUS; SUBCUTANEOUS at 09:40

## 2022-01-01 RX ADMIN — DEXAMETHASONE SODIUM PHOSPHATE 6 MG: 4 INJECTION, SOLUTION INTRA-ARTICULAR; INTRALESIONAL; INTRAMUSCULAR; INTRAVENOUS; SOFT TISSUE at 13:18

## 2022-01-01 NOTE — ASSESSMENT & PLAN NOTE
· Patient patient was seen in urgent care on 12/18/2021  Diagnosed with bacterial pneumonia tested negative for COVID  He was started on Levaquin 500 mg p o  Patient states that he did finish the course of the Levaquin but that he is still having symptoms of pneumonia including weakness, fevers, chills, and a productive cough with purulent sputum  · Chest x-ray showed no consolidation or ground-glass appearance  · Lactic acid normal  · Procalcitonin x 1 elevated to 0 31; Follow-up 2nd procalcitonin  · Follow-up sputum cultures  · White count further decreased to 1 85 today from 3 07 yesterday  · Continue IV Rocephin

## 2022-01-01 NOTE — ASSESSMENT & PLAN NOTE
Lab Results   Component Value Date    EGFR 42 01/01/2022    EGFR 29 12/31/2021    EGFR 42 07/01/2021    CREATININE 1 59 (H) 01/01/2022    CREATININE 2 12 (H) 12/31/2021    CREATININE 1 59 (H) 07/01/2021   · Presented with a creatinine 2 12  · Baseline is 1 6  Current creatinine approximates presumed baseline at 1 59 today  · Endorse a history of poor po intake secondary to dysphagia  · Will continue to monitor with a m  WADE

## 2022-01-01 NOTE — PLAN OF CARE
Problem: MOBILITY - ADULT  Goal: Maintain or return to baseline ADL function  Description: INTERVENTIONS:  -  Assess patient's ability to carry out ADLs; assess patient's baseline for ADL function and identify physical deficits which impact ability to perform ADLs (bathing, care of mouth/teeth, toileting, grooming, dressing, etc )  - Assess/evaluate cause of self-care deficits   - Assess range of motion  - Assess patient's mobility; develop plan if impaired  - Assess patient's need for assistive devices and provide as appropriate  - Encourage maximum independence but intervene and supervise when necessary  - Involve family in performance of ADLs  - Assess for home care needs following discharge   - Consider OT consult to assist with ADL evaluation and planning for discharge  - Provide patient education as appropriate  Outcome: Progressing  Goal: Maintains/Returns to pre admission functional level  Description: INTERVENTIONS:  - Perform BMAT or MOVE assessment daily    - Set and communicate daily mobility goal to care team and patient/family/caregiver  - Collaborate with rehabilitation services on mobility goals if consulted  - Perform Range of Motion  times a day  - Reposition patient every  hours    - Dangle patient  times a day  - Stand patient  times a day  - Ambulate patient  times a day  - Out of bed to chair  times a day   - Out of bed for meals  times a day  - Out of bed for toileting  - Record patient progress and toleration of activity level   Outcome: Progressing     Problem: Potential for Falls  Goal: Patient will remain free of falls  Description: INTERVENTIONS:  - Educate patient/family on patient safety including physical limitations  - Instruct patient to call for assistance with activity   - Consult OT/PT to assist with strengthening/mobility   - Keep Call bell within reach  - Keep bed low and locked with side rails adjusted as appropriate  - Keep care items and personal belongings within reach  - Initiate and maintain comfort rounds  - Make Fall Risk Sign visible to staff  - Offer Toileting every  Hours, in advance of need  - Initiate/Maintain alarm  - Obtain necessary fall risk management equipment:   - Apply yellow socks and bracelet for high fall risk patients  - Consider moving patient to room near nurses station  Outcome: Progressing     Problem: PAIN - ADULT  Goal: Verbalizes/displays adequate comfort level or baseline comfort level  Description: Interventions:  - Encourage patient to monitor pain and request assistance  - Assess pain using appropriate pain scale  - Administer analgesics based on type and severity of pain and evaluate response  - Implement non-pharmacological measures as appropriate and evaluate response  - Consider cultural and social influences on pain and pain management  - Notify physician/advanced practitioner if interventions unsuccessful or patient reports new pain  Outcome: Progressing     Problem: INFECTION - ADULT  Goal: Absence or prevention of progression during hospitalization  Description: INTERVENTIONS:  - Assess and monitor for signs and symptoms of infection  - Monitor lab/diagnostic results  - Monitor all insertion sites, i e  indwelling lines, tubes, and drains  - Monitor endotracheal if appropriate and nasal secretions for changes in amount and color  - [place] appropriate cooling/warming therapies per order  - Administer medications as ordered  - Instruct and encourage patient and family to use good hand hygiene technique  - Identify and instruct in appropriate isolation precautions for identified infection/condition  Outcome: Progressing  Goal: Absence of fever/infection during neutropenic period  Description: INTERVENTIONS:  - Monitor WBC    Outcome: Progressing     Problem: DISCHARGE PLANNING  Goal: Discharge to home or other facility with appropriate resources  Description: INTERVENTIONS:  - Identify barriers to discharge w/patient and caregiver  - Arrange for needed discharge resources and transportation as appropriate  - Identify discharge learning needs (meds, wound care, etc )  - Arrange for interpretive services to assist at discharge as needed  - Refer to Case Management Department for coordinating discharge planning if the patient needs post-hospital services based on physician/advanced practitioner order or complex needs related to functional status, cognitive ability, or social support system  Outcome: Progressing     Problem: METABOLIC, FLUID AND ELECTROLYTES - ADULT  Goal: Electrolytes maintained within normal limits  Description: INTERVENTIONS:  - Monitor labs and assess patient for signs and symptoms of electrolyte imbalances  - Administer electrolyte replacement as ordered  - Monitor response to electrolyte replacements, including repeat lab results as appropriate  - Instruct patient on fluid and nutrition as appropriate  Outcome: Progressing

## 2022-01-01 NOTE — ASSESSMENT & PLAN NOTE
· Patient was exposed to COVID-19 on 12/25/2021  Entire family current has COVID  Patient did receive 1st 2 doses of Pfizer vaccine  · Patient tested positive today for COVID-19  · Chest x-ray showed no evidence of COVID-19 pneumonia  · Current oxygen saturation 97% initially  Now requiring 2L/min of supplementation via NC  · Dexamethasone 6 mg qd started  Will defer from initiating remdesivir  · Encourage incentive spirometry use

## 2022-01-01 NOTE — ASSESSMENT & PLAN NOTE
· Patient is not on any home medications for hypertension  · Most recent blood pressure is 91/50  MAP of 64  Continue LR @ 100cc/hr   Monitor volume status  · Will continue to monitor blood pressures with regular vitals checks

## 2022-01-01 NOTE — PROGRESS NOTES
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), 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?)  With baseline WBC count of around 5-6  Presented with a WBC count of 3 07, with further drop to 1 85 on today's assessment  Relative lymphopenia  Acuity of drop may be secondary to acute infection with CAP and COVID, compounded by dilution in the setting of IV fluid maintenance  - Avoid marrow- suppressing agents: Dexamethasone started given current oxygen requirements in the setting of COVID 19 infection so will continue to monitor   - Will continue to monitor WBC count  Acute renal failure superimposed on chronic kidney disease [facility])  Assessment & Plan  Lab Results   Component Value Date    EGFR 42 01/01/2022    EGFR 29 12/31/2021    EGFR 42 07/01/2021    CREATININE 1 59 (H) 01/01/2022    CREATININE 2 12 (H) 12/31/2021    CREATININE 1 59 (H) 07/01/2021   · Presented with a creatinine 2 12  · Baseline is 1 6  Current creatinine approximates presumed baseline at 1 59 today  · Endorse a history of poor po intake secondary to dysphagia  · Will continue to monitor with a m  [facility]  COVID-19  Assessment & Plan  · Patient was exposed to COVID-19 on 12/25/2021  Entire family current has COVID  Patient did receive 1st 2 doses of Pfizer vaccine  · Patient tested positive today for COVID-19  · Chest x-ray showed no evidence of COVID-19 pneumonia  · Current oxygen saturation 97% initially  Now requiring 2L/min of supplementation via NC  · Dexamethasone 6 mg qd started  · Remdesivir  · Encourage incentive spirometry use        Esophageal dysphagia  Assessment & Plan  · Patient reports esophageal dysphagia  This is due to a past diagnosis of cancer of the tongue  Also has evidence of Schatzki ring and hiatal hernia on recent endoscopy  Patient did say that he needs pureed diet  · Placed on pureed diet  · SLP evaluation appreciated; converted to pureed consistency with pudding thick liquids  Essential hypertension  Assessment & Plan  · Patient is not on any home medications for hypertension  · Most recent blood pressure is 91/50  MAP of 64  Continue LR @ 100cc/hr   Monitor volume status  · Will continue to monitor blood pressures with regular vitals checks  * Pneumonia due to infectious organism  Assessment & Plan  · Patient patient was seen in urgent care on 12/18/2021  Diagnosed with bacterial pneumonia tested negative for COVID  He was started on Levaquin 500 mg p o  Patient states that he did finish the course of the Levaquin but that he is still having symptoms of pneumonia including weakness, fevers, chills, and a productive cough with purulent sputum  · Chest x-ray showed no consolidation or ground-glass appearance  · Lactic acid normal  · Procalcitonin x 1 elevated to 0 31; Follow-up 2nd procalcitonin  · Follow-up sputum cultures  · White count further decreased to 1 85 today from 3 07 yesterday  · Continue IV Rocephin  VTE Pharmacologic Prophylaxis:   Pharmacologic: Heparin  Mechanical VTE Prophylaxis in Place: Yes    Patient Centered Rounds: I have performed bedside rounds with nursing staff today  Discussions with Specialists or Other Care Team Provider:     Education and Discussions with Family / Patient:     Time Spent for Care: 45 minutes  More than 50% of total time spent on counseling and coordination of care as described above      Current Length of Stay: 0 day(s)    Current Patient Status: Inpatient   Certification Statement: The patient will continue to require additional inpatient hospital stay due to management for community acquired pneumonia  COVID 19 infection  Evaluation for lymphopenia  Discharge Plan / Estimated Discharge Date: Will continue to treatment for community acquired pneumonia  Follow-up sputum cultures  Titrate oxygen requiremement / greater than 72 hours  Code Status: Level 1 - Full Code      Subjective:   Patient interviewed today at the bedside  Dysarthric  Denies any chest pain, or shortness of breath  Objective:     Vitals:   Temp (24hrs), Av 7 °F (37 1 °C), Min:97 7 °F (36 5 °C), Max:99 4 °F (37 4 °C)    Temp:  [97 7 °F (36 5 °C)-99 4 °F (37 4 °C)] 98 4 °F (36 9 °C)  HR:  [83-96] 83  Resp:  [16-20] 16  BP: ()/(50-70) 91/50  SpO2:  [88 %-97 %] 93 %  There is no height or weight on file to calculate BMI  Input and Output Summary (last 24 hours): Intake/Output Summary (Last 24 hours) at 2022 1116  Last data filed at 2022 0900  Gross per 24 hour   Intake 1240 ml   Output 150 ml   Net 1090 ml       Physical Exam:     Physical Exam  Vitals reviewed  Constitutional:       Appearance: Normal appearance  HENT:      Head: Normocephalic and atraumatic  Mouth/Throat:      Mouth: Mucous membranes are moist    Eyes:      Extraocular Movements: Extraocular movements intact  Cardiovascular:      Rate and Rhythm: Normal rate and regular rhythm  Heart sounds: S1 normal and S2 normal    Pulmonary:      Effort: Pulmonary effort is normal  No respiratory distress  Breath sounds: Decreased breath sounds and rhonchi present  No wheezing or rales  Abdominal:      Palpations: Abdomen is soft  Musculoskeletal:         General: Normal range of motion  Cervical back: Neck supple  Right lower leg: No edema  Left lower leg: No edema  Skin:     General: Skin is warm and dry  Neurological:      Mental Status: He is alert and oriented to person, place, and time     Psychiatric:         Mood and Affect: Mood normal          Behavior: Behavior normal  Additional Data:     Labs:    Results from last 7 days   Lab Units 01/01/22  0518 12/31/21  2134 12/31/21  1350   WBC Thousand/uL 1 85*  --  3 07*   HEMOGLOBIN g/dL 13 3  --  14 1   HEMATOCRIT % 40 9  --  43 6   PLATELETS Thousands/uL 265   < > 291   NEUTROS PCT %  --   --  83*   LYMPHS PCT %  --   --  11*   LYMPHO PCT % 25  --   --    MONOS PCT %  --   --  5   MONO PCT % 2*  --   --    EOS PCT % 0  --  0    < > = values in this interval not displayed  Results from last 7 days   Lab Units 01/01/22  0518   POTASSIUM mmol/L 4 0   CHLORIDE mmol/L 104   CO2 mmol/L 24   BUN mg/dL 35*   CREATININE mg/dL 1 59*   CALCIUM mg/dL 8 4   ALK PHOS U/L 48   ALT U/L 11*   AST U/L 24     Results from last 7 days   Lab Units 12/31/21  1350   INR  1 06       * I Have Reviewed All Lab Data Listed Above  * Additional Pertinent Lab Tests Reviewed: All Labs For Current Hospital Admission Reviewed    Imaging:    Imaging Reports Reviewed Today Include: CXR PA & lateral,  Imaging Personally Reviewed by Myself Includes:  As above  Recent Cultures (last 7 days):     Results from last 7 days   Lab Units 12/31/21  1350   BLOOD CULTURE  Received in Microbiology Lab  Culture in Progress  Received in Microbiology Lab  Culture in Progress         Last 24 Hours Medication List:   Current Facility-Administered Medications   Medication Dose Route Frequency Provider Last Rate    albuterol  2 puff Inhalation Q6H PRN Nando West PA-C      cefTRIAXone  2,000 mg Intravenous Q24H Nando West PA-C 2,000 mg (12/31/21 1620)    dexamethasone  6 mg Intravenous Q24H Kenneth Ramirez MD      heparin (porcine)  5,000 Units Subcutaneous [facility] Jorgito macario PA-C      lactated ringers  100 mL/hr Intravenous Continuous Stella Thomason  mL/hr (12/31/21 1824)    ondansetron  4 mg Intravenous Q6H PRN Nando West PA-C      pravastatin  20 mg Oral Every Other Day Nando West PA-C      sodium chloride (PF)  3 mL Intravenous Q12H [facility] Cathleen Morales PA-C          Today, Patient Was Seen By: Farnaz French MD    ** Please Note: Dragon 360 Dictation voice to text software may have been used in the creation of this document   **

## 2022-01-01 NOTE — ASSESSMENT & PLAN NOTE
· Patient reports esophageal dysphagia  This is due to a past diagnosis of cancer of the tongue  Also has evidence of Schatzki ring on recent endoscopy  Patient did say that he needs pureed diet  · Placed on pureed diet  · Follow-up speech therapy assessment

## 2022-01-01 NOTE — ASSESSMENT & PLAN NOTE
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

## 2022-01-01 NOTE — PLAN OF CARE
Problem: MOBILITY - ADULT  Goal: Maintain or return to baseline ADL function  Description: INTERVENTIONS:  -  Assess patient's ability to carry out ADLs; assess patient's baseline for ADL function and identify physical deficits which impact ability to perform ADLs (bathing, care of mouth/teeth, toileting, grooming, dressing, etc )  - Assess/evaluate cause of self-care deficits   - Assess range of motion  - Assess patient's mobility; develop plan if impaired  - Assess patient's need for assistive devices and provide as appropriate  - Encourage maximum independence but intervene and supervise when necessary  - Involve family in performance of ADLs  - Assess for home care needs following discharge   - Consider OT consult to assist with ADL evaluation and planning for discharge  - Provide patient education as appropriate  Outcome: Progressing  Goal: Maintains/Returns to pre admission functional level  Description: INTERVENTIONS:  - Perform BMAT or MOVE assessment daily    - Set and communicate daily mobility goal to care team and patient/family/caregiver  - Collaborate with rehabilitation services on mobility goals if consulted  - Perform Range of Motion  times a day  - Reposition patient every  hours    - Dangle patient  times a day  - Stand patient  times a day  - Ambulate patient  times a day  - Out of bed to chair  times a day   - Out of bed for meals  times a day  - Out of bed for toileting  - Record patient progress and toleration of activity level   Outcome: Progressing     Problem: Potential for Falls  Goal: Patient will remain free of falls  Description: INTERVENTIONS:  - Educate patient/family on patient safety including physical limitations  - Instruct patient to call for assistance with activity   - Consult OT/PT to assist with strengthening/mobility   - Keep Call bell within reach  - Keep bed low and locked with side rails adjusted as appropriate  - Keep care items and personal belongings within reach  - Initiate and maintain comfort rounds  - Make Fall Risk Sign visible to staff  - Offer Toileting every  Hours, in advance of need  - Initiate/Maintain alarm  - Obtain necessary fall risk management equipment  - Apply yellow socks and bracelet for high fall risk patients  - Consider moving patient to room near nurses station  Outcome: Progressing     Problem: PAIN - ADULT  Goal: Verbalizes/displays adequate comfort level or baseline comfort level  Description: Interventions:  - Encourage patient to monitor pain and request assistance  - Assess pain using appropriate pain scale  - Administer analgesics based on type and severity of pain and evaluate response  - Implement non-pharmacological measures as appropriate and evaluate response  - Consider cultural and social influences on pain and pain management  - Notify physician/advanced practitioner if interventions unsuccessful or patient reports new pain  Outcome: Progressing     Problem: INFECTION - ADULT  Goal: Absence or prevention of progression during hospitalization  Description: INTERVENTIONS:  - Assess and monitor for signs and symptoms of infection  - Monitor lab/diagnostic results  - Monitor all insertion sites, i e  indwelling lines, tubes, and drains  - Monitor endotracheal if appropriate and nasal secretions for changes in amount and color  - [place] appropriate cooling/warming therapies per order  - Administer medications as ordered  - Instruct and encourage patient and family to use good hand hygiene technique  - Identify and instruct in appropriate isolation precautions for identified infection/condition  Outcome: Progressing  Goal: Absence of fever/infection during neutropenic period  Description: INTERVENTIONS:  - Monitor WBC    Outcome: Progressing     Problem: DISCHARGE PLANNING  Goal: Discharge to home or other facility with appropriate resources  Description: INTERVENTIONS:  - Identify barriers to discharge w/patient and caregiver  - Arrange for needed discharge resources and transportation as appropriate  - Identify discharge learning needs (meds, wound care, etc )  - Arrange for interpretive services to assist at discharge as needed  - Refer to Case Management Department for coordinating discharge planning if the patient needs post-hospital services based on physician/advanced practitioner order or complex needs related to functional status, cognitive ability, or social support system  Outcome: Progressing     Problem: CARDIOVASCULAR - ADULT  Goal: Maintains optimal cardiac output and hemodynamic stability  Description: INTERVENTIONS:  - Monitor I/O, vital signs and rhythm  - Monitor for S/S and trends of decreased cardiac output  - Administer and titrate ordered vasoactive medications to optimize hemodynamic stability  - Assess quality of pulses, skin color and temperature  - Assess for signs of decreased coronary artery perfusion  - Instruct patient to report change in severity of symptoms  Outcome: Progressing     Problem: RESPIRATORY - ADULT  Goal: Achieves optimal ventilation and oxygenation  Description: INTERVENTIONS:  - Assess for changes in respiratory status  - Assess for changes in mentation and behavior  - Position to facilitate oxygenation and minimize respiratory effort  - Oxygen administered by appropriate delivery if ordered  - Initiate smoking cessation education as indicated  - Encourage broncho-pulmonary hygiene including cough, deep breathe, Incentive Spirometry  - Assess the need for suctioning and aspirate as needed  - Assess and instruct to report SOB or any respiratory difficulty  - Respiratory Therapy support as indicated  Outcome: Progressing     Problem: METABOLIC, FLUID AND ELECTROLYTES - ADULT  Goal: Electrolytes maintained within normal limits  Description: INTERVENTIONS:  - Monitor labs and assess patient for signs and symptoms of electrolyte imbalances  - Administer electrolyte replacement as ordered  - Monitor response to electrolyte replacements, including repeat lab results as appropriate  - Instruct patient on fluid and nutrition as appropriate  Outcome: Progressing     Problem: HEMATOLOGIC - ADULT  Goal: Maintains hematologic stability  Description: INTERVENTIONS  - Assess for signs and symptoms of bleeding or hemorrhage  - Monitor labs  - Administer supportive blood products/factors as ordered and appropriate  Outcome: Progressing     Problem: Nutrition/Hydration-ADULT  Goal: Nutrient/Hydration intake appropriate for improving, restoring or maintaining nutritional needs  Description: Monitor and assess patient's nutrition/hydration status for malnutrition  Collaborate with interdisciplinary team and initiate plan and interventions as ordered  Monitor patient's weight and dietary intake as ordered or per policy  Utilize nutrition screening tool and intervene as necessary  Determine patient's food preferences and provide high-protein, high-caloric foods as appropriate       INTERVENTIONS:  - Monitor oral intake, urinary output, labs, and treatment plans  - Assess nutrition and hydration status and recommend course of action  - Evaluate amount of meals eaten  - Assist patient with eating if necessary   - Allow adequate time for meals  - Recommend/ encourage appropriate diets, oral nutritional supplements, and vitamin/mineral supplements  - Order, calculate, and assess calorie counts as needed  - Recommend, monitor, and adjust tube feedings and TPN/PPN based on assessed needs  - Assess need for intravenous fluids  - Provide specific nutrition/hydration education as appropriate  - Include patient/family/caregiver in decisions related to nutrition  Outcome: Progressing

## 2022-01-01 NOTE — PLAN OF CARE
Recommendations: puree/level 1 diet and pudding thick liquids     Recommended Form of Meds: crushed with puree     Aspiration precautions and compensatory swallowing strategies: upright posture, only feed when fully alert, slow rate of feeding and small bites/sips with smaller more frequent meals    Dysphagia Goals: pt will tolerate puree with honey thick liquids without s/s of aspiration x3

## 2022-01-01 NOTE — SPEECH THERAPY NOTE
Speech-Language Pathology Bedside Swallow Evaluation        Patient Name: Mimi Ching    JQAPO'L Date: 1/1/2022     Problem List  Patient Active Problem List   Diagnosis    Excessive salivation    Medicare annual wellness visit, subsequent    Colon cancer screening    Encounter for hepatitis C screening test for low risk patient    Screening, lipid    Abnormal kidney function    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[facility] )    Essential hypertension    Uncontrolled hypertension    Dizziness    History of head and neck radiation    Idiopathic hypotension    Dysuria    Weight loss    Overactive bladder    Benign localized prostatic hyperplasia with lower urinary tract symptoms (LUTS)    Hematuria    Exposure to SARS-associated coronavirus    CKD (chronic kidney disease) stage 3, GFR 30-59 ml/min ([facility])    Fatigue    Screening for lipid disorders    SOB (shortness of breath) on exertion    Screening for prostate cancer    Esophageal dysphagia    COVID-19    Acute renal failure superimposed on chronic kidney disease ([facility] )    Pneumonia due to infectious organism    Leukopenia       Past Medical History  Past Medical History:   Diagnosis Date    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[facility] ) 03/1999    SCCA left tongue base - T2N1M0- treated at [facility] - had surgery and XRT    Hyperlipidemia     Hypertension     Left anterior fascicular block     Stage 3 chronic kidney disease ([facility] )        Past Surgical History  Past Surgical History:   Procedure Laterality Date    CYSTOSCOPY  08/30/2021    EYELID CLOSURE Left     to prevent eyelashes from rubbing cornea    HERNIA REPAIR Left     MODIFIED RADICAL NECK DISSECTION Left 03/03/1999    [facility] - with mandibulotomy and resection SCCA left tongue base - Dr Marcin Johansen is a 78 y o  male who presented to [facility] with worsening SOB  Patient is COVID+   He has been treated for pneumonia with level Floxin, is on day 7 that treatment with steadily worsening symptoms  He also complains of generalized weakness, worsening over the past few days  Per notes he reports a history of esophageal dysphagia and is on a baseline diet of puree  He had EGD done 8/31 of this year and was noted to have a med-large hiatal hernia with Schatzki's rings x2 in upper and lower esophagus s/p dilation  He admits to coughing with liquids  *Evaluation was completed through the window with assistance from RN  *    Past medical history:   Please see H&P for details    Special Studies:  12/31 CXR: Hiatal hernia and chronic parenchymal disease and left lung base which may be representative of scarring  No acute pulmonary disease detected    12/30 CXR: No definite acute cardiopulmonary disease  Chronic left base scarring    Swallow Information   Current Risks for Dysphagia & Aspiration: known history of dysphagia and change in respiratory status     Current Symptoms/Concerns: change in respiratory status    Current Diet: puree/level 1 diet      Baseline Diet: puree/level 1 diet and thin liquids      Baseline Assessment   Behavior/Cognition: alert    Speech/Language Status: able to participate in conversation and able to follow commands    Patient Positioning: upright in bed      Swallow Mechanism Exam   Functional appearing- unable to fully assess  Consistencies Assessed and Performance   Consistencies Administered: thin liquids, nectar thick, honey thick and puree  Specific materials administered included: applesauce, ice cream, thin liquids, nectar thick liquids, honey thick liquids    Oral Stage:   Bolus formation and transfer were slow but functional appearing with no significant oral residue noted  No overt s/s reduced oral control  Pharyngeal Stage:   Swallowing initiation appeared prompt  HLE appeared functional  Delayed coughing was noted across all liquids and with ice cream (regurgitation?)   No coughing observed with puree  Esophageal Concerns: globus sensation, h/o schatzis' ring s/p dilation    Summary   Assessment was limited however s/s suggestive of minimal oral and suspected moderate-severe pharyngeal vs pharyngoesophageal dysphagia  Aspiration risk appears increased across all liquids- cannot r/o retrograde aspiration  He would benefit from additional objective imaging however given his COVID status this is not possible at this time      Recommendations: puree/level 1 diet and pudding thick liquids     Recommended Form of Meds: crushed with puree     Aspiration precautions and compensatory swallowing strategies: upright posture, only feed when fully alert, slow rate of feeding and small bites/sips with smaller more frequent meals    Results Reviewed with: RN and MD     Dysphagia Goals: pt will tolerate puree with honey thick liquids without s/s of aspiration x3    Plan  Will f/u     EULALIA Arshad , CCC-SLP  Speech Language Pathologist   Available via [address] [place] [address] #[id number]  [place] #[id number]

## 2022-01-02 LAB
ANION GAP SERPL CALCULATED.3IONS-SCNC: 14 MMOL/L (ref 4–13)
BASOPHILS # BLD AUTO: 0.01 THOUSANDS/ΜL (ref 0–0.1)
BASOPHILS NFR BLD AUTO: 1 % (ref 0–1)
BUN SERPL-MCNC: 32 MG/DL (ref 5–25)
CALCIUM SERPL-MCNC: 9.6 MG/DL (ref 8.3–10.1)
CHLORIDE SERPL-SCNC: 104 MMOL/L (ref 100–108)
CO2 SERPL-SCNC: 23 MMOL/L (ref 21–32)
CREAT SERPL-MCNC: 1.59 MG/DL (ref 0.6–1.3)
CRP SERPL QL: 88.8 MG/L
D DIMER PPP FEU-MCNC: 0.92 UG/ML FEU
EOSINOPHIL # BLD AUTO: 0 THOUSAND/ΜL (ref 0–0.61)
EOSINOPHIL NFR BLD AUTO: 0 % (ref 0–6)
ERYTHROCYTE [DISTWIDTH] IN BLOOD BY AUTOMATED COUNT: 13.7 % (ref 11.6–15.1)
GFR SERPL CREATININE-BSD FRML MDRD: 42 ML/MIN/1.73SQ M
GLUCOSE SERPL-MCNC: 107 MG/DL (ref 65–140)
HCT VFR BLD AUTO: 49.1 % (ref 36.5–49.3)
HGB BLD-MCNC: 15.6 G/DL (ref 12–17)
IMM GRANULOCYTES # BLD AUTO: 0.01 THOUSAND/UL (ref 0–0.2)
IMM GRANULOCYTES NFR BLD AUTO: 1 % (ref 0–2)
LYMPHOCYTES # BLD AUTO: 0.51 THOUSANDS/ΜL (ref 0.6–4.47)
LYMPHOCYTES NFR BLD AUTO: 31 % (ref 14–44)
MCH RBC QN AUTO: 26.9 PG (ref 26.8–34.3)
MCHC RBC AUTO-ENTMCNC: 31.8 G/DL (ref 31.4–37.4)
MCV RBC AUTO: 85 FL (ref 82–98)
MONOCYTES # BLD AUTO: 0.22 THOUSAND/ΜL (ref 0.17–1.22)
MONOCYTES NFR BLD AUTO: 13 % (ref 4–12)
NEUTROPHILS # BLD AUTO: 0.91 THOUSANDS/ΜL (ref 1.85–7.62)
NEUTS SEG NFR BLD AUTO: 54 % (ref 43–75)
NRBC BLD AUTO-RTO: 0 /100 WBCS
PLATELET # BLD AUTO: 292 THOUSANDS/UL (ref 149–390)
PMV BLD AUTO: 8.8 FL (ref 8.9–12.7)
POTASSIUM SERPL-SCNC: 4.7 MMOL/L (ref 3.5–5.3)
PROCALCITONIN SERPL-MCNC: 0.16 NG/ML
RBC # BLD AUTO: 5.8 MILLION/UL (ref 3.88–5.62)
SODIUM SERPL-SCNC: 141 MMOL/L (ref 136–145)
WBC # BLD AUTO: 1.66 THOUSAND/UL (ref 4.31–10.16)

## 2022-01-02 PROCEDURE — 80048 BASIC METABOLIC PNL TOTAL CA: CPT

## 2022-01-02 PROCEDURE — 85379 FIBRIN DEGRADATION QUANT: CPT | Performed by: INTERNAL MEDICINE

## 2022-01-02 PROCEDURE — 84145 PROCALCITONIN (PCT): CPT | Performed by: STUDENT IN AN ORGANIZED HEALTH CARE EDUCATION/TRAINING PROGRAM

## 2022-01-02 PROCEDURE — 99232 SBSQ HOSP IP/OBS MODERATE 35: CPT | Performed by: STUDENT IN AN ORGANIZED HEALTH CARE EDUCATION/TRAINING PROGRAM

## 2022-01-02 PROCEDURE — 85025 COMPLETE CBC W/AUTO DIFF WBC: CPT | Performed by: INTERNAL MEDICINE

## 2022-01-02 PROCEDURE — 86140 C-REACTIVE PROTEIN: CPT | Performed by: INTERNAL MEDICINE

## 2022-01-02 RX ADMIN — PRAVASTATIN SODIUM 20 MG: 20 TABLET ORAL at 08:47

## 2022-01-02 RX ADMIN — DEXAMETHASONE SODIUM PHOSPHATE 6 MG: 4 INJECTION, SOLUTION INTRA-ARTICULAR; INTRALESIONAL; INTRAMUSCULAR; INTRAVENOUS; SOFT TISSUE at 12:45

## 2022-01-02 RX ADMIN — SODIUM CHLORIDE 3 ML: 9 INJECTION INTRAMUSCULAR; INTRAVENOUS; SUBCUTANEOUS at 21:25

## 2022-01-02 RX ADMIN — SODIUM CHLORIDE 3 ML: 9 INJECTION INTRAMUSCULAR; INTRAVENOUS; SUBCUTANEOUS at 08:47

## 2022-01-02 RX ADMIN — ASPIRIN 81 MG: 81 TABLET, COATED ORAL at 08:47

## 2022-01-02 RX ADMIN — REMDESIVIR 100 MG: 100 INJECTION, POWDER, LYOPHILIZED, FOR SOLUTION INTRAVENOUS at 14:30

## 2022-01-02 NOTE — ASSESSMENT & PLAN NOTE
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

## 2022-01-02 NOTE — CASE MANAGEMENT
Case Management Assessment & Discharge Planning Note    Patient name Gabbi Tucker  Location /-03 MRN [id number]  : 1945 Date 2022       Current Admission Date: 2021  Current Admission Diagnosis:Pneumonia due to infectious organism   Patient Active Problem List    Diagnosis Date Noted    Leukopenia 2022    COVID-19 with hypoxic respiratory failure 2021    Acute renal failure superimposed on chronic kidney disease ([facility] ) 2021    Pneumonia due to infectious organism 2021    Esophageal dysphagia 2021    Screening for prostate cancer 2021    SOB (shortness of breath) on exertion 2020    Exposure to SARS-associated coronavirus 2020    CKD (chronic kidney disease) stage 3, GFR 30-59 ml/min ([facility] ) 2020    Fatigue 2020    Screening for lipid disorders 2020    Benign localized prostatic hyperplasia with lower urinary tract symptoms (LUTS) 10/15/2019    Hematuria 10/15/2019    Overactive bladder 10/09/2019    Idiopathic hypotension 2019    Dysuria 2019    Weight loss 2019    Uncontrolled hypertension 2019    Dizziness 2019    History of head and neck radiation 2019    Essential hypertension 2019    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[facility] ) 2019    Excessive salivation 01/10/2019    Medicare annual wellness visit, subsequent 01/10/2019    Colon cancer screening 01/10/2019    Encounter for hepatitis C screening test for low risk patient 01/10/2019    Screening, lipid 01/10/2019    Abnormal kidney function 01/10/2019      LOS (days): 1  Geometric Mean LOS (GMLOS) (days):   Days to GMLOS:     OBJECTIVE:    Risk of Unplanned Readmission Score: 14         Current admission status: Inpatient       Preferred Pharmacy:   [id number] [place] - [address]  Phone: [phone] Fax: [phone] [id number] - [place], [address]  [facility] [address]  [address]   [address] [address]  Phone: [phone] Fax: [phone]    Primary Care Provider: Oswaldo Griffin MD    Primary Insurance: MEDICARE  Secondary Insurance: [employer]    ASSESSMENT:  [facility], [facility] [address]   Primary Phone: [phone] (Home)               Advance Directives  Does patient have Advance Directives?: Yes  Advance Directives: Living will,Power of  for health care,Power of  for finance  Primary Contact: wife         Readmission Root Cause  30 Day Readmission: No    Patient Information  Admitted from[de-identified] Home  Mental Status: Alert  During Assessment patient was accompanied by: Not accompanied during assessment  Assessment information provided by[de-identified] Patient  Primary Caregiver: Self  Support Systems: Spouse/significant other  [place] of Residence: [place] [address] do you live in?: effort  Home entry access options   Select all that apply : Stairs  Number of steps to enter home : 4  Do the steps have railings?: Yes  Type of Current Residence: Bi-level  Upon entering residence, is there a bedroom on the main floor (no further steps)?: Yes  Upon entering residence, is there a bathroom on the main floor (no further steps)?: Yes  In the last 12 months, was there a time when you were not able to pay the mortgage or rent on time?: No  In the last 12 months, how many places have you lived?: 1  In the last 12 months, was there a time when you did not have a steady place to sleep or slept in a shelter (including now)?: No  Homeless/housing insecurity resource given?: N/A  Living Arrangements: Lives w/ Spouse/significant other  Is patient a ?: No    Activities of Daily Living Prior to Admission  Functional Status: Independent  Completes ADLs independently?: Yes  Ambulates independently?: Yes  Does patient use assisted devices?: No  Does patient currently own DME?: No  Does patient have a history of Outpatient Therapy (PT/OT)?: Yes  Does the patient have a history of Short-Term Rehab?: No  Does patient have a history of HHC?: No  Does patient currently have [facility]?: No         Patient Information Continued  Income Source: Unemployed  Does patient have prescription coverage?: Yes (he gets $1066 dollars a year from his union to pay for his meds)  Within the past 12 months, you worried that your food would run out before you got the money to buy more : Never true  Within the past 12 months, the food you bought just didnt last and you didnt have money to get more : Never true  Food insecurity resource given?: N/A  Does patient receive dialysis treatments?: No  Does patient have a history of substance abuse?: No  Does patient have a history of Mental Health Diagnosis?: No         Means of Transportation  Means of Transport to Appts[de-identified] Drives Self  In the past 12 months, has lack of transportation kept you from medical appointments or from getting medications?: No  Was application for public transport provided?: N/A        DISCHARGE DETAILS:    Discharge planning discussed with[de-identified] patient  Freedom of Choice: Yes  Comments - Freedom of Choice: Pt choses to speak to his wife about his care, says he speaks to her everyday  CM contacted family/caregiver?: No- see comments  Were Treatment Team discharge recommendations reviewed with patient/caregiver?: Yes  Did patient/caregiver verbalize understanding of patient care needs?: Yes  Were patient/caregiver advised of the risks associated with not following Treatment Team discharge recommendations?: Yes         Requested [address]         Is the patient interested in [facility] at discharge?: No    DME Referral Provided  Referral made for DME?: No

## 2022-01-02 NOTE — ASSESSMENT & PLAN NOTE
Lab Results   Component Value Date    EGFR 42 01/02/2022    EGFR 42 01/01/2022    EGFR 29 12/31/2021    CREATININE 1 59 (H) 01/02/2022    CREATININE 1 59 (H) 01/01/2022    CREATININE 2 12 (H) 12/31/2021   · Presented with a creatinine 2 12  · Baseline is 1 6  Current creatinine approximates presumed baseline at 1 51 today  · Endorse a history of poor po intake secondary to dysphagia  · Will continue to monitor with a david OLVERA

## 2022-01-02 NOTE — PLAN OF CARE
Problem: MOBILITY - ADULT  Goal: Maintain or return to baseline ADL function  Description: INTERVENTIONS:  -  Assess patient's ability to carry out ADLs; assess patient's baseline for ADL function and identify physical deficits which impact ability to perform ADLs (bathing, care of mouth/teeth, toileting, grooming, dressing, etc )  - Assess/evaluate cause of self-care deficits   - Assess range of motion  - Assess patient's mobility; develop plan if impaired  - Assess patient's need for assistive devices and provide as appropriate  - Encourage maximum independence but intervene and supervise when necessary  - Involve family in performance of ADLs  - Assess for home care needs following discharge   - Consider OT consult to assist with ADL evaluation and planning for discharge  - Provide patient education as appropriate  Outcome: Progressing  Goal: Maintains/Returns to pre admission functional level  Description: INTERVENTIONS:  - Perform BMAT or MOVE assessment daily    - Set and communicate daily mobility goal to care team and patient/family/caregiver  - Collaborate with rehabilitation services on mobility goals if consulted  - Perform Range of Motion  times a day  - Reposition patient every  hours    - Dangle patient  times a day  - Stand patient  times a day  - Ambulate patient  times a day  - Out of bed to chair  times a day   - Out of bed for meals  times a day  - Out of bed for toileting  - Record patient progress and toleration of activity level   Outcome: Progressing     Problem: Potential for Falls  Goal: Patient will remain free of falls  Description: INTERVENTIONS:  - Educate patient/family on patient safety including physical limitations  - Instruct patient to call for assistance with activity   - Consult OT/PT to assist with strengthening/mobility   - Keep Call bell within reach  - Keep bed low and locked with side rails adjusted as appropriate  - Keep care items and personal belongings within reach  - Initiate and maintain comfort rounds  - Make Fall Risk Sign visible to staff  - Offer Toileting every  Hours, in advance of need  - Initiate/Maintain alarm  - Obtain necessary fall risk management equipment:   - Apply yellow socks and bracelet for high fall risk patients  - Consider moving patient to room near nurses station  Outcome: Progressing     Problem: PAIN - ADULT  Goal: Verbalizes/displays adequate comfort level or baseline comfort level  Description: Interventions:  - Encourage patient to monitor pain and request assistance  - Assess pain using appropriate pain scale  - Administer analgesics based on type and severity of pain and evaluate response  - Implement non-pharmacological measures as appropriate and evaluate response  - Consider cultural and social influences on pain and pain management  - Notify physician/advanced practitioner if interventions unsuccessful or patient reports new pain  Outcome: Progressing     Problem: INFECTION - ADULT  Goal: Absence or prevention of progression during hospitalization  Description: INTERVENTIONS:  - Assess and monitor for signs and symptoms of infection  - Monitor lab/diagnostic results  - Monitor all insertion sites, i e  indwelling lines, tubes, and drains  - Monitor endotracheal if appropriate and nasal secretions for changes in amount and color  - [place] appropriate cooling/warming therapies per order  - Administer medications as ordered  - Instruct and encourage patient and family to use good hand hygiene technique  - Identify and instruct in appropriate isolation precautions for identified infection/condition  Outcome: Progressing  Goal: Absence of fever/infection during neutropenic period  Description: INTERVENTIONS:  - Monitor WBC    Outcome: Progressing     Problem: DISCHARGE PLANNING  Goal: Discharge to home or other facility with appropriate resources  Description: INTERVENTIONS:  - Identify barriers to discharge w/patient and caregiver  - Arrange for needed discharge resources and transportation as appropriate  - Identify discharge learning needs (meds, wound care, etc )  - Arrange for interpretive services to assist at discharge as needed  - Refer to Case Management Department for coordinating discharge planning if the patient needs post-hospital services based on physician/advanced practitioner order or complex needs related to functional status, cognitive ability, or social support system  Outcome: Progressing     Problem: CARDIOVASCULAR - ADULT  Goal: Maintains optimal cardiac output and hemodynamic stability  Description: INTERVENTIONS:  - Monitor I/O, vital signs and rhythm  - Monitor for S/S and trends of decreased cardiac output  - Administer and titrate ordered vasoactive medications to optimize hemodynamic stability  - Assess quality of pulses, skin color and temperature  - Assess for signs of decreased coronary artery perfusion  - Instruct patient to report change in severity of symptoms  Outcome: Progressing     Problem: RESPIRATORY - ADULT  Goal: Achieves optimal ventilation and oxygenation  Description: INTERVENTIONS:  - Assess for changes in respiratory status  - Assess for changes in mentation and behavior  - Position to facilitate oxygenation and minimize respiratory effort  - Oxygen administered by appropriate delivery if ordered  - Initiate smoking cessation education as indicated  - Encourage broncho-pulmonary hygiene including cough, deep breathe, Incentive Spirometry  - Assess the need for suctioning and aspirate as needed  - Assess and instruct to report SOB or any respiratory difficulty  - Respiratory Therapy support as indicated  Outcome: Progressing     Problem: METABOLIC, FLUID AND ELECTROLYTES - ADULT  Goal: Electrolytes maintained within normal limits  Description: INTERVENTIONS:  - Monitor labs and assess patient for signs and symptoms of electrolyte imbalances  - Administer electrolyte replacement as ordered  - Monitor response to electrolyte replacements, including repeat lab results as appropriate  - Instruct patient on fluid and nutrition as appropriate  Outcome: Progressing     Problem: HEMATOLOGIC - ADULT  Goal: Maintains hematologic stability  Description: INTERVENTIONS  - Assess for signs and symptoms of bleeding or hemorrhage  - Monitor labs  - Administer supportive blood products/factors as ordered and appropriate  Outcome: Progressing     Problem: Nutrition/Hydration-ADULT  Goal: Nutrient/Hydration intake appropriate for improving, restoring or maintaining nutritional needs  Description: Monitor and assess patient's nutrition/hydration status for malnutrition  Collaborate with interdisciplinary team and initiate plan and interventions as ordered  Monitor patient's weight and dietary intake as ordered or per policy  Utilize nutrition screening tool and intervene as necessary  Determine patient's food preferences and provide high-protein, high-caloric foods as appropriate       INTERVENTIONS:  - Monitor oral intake, urinary output, labs, and treatment plans  - Assess nutrition and hydration status and recommend course of action  - Evaluate amount of meals eaten  - Assist patient with eating if necessary   - Allow adequate time for meals  - Recommend/ encourage appropriate diets, oral nutritional supplements, and vitamin/mineral supplements  - Order, calculate, and assess calorie counts as needed  - Recommend, monitor, and adjust tube feedings and TPN/PPN based on assessed needs  - Assess need for intravenous fluids  - Provide specific nutrition/hydration education as appropriate  - Include patient/family/caregiver in decisions related to nutrition  Outcome: Progressing

## 2022-01-02 NOTE — ASSESSMENT & PLAN NOTE
· Patient was exposed to COVID-19 on 12/25/2021  Entire family current has COVID  Patient did receive 1st 2 doses of Pfizer vaccine  · Patient tested positive on 12/30 for COVID-19  · Chest x-ray showed no evidence of COVID-19 pneumonia  · Currently saturating in the low 90's and remains on 2L/min of supplementation via NC  · Continue Dexamethasone 6 mg qd and remdesivir (Day 3)  · Encourage incentive spirometry use

## 2022-01-02 NOTE — PROGRESS NOTES
INTERNAL MEDICINE RESIDENCY PROGRESS NOTE     Name: Alex Conti   Age & Sex: 78 y o  male   MRN: [id number]  Unit/Bed#: -01   Encounter: [id number]    PATIENT INFORMATION     Name: Alex Conti   Age & Sex: 78 y o  male   MRN: [id number]  Hospital Stay Days: 1    ASSESSMENT/PLAN     Principal Problem:    Pneumonia due to infectious organism  Active Problems:    COVID-19 with hypoxic respiratory failure    Essential hypertension    Esophageal dysphagia    Acute renal failure superimposed on chronic kidney disease (HCC)    Leukopenia      * Pneumonia due to infectious organism  Assessment & Plan  · Patient patient was seen in urgent care on 12/18/2021  Diagnosed with bacterial pneumonia tested negative for COVID  He was started on Levaquin 500 mg p o  Patient states that he did finish the course of the Levaquin but that he is still having symptoms of pneumonia including weakness, fevers, chills, and a productive cough with purulent sputum  · Chest x-ray showed no consolidation or ground-glass appearance  · Lactic acid normal  · Procalcitonin x 1 elevated to 0 31; Follow-up 2nd procalcitonin --> 0 22  · Follow-up sputum cultures  · BCx negative 24h  · White count further decreased to 1 66 today from 1 85 yesterday  · Ceftriaxone can cause leukopenia  Procal trending down and low suspicion for bacterial pneumonia at this point  Will discontinue ceftriaxone and monitor off AB  COVID-19 with hypoxic respiratory failure  Assessment & Plan  · Patient was exposed to COVID-19 on 12/25/2021  Entire family current has COVID  Patient did receive 1st 2 doses of Pfizer vaccine  · Patient tested positive on 12/30 for COVID-19  · Chest x-ray showed no evidence of COVID-19 pneumonia  · Current oxygen saturation 97% initially  Now requiring 2L/min of supplementation via NC  · Dexamethasone 6 mg qd and remdesivir started  · Encourage incentive spirometry use      1/2: Currently weaned to 1L O2 NC  Continue management for COVID19 as above  Leukopenia  Assessment & Plan  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 years), 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?)  With baseline WBC count of around 5-6  Presented with a WBC count of 3 07, with further drop to 1 85 > 1 66 on today's assessment  Relative lymphopenia  Acuity of drop may be secondary to acute infection with CAP and COVID, compounded by dilution in the setting of IV fluid maintenance  - Avoid marrow- suppressing agents: Dexamethasone started given current oxygen requirements in the setting of COVID 19 infection so will continue to monitor   - Will continue to monitor WBC count  1/2: dropped further to 1 66 today  Mild neutropenia with ANC of 910  Ceftriaxone discontinued  Continue to monitor  Acute renal failure superimposed on chronic kidney disease [facility])  Assessment & Plan  Lab Results   Component Value Date    EGFR 42 01/02/2022    EGFR 42 01/01/2022    EGFR 29 12/31/2021    CREATININE 1 59 (H) 01/02/2022    CREATININE 1 59 (H) 01/01/2022    CREATININE 2 12 (H) 12/31/2021   · Presented with a creatinine 2 12  · Baseline is 1 6  Current creatinine approximates presumed baseline at 1 59 today  · Endorse a history of poor po intake secondary to dysphagia  · Will continue to monitor with a m  CMP     1/2: creatinine remains stable at 1 59 today    Esophageal dysphagia  Assessment & Plan  · Patient reports esophageal dysphagia  This is due to a past diagnosis of cancer of the tongue  Also has evidence of Schatzki ring on recent endoscopy  Patient did say that he needs pureed diet  t   · Evaluated by speech therapy -->   · Dysphagia 1- pureed diet with pudding thick liquids  · Would benefit from additional objective imaging once no longer infective from COVID standpoint       1/2: Spoke to patient's wife over the phone who is very concerned about the prescribed dysphagia diet for her   She states that she has been feeding him for years and that he can not eat pureed food  She wants to bring over food items that he can eat at home s a  vanilla ice cream, peanut butter and jelly and ensure  I explained to her that our speech pathologist has recommended pureed diet with pudding thick liquid to prevent aspiration  She would like to speak to speech pathology about her concerns but understands that they are not available on   We will reach out to speech pathology tomorrow for re-evaluation  In the mean time I placed consult for nutritionist and ordered ensure TID  Essential hypertension  Assessment & Plan  · Patient is not on any home medications for hypertension  On presentation hypotensive which improved with fluid resuscitation  · Most recent blood pressure improved 106/64  MAP of 64  Now off IVF  · Will continue to monitor blood pressures with regular vitals checks  Disposition: Continue inpatient management for acute hypoxic respiratory failure in setting of COVID 19 infection    SUBJECTIVE     Patient seen and examined  No acute events overnight  Reports that his SOB has improved  Still has cough but mostly related to food intake  Occasionally coughs up small amount of clear phlegm  Denies fever/chills, CP, dizziness, abdominal discomfort, N/V/D  OBJECTIVE     Vitals:    22 2300 22 0532 22 0729 22 1525   BP:   106/64 100/61   Pulse:   65 56   Resp:   18    Temp:   (!) 97 3 °F (36 3 °C) 97 5 °F (36 4 °C)   TempSrc:       SpO2: 94%  92% 94%   Weight:  67 8 kg (149 lb 7 6 oz)     Height:          Temperature:   Temp (24hrs), Av 3 °F (36 3 °C), Min:97 2 °F (36 2 °C), Max:97 5 °F (36 4 °C)    Temperature: 97 5 °F (36 4 °C)  Intake & Output:  I/O        0701   0700  07 07 07 0700    P  O  240      I V  (mL/kg) 1000      Total Intake(mL/kg) 1240      Urine (mL/kg/hr)  150 (0 1)     Total Output  150     Net +1240 -150                Weights:   IBW (Ideal Body Weight): 70 7 kg    Body mass index is 22 07 kg/m²  Weight (last 2 days)     Date/Time Weight    01/02/22 0532 67 8 (149 47)    01/01/22 1934 67 6 (149)    01/01/22 1439 --     Comments:   Weight: 67 6 kg 12/30/21 at 01/01/22 1439       Physical Exam  Vitals reviewed  Constitutional:       General: He is not in acute distress  Appearance: Normal appearance  He is ill-appearing  He is not toxic-appearing or diaphoretic  HENT:      Mouth/Throat:      Mouth: Mucous membranes are dry  Pharynx: Oropharynx is clear  Eyes:      General: No scleral icterus  Conjunctiva/sclera: Conjunctivae normal    Cardiovascular:      Rate and Rhythm: Normal rate and regular rhythm  Heart sounds: Normal heart sounds  Pulmonary:      Breath sounds: Normal breath sounds  No wheezing, rhonchi or rales  Abdominal:      General: Bowel sounds are normal       Palpations: Abdomen is soft  Tenderness: There is no abdominal tenderness  Musculoskeletal:      Cervical back: Normal range of motion and neck supple  Right lower leg: No edema  Left lower leg: No edema  Skin:     General: Skin is warm and dry  Neurological:      General: No focal deficit present  Mental Status: He is alert and oriented to person, place, and time  Psychiatric:         Mood and Affect: Mood normal          Behavior: Behavior normal        LABORATORY DATA     Labs: I have personally reviewed pertinent reports    Results from last 7 days   Lab Units 01/02/22  0528 01/01/22  0518 12/31/21  2134 12/31/21  1350 12/31/21  1350   WBC Thousand/uL 1 66* 1 85*  --   --  3 07*   HEMOGLOBIN g/dL 15 6 13 3  --   --  14 1   HEMATOCRIT % 49 1 40 9  --   --  43 6   PLATELETS Thousands/uL 292 265 265   < > 291   NEUTROS PCT % 54  --   --   --  83*   MONOS PCT % 13*  --   --   --  5   MONO PCT %  --  2*  --   --   --     < > = values in this interval not displayed  Results from last 7 days   Lab Units 01/02/22  0528 01/01/22  0518 12/31/21  1350   POTASSIUM mmol/L 4 7 4 0 4 1   CHLORIDE mmol/L 104 104 103   CO2 mmol/L 23 24 27   BUN mg/dL 32* 35* 43*   CREATININE mg/dL 1 59* 1 59* 2 12*   CALCIUM mg/dL 9 6 8 4 8 6   ALK PHOS U/L  --  48 53   ALT U/L  --  11* 14   AST U/L  --  24 20              Results from last 7 days   Lab Units 12/31/21  1350   INR  1 06   PTT seconds 35     Results from last 7 days   Lab Units 12/31/21  1350   LACTIC ACID mmol/L 1 1           IMAGING & DIAGNOSTIC TESTING     Radiology Results: I have personally reviewed pertinent reports  XR chest 2 views    Result Date: 12/31/2021  Impression: Hiatal hernia and chronic parenchymal disease and left lung base which may be representative of scarring  No acute pulmonary disease detected Workstation performed: [id number]     Other Diagnostic Testing: I have personally reviewed pertinent reports  ACTIVE MEDICATIONS     Current Facility-Administered Medications   Medication Dose Route Frequency    albuterol (PROVENTIL HFA,VENTOLIN HFA) inhaler 2 puff  2 puff Inhalation Q6H PRN    aspirin (ECOTRIN LOW STRENGTH) EC tablet 81 mg  81 mg Oral Daily    dexamethasone (DECADRON) injection 6 mg  6 mg Intravenous Q24H    heparin (porcine) subcutaneous injection 5,000 Units  5,000 Units Subcutaneous Q8H [facility]    ondansetron (ZOFRAN) injection 4 mg  4 mg Intravenous Q6H PRN    pravastatin (PRAVACHOL) tablet 20 mg  20 mg Oral Every Other Day    remdesivir (Veklury) 100 mg in sodium chloride 0 9 % 270 mL IVPB  100 mg Intravenous Q24H    sodium chloride (PF) 0 9 % injection 3 mL  3 mL Intravenous Q12H [facility]       VTE Pharmacologic Prophylaxis: Heparin  VTE Mechanical Prophylaxis: sequential compression device    Portions of the record may have been created with voice recognition software    Occasional wrong word or "sound a like" substitutions may have occurred due to the inherent limitations of voice recognition software    Read the chart carefully and recognize, using context, where substitutions have occurred   ==  Fina Velasco MD  [address]  Internal Medicine Residency PGY-3

## 2022-01-02 NOTE — NURSING NOTE
Pt refuses to eat diet ordered for him and states he does not take thickened fluids  Pt requested food be brought from home family brought thin liquids and food from home Pt has same at bedside  Pt educated on the need for a modified diet and the dangers of aspiration    Ferny Regalado RN  6:48 PM  01/02/22

## 2022-01-02 NOTE — ASSESSMENT & PLAN NOTE
· Patient reports esophageal dysphagia  This is due to a past diagnosis of cancer of the tongue  Also has evidence of Schatzki ring on recent endoscopy  Patient did say that he needs pureed diet  t   · Evaluated by speech therapy -->   · Dysphagia 1- pureed diet with pudding thick liquids  Wife has challenged dietary recommendations  Underwent re-evaluation today, with recommendations made to consider adjusting to pureed diet plus thin liquids  · Would benefit from additional objective imaging once no longer infective from COVID standpoint

## 2022-01-02 NOTE — PLAN OF CARE
Problem: MOBILITY - ADULT  Goal: Maintain or return to baseline ADL function  Description: INTERVENTIONS:  -  Assess patient's ability to carry out ADLs; assess patient's baseline for ADL function and identify physical deficits which impact ability to perform ADLs (bathing, care of mouth/teeth, toileting, grooming, dressing, etc )  - Assess/evaluate cause of self-care deficits   - Assess range of motion  - Assess patient's mobility; develop plan if impaired  - Assess patient's need for assistive devices and provide as appropriate  - Encourage maximum independence but intervene and supervise when necessary  - Involve family in performance of ADLs  - Assess for home care needs following discharge   - Consider OT consult to assist with ADL evaluation and planning for discharge  - Provide patient education as appropriate  Outcome: Progressing  Goal: Maintains/Returns to pre admission functional level  Description: INTERVENTIONS:  - Perform BMAT or MOVE assessment daily    - Set and communicate daily mobility goal to care team and patient/family/caregiver  - Collaborate with rehabilitation services on mobility goals if consulted  - Perform Range of Motion  times a day  - Reposition patient every  hours    - Dangle patient  times a day  - Stand patient  times a day  - Ambulate patient  times a day  - Out of bed to chair  times a day   - Out of bed for meals  times a day  - Out of bed for toileting  - Record patient progress and toleration of activity level   Outcome: Progressing     Problem: Potential for Falls  Goal: Patient will remain free of falls  Description: INTERVENTIONS:  - Educate patient/family on patient safety including physical limitations  - Instruct patient to call for assistance with activity   - Consult OT/PT to assist with strengthening/mobility   - Keep Call bell within reach  - Keep bed low and locked with side rails adjusted as appropriate  - Keep care items and personal belongings within reach  - Initiate and maintain comfort rounds  - Make Fall Risk Sign visible to staff  - Offer Toileting every  Hours, in advance of need  - Initiate/Maintain alarm  - Obtain necessary fall risk management equipmen  - Apply yellow socks and bracelet for high fall risk patients  - Consider moving patient to room near nurses station  Outcome: Progressing     Problem: PAIN - ADULT  Goal: Verbalizes/displays adequate comfort level or baseline comfort level  Description: Interventions:  - Encourage patient to monitor pain and request assistance  - Assess pain using appropriate pain scale  - Administer analgesics based on type and severity of pain and evaluate response  - Implement non-pharmacological measures as appropriate and evaluate response  - Consider cultural and social influences on pain and pain management  - Notify physician/advanced practitioner if interventions unsuccessful or patient reports new pain  Outcome: Progressing     Problem: INFECTION - ADULT  Goal: Absence or prevention of progression during hospitalization  Description: INTERVENTIONS:  - Assess and monitor for signs and symptoms of infection  - Monitor lab/diagnostic results  - Monitor all insertion sites, i e  indwelling lines, tubes, and drains  - Monitor endotracheal if appropriate and nasal secretions for changes in amount and color  - [place] appropriate cooling/warming therapies per order  - Administer medications as ordered  - Instruct and encourage patient and family to use good hand hygiene technique  - Identify and instruct in appropriate isolation precautions for identified infection/condition  Outcome: Progressing  Goal: Absence of fever/infection during neutropenic period  Description: INTERVENTIONS:  - Monitor WBC    Outcome: Progressing     Problem: DISCHARGE PLANNING  Goal: Discharge to home or other facility with appropriate resources  Description: INTERVENTIONS:  - Identify barriers to discharge w/patient and caregiver  - Arrange for needed discharge resources and transportation as appropriate  - Identify discharge learning needs (meds, wound care, etc )  - Arrange for interpretive services to assist at discharge as needed  - Refer to Case Management Department for coordinating discharge planning if the patient needs post-hospital services based on physician/advanced practitioner order or complex needs related to functional status, cognitive ability, or social support system  Outcome: Progressing     Problem: CARDIOVASCULAR - ADULT  Goal: Maintains optimal cardiac output and hemodynamic stability  Description: INTERVENTIONS:  - Monitor I/O, vital signs and rhythm  - Monitor for S/S and trends of decreased cardiac output  - Administer and titrate ordered vasoactive medications to optimize hemodynamic stability  - Assess quality of pulses, skin color and temperature  - Assess for signs of decreased coronary artery perfusion  - Instruct patient to report change in severity of symptoms  Outcome: Progressing     Problem: RESPIRATORY - ADULT  Goal: Achieves optimal ventilation and oxygenation  Description: INTERVENTIONS:  - Assess for changes in respiratory status  - Assess for changes in mentation and behavior  - Position to facilitate oxygenation and minimize respiratory effort  - Oxygen administered by appropriate delivery if ordered  - Initiate smoking cessation education as indicated  - Encourage broncho-pulmonary hygiene including cough, deep breathe, Incentive Spirometry  - Assess the need for suctioning and aspirate as needed  - Assess and instruct to report SOB or any respiratory difficulty  - Respiratory Therapy support as indicated  Outcome: Progressing     Problem: METABOLIC, FLUID AND ELECTROLYTES - ADULT  Goal: Electrolytes maintained within normal limits  Description: INTERVENTIONS:  - Monitor labs and assess patient for signs and symptoms of electrolyte imbalances  - Administer electrolyte replacement as ordered  - Monitor response to electrolyte replacements, including repeat lab results as appropriate  - Instruct patient on fluid and nutrition as appropriate  Outcome: Progressing     Problem: HEMATOLOGIC - ADULT  Goal: Maintains hematologic stability  Description: INTERVENTIONS  - Assess for signs and symptoms of bleeding or hemorrhage  - Monitor labs  - Administer supportive blood products/factors as ordered and appropriate  Outcome: Progressing     Problem: Nutrition/Hydration-ADULT  Goal: Nutrient/Hydration intake appropriate for improving, restoring or maintaining nutritional needs  Description: Monitor and assess patient's nutrition/hydration status for malnutrition  Collaborate with interdisciplinary team and initiate plan and interventions as ordered  Monitor patient's weight and dietary intake as ordered or per policy  Utilize nutrition screening tool and intervene as necessary  Determine patient's food preferences and provide high-protein, high-caloric foods as appropriate       INTERVENTIONS:  - Monitor oral intake, urinary output, labs, and treatment plans  - Assess nutrition and hydration status and recommend course of action  - Evaluate amount of meals eaten  - Assist patient with eating if necessary   - Allow adequate time for meals  - Recommend/ encourage appropriate diets, oral nutritional supplements, and vitamin/mineral supplements  - Order, calculate, and assess calorie counts as needed  - Recommend, monitor, and adjust tube feedings and TPN/PPN based on assessed needs  - Assess need for intravenous fluids  - Provide specific nutrition/hydration education as appropriate  - Include patient/family/caregiver in decisions related to nutrition  Outcome: Progressing

## 2022-01-02 NOTE — ASSESSMENT & PLAN NOTE
· Patient patient was seen in urgent care on 12/18/2021  Diagnosed with bacterial pneumonia tested negative for COVID  He was started on Levaquin 500 mg p o  Patient states that he did finish the course of the Levaquin but that he is still having symptoms of pneumonia including weakness, fevers, chills, and a productive cough with purulent sputum  · CXR without consolidation or GGO  Remains afebrile  Procalcitonin slight elevated on presentation but trended down on subsequent measures  Blood cultures currently show no growth  Sputum cultures pending  Leukopenic at baseline but noted for acute drop on initial presentation, but WBC is trending up  Ceftriaxone discontinued on 1/02 after 2 doses as can contribute to leukopenia  Will continue to monitor

## 2022-01-02 NOTE — ASSESSMENT & PLAN NOTE
· Patient is not on any home medications for hypertension  On presentation hypotensive which improved with fluid resuscitation  · Hypotensive on today's assessment and reports dizziness  BP approximately 80/50 on bedside assessment  Will provide 500 cc bolus of LR and monitor for improvement in BP  · Will continue to monitor blood pressures with regular vitals checks

## 2022-01-03 LAB
ANION GAP SERPL CALCULATED.3IONS-SCNC: 9 MMOL/L (ref 4–13)
BASOPHILS # BLD AUTO: 0 THOUSANDS/ΜL (ref 0–0.1)
BASOPHILS NFR BLD AUTO: 0 % (ref 0–1)
BUN SERPL-MCNC: 44 MG/DL (ref 5–25)
CALCIUM SERPL-MCNC: 9.1 MG/DL (ref 8.3–10.1)
CHLORIDE SERPL-SCNC: 108 MMOL/L (ref 100–108)
CO2 SERPL-SCNC: 25 MMOL/L (ref 21–32)
CREAT SERPL-MCNC: 1.48 MG/DL (ref 0.6–1.3)
EOSINOPHIL # BLD AUTO: 0 THOUSAND/ΜL (ref 0–0.61)
EOSINOPHIL NFR BLD AUTO: 0 % (ref 0–6)
ERYTHROCYTE [DISTWIDTH] IN BLOOD BY AUTOMATED COUNT: 13.5 % (ref 11.6–15.1)
GFR SERPL CREATININE-BSD FRML MDRD: 45 ML/MIN/1.73SQ M
GLUCOSE SERPL-MCNC: 124 MG/DL (ref 65–140)
HCT VFR BLD AUTO: 43.3 % (ref 36.5–49.3)
HGB BLD-MCNC: 14.4 G/DL (ref 12–17)
IMM GRANULOCYTES # BLD AUTO: 0.03 THOUSAND/UL (ref 0–0.2)
IMM GRANULOCYTES NFR BLD AUTO: 1 % (ref 0–2)
LYMPHOCYTES # BLD AUTO: 0.56 THOUSANDS/ΜL (ref 0.6–4.47)
LYMPHOCYTES NFR BLD AUTO: 19 % (ref 14–44)
MCH RBC QN AUTO: 27.3 PG (ref 26.8–34.3)
MCHC RBC AUTO-ENTMCNC: 33.3 G/DL (ref 31.4–37.4)
MCV RBC AUTO: 82 FL (ref 82–98)
MONOCYTES # BLD AUTO: 0.34 THOUSAND/ΜL (ref 0.17–1.22)
MONOCYTES NFR BLD AUTO: 11 % (ref 4–12)
NEUTROPHILS # BLD AUTO: 2.07 THOUSANDS/ΜL (ref 1.85–7.62)
NEUTS SEG NFR BLD AUTO: 69 % (ref 43–75)
NRBC BLD AUTO-RTO: 0 /100 WBCS
PLATELET # BLD AUTO: 296 THOUSANDS/UL (ref 149–390)
PMV BLD AUTO: 8.8 FL (ref 8.9–12.7)
POTASSIUM SERPL-SCNC: 4.5 MMOL/L (ref 3.5–5.3)
PROCALCITONIN SERPL-MCNC: 0.16 NG/ML
RBC # BLD AUTO: 5.28 MILLION/UL (ref 3.88–5.62)
SODIUM SERPL-SCNC: 142 MMOL/L (ref 136–145)
WBC # BLD AUTO: 3 THOUSAND/UL (ref 4.31–10.16)

## 2022-01-03 PROCEDURE — 84145 PROCALCITONIN (PCT): CPT | Performed by: STUDENT IN AN ORGANIZED HEALTH CARE EDUCATION/TRAINING PROGRAM

## 2022-01-03 PROCEDURE — 97163 PT EVAL HIGH COMPLEX 45 MIN: CPT

## 2022-01-03 PROCEDURE — 85025 COMPLETE CBC W/AUTO DIFF WBC: CPT | Performed by: INTERNAL MEDICINE

## 2022-01-03 PROCEDURE — 80048 BASIC METABOLIC PNL TOTAL CA: CPT

## 2022-01-03 PROCEDURE — 97167 OT EVAL HIGH COMPLEX 60 MIN: CPT

## 2022-01-03 PROCEDURE — 99232 SBSQ HOSP IP/OBS MODERATE 35: CPT | Performed by: STUDENT IN AN ORGANIZED HEALTH CARE EDUCATION/TRAINING PROGRAM

## 2022-01-03 PROCEDURE — 97110 THERAPEUTIC EXERCISES: CPT

## 2022-01-03 PROCEDURE — 92526 ORAL FUNCTION THERAPY: CPT

## 2022-01-03 RX ADMIN — ASPIRIN 81 MG: 81 TABLET, COATED ORAL at 09:04

## 2022-01-03 RX ADMIN — SODIUM CHLORIDE 3 ML: 9 INJECTION INTRAMUSCULAR; INTRAVENOUS; SUBCUTANEOUS at 09:04

## 2022-01-03 RX ADMIN — DEXAMETHASONE SODIUM PHOSPHATE 6 MG: 4 INJECTION, SOLUTION INTRA-ARTICULAR; INTRALESIONAL; INTRAMUSCULAR; INTRAVENOUS; SOFT TISSUE at 10:19

## 2022-01-03 RX ADMIN — SODIUM CHLORIDE, SODIUM LACTATE, POTASSIUM CHLORIDE, AND CALCIUM CHLORIDE 500 ML: .6; .31; .03; .02 INJECTION, SOLUTION INTRAVENOUS at 10:17

## 2022-01-03 RX ADMIN — SODIUM CHLORIDE 3 ML: 9 INJECTION INTRAMUSCULAR; INTRAVENOUS; SUBCUTANEOUS at 20:35

## 2022-01-03 RX ADMIN — REMDESIVIR 100 MG: 100 INJECTION, POWDER, LYOPHILIZED, FOR SOLUTION INTRAVENOUS at 13:13

## 2022-01-03 NOTE — CASE MANAGEMENT
Case Management Discharge Planning Note    Patient name Duke Shah  Location /-15 MRN [id number]  : 1945 Date 1/3/2022       Current Admission Date: 2021  Current Admission Diagnosis:Pneumonia due to infectious organism   Patient Active Problem List    Diagnosis Date Noted    Leukopenia 2022    COVID-19 with hypoxic respiratory failure 2021    Acute renal failure superimposed on chronic kidney disease ([facility] ) 2021    Pneumonia due to infectious organism 2021    Esophageal dysphagia 2021    Screening for prostate cancer 2021    SOB (shortness of breath) on exertion 2020    Exposure to SARS-associated coronavirus 2020    CKD (chronic kidney disease) stage 3, GFR 30-59 ml/min ([facility] [address] ) 2020    Fatigue 2020    Screening for lipid disorders 2020    Benign localized prostatic hyperplasia with lower urinary tract symptoms (LUTS) 10/15/2019    Hematuria 10/15/2019    Overactive bladder 10/09/2019    Idiopathic hypotension 2019    Dysuria 2019    Weight loss 2019    Uncontrolled hypertension 2019    Dizziness 2019    History of head and neck radiation 2019    Essential hypertension 2019    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[facility] [address] ) 2019    Excessive salivation 01/10/2019    Medicare annual wellness visit, subsequent 01/10/2019    Colon cancer screening 01/10/2019    Encounter for hepatitis C screening test for low risk patient 01/10/2019    Screening, lipid 01/10/2019    Abnormal kidney function 01/10/2019      LOS (days): 2  Geometric Mean LOS (GMLOS) (days):   Days to GMLOS:     OBJECTIVE:  Risk of Unplanned Readmission Score: 15         Current admission status: Inpatient   Preferred Pharmacy:   [id number] [place], [place] - [address]  Phone: [phone] Fax: [place] - [address]  [address]   [address] [address]  Phone: [phone] Fax: [phone]    Primary Care Provider: Nancy Arizmendi MD    Primary Insurance: MEDICARE  Secondary Insurance: [employer]    DISCHARGE DETAILS:       Freedom of Choice: Yes  Comments - Freedom of Choice: per pt rec is c  cm s/w pt and he would like slvna  Requested [address]         [facility] the [facility]  at discharge?: Yes  [address] requested[de-identified] [address] [facility] Name[de-identified] [address] [facility] Provider[de-identified] PCP  Home Health Services Needed[de-identified] Urinary Incontinence Catheter Management,Evaluate Functional Status and Safety  Homebound Criteria Met[de-identified] Uses an Assist Device (i e  cane, walker, etc)  Supporting Clincal Findings[de-identified] Limited Endurance,Dyspnea with Exertion,Fatigues Easliy in Short Distances         Other Referral/Resources/Interventions Provided:  Interventions: [facility]  Referral Comments: slvna referral sent

## 2022-01-03 NOTE — SPEECH THERAPY NOTE
Speech Language/Pathology    Speech/Language Pathology Progress Note    Patient Name: Melva Neely  PWYMQ'M Date: 1/3/2022     Problem List  Principal Problem:    Pneumonia due to infectious organism  Active Problems:    Essential hypertension    Esophageal dysphagia    COVID-19 with hypoxic respiratory failure    Acute renal failure superimposed on chronic kidney disease ([facility] [address] )    Leukopenia       Past Medical History  Past Medical History:   Diagnosis Date    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[facility] [address] ) 03/1999    SCCA left tongue base - T2N1M0- treated at [facility] - had surgery and XRT    Hyperlipidemia     Hypertension     Left anterior fascicular block     Stage 3 chronic kidney disease ([facility] [address] )         Past Surgical History  Past Surgical History:   Procedure Laterality Date    CYSTOSCOPY  08/30/2021    EYELID CLOSURE Left     to prevent eyelashes from rubbing cornea    HERNIA REPAIR Left     MODIFIED RADICAL NECK DISSECTION Left 03/03/1999    [facility] - with mandibulotomy and resection SCCA left tongue base - Dr Elicia Bruno       Subjective:  "The swallowing was not too bad for the 1st 15 years after surgery  Have more coughing now and take me about an hour and half to eat  I have thought about putting the tube in stomach but a few doctors recommended against it "    Objective:  Pt was seen for diagnostic dysphagia therapy to assess tolerance of any/all liquids and to nmaximzie safet with intake of food and liquid  Pt was alert, fully upright and fed himself for slowly after providing a full history  He reported being diagnosed with cancer of the base of tongue in 1999  He reported follow-up in our surgery for resection and modification including base of tongue resection, neck dissection,  partial mandibulectomy and XRT  He reported that the doctors expected that he would look less than 5 years    He admits to requiring only mild diet modification for the 1st 15 years over the past few years that he has modified his diet includes mostly pureed food plus a few soft foods (including peanut butter and jelly sandwich, bananas, and mashed baked beans)  He reported that he ingests boost daily in an effort to maintain his weight    Pt was assessed with applesauce, honey thick liquid, nectar thick liquid and thin liquid/Boost supplement  Bolus formation and transfer were mild to moderately slowed and effortful appearing but effective  Swallow initiation appeared prompt  Asymmetric laryngeal rise (to left) appeared good  Secondary swallows were observed with all materials  Episodic coughing and throat clearing she was noted with all liquids  Vocal quality was clear status post cough and or throat clear  Assessment:  Patient is s/p resection of the base of tongue, neck dissection, partial mandibulectomy and XRT with suspicion for significant pharyngeal dysphagia  Patient uses multiple strategies to maximize the safety of his intake (ie slow rate, small bites and sips, multiple swallows and cough/throat clearing to clear laryngeal penetration and/or aspiration)  He reports experiencing only occasional pneumonia over the past 5 years  He denied increasing signs or symptoms of dysphagia at this time  Plan/Recommendations:  Please consider resuming baseline diet of essentially pureed food and thin liquid  Encourage intake of supplements  Patient to continue with all compensations as demonstrated today

## 2022-01-03 NOTE — PLAN OF CARE
Problem: PAIN - ADULT  Goal: Verbalizes/displays adequate comfort level or baseline comfort level  Description: Interventions:  - Encourage patient to monitor pain and request assistance  - Assess pain using appropriate pain scale  - Administer analgesics based on type and severity of pain and evaluate response  - Implement non-pharmacological measures as appropriate and evaluate response  - Consider cultural and social influences on pain and pain management  - Notify physician/advanced practitioner if interventions unsuccessful or patient reports new pain  Outcome: Progressing     Problem: INFECTION - ADULT  Goal: Absence or prevention of progression during hospitalization  Description: INTERVENTIONS:  - Assess and monitor for signs and symptoms of infection  - Monitor lab/diagnostic results  - Monitor all insertion sites, i e  indwelling lines, tubes, and drains  - Monitor endotracheal if appropriate and nasal secretions for changes in amount and color  - [place] appropriate cooling/warming therapies per order  - Administer medications as ordered  - Instruct and encourage patient and family to use good hand hygiene technique  - Identify and instruct in appropriate isolation precautions for identified infection/condition  Outcome: Progressing  Goal: Absence of fever/infection during neutropenic period  Description: INTERVENTIONS:  - Monitor WBC    Outcome: Progressing     Problem: DISCHARGE PLANNING  Goal: Discharge to home or other facility with appropriate resources  Description: INTERVENTIONS:  - Identify barriers to discharge w/patient and caregiver  - Arrange for needed discharge resources and transportation as appropriate  - Identify discharge learning needs (meds, wound care, etc )  - Arrange for interpretive services to assist at discharge as needed  - Refer to Case Management Department for coordinating discharge planning if the patient needs post-hospital services based on physician/advanced practitioner order or complex needs related to functional status, cognitive ability, or social support system  Outcome: Progressing     Problem: CARDIOVASCULAR - ADULT  Goal: Maintains optimal cardiac output and hemodynamic stability  Description: INTERVENTIONS:  - Monitor I/O, vital signs and rhythm  - Monitor for S/S and trends of decreased cardiac output  - Administer and titrate ordered vasoactive medications to optimize hemodynamic stability  - Assess quality of pulses, skin color and temperature  - Assess for signs of decreased coronary artery perfusion  - Instruct patient to report change in severity of symptoms  Outcome: Progressing     Problem: RESPIRATORY - ADULT  Goal: Achieves optimal ventilation and oxygenation  Description: INTERVENTIONS:  - Assess for changes in respiratory status  - Assess for changes in mentation and behavior  - Position to facilitate oxygenation and minimize respiratory effort  - Oxygen administered by appropriate delivery if ordered  - Initiate smoking cessation education as indicated  - Encourage broncho-pulmonary hygiene including cough, deep breathe, Incentive Spirometry  - Assess the need for suctioning and aspirate as needed  - Assess and instruct to report SOB or any respiratory difficulty  - Respiratory Therapy support as indicated  Outcome: Progressing     Problem: HEMATOLOGIC - ADULT  Goal: Maintains hematologic stability  Description: INTERVENTIONS  - Assess for signs and symptoms of bleeding or hemorrhage  - Monitor labs  - Administer supportive blood products/factors as ordered and appropriate  Outcome: Progressing     Problem: METABOLIC, FLUID AND ELECTROLYTES - ADULT  Goal: Electrolytes maintained within normal limits  Description: INTERVENTIONS:  - Monitor labs and assess patient for signs and symptoms of electrolyte imbalances  - Administer electrolyte replacement as ordered  - Monitor response to electrolyte replacements, including repeat lab results as appropriate  - Instruct patient on fluid and nutrition as appropriate  Outcome: Progressing     Problem: MOBILITY - ADULT  Goal: Maintain or return to baseline ADL function  Description: INTERVENTIONS:  -  Assess patient's ability to carry out ADLs; assess patient's baseline for ADL function and identify physical deficits which impact ability to perform ADLs (bathing, care of mouth/teeth, toileting, grooming, dressing, etc )  - Assess/evaluate cause of self-care deficits   - Assess range of motion  - Assess patient's mobility; develop plan if impaired  - Assess patient's need for assistive devices and provide as appropriate  - Encourage maximum independence but intervene and supervise when necessary  - Involve family in performance of ADLs  - Assess for home care needs following discharge   - Consider OT consult to assist with ADL evaluation and planning for discharge  - Provide patient education as appropriate  Outcome: Progressing  Goal: Maintains/Returns to pre admission functional level  Description: INTERVENTIONS:  - Perform BMAT or MOVE assessment daily    - Set and communicate daily mobility goal to care team and patient/family/caregiver  - Collaborate with rehabilitation services on mobility goals if consulted  - Perform Range of Motion  times a day  - Reposition patient every  hours    - Dangle patient  times a day  - Stand patient  times a day  - Ambulate patient  times a day  - Out of bed to chair  times a day   - Out of bed for meals  times a day  - Out of bed for toileting  - Record patient progress and toleration of activity level   Outcome: Progressing

## 2022-01-03 NOTE — OCCUPATIONAL THERAPY NOTE
Occupational Therapy Evaluation      Laura Tinsley    1/3/2022    Principal Problem:    Pneumonia due to infectious organism  Active Problems:    Essential hypertension    Esophageal dysphagia    COVID-19 with hypoxic respiratory failure    Acute renal failure superimposed on chronic kidney disease (HCC)    Leukopenia      Past Medical History:   Diagnosis Date    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[facility] [address] ) 03/1999    SCCA left tongue base - T2N1M0- treated at [facility] - had surgery and XRT    Hyperlipidemia     Hypertension     Left anterior fascicular block     Stage 3 chronic kidney disease ([facility] [address] )        Past Surgical History:   Procedure Laterality Date    CYSTOSCOPY  08/30/2021    EYELID CLOSURE Left     to prevent eyelashes from rubbing cornea    HERNIA REPAIR Left     MODIFIED RADICAL NECK DISSECTION Left 03/03/1999    [facility] - with mandibulotomy and resection SCCA left tongue base - Dr Misty Otero        01/03/22 1219   OT Last Visit   OT Visit Date 01/03/22   Note Type   Note type Evaluation   Restrictions/Precautions   Weight Bearing Precautions Per Order No   Other Precautions Contact/isolation;Droplet precautions; Airborne/isolation; Bed Alarm; Fall Risk;O2  (2L via NC)   Pain Assessment   Pain Assessment Tool 0-10   Pain Score No Pain   Home Living   Type of [address] [place] [address] Two level;Stairs to enter with rails; Performs ADLs on one level; Able to live on main level with bedroom/bathroom; Work area in [employer] in basement  (4STE; 12 steps to basement)   Bathroom Shower/Tub Tub/shower unit  ("I take a bath")   Bathroom Toilet Standard  (has both; uses low toilet)   [address] [facility] in [facility]   Additional Comments (+)    Prior Function   Level of [place] Independent with ADLs and functional mobility   Lives With Spouse   Receives Help From Family  (wife and 3 children)   ADL Assistance Independent   IADLs Independent  (shared)   Falls in the last 6 months 0   Vocational Retired  ()   Lifestyle   Autonomy Pt reports PLOF was Ind with ADLs, IADLS, and (+)    Reciprocal Relationships wife and 3 kids   Psychosocial   Psychosocial (WDL) WDL   ADL   Eating Assistance 6  Modified independent   Eating Deficit Increased time to complete   Grooming Assistance 6  Modified Independent   Grooming Deficit Increased time to complete   UB Bathing Assistance 5  Supervision/Setup   UB Bathing Deficit Increased time to complete;Supervision/safety;Setup   LB Bathing Assistance 4  Minimal Assistance   LB Bathing Deficit Increased time to complete;Supervision/safety;Verbal cueing;Steadying;Setup   UB Dressing Assistance 5  Supervision/Setup   UB Dressing Deficit Increased time to complete;Supervision/safety;Setup   LB Dressing Assistance 4  Minimal Assistance   LB Dressing Deficit Increased time to complete;Supervision/safety;Verbal cueing;Steadying;Setup   Toileting Assistance  5  Supervision/Setup   Toileting Deficit Increased time to complete;Supervison/safety;Setup   Functional Assistance 4  Minimal Assistance   Functional Deficit Increased time to complete;Supervision/safety;Verbal cueing;Steadying;Setup   Bed Mobility   Supine to Sit 5  Supervision   Additional items Assist x 1; Increased time required;Verbal cues;HOB elevated   Additional Comments (+) lightheadedness reported c transitional movements, pt requiring rest periods for recovery of same  RN notified of such   Transfers   Sit to Stand 4  Minimal assistance   Additional items Assist x 1; Impulsive;Verbal cues   Stand to Sit 4  Minimal assistance   Additional items Assist x 1; Impulsive;Verbal cues   Functional Mobility   Functional Mobility 4  Minimal assistance  (CGA)   Additional Comments abandons RW and carried at one point vs rolling   Additional items Rolling walker   Activity Tolerance   Activity Tolerance Patient tolerated treatment well   Medical Staff Made Aware care coordination with OT Alysia  notified LOREN Ibanez PT rec via TT   Nurse Made Aware yes spoke with pt's RN who stated pt was appropriate for OT and made aware ofoutcomes   RUE Assessment   RUE Assessment WFL   LUE Assessment   LUE Assessment WFL   Hand Function   Gross Motor Coordination Functional   Fine Motor Coordination Functional   Sensation   Light Touch No apparent deficits   Sharp/Dull No apparent deficits   Stereognosis No apparent deficits   Proprioception   Proprioception No apparent deficits   Vision-Basic Assessment   Current Vision Wears glasses all the time   Vision - Complex Assessment   Ocular Range of Motion [facility]   Perception   Inattention/Neglect Appears intact   Cognition   Overall Cognitive Status WFL   Arousal/Participation Alert;Arousable; Cooperative   Attention Within functional limits   Orientation Level Oriented X4   Memory Within functional limits   Following Commands Follows all commands and directions without difficulty   Comments Pt was agreeable to OT eval   Assessment   Limitation Decreased ADL status; Decreased UE strength;Decreased Safe judgement during ADL;Decreased endurance;Decreased self-care trans;Decreased high-level ADLs   Prognosis Good   Assessment Pt is a 78 y o  male seen for OT evaluation s/p admit to [facility] on 12/31/2021 w/ Pneumonia due to infectious organism  Comorbidities affecting pt's functional performance at time of assessment include:acute renal failure, leukopenia, tongue cancer, HTN and esophogeal dysphagie   Orders placed for OT evaluation and treatment  Performed at least two patient identifiers during session including name and wristband  Personal factors affecting pt at time of IE include:steps to enter environment, limited home support, difficulty performing ADLS, difficulty performing IADLS , financial barriers, health management  and environment   Prior to admission, pt reports Ind with ADLs, Ind with IADLs, and (+) driving  Upon evaluation: Pt requires S with UB ADLs, min A with LB ADLs, CG/min A with xfers and min A with functional mobility 2* the following deficits impacting occupational performance: weakness, decreased ROM, decreased strength, decreased dynamic sit/ stand balance, decreased activity tolerance, decreased standing tolerance time for self care and functional mobility, environmental deficits, decreased coping skills, decreased mobilty and requiring external assistance to complete transitional movements  Pt to benefit from continued skilled OT tx while in the hospital to address deficits as defined above and maximize level of functional independence w ADL's and functional mobility  Occupational Performance areas to address include: bathing/shower, toilet hygiene, dressing, medication management, socialization, health maintenance, functional mobility, community mobility, clothing management, cleaning and household maintenance  From OT standpoint, recommendation at time of d/c would be home OT  Plan   Treatment Interventions ADL retraining;Functional transfer training; Activityengagement; Energy conservation;Cardiac education;Continued evaluation; Compensatory technique education; Endurance training;Patient/family training;Equipment evaluation/education   Goal Expiration Date 01/16/22   OT Frequency 3-5x/wk   Recommendation   OT Discharge Recommendation Home with home health rehabilitation   AM-PAC Daily Activity Inpatient   Lower Body Dressing 3   Bathing 3   Toileting 3   Upper Body Dressing 3   Grooming 4   Eating 4   Daily Activity Raw Score 20   Daily Activity Standardized Score (Calc for Raw Score >=11) 42 03   AM-PAC Applied Cognition Inpatient   Following a Speech/Presentation 4   Understanding Ordinary Conversation 4   Taking Medications 4   Remembering Where Things Are Placed or Put Away 4   Remembering List of 4-5 Errands 4   Taking Care of Complicated Tasks 4   Applied Cognition Raw Score 24   Applied Cognition Standardized Score 62 21   Barthel Index   Feeding 10   Bathing 0   Grooming Score 5   Dressing Score 5   Bladder Score 10   Bowels Score 10   Toilet Use Score 5   Transfers (Bed/Chair) Score 10   Mobility (Level Surface) Score 0   Stairs Score 0   Barthel Index Score 55   Modified [place] Scale   Modified [place] Scale 4     Occupational therapy Goals: In 2-4 days    Patient will completed passport to independence program in order to further determine DC needs as well as any additional recommendations/ education  Patient will verbalize and demonstrate use of energy conservation/ deep breathing technique and work simplification skills during functional activity with no verbal cues  Patient will verbalize and demonstrate good body mechanics and joint protection techniques during ADLs/ IADLs with no verbal cues     Patient will increase OOB/ sitting tolerance to 4-6 hours per day for increased participation in self care and leisure tasks with no s/s of exertion  Patient will identify s/s of exertion during ADL and functional mobility with no verbal cues  Patient will verbalize/ demonstrate compensatory strategies to recover from exertion with no verbal cues  Patient will increase standing tolerance time to 10 minutes with No UE support to complete sink level ADLs @ Mod I level  Patient will increase sitting tolerance at edge of bed to 30 minutes to complete UB ADLs @ Indep  level       Patient/ Family will demonstrate competency with UE Home Exercise Program      Martha Rodriguez MS OTR/L

## 2022-01-03 NOTE — PHYSICAL THERAPY NOTE
Physical Therapy Evaluation   Time in: 1219  Time out: 1234  Total evaluation time: 15 minutes    Patient's Name: Maksim Mark    Admitting Diagnosis  CKD (chronic kidney disease) stage 3, GFR 30-59 ml/min (HCC) [N18 30]  Difficulty breathing [R06 89]  Acute on chronic renal insufficiency [N28 9, N18 9]  Generalized weakness [R53 1]  Pneumonia due to COVID-19 virus [U07 1, J12 82]    Problem List  Patient Active Problem List   Diagnosis    Excessive salivation    Medicare annual wellness visit, subsequent    Colon cancer screening    Encounter for hepatitis C screening test for low risk patient    Screening, lipid    Abnormal kidney function    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[facility] )    Essential hypertension    Uncontrolled hypertension    Dizziness    History of head and neck radiation    Idiopathic hypotension    Dysuria    Weight loss    Overactive bladder    Benign localized prostatic hyperplasia with lower urinary tract symptoms (LUTS)    Hematuria    Exposure to SARS-associated coronavirus    CKD (chronic kidney disease) stage 3, GFR 30-59 ml/min ([facility]    Fatigue    Screening for lipid disorders    SOB (shortness of breath) on exertion    Screening for prostate cancer    Esophageal dysphagia    COVID-19 with hypoxic respiratory failure    Acute renal failure superimposed on chronic kidney disease ([facility] )    Pneumonia due to infectious organism    Leukopenia       Past Medical History  Past Medical History:   Diagnosis Date    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[facility] ) 03/1999    SCCA left tongue base - T2N1M0- [facility] at [facility] - had surgery and XRT    Hyperlipidemia     Hypertension     Left anterior fascicular block     Stage 3 chronic kidney disease ([facility] )        Past Surgical History  Past Surgical History:   Procedure Laterality Date    CYSTOSCOPY  08/30/2021    EYELID CLOSURE Left     to prevent eyelashes from rubbing cornea    HERNIA REPAIR Left     MODIFIED RADICAL NECK DISSECTION Left 03/03/1999    [facility] - with mandibulotomy and resection SCCA left tongue base - Dr Yash Kirby         PT performed at least 2 patient identifiers during session: Name and wristband  01/03/22 1220   PT Last Visit   PT Visit Date 01/03/22   Note Type   Note type Evaluation   Pain Assessment   Pain Assessment Tool 0-10   Pain Score No Pain   Restrictions/Precautions   Weight Bearing Precautions Per Order No   Other Precautions Contact/isolation;Droplet precautions; Airborne/isolation; Bed Alarm; Chair Alarm; Fall Risk;O2  (2L O2 NC)   Home Living   Type of [address] [place] [address] Two level;Stairs to enter with rails; Performs ADLs on one level; Able to live on main level with bedroom/bathroom; Work area in [employer] in basement  (4S TE; 12 steps to basement-> rec room/laundry)   Bathroom Shower/Tub Tub/shower unit  ("I take a bath")   Bathroom Toilet Standard  (has both; uses low toilet)   [facility] in [facility]   Additional Comments (+)    Prior Function   Level of [place] Independent with ADLs and functional mobility   Lives With Ema Help From Family  (wife and 3 children (local dtr))   ADL Assistance Independent   IADLs Independent  (shared c wife)   Falls in the last 6 months 0   Vocational Retired  ()   General   Family/Caregiver Present No   Cognition   Overall Cognitive Status WFL   Arousal/Participation Alert   Orientation Level Oriented X4   Memory Within functional limits   Following Commands Follows all commands and directions without difficulty   Comments pt agreeable to PT eval   Subjective   Subjective "I feel wobbly"   RUE Assessment   RUE Assessment WFL   LUE Assessment   LUE Assessment WFL   RLE Assessment   RLE Assessment   (grossly 4-/5 observed c functional mobility)   LLE Assessment   LLE Assessment   (grossly 4-/5 observed c functional mobility)   Vision-Basic Assessment   Current Vision Wears glasses all the time   Coordination   Movements are Fluid and Coordinated 1   Sensation WFL   Bed Mobility   Supine to Sit 5  Supervision   Additional items Assist x 1; Increased time required;Verbal cues;HOB elevated   Additional Comments (+) lightheadedness reported c transitional movements, pt requiring rest periods for recovery of same  RN notified of such   Transfers   Sit to Stand 4  Minimal assistance  (CGA)   Additional items Assist x 1; Impulsive;Verbal cues   Stand to Sit 4  Minimal assistance  (CGA)   Additional items Assist x 1; Impulsive;Verbal cues   Ambulation/Elevation   Gait pattern Decreased foot clearance; Short stride; Step to   Gait Assistance 4  Minimal assist  (CGA)   Additional items Assist x 1;Verbal cues   Assistive Device Rolling walker   Distance 30'   Activity Tolerance   Activity Tolerance Patient tolerated treatment well   Medical Staff Made Aware care coordination with PT Florence   Nurse Made Aware RN Brendan   Assessment   Prognosis Good   Problem List Decreased strength; Impaired balance;Decreased endurance;Decreased mobility   Assessment Pt is 78 y o  male seen for high-complexity PT evaluation on 1/3/2022 s/p admit to [facility] on 12/31/2021 w/ Pneumonia due to infectious organism  PT was consulted to assess pt's functional mobility and d/c needs  Order placed for PT eval and tx, w/ up and OOB as tolerated order  PTA, pt resides c wife in bi level home c 4 ISAIAS; flight down to lower level rec room area; typically ambulates without AD, does have access to a RW; retired, (+)   At time of eval, pt performing bed mobility at SBA level, CGA for transfers and household distance gait trial without overt LOB observed  Upon evaluation, pt presenting with impaired functional mobility d/t decreased strength, decreased endurance, impaired balance, decreased mobility and activity intolerance   Pertinent PMHx and current co-morbidities affecting pt's physical performance at time of assessment include: HTN, esophageal dysphagia, COVID, tongue carcinoma, dizziness, idiopathic hypotension, hematuria, CKD stage 3, fatigue, SOB  Personal factors affecting pt at time of eval include: inaccessible home environment, ambulating w/ assistive device, stairs to enter home and inability to navigate community distances  The following objective measures performed on IE also reveal limitations: Barthel Index: 60/100, Modified [place]: 3 (moderate disability) and AM-PAC 6-Clicks: 16/94  Pt's clinical presentation is currently unstable/unpredictable seen in pt's presentation of abnormal lab value(s), ongoing medical assessment and need for O2 NC  Overall, pt's rehab potential and prognosis to return to PLOF is good as impacted by objective findings, warranting pt to receive further skilled PT interventions to address identified impairments, activity limitation(s), and participation restriction(s)  Pt to benefit from continued PT tx to address deficits as defined above and maximize level of functional independent mobility and consistency  From PT/mobility standpoint, recommendation at time of d/c would be home with home health rehabilitation pending progress in order to facilitate return to PLOF     Barriers to Discharge Inaccessible home environment;Decreased caregiver support   Goals   Patient Goals to return home   STG Expiration Date 01/13/22   Short Term Goal #1 In 7-10 days: Increase bilateral LE strength 1/2 grade to facilitate independent mobility, Perform all bed mobility tasks modified independent to decrease caregiver burden, Perform all transfers modified independent to improve independence, Ambulate > 150 ft  with least restrictive assistive device modified independent w/o LOB and w/ normalized gait pattern 100% of the time, Navigate 12 stairs modified independent with unilateral handrail to facilitate return to previous living environment/lower level rec room, Increase all balance 1/2 grade to decrease risk for falls, Complete exercise program independently, Tolerate 4 hr OOB to faciliate upright tolerance and PT provider will perform functional balance assessment to determine fall risk   PT Treatment Day 1   Plan   Treatment/Interventions Functional transfer training;LE strengthening/ROM; Therapeutic exercise; Endurance training;Patient/family training;Equipment eval/education; Bed mobility;Gait training;Elevations; Spoke to nursing;OT   PT Frequency 3-5x/wk   Recommendation   PT Discharge Recommendation Home with home health rehabilitation   Equipment Recommended Walker  (RW)   AM-PAC Basic Mobility Inpatient   Turning in Bed Without Bedrails 3   Lying on Back to Sitting on Edge of Flat Bed 3   Moving Bed to Chair 3   Standing Up From Chair 3   Walk in Room 3   Climb 3-5 Stairs 3   Basic Mobility Inpatient Raw Score 18   Basic Mobility Standardized Score 41 05   Highest Level Of Mobility   JH-HLM Goal 6: Walk 10 steps or more   JH-HLM Highest Level of Mobility 7: Walk 25 feet or more   JH-HLM Goal Achieved Yes   Modified [place] Scale   Modified [place] Scale 3   Barthel Index   Feeding 10   Bathing 0   Grooming Score 5   Dressing Score 10   Bladder Score 10   Bowels Score 10   Toilet Use Score 5   Transfers (Bed/Chair) Score 10   Mobility (Level Surface) Score 0   Stairs Score 0   Barthel Index Score 60   Additional Treatment Session   Start Time 1235   End Time 1245   Treatment Assessment Pt seen for PT treatment session this date, consisting of ther ex focused on strengthening  Current goals and POC remain appropriate, pt continues to have rehab potential and is making progress towards STGs   Pt prognosis for achieving goals is good, pending pt progress with hospitalization/medical status improvements, and indicated by [facility], ability to follow directions, motivation to walk, talk, and achieve self-help skills, recent history of independence with functional skills/High PLOF, supportive family/caregivers and previous response to intervention  PT recommends home with home health rehabilitation upon discharge  Pt continues to be functioning below baseline level, and remains limited 2* factors listed above  PT will continue to see pt during current hospitalization in order to address the deficits listed above and provide interventions consistent w/ POC in effort to achieve STGs  Exercises   Hip Flexion Sitting;10 reps;AROM; Bilateral   Hip Abduction Sitting;10 reps;AROM; Bilateral   Hip Adduction Sitting;10 reps;AROM; Bilateral   Knee AROM Long Arc Quad Sitting;10 reps;AROM; Bilateral   Ankle Pumps Sitting;10 reps;AROM; Bilateral   Marching Sitting;10 reps;AROM; Bilateral   End of Consult   Patient Position at End of Consult Bedside chair; All needs within reach;Bed/Chair alarm activated       Waqas Akbar, PT, DPT

## 2022-01-03 NOTE — PLAN OF CARE
Assessment:  Patient is s/p resection of the base of tongue, neck dissection, partial mandibulectomy and XRT with suspicion for significant pharyngeal dysphagia  Patient uses multiple strategies to maximize the safety of his intake (ie slow rate, small bites and sips, multiple swallows and cough/throat clearing to clear laryngeal penetration and/or aspiration)  He reports experiencing only occasional pneumonia over the past 5 years  He denied increasing signs or symptoms of dysphagia at this time  Plan/Recommendations:  Please consider resuming baseline diet of essentially pureed food and thin liquid  Encourage intake of supplements  Patient to continue with all compensations as demonstrated today

## 2022-01-03 NOTE — PLAN OF CARE
Problem: MOBILITY - ADULT  Goal: Maintain or return to baseline ADL function  Description: INTERVENTIONS:  -  Assess patient's ability to carry out ADLs; assess patient's baseline for ADL function and identify physical deficits which impact ability to perform ADLs (bathing, care of mouth/teeth, toileting, grooming, dressing, etc )  - Assess/evaluate cause of self-care deficits   - Assess range of motion  - Assess patient's mobility; develop plan if impaired  - Assess patient's need for assistive devices and provide as appropriate  - Encourage maximum independence but intervene and supervise when necessary  - Involve family in performance of ADLs  - Assess for home care needs following discharge   - Consider OT consult to assist with ADL evaluation and planning for discharge  - Provide patient education as appropriate  Outcome: Progressing  Goal: Maintains/Returns to pre admission functional level  Description: INTERVENTIONS:  - Perform BMAT or MOVE assessment daily    - Set and communicate daily mobility goal to care team and patient/family/caregiver  - Collaborate with rehabilitation services on mobility goals if consulted  - Perform Range of Motion  times a day  - Reposition patient every  hours    - Dangle patient times a day  - Stand patient  times a day  - Ambulate patient  times a day  - Out of bed to chair  times a day   - Out of bed for meals  times a day  - Out of bed for toileting  - Record patient progress and toleration of activity level   Outcome: Progressing     Problem: Potential for Falls  Goal: Patient will remain free of falls  Description: INTERVENTIONS:  - Educate patient/family on patient safety including physical limitations  - Instruct patient to call for assistance with activity   - Consult OT/PT to assist with strengthening/mobility   - Keep Call bell within reach  - Keep bed low and locked with side rails adjusted as appropriate  - Keep care items and personal belongings within reach  - Initiate and maintain comfort rounds  - Make Fall Risk Sign visible to staff  - Offer Toileting every  Hours, in advance of need  - Initiate/Maintain alarm  - Obtain necessary fall risk management equipment  - Apply yellow socks and bracelet for high fall risk patients  - Consider moving patient to room near nurses station  Outcome: Progressing     Problem: PAIN - ADULT  Goal: Verbalizes/displays adequate comfort level or baseline comfort level  Description: Interventions:  - Encourage patient to monitor pain and request assistance  - Assess pain using appropriate pain scale  - Administer analgesics based on type and severity of pain and evaluate response  - Implement non-pharmacological measures as appropriate and evaluate response  - Consider cultural and social influences on pain and pain management  - Notify physician/advanced practitioner if interventions unsuccessful or patient reports new pain  Outcome: Progressing     Problem: INFECTION - ADULT  Goal: Absence or prevention of progression during hospitalization  Description: INTERVENTIONS:  - Assess and monitor for signs and symptoms of infection  - Monitor lab/diagnostic results  - Monitor all insertion sites, i e  indwelling lines, tubes, and drains  - Monitor endotracheal if appropriate and nasal secretions for changes in amount and color  - [place] appropriate cooling/warming therapies per order  - Administer medications as ordered  - Instruct and encourage patient and family to use good hand hygiene technique  - Identify and instruct in appropriate isolation precautions for identified infection/condition  Outcome: Progressing  Goal: Absence of fever/infection during neutropenic period  Description: INTERVENTIONS:  - Monitor WBC    Outcome: Progressing     Problem: DISCHARGE PLANNING  Goal: Discharge to home or other facility with appropriate resources  Description: INTERVENTIONS:  - Identify barriers to discharge w/patient and caregiver  - Arrange for needed discharge resources and transportation as appropriate  - Identify discharge learning needs (meds, wound care, etc )  - Arrange for interpretive services to assist at discharge as needed  - Refer to Case Management Department for coordinating discharge planning if the patient needs post-hospital services based on physician/advanced practitioner order or complex needs related to functional status, cognitive ability, or social support system  Outcome: Progressing     Problem: CARDIOVASCULAR - ADULT  Goal: Maintains optimal cardiac output and hemodynamic stability  Description: INTERVENTIONS:  - Monitor I/O, vital signs and rhythm  - Monitor for S/S and trends of decreased cardiac output  - Administer and titrate ordered vasoactive medications to optimize hemodynamic stability  - Assess quality of pulses, skin color and temperature  - Assess for signs of decreased coronary artery perfusion  - Instruct patient to report change in severity of symptoms  Outcome: Progressing     Problem: RESPIRATORY - ADULT  Goal: Achieves optimal ventilation and oxygenation  Description: INTERVENTIONS:  - Assess for changes in respiratory status  - Assess for changes in mentation and behavior  - Position to facilitate oxygenation and minimize respiratory effort  - Oxygen administered by appropriate delivery if ordered  - Initiate smoking cessation education as indicated  - Encourage broncho-pulmonary hygiene including cough, deep breathe, Incentive Spirometry  - Assess the need for suctioning and aspirate as needed  - Assess and instruct to report SOB or any respiratory difficulty  - Respiratory Therapy support as indicated  Outcome: Progressing     Problem: METABOLIC, FLUID AND ELECTROLYTES - ADULT  Goal: Electrolytes maintained within normal limits  Description: INTERVENTIONS:  - Monitor labs and assess patient for signs and symptoms of electrolyte imbalances  - Administer electrolyte replacement as ordered  - Monitor response to electrolyte replacements, including repeat lab results as appropriate  - Instruct patient on fluid and nutrition as appropriate  Outcome: Progressing     Problem: HEMATOLOGIC - ADULT  Goal: Maintains hematologic stability  Description: INTERVENTIONS  - Assess for signs and symptoms of bleeding or hemorrhage  - Monitor labs  - Administer supportive blood products/factors as ordered and appropriate  Outcome: Progressing     Problem: Nutrition/Hydration-ADULT  Goal: Nutrient/Hydration intake appropriate for improving, restoring or maintaining nutritional needs  Description: Monitor and assess patient's nutrition/hydration status for malnutrition  Collaborate with interdisciplinary team and initiate plan and interventions as ordered  Monitor patient's weight and dietary intake as ordered or per policy  Utilize nutrition screening tool and intervene as necessary  Determine patient's food preferences and provide high-protein, high-caloric foods as appropriate       INTERVENTIONS:  - Monitor oral intake, urinary output, labs, and treatment plans  - Assess nutrition and hydration status and recommend course of action  - Evaluate amount of meals eaten  - Assist patient with eating if necessary   - Allow adequate time for meals  - Recommend/ encourage appropriate diets, oral nutritional supplements, and vitamin/mineral supplements  - Order, calculate, and assess calorie counts as needed  - Recommend, monitor, and adjust tube feedings and TPN/PPN based on assessed needs  - Assess need for intravenous fluids  - Provide specific nutrition/hydration education as appropriate  - Include patient/family/caregiver in decisions related to nutrition  Outcome: Progressing

## 2022-01-03 NOTE — MALNUTRITION/BMI
This medical record reflects one or more clinical indicators suggestive of malnutrition  Malnutrition Findings:      Adult Degree of Malnutrition: Malnutrition of mild degree  Malnutrition Characteristics: Muscle loss,Fat loss,Inadequate energy (related to inadequate energy intake as evidenced by hollow supraclavicular space, dark sunken orbital, intake less than 75% est energy needs for > 7 days per pt report  Intervention : Puree Pudding thick liquids, medical food supplements )    BMI Findings: Body mass index is 22 33 kg/m²  See Nutrition note dated 1/3/2022 for additional details  Completed nutrition assessment is viewable in the nutrition documentation

## 2022-01-03 NOTE — PLAN OF CARE
Problem: OCCUPATIONAL THERAPY ADULT  Goal: Performs self-care activities at highest level of function for planned discharge setting  See evaluation for individualized goals  Description: Treatment Interventions: ADL retraining,Functional transfer training,Activityengagement,Energy conservation,Cardiac education,Continued evaluation,Compensatory technique education,Endurance training,Patient/family training,Equipment evaluation/education          See flowsheet documentation for full assessment, interventions and recommendations  Note: Limitation: Decreased ADL status,Decreased UE strength,Decreased Safe judgement during ADL,Decreased endurance,Decreased self-care trans,Decreased high-level ADLs  Prognosis: Good  Assessment: Pt is a 78 y o  male seen for OT evaluation s/p admit to [facility] on 12/31/2021 w/ Pneumonia due to infectious organism  Comorbidities affecting pt's functional performance at time of assessment include:acute renal failure, leukopenia, tongue cancer, HTN and esophogeal dysphagie   Orders placed for OT evaluation and treatment  Performed at least two patient identifiers during session including name and wristband  Personal factors affecting pt at time of IE include:steps to enter environment, limited home support, difficulty performing ADLS, difficulty performing IADLS , financial barriers, health management  and environment  Prior to admission, pt reports Ind with ADLs, Ind with IADLs, and (+) driving    Upon evaluation: Pt requires S with UB ADLs, min A with LB ADLs, CG/min A with xfers and min A with functional mobility 2* the following deficits impacting occupational performance: weakness, decreased ROM, decreased strength, decreased dynamic sit/ stand balance, decreased activity tolerance, decreased standing tolerance time for self care and functional mobility, environmental deficits, decreased coping skills, decreased mobilty and requiring external assistance to complete transitional movements  Pt to benefit from continued skilled OT tx while in the hospital to address deficits as defined above and maximize level of functional independence w ADL's and functional mobility  Occupational Performance areas to address include: bathing/shower, toilet hygiene, dressing, medication management, socialization, health maintenance, functional mobility, community mobility, clothing management, cleaning and household maintenance  From OT standpoint, recommendation at time of d/c would be home OT       OT Discharge Recommendation: Home with home health rehabilitation

## 2022-01-03 NOTE — QUICK NOTE
Mr Antwon Dominguez wife, Peewee Santos was contacted just prior to this entry, and provided with an updated hospitalization course for her ; all questions and concerns were addressed at this time

## 2022-01-03 NOTE — CASE MANAGEMENT
Case Management Progress Note    Patient name Elle Vaughn  Location /-14 MRN [id number]  : 1945 Date 1/3/2022       LOS (days): 2  Geometric Mean LOS (GMLOS) (days):   Days to GMLOS:        OBJECTIVE:        Current admission status: Inpatient  Preferred Pharmacy:   [id number] [place], [place] - [facility] [place] [address]  Phone: [phone] Fax: [phone] [facility], [address] [place] [address]  Phone: [phone] Fax: [phone]    Primary Care Provider: Luis Armando Leahy MD    Primary Insurance: MEDICARE  Secondary Insurance: [employer]    PROGRESS NOTE:  Per SLIM pt clear for dc in next 24-48 pending pt/ot eval  Pt is on RA  Tentative dc plan per handoff was home with dgt

## 2022-01-03 NOTE — PLAN OF CARE
Problem: PHYSICAL THERAPY ADULT  Goal: Performs mobility at highest level of function for planned discharge setting  See evaluation for individualized goals  Description: Treatment/Interventions: Functional transfer training,LE strengthening/ROM,Therapeutic exercise,Endurance training,Patient/family training,Equipment eval/education,Bed mobility,Gait training,Elevations,Spoke to nursing,OT  Equipment Recommended: Cara Gonsalez (RW)       See flowsheet documentation for full assessment, interventions and recommendations  1/3/2022 1434 by Meri Ferguson, PT  Note: Prognosis: Good  Problem List: Decreased strength,Impaired balance,Decreased endurance,Decreased mobility  Assessment: Pt is 78 y o  male seen for high-complexity PT evaluation on 1/3/2022 s/p admit to [facility] on 12/31/2021 w/ Pneumonia due to infectious organism  PT was consulted to assess pt's functional mobility and d/c needs  Order placed for PT eval and tx, w/ up and OOB as tolerated order  PTA, pt resides c wife in bi level home c 4 ISAIAS; flight down to lower level rec room area; typically ambulates without AD, does have access to a RW; retired, (+)   At time of eval, pt performing bed mobility at SBA level, CGA for transfers and household distance gait trial without overt LOB observed  Upon evaluation, pt presenting with impaired functional mobility d/t decreased strength, decreased endurance, impaired balance, decreased mobility and activity intolerance  Pertinent PMHx and current co-morbidities affecting pt's physical performance at time of assessment include: HTN, esophageal dysphagia, COVID, tongue carcinoma, dizziness, idiopathic hypotension, hematuria, CKD stage 3, fatigue, SOB  Personal factors affecting pt at time of eval include: inaccessible home environment, ambulating w/ assistive device, stairs to enter home and inability to navigate community distances   The following objective measures performed on IE also reveal limitations: Barthel Index: 60/100, Modified Oliver: 3 (moderate disability) and AM-PAC 6-Clicks: 68/78  Pt's clinical presentation is currently unstable/unpredictable seen in pt's presentation of abnormal lab value(s), ongoing medical assessment and need for O2 NC  Overall, pt's rehab potential and prognosis to return to PLOF is good as impacted by objective findings, warranting pt to receive further skilled PT interventions to address identified impairments, activity limitation(s), and participation restriction(s)  Pt to benefit from continued PT tx to address deficits as defined above and maximize level of functional independent mobility and consistency  From PT/mobility standpoint, recommendation at time of d/c would be home with home health rehabilitation pending progress in order to facilitate return to PLOF  Barriers to Discharge: Inaccessible home environment,Decreased caregiver support        PT Discharge Recommendation: Home with home health rehabilitation          See flowsheet documentation for full assessment  1/3/2022 1434 by Mich Melo PT  Note: Prognosis: Good  Problem List: Decreased strength,Impaired balance,Decreased endurance,Decreased mobility  Assessment: Pt is 78 y o  male seen for high-complexity PT evaluation on 1/3/2022 s/p admit to [facility] on 12/31/2021 w/ Pneumonia due to infectious organism  PT was consulted to assess pt's functional mobility and d/c needs  Order placed for PT eval and tx, w/ up and OOB as tolerated order  PTA, pt resides c wife in bi level home c 4 ISAIAS; flight down to lower level rec room area; typically ambulates without AD, does have access to a RW; retired, (+)   At time of eval, pt performing bed mobility at SBA level, CGA for transfers and household distance gait trial without overt LOB observed   Upon evaluation, pt presenting with impaired functional mobility d/t decreased strength, decreased endurance, impaired balance, decreased mobility and activity intolerance  Pertinent PMHx and current co-morbidities affecting pt's physical performance at time of assessment include: HTN, esophageal dysphagia, COVID, tongue carcinoma, dizziness, idiopathic hypotension, hematuria, CKD stage 3, fatigue, SOB  Personal factors affecting pt at time of eval include: inaccessible home environment, ambulating w/ assistive device, stairs to enter home and inability to navigate community distances  The following objective measures performed on IE also reveal limitations: Barthel Index: 60/100, Modified Oliver: 3 (moderate disability) and AM-PAC 6-Clicks: 85/41  Pt's clinical presentation is currently unstable/unpredictable seen in pt's presentation of abnormal lab value(s), ongoing medical assessment and need for O2 NC  Overall, pt's rehab potential and prognosis to return to PLOF is good as impacted by objective findings, warranting pt to receive further skilled PT interventions to address identified impairments, activity limitation(s), and participation restriction(s)  Pt to benefit from continued PT tx to address deficits as defined above and maximize level of functional independent mobility and consistency  From PT/mobility standpoint, recommendation at time of d/c would be home with home health rehabilitation pending progress in order to facilitate return to PLOF  Barriers to Discharge: Inaccessible home environment,Decreased caregiver support        PT Discharge Recommendation: Home with home health rehabilitation          See flowsheet documentation for full assessment

## 2022-01-03 NOTE — ASSESSMENT & PLAN NOTE
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

## 2022-01-03 NOTE — PROGRESS NOTES
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), 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?)  With baseline WBC count of around 5-6  Presented with a WBC count of 3 07, with a tico of approximately 1 66, but has improved to 3 on today's assessment  Leukopenia likely multifactorial in etiology, with consideration given to previous marrow involvement of myeloma and acute infection and side-effect of ceftriaxone  - Avoid marrow- suppressing agents: Dexamethasone started given current oxygen requirements in the setting of COVID 19 infection so will continue to monitor   - Will continue to monitor WBC count  - Heme-onc follow-up on discharge  Acute renal failure superimposed on chronic kidney disease [facility])  Assessment & Plan  Lab Results   Component Value Date    EGFR 42 01/02/2022    EGFR 42 01/01/2022    EGFR 29 12/31/2021    CREATININE 1 59 (H) 01/02/2022    CREATININE 1 59 (H) 01/01/2022    CREATININE 2 12 (H) 12/31/2021   · Presented with a creatinine 2 12  · Baseline is 1 6  Current creatinine approximates presumed baseline at 1 51 today  · Endorse a history of poor po intake secondary to dysphagia  · Will continue to monitor with a m  CMP  COVID-19 with hypoxic respiratory failure  Assessment & Plan  · Patient was exposed to COVID-19 on 12/25/2021  Entire family current has COVID  Patient did receive 1st 2 doses of Pfizer vaccine  · Patient tested positive on 12/30 for COVID-19  · Chest x-ray showed no evidence of COVID-19 pneumonia  · Currently saturating in the low 90's and remains on 2L/min of supplementation via NC    · Continue Dexamethasone 6 mg qd and remdesivir (Day 3)  · Encourage incentive spirometry use  Esophageal dysphagia  Assessment & Plan  · Patient reports esophageal dysphagia  This is due to a past diagnosis of cancer of the tongue  Also has evidence of Schatzki ring on recent endoscopy  Patient did say that he needs pureed diet  t   · Evaluated by speech therapy -->   · Dysphagia 1- pureed diet with pudding thick liquids  Wife has challenged dietary recommendations  Underwent re-evaluation today, with recommendations made to consider adjusting to pureed diet plus thin liquids  · Would benefit from additional objective imaging once no longer infective from COVID standpoint  Essential hypertension  Assessment & Plan  · Patient is not on any home medications for hypertension  On presentation hypotensive which improved with fluid resuscitation  · Hypotensive on today's assessment and reports dizziness  BP approximately 80/50 on bedside assessment  Will provide 500 cc bolus of LR and monitor for improvement in BP  · Will continue to monitor blood pressures with regular vitals checks  * Pneumonia due to infectious organism  Assessment & Plan  · Patient patient was seen in urgent care on 12/18/2021  Diagnosed with bacterial pneumonia tested negative for COVID  He was started on Levaquin 500 mg p o  Patient states that he did finish the course of the Levaquin but that he is still having symptoms of pneumonia including weakness, fevers, chills, and a productive cough with purulent sputum  · CXR without consolidation or GGO  Remains afebrile  Procalcitonin slight elevated on presentation but trended down on subsequent measures  Blood cultures currently show no growth  Sputum cultures pending  Leukopenic at baseline but noted for acute drop on initial presentation, but WBC is trending up  Ceftriaxone discontinued on 1/02 after 2 doses as can contribute to leukopenia  Will continue to monitor        VTE Pharmacologic Prophylaxis:   Pharmacologic: Heparin  Mechanical VTE Prophylaxis in Place: Yes    Patient Centered Rounds: I have performed bedside rounds with nursing staff today  Discussions with Specialists or Other Care Team Provider: None  Education and Discussions with Family / Patient:     Time Spent for Care: 45 minutes  More than 50% of total time spent on counseling and coordination of care as described above  Current Length of Stay: 2 day(s)    Current Patient Status: Inpatient   Certification Statement: The patient will continue to require additional inpatient hospital stay due to Management of COVID 19 infection  Discharge Plan / Estimated Discharge Date:       Code Status: Level 1 - Full Code      Subjective:   Patient interviewed today at the bedside  States that he feels dizzy  Denies any chest pain, palpitations or shortness of breath  Objective:     Vitals:   Temp (24hrs), Av 6 °F (36 4 °C), Min:97 5 °F (36 4 °C), Max:97 7 °F (36 5 °C)    Temp:  [97 5 °F (36 4 °C)-97 7 °F (36 5 °C)] 97 6 °F (36 4 °C)  HR:  [56-65] 59  Resp:  [16-18] 18  BP: (100-111)/(61-65) 111/65  SpO2:  [90 %-94 %] 90 %  Body mass index is 22 33 kg/m²  Input and Output Summary (last 24 hours): Intake/Output Summary (Last 24 hours) at 1/3/2022 1418  Last data filed at 1/3/2022 0441  Gross per 24 hour   Intake --   Output 300 ml   Net -300 ml       Physical Exam:     Physical Exam  Vitals reviewed  Constitutional:       Appearance: Normal appearance  Interventions: Nasal cannula in place  HENT:      Head: Normocephalic and atraumatic  Mouth/Throat:      Mouth: Mucous membranes are moist    Eyes:      Extraocular Movements: Extraocular movements intact  Cardiovascular:      Rate and Rhythm: Normal rate and regular rhythm  Heart sounds: S1 normal and S2 normal    Pulmonary:      Effort: Pulmonary effort is normal       Breath sounds: Normal breath sounds     Abdominal: Palpations: Abdomen is soft  Musculoskeletal:         General: Normal range of motion  Cervical back: Neck supple  Right lower leg: No edema  Left lower leg: No edema  Skin:     General: Skin is warm and dry  Neurological:      Mental Status: He is alert and oriented to person, place, and time  Psychiatric:         Mood and Affect: Mood normal          Behavior: Behavior normal          Additional Data:     Labs:    Results from last 7 days   Lab Units 01/03/22  0434   WBC Thousand/uL 3 00*   HEMOGLOBIN g/dL 14 4   HEMATOCRIT % 43 3   PLATELETS Thousands/uL 296   NEUTROS PCT % 69   LYMPHS PCT % 19   MONOS PCT % 11   EOS PCT % 0     Results from last 7 days   Lab Units 01/03/22  0429 01/02/22  0528 01/01/22  0518   POTASSIUM mmol/L 4 5   < > 4 0   CHLORIDE mmol/L 108   < > 104   CO2 mmol/L 25   < > 24   BUN mg/dL 44*   < > 35*   CREATININE mg/dL 1 48*   < > 1 59*   CALCIUM mg/dL 9 1   < > 8 4   ALK PHOS U/L  --   --  48   ALT U/L  --   --  11*   AST U/L  --   --  24    < > = values in this interval not displayed  Results from last 7 days   Lab Units 12/31/21  1350   INR  1 06       * I Have Reviewed All Lab Data Listed Above  * Additional Pertinent Lab Tests Reviewed: All Labs Within Last 24 Hours Reviewed    Imaging:    Imaging Reports Reviewed Today Include: None  Imaging Personally Reviewed by Myself Includes:  None  Recent Cultures (last 7 days):     Results from last 7 days   Lab Units 12/31/21  1350   BLOOD CULTURE  No Growth at 48 hrs  No Growth at 48 hrs         Last 24 Hours Medication List:   Current Facility-Administered Medications   Medication Dose Route Frequency Provider Last Rate    albuterol  2 puff Inhalation Q6H PRN Nadia Michaud PA-C      aspirin  81 mg Oral Daily [address]      dexamethasone  6 mg Intravenous Q24H Aida Thakkar MD      heparin (porcine)  5,000 Units Subcutaneous [facility] Jorgito Cheng PA-C      ondansetron  4 mg Intravenous Q6H PRN Urmila Kang PA-C      pravastatin  20 mg Oral Every Other Day Urmila Kang PA-C      remdesivir  100 mg Intravenous Q24H Calvin Avilez MD      sodium chloride (PF)  3 mL Intravenous Q12H [address] Urmila Kang PA-C          Today, Patient Was Seen By: Calvin Avilez MD    ** Please Note: Dragon 360 Dictation voice to text software may have been used in the creation of this document   **

## 2022-01-04 VITALS
SYSTOLIC BLOOD PRESSURE: 117 MMHG | BODY MASS INDEX: 21.98 KG/M2 | OXYGEN SATURATION: 95 % | RESPIRATION RATE: 18 BRPM | HEART RATE: 56 BPM | WEIGHT: 148.37 LBS | TEMPERATURE: 97.6 F | HEIGHT: 69 IN | DIASTOLIC BLOOD PRESSURE: 62 MMHG

## 2022-01-04 LAB
ANION GAP SERPL CALCULATED.3IONS-SCNC: 9 MMOL/L (ref 4–13)
BASOPHILS # BLD AUTO: 0 THOUSANDS/ΜL (ref 0–0.1)
BASOPHILS NFR BLD AUTO: 0 % (ref 0–1)
BUN SERPL-MCNC: 43 MG/DL (ref 5–25)
CALCIUM SERPL-MCNC: 8.9 MG/DL (ref 8.3–10.1)
CHLORIDE SERPL-SCNC: 108 MMOL/L (ref 100–108)
CO2 SERPL-SCNC: 26 MMOL/L (ref 21–32)
CREAT SERPL-MCNC: 1.24 MG/DL (ref 0.6–1.3)
EOSINOPHIL # BLD AUTO: 0 THOUSAND/ΜL (ref 0–0.61)
EOSINOPHIL NFR BLD AUTO: 0 % (ref 0–6)
ERYTHROCYTE [DISTWIDTH] IN BLOOD BY AUTOMATED COUNT: 13.5 % (ref 11.6–15.1)
GFR SERPL CREATININE-BSD FRML MDRD: 56 ML/MIN/1.73SQ M
GLUCOSE SERPL-MCNC: 112 MG/DL (ref 65–140)
HCT VFR BLD AUTO: 42.3 % (ref 36.5–49.3)
HGB BLD-MCNC: 14 G/DL (ref 12–17)
IMM GRANULOCYTES # BLD AUTO: 0.06 THOUSAND/UL (ref 0–0.2)
IMM GRANULOCYTES NFR BLD AUTO: 2 % (ref 0–2)
LYMPHOCYTES # BLD AUTO: 0.69 THOUSANDS/ΜL (ref 0.6–4.47)
LYMPHOCYTES NFR BLD AUTO: 22 % (ref 14–44)
MCH RBC QN AUTO: 27.4 PG (ref 26.8–34.3)
MCHC RBC AUTO-ENTMCNC: 33.1 G/DL (ref 31.4–37.4)
MCV RBC AUTO: 83 FL (ref 82–98)
MONOCYTES # BLD AUTO: 0.48 THOUSAND/ΜL (ref 0.17–1.22)
MONOCYTES NFR BLD AUTO: 15 % (ref 4–12)
NEUTROPHILS # BLD AUTO: 1.93 THOUSANDS/ΜL (ref 1.85–7.62)
NEUTS SEG NFR BLD AUTO: 61 % (ref 43–75)
NRBC BLD AUTO-RTO: 0 /100 WBCS
PLATELET # BLD AUTO: 272 THOUSANDS/UL (ref 149–390)
PMV BLD AUTO: 8.9 FL (ref 8.9–12.7)
POTASSIUM SERPL-SCNC: 4.6 MMOL/L (ref 3.5–5.3)
RBC # BLD AUTO: 5.11 MILLION/UL (ref 3.88–5.62)
SODIUM SERPL-SCNC: 143 MMOL/L (ref 136–145)
WBC # BLD AUTO: 3.16 THOUSAND/UL (ref 4.31–10.16)

## 2022-01-04 PROCEDURE — 80048 BASIC METABOLIC PNL TOTAL CA: CPT

## 2022-01-04 PROCEDURE — 85025 COMPLETE CBC W/AUTO DIFF WBC: CPT | Performed by: INTERNAL MEDICINE

## 2022-01-04 PROCEDURE — 99239 HOSP IP/OBS DSCHRG MGMT >30: CPT | Performed by: INTERNAL MEDICINE

## 2022-01-04 RX ADMIN — ASPIRIN 81 MG: 81 TABLET, COATED ORAL at 08:42

## 2022-01-04 RX ADMIN — PRAVASTATIN SODIUM 20 MG: 20 TABLET ORAL at 08:42

## 2022-01-04 NOTE — PLAN OF CARE
Problem: MOBILITY - ADULT  Goal: Maintain or return to baseline ADL function  Description: INTERVENTIONS:  -  Assess patient's ability to carry out ADLs; assess patient's baseline for ADL function and identify physical deficits which impact ability to perform ADLs (bathing, care of mouth/teeth, toileting, grooming, dressing, etc )  - Assess/evaluate cause of self-care deficits   - Assess range of motion  - Assess patient's mobility; develop plan if impaired  - Assess patient's need for assistive devices and provide as appropriate  - Encourage maximum independence but intervene and supervise when necessary  - Involve family in performance of ADLs  - Assess for home care needs following discharge   - Consider OT consult to assist with ADL evaluation and planning for discharge  - Provide patient education as appropriate  Outcome: Progressing  Goal: Maintains/Returns to pre admission functional level  Description: INTERVENTIONS:  - Perform BMAT or MOVE assessment daily    - Set and communicate daily mobility goal to care team and patient/family/caregiver  - Collaborate with rehabilitation services on mobility goals if consulted  - Perform Range of Motion  times a day  - Reposition patient every  hours    - Dangle patient  times a day  - Stand patient  times a day  - Ambulate patient  times a day  - Out of bed to chair  times a day   - Out of bed for meals  times a day  - Out of bed for toileting  - Record patient progress and toleration of activity level   Outcome: Progressing     Problem: Potential for Falls  Goal: Patient will remain free of falls  Description: INTERVENTIONS:  - Educate patient/family on patient safety including physical limitations  - Instruct patient to call for assistance with activity   - Consult OT/PT to assist with strengthening/mobility   - Keep Call bell within reach  - Keep bed low and locked with side rails adjusted as appropriate  - Keep care items and personal belongings within reach  - Initiate and maintain comfort rounds  - Make Fall Risk Sign visible to staff  - Offer Toileting every  Hours, in advance of need  - Initiate/Maintain alarm  - Obtain necessary fall risk management equipment:   - Apply yellow socks and bracelet for high fall risk patients  - Consider moving patient to room near nurses station  Outcome: Progressing     Problem: PAIN - ADULT  Goal: Verbalizes/displays adequate comfort level or baseline comfort level  Description: Interventions:  - Encourage patient to monitor pain and request assistance  - Assess pain using appropriate pain scale  - Administer analgesics based on type and severity of pain and evaluate response  - Implement non-pharmacological measures as appropriate and evaluate response  - Consider cultural and social influences on pain and pain management  - Notify physician/advanced practitioner if interventions unsuccessful or patient reports new pain  Outcome: Progressing     Problem: INFECTION - ADULT  Goal: Absence or prevention of progression during hospitalization  Description: INTERVENTIONS:  - Assess and monitor for signs and symptoms of infection  - Monitor lab/diagnostic results  - Monitor all insertion sites, i e  indwelling lines, tubes, and drains  - Monitor endotracheal if appropriate and nasal secretions for changes in amount and color  - [place] appropriate cooling/warming therapies per order  - Administer medications as ordered  - Instruct and encourage patient and family to use good hand hygiene technique  - Identify and instruct in appropriate isolation precautions for identified infection/condition  Outcome: Progressing  Goal: Absence of fever/infection during neutropenic period  Description: INTERVENTIONS:  - Monitor WBC    Outcome: Progressing     Problem: CARDIOVASCULAR - ADULT  Goal: Maintains optimal cardiac output and hemodynamic stability  Description: INTERVENTIONS:  - Monitor I/O, vital signs and rhythm  - Monitor for S/S and trends of decreased cardiac output  - Administer and titrate ordered vasoactive medications to optimize hemodynamic stability  - Assess quality of pulses, skin color and temperature  - Assess for signs of decreased coronary artery perfusion  - Instruct patient to report change in severity of symptoms  Outcome: Progressing     Problem: METABOLIC, FLUID AND ELECTROLYTES - ADULT  Goal: Electrolytes maintained within normal limits  Description: INTERVENTIONS:  - Monitor labs and assess patient for signs and symptoms of electrolyte imbalances  - Administer electrolyte replacement as ordered  - Monitor response to electrolyte replacements, including repeat lab results as appropriate  - Instruct patient on fluid and nutrition as appropriate  Outcome: Progressing

## 2022-01-04 NOTE — DISCHARGE INSTR - DIET
Essentially pureed diet (OK for banana, cottage cheese, mashed baked beans),   Thin liquid  Small sips

## 2022-01-04 NOTE — PLAN OF CARE
Problem: MOBILITY - ADULT  Goal: Maintain or return to baseline ADL function  Description: INTERVENTIONS:  -  Assess patient's ability to carry out ADLs; assess patient's baseline for ADL function and identify physical deficits which impact ability to perform ADLs (bathing, care of mouth/teeth, toileting, grooming, dressing, etc )  - Assess/evaluate cause of self-care deficits   - Assess range of motion  - Assess patient's mobility; develop plan if impaired  - Assess patient's need for assistive devices and provide as appropriate  - Encourage maximum independence but intervene and supervise when necessary  - Involve family in performance of ADLs  - Assess for home care needs following discharge   - Consider OT consult to assist with ADL evaluation and planning for discharge  - Provide patient education as appropriate  1/4/2022 1516 by Eve Leiva RN  Outcome: Adequate for Discharge  1/4/2022 0856 by Eve Leiva RN  Outcome: Progressing  Goal: Maintains/Returns to pre admission functional level  Description: INTERVENTIONS:  - Perform BMAT or MOVE assessment daily    - Set and communicate daily mobility goal to care team and patient/family/caregiver  - Collaborate with rehabilitation services on mobility goals if consulted  - Perform Range of Motion  times a day  - Reposition patient every  hours    - Dangle patient  times a day  - Stand patient times a day  - Ambulate patient  times a day  - Out of bed to chair  times a day   - Out of bed for meals  times a day  - Out of bed for toileting  - Record patient progress and toleration of activity level   1/4/2022 1516 by Eve Leiva RN  Outcome: Adequate for Discharge  1/4/2022 0856 by Eve Leiva RN  Outcome: Progressing     Problem: Potential for Falls  Goal: Patient will remain free of falls  Description: INTERVENTIONS:  - Educate patient/family on patient safety including physical limitations  - Instruct patient to call for assistance with activity   - Consult OT/PT to assist with strengthening/mobility   - Keep Call bell within reach  - Keep bed low and locked with side rails adjusted as appropriate  - Keep care items and personal belongings within reach  - Initiate and maintain comfort rounds  - Make Fall Risk Sign visible to staff  - Offer Toileting every  Hours, in advance of need  - Initiate/Maintaalarm  - Obtain necessary fall risk management equipment:   - Apply yellow socks and bracelet for high fall risk patients  - Consider moving patient to room near nurses station  1/4/2022 1516 by Sania Anne RN  Outcome: Adequate for Discharge  1/4/2022 0856 by Sania Anne RN  Outcome: Progressing     Problem: PAIN - ADULT  Goal: Verbalizes/displays adequate comfort level or baseline comfort level  Description: Interventions:  - Encourage patient to monitor pain and request assistance  - Assess pain using appropriate pain scale  - Administer analgesics based on type and severity of pain and evaluate response  - Implement non-pharmacological measures as appropriate and evaluate response  - Consider cultural and social influences on pain and pain management  - Notify physician/advanced practitioner if interventions unsuccessful or patient reports new pain  1/4/2022 1516 by Sania Anne RN  Outcome: Adequate for Discharge  1/4/2022 0856 by Sania Anne RN  Outcome: Progressing     Problem: INFECTION - ADULT  Goal: Absence or prevention of progression during hospitalization  Description: INTERVENTIONS:  - Assess and monitor for signs and symptoms of infection  - Monitor lab/diagnostic results  - Monitor all insertion sites, i e  indwelling lines, tubes, and drains  - Monitor endotracheal if appropriate and nasal secretions for changes in amount and color  - Deal appropriate cooling/warming therapies per order  - Administer medications as ordered  - Instruct and encourage patient and family to use good hand hygiene technique  - Identify and instruct in appropriate isolation precautions for identified infection/condition  1/4/2022 1516 by Jaylene Haque RN  Outcome: Adequate for Discharge  1/4/2022 0856 by Jaylene Haque RN  Outcome: Progressing  Goal: Absence of fever/infection during neutropenic period  Description: INTERVENTIONS:  - Monitor WBC    1/4/2022 1516 by Jaylene Haque RN  Outcome: Adequate for Discharge  1/4/2022 0856 by Jaylene Haque RN  Outcome: Progressing     Problem: DISCHARGE PLANNING  Goal: Discharge to home or other facility with appropriate resources  Description: INTERVENTIONS:  - Identify barriers to discharge w/patient and caregiver  - Arrange for needed discharge resources and transportation as appropriate  - Identify discharge learning needs (meds, wound care, etc )  - Arrange for interpretive services to assist at discharge as needed  - Refer to Case Management Department for coordinating discharge planning if the patient needs post-hospital services based on physician/advanced practitioner order or complex needs related to functional status, cognitive ability, or social support system  1/4/2022 1516 by Jaylene Haque RN  Outcome: Adequate for Discharge  1/4/2022 0856 by Jaylene Haque RN  Outcome: Progressing     Problem: CARDIOVASCULAR - ADULT  Goal: Maintains optimal cardiac output and hemodynamic stability  Description: INTERVENTIONS:  - Monitor I/O, vital signs and rhythm  - Monitor for S/S and trends of decreased cardiac output  - Administer and titrate ordered vasoactive medications to optimize hemodynamic stability  - Assess quality of pulses, skin color and temperature  - Assess for signs of decreased coronary artery perfusion  - Instruct patient to report change in severity of symptoms  1/4/2022 1516 by Jaylene Haque RN  Outcome: Adequate for Discharge  1/4/2022 0856 by Jaylene Haque RN  Outcome: Progressing     Problem: RESPIRATORY - ADULT  Goal: Achieves optimal ventilation and oxygenation  Description: INTERVENTIONS:  - Assess for changes in respiratory status  - Assess for changes in mentation and behavior  - Position to facilitate oxygenation and minimize respiratory effort  - Oxygen administered by appropriate delivery if ordered  - Initiate smoking cessation education as indicated  - Encourage broncho-pulmonary hygiene including cough, deep breathe, Incentive Spirometry  - Assess the need for suctioning and aspirate as needed  - Assess and instruct to report SOB or any respiratory difficulty  - Respiratory Therapy support as indicated  1/4/2022 1516 by Ruddy Sandhu RN  Outcome: Adequate for Discharge  1/4/2022 0856 by Ruddy Sandhu RN  Outcome: Progressing     Problem: METABOLIC, FLUID AND ELECTROLYTES - ADULT  Goal: Electrolytes maintained within normal limits  Description: INTERVENTIONS:  - Monitor labs and assess patient for signs and symptoms of electrolyte imbalances  - Administer electrolyte replacement as ordered  - Monitor response to electrolyte replacements, including repeat lab results as appropriate  - Instruct patient on fluid and nutrition as appropriate  1/4/2022 1516 by Ruddy Sandhu RN  Outcome: Adequate for Discharge  1/4/2022 0856 by Ruddy Sandhu RN  Outcome: Progressing     Problem: HEMATOLOGIC - ADULT  Goal: Maintains hematologic stability  Description: INTERVENTIONS  - Assess for signs and symptoms of bleeding or hemorrhage  - Monitor labs  - Administer supportive blood products/factors as ordered and appropriate  1/4/2022 1516 by Ruddy Sandhu RN  Outcome: Adequate for Discharge  1/4/2022 0856 by Ruddy Sandhu RN  Outcome: Progressing     Problem: Nutrition/Hydration-ADULT  Goal: Nutrient/Hydration intake appropriate for improving, restoring or maintaining nutritional needs  Description: Monitor and assess patient's nutrition/hydration status for malnutrition  Collaborate with interdisciplinary team and initiate plan and interventions as ordered    Monitor patient's weight and dietary intake as ordered or per policy  Utilize nutrition screening tool and intervene as necessary  Determine patient's food preferences and provide high-protein, high-caloric foods as appropriate       INTERVENTIONS:  - Monitor oral intake, urinary output, labs, and treatment plans  - Assess nutrition and hydration status and recommend course of action  - Evaluate amount of meals eaten  - Assist patient with eating if necessary   - Allow adequate time for meals  - Recommend/ encourage appropriate diets, oral nutritional supplements, and vitamin/mineral supplements  - Order, calculate, and assess calorie counts as needed  - Recommend, monitor, and adjust tube feedings and TPN/PPN based on assessed needs  - Assess need for intravenous fluids  - Provide specific nutrition/hydration education as appropriate  - Include patient/family/caregiver in decisions related to nutrition  1/4/2022 1516 by Steven Perez RN  Outcome: Adequate for Discharge  1/4/2022 0856 by Steven Perez RN  Outcome: Progressing

## 2022-01-04 NOTE — ASSESSMENT & PLAN NOTE
· Patient was exposed to COVID-19 on 12/25/2021  Entire family current has COVID  Patient did receive 1st 2 doses of Pfizer vaccine  · Patient tested positive on 12/30 for COVID-19  · Chest x-ray showed no evidence of COVID-19 pneumonia  · Patient received 4 days of dexamethasone and remdesivir with improvements in respiratory status  Underwent home O2 evaluation if no requirements indicated

## 2022-01-04 NOTE — ASSESSMENT & PLAN NOTE
Lab Results   Component Value Date    EGFR 56 01/04/2022    EGFR 45 01/03/2022    EGFR 42 01/02/2022    CREATININE 1 24 01/04/2022    CREATININE 1 48 (H) 01/03/2022    CREATININE 1 59 (H) 01/02/2022   · Presented with a creatinine 2 12  Improved with IV fluids  Creatinine at baseline at the time of discharge

## 2022-01-04 NOTE — DISCHARGE SUMMARY
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?)  With baseline WBC count of around 5-6  Presented with a WBC count of 3 07, with a tico of approximately 1 66, but has improved to 3 on today's assessment  Leukopenia likely multifactorial in etiology, with consideration given to previous marrow involvement of myeloma and acute infection and side-effect of ceftriaxone  - Heme-onc follow-up on discharge  Acute renal failure superimposed on chronic kidney disease [facility])  Assessment & Plan  Lab Results   Component Value Date    EGFR 56 01/04/2022    EGFR 45 01/03/2022    EGFR 42 01/02/2022    CREATININE 1 24 01/04/2022    CREATININE 1 48 (H) 01/03/2022    CREATININE 1 59 (H) 01/02/2022   · Presented with a creatinine 2 12  Improved with IV fluids  Creatinine at baseline at the time of discharge  COVID-19 with hypoxic respiratory failure  Assessment & Plan  · Patient was exposed to COVID-19 on 12/25/2021  Entire family current has COVID  Patient did receive 1st 2 doses of Pfizer vaccine  · Patient tested positive on 12/30 for COVID-19  · Chest x-ray showed no evidence of COVID-19 pneumonia  · Patient received 4 days of dexamethasone and remdesivir with improvements in respiratory status  Underwent home O2 evaluation if no requirements indicated  Esophageal dysphagia  Assessment & Plan  · Patient reports esophageal dysphagia  This is due to a past diagnosis of cancer of the tongue  Also has evidence of Schatzki ring on recent endoscopy  Patient did say that he needs pureed diet    · Evaluated by speech therapy -->   · Dysphagia 1- pureed diet with pudding thick liquids  Wife has challenged dietary recommendations  Underwent re-evaluation today, with recommendations made to consider adjusting to pureed diet plus thin liquids  · Would benefit from additional objective imaging once no longer infective from COVID standpoint  Essential hypertension  Assessment & Plan  · Patient is not on any home medications for hypertension  On presentation hypotensive which improved with fluid resuscitation  · The patient was noted for hypertension and ascites assessment, with improvement status post bolus of lactated Ringer's  Blood pressure on today's assessment remains appropriate  * Pneumonia due to infectious organism  Assessment & Plan  · Patient patient was seen in urgent care on 12/18/2021  Diagnosed with bacterial pneumonia tested negative for COVID  He was started on Levaquin 500 mg p o  Patient states that he did finish the course of the Levaquin but that he is still having symptoms of pneumonia including weakness, fevers, chills, and a productive cough with purulent sputum  · CXR without consolidation or GGO  Remained afebrile  Procalcitonin slight elevated on presentation but trended down on subsequent measures  Blood cultures without growth for greater than 72 hours  Sputum cultures pending  Leukopenic at baseline but noted for acute drop on initial presentation, but WBC is trending up  Ceftriaxone discontinued on 1/02 after 2 doses as can contribute to leukopenia  PCP: Edwige Sorenson MD  Admission Date: 12/31/2021  Discharge Date: 01/04/22    Disposition:     Home    Reason for Admission:  Respiratory distress    Consultations During Hospital Stay:  · Speech & Language Pathology  Procedures Performed:     · None  Primary diagnosis:    COVID-19 pneumonia  Secondary diagnosis:   Community-acquired pneumonia    Significant Findings / Test Results:     · None      Incidental Findings:   · None  Test Results Pending at Discharge (will require follow up): · None  Outpatient Tests Requested:  · None  Follow-up with PCP within one week  Complications:  None  HPI:    Adapted from admission H&P; Christophe Kapoor is a 78 y o  male with a PMH of cancer of the tongue, hyperlipidemia, hypertension, stage 3 chronic kidney disease, recent pneumonia who presents with cough and fatigue  The patient was seen on 12/18/2021 for similar symptoms at urgent care  He was diagnosed with bacterial pneumonia and tested negative for COVID-19  The patient was started on Levaquin 500 mg p o  For 1 week  He states that he completed the course of treatment but he is still having the symptoms  As well he report/that he was exposed to COVID at a family gathering  He reports that he is vaccinated with his 1st 2 doses of the Pfizer vaccine  But did not receive his booster  He reports fatigue, weakness, productive cough with purulent sputum  He denies any sore throat loss of taste or smell headaches or lightheadedness  In the ER he tested positive for COVID-19  He has not been on any oxygen since arriving in the ER, and his current saturation is 97% on room air  He states that he did have fevers and chills earlier this week but does not feel them anymore  His main symptom is the productive cough and fatigue  As well patient states that he has not been drinking any water due to feeling ill and has not been eating much since his illness began  His creatinine was at 2 12  Which is elevated from his baseline     Hospital Course:     Mr Vinnie Marquez was admitted to the hospitalist service for management of community-acquired pneumonia  Patient met criteria for SVITLANA on presentation, for which IV fluids were provided  Procalcitonin was slightly elevated and trended  Patient was noted for a history of chronic leukopenia, with acute drop from his baseline   Patient initially required no supplemental oxygen, hence initiation of COVID treatment with dexamethasone and remdesivir was held  Patient was managed with ceftriaxone for community-acquired pneumonia, however on the 2nd day of admission, patient developed hypoxia requiring 1 to 2 liters/minute of supplemental oxygen  Patient was then started on remdesivir and dexamethasone 6 mg daily  Review of patient's chest x-ray, however,  showed no stereotypical findings of COVID-19 pneumonia, and was generally unremarkable  Patient was noted for a history of dysphagia, with a background of tongue cancer, hiatal hernia and skatzi ring on recent EGD  Patient underwent SLP evaluation, with recommendations made for pureed consistency with thin liquids on final assessment  Ceftriaxone was discontinued after 2 days of treatment, as procalcitonin trended downwards, and patient remained afebrile; consideration was also given to ceftriaxone contributing to leukopenia  Patient respiratory status gradually improved during hospitalization course, and patient underwent evaluation for supplemental oxygen on discharge, with no requirement as indicated  SVITLANA resolved  Patient was discharged with recommendations made follow-up with his primary care physician within 1 week of discharge  Condition at Discharge: good     Discharge Day Visit / Exam:     Subjective:  No acute issues reported prior to discharge  Vitals: Blood Pressure: 117/62 (01/04/22 0753)  Pulse: 56 (01/04/22 0753)  Temperature: 97 6 °F (36 4 °C) (01/03/22 2038)  Temp Source: Oral (01/03/22 2038)  Respirations: 18 (01/03/22 2038)  Height: 5' 9" (175 3 cm) (01/01/22 1934)  Weight - Scale: 67 3 kg (148 lb 5 9 oz) (01/04/22 0535)  SpO2: 95 % (01/04/22 0753)  Exam:   Physical Exam  Vitals reviewed  Constitutional:       Appearance: Normal appearance  HENT:      Head: Normocephalic and atraumatic        Mouth/Throat:      Mouth: Mucous membranes are moist    Eyes:      Extraocular Movements: Extraocular movements intact  Cardiovascular:      Rate and Rhythm: Normal rate and regular rhythm  Heart sounds: S1 normal and S2 normal    Pulmonary:      Effort: Pulmonary effort is normal       Breath sounds: Normal breath sounds  Abdominal:      Palpations: Abdomen is soft  Musculoskeletal:         General: Normal range of motion  Cervical back: Neck supple  Right lower leg: No edema  Left lower leg: No edema  Skin:     General: Skin is warm and dry  Neurological:      Mental Status: He is alert and oriented to person, place, and time  Psychiatric:         Mood and Affect: Mood normal          Behavior: Behavior normal        Discussion with Family: None  Discharge instructions/Information to patient and family:   See after visit summary for information provided to patient and family  Provisions for Follow-Up Care:  See after visit summary for information related to follow-up care and any pertinent home health orders  Planned Readmission: None  Discharge Medications:  See after visit summary for reconciled discharge medications provided to patient and family        ** Please Note: This note has been constructed using a voice recognition system **

## 2022-01-04 NOTE — ASSESSMENT & PLAN NOTE
· Patient patient was seen in urgent care on 12/18/2021  Diagnosed with bacterial pneumonia tested negative for COVID  He was started on Levaquin 500 mg p o  Patient states that he did finish the course of the Levaquin but that he is still having symptoms of pneumonia including weakness, fevers, chills, and a productive cough with purulent sputum  · CXR without consolidation or GGO  Remained afebrile  Procalcitonin slight elevated on presentation but trended down on subsequent measures  Blood cultures without growth for greater than 72 hours  Sputum cultures pending  Leukopenic at baseline but noted for acute drop on initial presentation, but WBC is trending up  Ceftriaxone discontinued on 1/02 after 2 doses as can contribute to leukopenia

## 2022-01-04 NOTE — ASSESSMENT & PLAN NOTE
· Patient reports esophageal dysphagia  This is due to a past diagnosis of cancer of the tongue  Also has evidence of Schatzki ring on recent endoscopy  Patient did say that he needs pureed diet  · Evaluated by speech therapy -->   · Dysphagia 1- pureed diet with pudding thick liquids  Wife has challenged dietary recommendations  Underwent re-evaluation today, with recommendations made to consider adjusting to pureed diet plus thin liquids  · Would benefit from additional objective imaging once no longer infective from COVID standpoint

## 2022-01-04 NOTE — PLAN OF CARE
Problem: MOBILITY - ADULT  Goal: Maintain or return to baseline ADL function  Description: INTERVENTIONS:  -  Assess patient's ability to carry out ADLs; assess patient's baseline for ADL function and identify physical deficits which impact ability to perform ADLs (bathing, care of mouth/teeth, toileting, grooming, dressing, etc )  - Assess/evaluate cause of self-care deficits   - Assess range of motion  - Assess patient's mobility; develop plan if impaired  - Assess patient's need for assistive devices and provide as appropriate  - Encourage maximum independence but intervene and supervise when necessary  - Involve family in performance of ADLs  - Assess for home care needs following discharge   - Consider OT consult to assist with ADL evaluation and planning for discharge  - Provide patient education as appropriate  Outcome: Progressing  Goal: Maintains/Returns to pre admission functional level  Description: INTERVENTIONS:  - Perform BMAT or MOVE assessment daily    - Set and communicate daily mobility goal to care team and patient/family/caregiver  - Collaborate with rehabilitation services on mobility goals if consulted  - Perform Range of Motion  times a day  - Reposition patient every  hours    - Dangle patient  times a day  - Stand patient  times a day  - Ambulate patient  times a day  - Out of bed to chair  times a day   - Out of bed for meals  times a day  - Out of bed for toileting  - Record patient progress and toleration of activity level   Outcome: Progressing     Problem: Potential for Falls  Goal: Patient will remain free of falls  Description: INTERVENTIONS:  - Educate patient/family on patient safety including physical limitations  - Instruct patient to call for assistance with activity   - Consult OT/PT to assist with strengthening/mobility   - Keep Call bell within reach  - Keep bed low and locked with side rails adjusted as appropriate  - Keep care items and personal belongings within reach  - Initiate and maintain comfort rounds  - Make Fall Risk Sign visible to staff  - Offer Toileting every  Hours, in advance of need  - Initiate/Maintainalarm  - Obtain necessary fall risk management equipment  - Apply yellow socks and bracelet for high fall risk patients  - Consider moving patient to room near nurses station  Outcome: Progressing     Problem: PAIN - ADULT  Goal: Verbalizes/displays adequate comfort level or baseline comfort level  Description: Interventions:  - Encourage patient to monitor pain and request assistance  - Assess pain using appropriate pain scale  - Administer analgesics based on type and severity of pain and evaluate response  - Implement non-pharmacological measures as appropriate and evaluate response  - Consider cultural and social influences on pain and pain management  - Notify physician/advanced practitioner if interventions unsuccessful or patient reports new pain  Outcome: Progressing     Problem: INFECTION - ADULT  Goal: Absence or prevention of progression during hospitalization  Description: INTERVENTIONS:  - Assess and monitor for signs and symptoms of infection  - Monitor lab/diagnostic results  - Monitor all insertion sites, i e  indwelling lines, tubes, and drains  - Monitor endotracheal if appropriate and nasal secretions for changes in amount and color  - [place] appropriate cooling/warming therapies per order  - Administer medications as ordered  - Instruct and encourage patient and family to use good hand hygiene technique  - Identify and instruct in appropriate isolation precautions for identified infection/condition  Outcome: Progressing  Goal: Absence of fever/infection during neutropenic period  Description: INTERVENTIONS:  - Monitor WBC    Outcome: Progressing     Problem: DISCHARGE PLANNING  Goal: Discharge to home or other facility with appropriate resources  Description: INTERVENTIONS:  - Identify barriers to discharge w/patient and caregiver  - Arrange for needed discharge resources and transportation as appropriate  - Identify discharge learning needs (meds, wound care, etc )  - Arrange for interpretive services to assist at discharge as needed  - Refer to Case Management Department for coordinating discharge planning if the patient needs post-hospital services based on physician/advanced practitioner order or complex needs related to functional status, cognitive ability, or social support system  Outcome: Progressing     Problem: CARDIOVASCULAR - ADULT  Goal: Maintains optimal cardiac output and hemodynamic stability  Description: INTERVENTIONS:  - Monitor I/O, vital signs and rhythm  - Monitor for S/S and trends of decreased cardiac output  - Administer and titrate ordered vasoactive medications to optimize hemodynamic stability  - Assess quality of pulses, skin color and temperature  - Assess for signs of decreased coronary artery perfusion  - Instruct patient to report change in severity of symptoms  Outcome: Progressing     Problem: RESPIRATORY - ADULT  Goal: Achieves optimal ventilation and oxygenation  Description: INTERVENTIONS:  - Assess for changes in respiratory status  - Assess for changes in mentation and behavior  - Position to facilitate oxygenation and minimize respiratory effort  - Oxygen administered by appropriate delivery if ordered  - Initiate smoking cessation education as indicated  - Encourage broncho-pulmonary hygiene including cough, deep breathe, Incentive Spirometry  - Assess the need for suctioning and aspirate as needed  - Assess and instruct to report SOB or any respiratory difficulty  - Respiratory Therapy support as indicated  Outcome: Progressing     Problem: METABOLIC, FLUID AND ELECTROLYTES - ADULT  Goal: Electrolytes maintained within normal limits  Description: INTERVENTIONS:  - Monitor labs and assess patient for signs and symptoms of electrolyte imbalances  - Administer electrolyte replacement as ordered  - Monitor response to electrolyte replacements, including repeat lab results as appropriate  - Instruct patient on fluid and nutrition as appropriate  Outcome: Progressing     Problem: HEMATOLOGIC - ADULT  Goal: Maintains hematologic stability  Description: INTERVENTIONS  - Assess for signs and symptoms of bleeding or hemorrhage  - Monitor labs  - Administer supportive blood products/factors as ordered and appropriate  Outcome: Progressing     Problem: Nutrition/Hydration-ADULT  Goal: Nutrient/Hydration intake appropriate for improving, restoring or maintaining nutritional needs  Description: Monitor and assess patient's nutrition/hydration status for malnutrition  Collaborate with interdisciplinary team and initiate plan and interventions as ordered  Monitor patient's weight and dietary intake as ordered or per policy  Utilize nutrition screening tool and intervene as necessary  Determine patient's food preferences and provide high-protein, high-caloric foods as appropriate       INTERVENTIONS:  - Monitor oral intake, urinary output, labs, and treatment plans  - Assess nutrition and hydration status and recommend course of action  - Evaluate amount of meals eaten  - Assist patient with eating if necessary   - Allow adequate time for meals  - Recommend/ encourage appropriate diets, oral nutritional supplements, and vitamin/mineral supplements  - Order, calculate, and assess calorie counts as needed  - Recommend, monitor, and adjust tube feedings and TPN/PPN based on assessed needs  - Assess need for intravenous fluids  - Provide specific nutrition/hydration education as appropriate  - Include patient/family/caregiver in decisions related to nutrition  Outcome: Progressing

## 2022-01-04 NOTE — ASSESSMENT & PLAN NOTE
· Patient is not on any home medications for hypertension  On presentation hypotensive which improved with fluid resuscitation  · The patient was noted for hypertension and ascites assessment, with improvement status post bolus of lactated Ringer's  Blood pressure on today's assessment remains appropriate

## 2022-01-05 ENCOUNTER — TRANSITIONAL CARE MANAGEMENT (OUTPATIENT)
Dept: FAMILY MEDICINE CLINIC | Facility: CLINIC | Age: 77
End: 2022-01-05

## 2022-01-06 LAB
BACTERIA BLD CULT: NORMAL
BACTERIA BLD CULT: NORMAL

## 2022-01-07 ENCOUNTER — TELEPHONE (OUTPATIENT)
Dept: FAMILY MEDICINE CLINIC | Facility: CLINIC | Age: 77
End: 2022-01-07

## 2022-01-07 DIAGNOSIS — B37.0 ORAL CANDIDIASIS: Primary | ICD-10-CM

## 2022-01-07 RX ORDER — FLUCONAZOLE 150 MG/1
150 TABLET ORAL DAILY
Qty: 7 TABLET | Refills: 0 | Status: SHIPPED | OUTPATIENT
Start: 2022-01-07 | End: 2022-01-14

## 2022-01-07 NOTE — TELEPHONE ENCOUNTER
Shaq Vail, RN with  [facility] calls stating pt is having difficulty swallowing and white bumps on his tongue  Shaq Vail believes it is thrush  He is having difficulty eating and even drinking d/t swelling  Please send medication to [facility] and notify Shaq Vail @ [phone]   Thanks

## 2022-01-13 ENCOUNTER — TELEMEDICINE (OUTPATIENT)
Dept: FAMILY MEDICINE CLINIC | Facility: CLINIC | Age: 77
End: 2022-01-13
Payer: MEDICARE

## 2022-01-13 DIAGNOSIS — R53.83 FATIGUE, UNSPECIFIED TYPE: ICD-10-CM

## 2022-01-13 DIAGNOSIS — U07.1 COVID-19: Primary | ICD-10-CM

## 2022-01-13 PROCEDURE — G2012 BRIEF CHECK IN BY MD/QHP: HCPCS | Performed by: FAMILY MEDICINE

## 2022-01-13 NOTE — PROGRESS NOTES
Patient Name: eZnon Worthing     : 1945     MRN: [id number]    Assessment/Plan:    Problem List Items Addressed This Visit        Other    Fatigue    Relevant Orders    CBC and differential    Basic metabolic panel    NT-BNP PRO    TSH, 3rd generation with Free T4 reflex    CK (with reflex to MB)    XR chest pa & lateral    COVID-19 with hypoxic respiratory failure - Primary    Relevant Orders    CBC and differential    Basic metabolic panel    NT-BNP PRO    TSH, 3rd generation with Free T4 reflex    CK (with reflex to MB)    XR chest pa & lateral        COVID-19 PASC Plan       I spent 6 minutes directly with the patient during this visit     Subjective:    COVID-19 Infection:    Date of positive test: 2021    Initial symptoms with acute illness:  Initial symptoms included: cough, shortness of breath and fatigue  New or persistent symptoms:  Patient complains of: fatigue and weakness  Patient denies: shortness of breath, cough, joint pain, headache, appetite change, dizziness, diarrhea and nausea  Patient rates severity of current symptoms as moderate  Inpatient treatment:  - Patient hospitalized?: Yes    - Dates of hospitalization:  to 2022    Admitted to the ICU?: No      Intubated?: No    - Treatments received: Oxygen, IV/PO steroids and Remdesivir    Discharged on oxygen?: No      Remain on oxygen?: No      Discharged on anticoagulation?: No      Outpatient treatment:      Did patient receive monoclonal antibody therapy?: No      Review of Systems   Constitutional: Positive for fatigue  Negative for activity change, appetite change and fever  HENT: Negative for congestion and ear discharge  Respiratory: Negative for cough and shortness of breath  Cardiovascular: Negative for chest pain and palpitations  Gastrointestinal: Negative for diarrhea and nausea  Musculoskeletal: Negative for arthralgias and back pain  Skin: Negative for color change and rash  Neurological: Positive for weakness  Negative for dizziness and headaches  Psychiatric/Behavioral: Negative for agitation and behavioral problems  Patient Active Problem List   Diagnosis    Excessive salivation    Medicare annual wellness visit, subsequent    Colon cancer screening    Encounter for hepatitis C screening test for low risk patient    Screening, lipid    Abnormal kidney function    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[facility] )    Essential hypertension    Uncontrolled hypertension    Dizziness    History of head and neck radiation    Idiopathic hypotension    Dysuria    Weight loss    Overactive bladder    Benign localized prostatic hyperplasia with lower urinary tract symptoms (LUTS)    Hematuria    Exposure to SARS-associated coronavirus    CKD (chronic kidney disease) stage 3, GFR 30-59 ml/min ([facility])    Fatigue    Screening for lipid disorders    SOB (shortness of breath) on exertion    Screening for prostate cancer    Esophageal dysphagia    COVID-19 with hypoxic respiratory failure    Acute renal failure superimposed on chronic kidney disease ([facility] )    Pneumonia due to infectious organism    Leukopenia     Social History     Tobacco Use    Smoking status: Former Smoker    Smokeless tobacco: Former User   Vaping Use    Vaping Use: Never used   Substance Use Topics    Alcohol use: Yes     Comment: occasional    Drug use: Never      Objective: There were no vitals taken for this visit  Physical Exam  Neurological:      Mental Status: He is alert and oriented to person, place, and time           Ankit Carbajal MD

## 2022-01-19 ENCOUNTER — OFFICE VISIT (OUTPATIENT)
Dept: FAMILY MEDICINE CLINIC | Facility: CLINIC | Age: 77
End: 2022-01-19
Payer: MEDICARE

## 2022-01-19 VITALS
SYSTOLIC BLOOD PRESSURE: 122 MMHG | DIASTOLIC BLOOD PRESSURE: 78 MMHG | HEIGHT: 69 IN | TEMPERATURE: 98.6 F | WEIGHT: 141 LBS | HEART RATE: 64 BPM | BODY MASS INDEX: 20.88 KG/M2
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
## 2022-01-19 PROCEDURE — 99214 OFFICE O/P EST MOD 30 MIN: CPT | Performed by: PHYSICIAN ASSISTANT

## 2022-01-19 RX ORDER — AMOXICILLIN AND CLAVULANATE POTASSIUM 875; 125 MG/1; MG/1
1 TABLET, FILM COATED ORAL EVERY 12 HOURS SCHEDULED
Qty: 14 TABLET | Refills: 0 | Status: SHIPPED | OUTPATIENT
Start: 2022-01-19 | End: 2022-01-26

## 2022-01-19 RX ORDER — AMOXICILLIN AND CLAVULANATE POTASSIUM 875; 125 MG/1; MG/1
1 TABLET, FILM COATED ORAL EVERY 12 HOURS SCHEDULED
Qty: 14 TABLET | Refills: 0 | Status: SHIPPED | OUTPATIENT
Start: 2022-01-19 | End: 2022-01-19

## 2022-01-19 NOTE — PROGRESS NOTES
Assessment/Plan:       Problem List Items Addressed This Visit     None      Visit Diagnoses     Cat bite, initial encounter    -  Primary    Relevant Medications    amoxicillin-clavulanate (Augmentin) 875-125 mg per tablet        cat bite 24 hours ago  Cat was utd with vaccines  Pt due for tetanus immunization, will have done at pharmacy  Augmentin for 7 days  Wash with soap/water and change bandage daily  Strict return precautions provided     Subjective:      Patient ID: Graciela Parks is a 78 y o  male  Pt presents with concerns of a cat bite last night  He owns the cat  The cat has its vaccines  He washed the wound out initially with peroxide  He notes redness and some pustular discharge  No fevers or chills  Minimal pain  No weakness or numbness  The following portions of the patient's history were reviewed and updated as appropriate:   He  has a past medical history of 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[facility] ) (03/1999), Hyperlipidemia, Hypertension, Left anterior fascicular block, and Stage 3 chronic kidney disease ([facility] )    He   Patient Active Problem List    Diagnosis Date Noted    Leukopenia 01/01/2022    COVID-19 with hypoxic respiratory failure 12/31/2021    Acute renal failure superimposed on chronic kidney disease ([facility] ) 12/31/2021    Pneumonia due to infectious organism 12/31/2021    Esophageal dysphagia 09/08/2021    Screening for prostate cancer 08/30/2021    SOB (shortness of breath) on exertion 06/17/2020    Exposure to SARS-associated coronavirus 05/26/2020    CKD (chronic kidney disease) stage 3, GFR 30-59 ml/min ([facility] ) 05/26/2020    Fatigue 05/26/2020    Screening for lipid disorders 05/26/2020    Benign localized prostatic hyperplasia with lower urinary tract symptoms (LUTS) 10/15/2019    Hematuria 10/15/2019    Overactive bladder 10/09/2019    Idiopathic hypotension 07/31/2019    Dysuria 07/31/2019    Weight loss 07/31/2019    Uncontrolled hypertension 05/14/2019    Dizziness 05/14/2019    History of head and neck radiation 05/14/2019    Essential hypertension 05/06/2019    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[facility] ) 02/14/2019    Excessive salivation 01/10/2019    Medicare annual wellness visit, subsequent 01/10/2019    Colon cancer screening 01/10/2019    Encounter for hepatitis C screening test for low risk patient 01/10/2019    Screening, lipid 01/10/2019    Abnormal kidney function 01/10/2019     He  has a past surgical history that includes Hernia repair (Left); Eyelid closure (Left); Modified radical neck dissection (Left, 03/03/1999); Tonsillectomy; and Cystoscopy (08/30/2021)  His family history includes Hypertension in his mother; Stroke in his mother  He  reports that he has quit smoking  He has quit using smokeless tobacco  He reports current alcohol use  He reports that he does not use drugs  Current Outpatient Medications   Medication Sig Dispense Refill    albuterol (ProAir HFA) 90 mcg/act inhaler Inhale 2 puffs every 6 (six) hours as needed for wheezing 8 5 g 0    amoxicillin-clavulanate (Augmentin) 875-125 mg per tablet Take 1 tablet by mouth every 12 (twelve) hours for 7 days 14 tablet 0    aspirin 81 MG tablet 1 cap(s)      Coenzyme Q10 (CO Q 10 PO) Take by mouth      cyanocobalamin (VITAMIN B-12) 100 mcg tablet Take by mouth daily      Multiple Vitamins-Minerals (MULTIVITAMIN ADULTS 50+ PO) Take by mouth      nystatin (MYCOSTATIN) 500,000 units/5 mL suspension Apply 5 mL (500,000 Units total) to the mouth or throat 4 (four) times a day 60 mL 2    pravastatin (PRAVACHOL) 20 mg tablet Take 1 tablet (20 mg total) by mouth every other day 30 tablet 3    VITAMIN D, CHOLECALCIFEROL, PO Take by mouth       No current facility-administered medications for this visit       Current Outpatient Medications on File Prior to Visit   Medication Sig    albuterol (ProAir HFA) 90 mcg/act inhaler Inhale 2 puffs every 6 (six) hours as needed for wheezing    aspirin 81 MG tablet 1 cap(s)  Coenzyme Q10 (CO Q 10 PO) Take by mouth    cyanocobalamin (VITAMIN B-12) 100 mcg tablet Take by mouth daily    Multiple Vitamins-Minerals (MULTIVITAMIN ADULTS 50+ PO) Take by mouth    nystatin (MYCOSTATIN) 500,000 units/5 mL suspension Apply 5 mL (500,000 Units total) to the mouth or throat 4 (four) times a day    pravastatin (PRAVACHOL) 20 mg tablet Take 1 tablet (20 mg total) by mouth every other day    VITAMIN D, CHOLECALCIFEROL, PO Take by mouth    [DISCONTINUED] benzonatate (TESSALON) 200 MG capsule Take 1 capsule (200 mg total) by mouth 3 (three) times a day as needed for cough (Patient not taking: Reported on 12/30/2021 )    [DISCONTINUED] omeprazole (PriLOSEC OTC) 20 MG tablet Take 1 tablet (20 mg total) by mouth daily (Patient not taking: Reported on 12/30/2021 )     No current facility-administered medications on file prior to visit  He is allergic to iodinated diagnostic agents and iodine - food allergy       Review of Systems   Constitutional: Negative  Skin: Positive for color change and wound  Neurological: Negative for weakness and numbness  Objective:      /78   Pulse 64   Temp 98 6 °F (37 °C)   Ht 5' 9" (1 753 m)   Wt 64 kg (141 lb)   BMI 20 82 kg/m²          Physical Exam  Vitals and nursing note reviewed  Constitutional:       General: He is not in acute distress  Appearance: Normal appearance  He is not ill-appearing  HENT:      Head: Normocephalic and atraumatic  Nose: Nose normal    Cardiovascular:      Rate and Rhythm: Normal rate and regular rhythm  Pulses: Normal pulses  Heart sounds: Normal heart sounds  No murmur heard  Pulmonary:      Effort: Pulmonary effort is normal  No respiratory distress  Breath sounds: No wheezing, rhonchi or rales  Musculoskeletal:      Cervical back: Normal range of motion  Skin:     General: Skin is warm  Findings: Erythema and wound present            Neurological:      Mental Status: He is alert  Sensory: No sensory deficit  Motor: No weakness

## 2022-01-27 ENCOUNTER — TELEPHONE (OUTPATIENT)
Dept: FAMILY MEDICINE CLINIC | Facility: CLINIC | Age: 77
End: 2022-01-27

## 2022-01-27 NOTE — TELEPHONE ENCOUNTER
Evangelina from [facility] called to report pt will only be seen 1x/week in preparation for discharge  He will be d/c'd next week

## 2022-02-02 ENCOUNTER — TELEPHONE (OUTPATIENT)
Dept: FAMILY MEDICINE CLINIC | Facility: CLINIC | Age: 77
End: 2022-02-02

## 2022-02-08 ENCOUNTER — HOSPITAL ENCOUNTER (OUTPATIENT)
Dept: NON INVASIVE DIAGNOSTICS | Facility: CLINIC | Age: 77
Discharge: HOME | End: 2022-02-08
Payer: MEDICARE

## 2022-02-08 VITALS
HEIGHT: 69 IN | DIASTOLIC BLOOD PRESSURE: 74 MMHG | BODY MASS INDEX: 20.9 KG/M2 | HEART RATE: 72 BPM | WEIGHT: 141.09 LBS | SYSTOLIC BLOOD PRESSURE: 128 MMHG

## 2022-02-08 DIAGNOSIS — U07.1 COVID-19: ICD-10-CM

## 2022-02-08 LAB
AORTIC ROOT: 3.4 CM
ASCENDING AORTA: 3.5 CM (ref 1.9–2.85)
AV REGURGITATION PRESSURE HALF TIME: 396 MS
E WAVE DECELERATION TIME: 132 MS
FRACTIONAL SHORTENING: 20 % (ref 28–44)
INTERVENTRICULAR SEPTUM IN DIASTOLE (PARASTERNAL SHORT AXIS VIEW): 0.9 CM (ref 0.51–0.95)
LAAS-AP4: 17.3 CM2
LEFT ATRIUM SIZE: 3.8 CM
LEFT INTERNAL DIMENSION IN SYSTOLE: 3.6 CM (ref 2.1–4)
LEFT VENTRICULAR INTERNAL DIMENSION IN DIASTOLE: 4.5 CM (ref 3.99–5.94)
LEFT VENTRICULAR POSTERIOR WALL IN END DIASTOLE: 0.9 CM (ref 0.49–0.94)
LEFT VENTRICULAR STROKE VOLUME: 38 ML
MV E'TISSUE VEL-LAT: 10 CM/S
MV E'TISSUE VEL-SEP: 7 CM/S
MV PEAK A VEL: 0.67 M/S
MV PEAK E VEL: 54 CM/S
RIGHT ATRIAL 2D VOLUME: 53 ML
RIGHT ATRIUM AREA SYSTOLE A4C: 20 CM2
RIGHT VENTRICLE ID DIMENSION: 2.6 CM
SL CV AV DECELERATION TIME RETROGRADE: 1364 MS
SL CV AV PEAK GRADIENT RETROGRADE: 73 MMHG
SL CV PED ECHO LEFT VENTRICLE DIASTOLIC VOLUME (MOD BIPLANE) 2D: 92 ML
SL CV PED ECHO LEFT VENTRICLE SYSTOLIC VOLUME (MOD BIPLANE) 2D: 54 ML
TR MAX PG: 6 MMHG
TRICUSPID VALVE PEAK E WAVE VELOCITY: 0.11 M/S
TRICUSPID VALVE PEAK REGURGITATION VELOCITY: 1.25 M/S
Z-SCORE OF ASCENDING AORTA: 4.71
Z-SCORE OF INTERVENTRICULAR SEPTUM IN END DIASTOLE: 1.53
Z-SCORE OF LEFT VENTRICULAR DIMENSION IN END SYSTOLE: -0.78
Z-SCORE OF LEFT VENTRICULAR POSTERIOR WALL IN END DIASTOLE: 1.64

## 2022-02-08 PROCEDURE — 93306 TTE W/DOPPLER COMPLETE: CPT | Performed by: INTERNAL MEDICINE

## 2022-02-08 PROCEDURE — 93306 TTE W/DOPPLER COMPLETE: CPT

## 2022-02-16 ENCOUNTER — APPOINTMENT (OUTPATIENT)
Dept: LAB | Facility: CLINIC | Age: 77
End: 2022-02-16
Payer: MEDICARE

## 2022-02-16 DIAGNOSIS — Z13.220 SCREENING, LIPID: ICD-10-CM

## 2022-02-16 DIAGNOSIS — N18.30 STAGE 3 CHRONIC KIDNEY DISEASE, UNSPECIFIED WHETHER STAGE 3A OR 3B CKD (HCC): ICD-10-CM

## 2022-02-16 DIAGNOSIS — R53.83 FATIGUE, UNSPECIFIED TYPE: ICD-10-CM

## 2022-02-16 DIAGNOSIS — U07.1 COVID-19: ICD-10-CM

## 2022-02-16 DIAGNOSIS — I10 ESSENTIAL HYPERTENSION: ICD-10-CM

## 2022-02-16 DIAGNOSIS — Z12.5 SCREENING FOR PROSTATE CANCER: ICD-10-CM

## 2022-02-16 LAB
ALBUMIN SERPL BCP-MCNC: 3.5 G/DL (ref 3.5–5)
ALP SERPL-CCNC: 59 U/L (ref 46–116)
ALT SERPL W P-5'-P-CCNC: 13 U/L (ref 12–78)
ANION GAP SERPL CALCULATED.3IONS-SCNC: 5 MMOL/L (ref 4–13)
AST SERPL W P-5'-P-CCNC: 13 U/L (ref 5–45)
BASOPHILS # BLD AUTO: 0.07 THOUSANDS/ΜL (ref 0–0.1)
BASOPHILS NFR BLD AUTO: 1 % (ref 0–1)
BILIRUB SERPL-MCNC: 0.47 MG/DL (ref 0.2–1)
BUN SERPL-MCNC: 20 MG/DL (ref 5–25)
CALCIUM SERPL-MCNC: 9.4 MG/DL (ref 8.3–10.1)
CHLORIDE SERPL-SCNC: 108 MMOL/L (ref 100–108)
CHOLEST SERPL-MCNC: 172 MG/DL
CK SERPL-CCNC: 63 U/L (ref 39–308)
CO2 SERPL-SCNC: 28 MMOL/L (ref 21–32)
CREAT SERPL-MCNC: 1.48 MG/DL (ref 0.6–1.3)
EOSINOPHIL # BLD AUTO: 0.37 THOUSAND/ΜL (ref 0–0.61)
EOSINOPHIL NFR BLD AUTO: 7 % (ref 0–6)
ERYTHROCYTE [DISTWIDTH] IN BLOOD BY AUTOMATED COUNT: 15.2 % (ref 11.6–15.1)
GFR SERPL CREATININE-BSD FRML MDRD: 45 ML/MIN/1.73SQ M
GLUCOSE P FAST SERPL-MCNC: 86 MG/DL (ref 65–99)
HCT VFR BLD AUTO: 44.9 % (ref 36.5–49.3)
HDLC SERPL-MCNC: 47 MG/DL
HGB BLD-MCNC: 14.2 G/DL (ref 12–17)
IMM GRANULOCYTES # BLD AUTO: 0.03 THOUSAND/UL (ref 0–0.2)
IMM GRANULOCYTES NFR BLD AUTO: 1 % (ref 0–2)
LDLC SERPL CALC-MCNC: 107 MG/DL (ref 0–100)
LYMPHOCYTES # BLD AUTO: 1.33 THOUSANDS/ΜL (ref 0.6–4.47)
LYMPHOCYTES NFR BLD AUTO: 24 % (ref 14–44)
MCH RBC QN AUTO: 27.6 PG (ref 26.8–34.3)
MCHC RBC AUTO-ENTMCNC: 31.6 G/DL (ref 31.4–37.4)
MCV RBC AUTO: 87 FL (ref 82–98)
MONOCYTES # BLD AUTO: 0.46 THOUSAND/ΜL (ref 0.17–1.22)
MONOCYTES NFR BLD AUTO: 8 % (ref 4–12)
NEUTROPHILS # BLD AUTO: 3.38 THOUSANDS/ΜL (ref 1.85–7.62)
NEUTS SEG NFR BLD AUTO: 59 % (ref 43–75)
NRBC BLD AUTO-RTO: 0 /100 WBCS
NT-PROBNP SERPL-MCNC: 136 PG/ML
PLATELET # BLD AUTO: 425 THOUSANDS/UL (ref 149–390)
PMV BLD AUTO: 8.9 FL (ref 8.9–12.7)
POTASSIUM SERPL-SCNC: 4.9 MMOL/L (ref 3.5–5.3)
PROT SERPL-MCNC: 8 G/DL (ref 6.4–8.2)
PSA SERPL-MCNC: 1.7 NG/ML (ref 0–4)
RBC # BLD AUTO: 5.15 MILLION/UL (ref 3.88–5.62)
SODIUM SERPL-SCNC: 141 MMOL/L (ref 136–145)
TRIGL SERPL-MCNC: 89 MG/DL
TSH SERPL DL<=0.05 MIU/L-ACNC: 2.76 UIU/ML (ref 0.36–3.74)
WBC # BLD AUTO: 5.64 THOUSAND/UL (ref 4.31–10.16)

## 2022-02-16 PROCEDURE — 82550 ASSAY OF CK (CPK): CPT

## 2022-02-16 PROCEDURE — 83880 ASSAY OF NATRIURETIC PEPTIDE: CPT

## 2022-02-16 PROCEDURE — 84443 ASSAY THYROID STIM HORMONE: CPT

## 2022-02-16 PROCEDURE — 80053 COMPREHEN METABOLIC PANEL: CPT

## 2022-02-16 PROCEDURE — 36415 COLL VENOUS BLD VENIPUNCTURE: CPT

## 2022-02-16 PROCEDURE — 85025 COMPLETE CBC W/AUTO DIFF WBC: CPT

## 2022-02-16 PROCEDURE — G0103 PSA SCREENING: HCPCS

## 2022-02-16 PROCEDURE — 80061 LIPID PANEL: CPT

## 2022-02-17 DIAGNOSIS — N18.31 STAGE 3A CHRONIC KIDNEY DISEASE (HCC): Primary | ICD-10-CM

## 2022-02-22 ENCOUNTER — TELEPHONE (OUTPATIENT)
Dept: NEPHROLOGY | Facility: CLINIC | Age: 77
End: 2022-02-22

## 2022-02-22 NOTE — TELEPHONE ENCOUNTER
Pt called and LM to schedule an appt with Dr Antonio Malagon and scheduled a consult referred by Dr Gaby Taylor for CKD 3a on 04/27 at 320

## 2022-03-28 DIAGNOSIS — E78.5 HYPERLIPIDEMIA: ICD-10-CM

## 2022-03-28 RX ORDER — PRAVASTATIN SODIUM 20 MG
TABLET ORAL
Qty: 30 TABLET | Refills: 3 | Status: SHIPPED | OUTPATIENT
Start: 2022-03-28

## 2022-04-19 ENCOUNTER — APPOINTMENT (OUTPATIENT)
Dept: LAB | Facility: CLINIC | Age: 77
End: 2022-04-19
Payer: MEDICARE

## 2022-04-19 DIAGNOSIS — D47.2 MONOCLONAL PARAPROTEINEMIA: ICD-10-CM

## 2022-04-19 LAB
ALBUMIN SERPL BCP-MCNC: 3.6 G/DL (ref 3.5–5)
ALP SERPL-CCNC: 59 U/L (ref 46–116)
ALT SERPL W P-5'-P-CCNC: 17 U/L (ref 12–78)
ANION GAP SERPL CALCULATED.3IONS-SCNC: 5 MMOL/L (ref 4–13)
AST SERPL W P-5'-P-CCNC: 19 U/L (ref 5–45)
BASOPHILS # BLD AUTO: 0.06 THOUSANDS/ΜL (ref 0–0.1)
BASOPHILS NFR BLD AUTO: 1 % (ref 0–1)
BILIRUB SERPL-MCNC: 0.36 MG/DL (ref 0.2–1)
BUN SERPL-MCNC: 20 MG/DL (ref 5–25)
CALCIUM SERPL-MCNC: 10.2 MG/DL (ref 8.3–10.1)
CHLORIDE SERPL-SCNC: 105 MMOL/L (ref 100–108)
CO2 SERPL-SCNC: 29 MMOL/L (ref 21–32)
CREAT SERPL-MCNC: 1.55 MG/DL (ref 0.6–1.3)
EOSINOPHIL # BLD AUTO: 0.24 THOUSAND/ΜL (ref 0–0.61)
EOSINOPHIL NFR BLD AUTO: 5 % (ref 0–6)
ERYTHROCYTE [DISTWIDTH] IN BLOOD BY AUTOMATED COUNT: 13.7 % (ref 11.6–15.1)
GFR SERPL CREATININE-BSD FRML MDRD: 43 ML/MIN/1.73SQ M
GLUCOSE P FAST SERPL-MCNC: 83 MG/DL (ref 65–99)
HCT VFR BLD AUTO: 44.9 % (ref 36.5–49.3)
HGB BLD-MCNC: 14.3 G/DL (ref 12–17)
IGA SERPL-MCNC: 70 MG/DL (ref 70–400)
IGG SERPL-MCNC: 1860 MG/DL (ref 700–1600)
IGM SERPL-MCNC: 36 MG/DL (ref 40–230)
IMM GRANULOCYTES # BLD AUTO: 0.01 THOUSAND/UL (ref 0–0.2)
IMM GRANULOCYTES NFR BLD AUTO: 0 % (ref 0–2)
LYMPHOCYTES # BLD AUTO: 1.18 THOUSANDS/ΜL (ref 0.6–4.47)
LYMPHOCYTES NFR BLD AUTO: 25 % (ref 14–44)
MCH RBC QN AUTO: 28 PG (ref 26.8–34.3)
MCHC RBC AUTO-ENTMCNC: 31.8 G/DL (ref 31.4–37.4)
MCV RBC AUTO: 88 FL (ref 82–98)
MONOCYTES # BLD AUTO: 0.45 THOUSAND/ΜL (ref 0.17–1.22)
MONOCYTES NFR BLD AUTO: 10 % (ref 4–12)
NEUTROPHILS # BLD AUTO: 2.71 THOUSANDS/ΜL (ref 1.85–7.62)
NEUTS SEG NFR BLD AUTO: 59 % (ref 43–75)
NRBC BLD AUTO-RTO: 0 /100 WBCS
PLATELET # BLD AUTO: 411 THOUSANDS/UL (ref 149–390)
PMV BLD AUTO: 9.6 FL (ref 8.9–12.7)
POTASSIUM SERPL-SCNC: 5 MMOL/L (ref 3.5–5.3)
PROT SERPL-MCNC: 8 G/DL (ref 6.4–8.2)
RBC # BLD AUTO: 5.11 MILLION/UL (ref 3.88–5.62)
SODIUM SERPL-SCNC: 139 MMOL/L (ref 136–145)
WBC # BLD AUTO: 4.65 THOUSAND/UL (ref 4.31–10.16)

## 2022-04-19 PROCEDURE — 84165 PROTEIN E-PHORESIS SERUM: CPT | Performed by: PATHOLOGY

## 2022-04-19 PROCEDURE — 80053 COMPREHEN METABOLIC PANEL: CPT

## 2022-04-19 PROCEDURE — 83521 IG LIGHT CHAINS FREE EACH: CPT

## 2022-04-19 PROCEDURE — 86334 IMMUNOFIX E-PHORESIS SERUM: CPT | Performed by: PATHOLOGY

## 2022-04-19 PROCEDURE — 82784 ASSAY IGA/IGD/IGG/IGM EACH: CPT

## 2022-04-19 PROCEDURE — 85025 COMPLETE CBC W/AUTO DIFF WBC: CPT

## 2022-04-19 PROCEDURE — 86334 IMMUNOFIX E-PHORESIS SERUM: CPT

## 2022-04-19 PROCEDURE — 84165 PROTEIN E-PHORESIS SERUM: CPT

## 2022-04-19 PROCEDURE — 36415 COLL VENOUS BLD VENIPUNCTURE: CPT

## 2022-04-20 LAB
ALBUMIN SERPL ELPH-MCNC: 4.19 G/DL (ref 3.5–5)
ALBUMIN SERPL ELPH-MCNC: 53.7 % (ref 52–65)
ALPHA1 GLOB SERPL ELPH-MCNC: 0.37 G/DL (ref 0.1–0.4)
ALPHA1 GLOB SERPL ELPH-MCNC: 4.8 % (ref 2.5–5)
ALPHA2 GLOB SERPL ELPH-MCNC: 0.85 G/DL (ref 0.4–1.2)
ALPHA2 GLOB SERPL ELPH-MCNC: 10.9 % (ref 7–13)
BETA GLOB ABNORMAL SERPL ELPH-MCNC: 0.41 G/DL (ref 0.4–0.8)
BETA1 GLOB SERPL ELPH-MCNC: 5.2 % (ref 5–13)
BETA2 GLOB SERPL ELPH-MCNC: 3.5 % (ref 2–8)
BETA2+GAMMA GLOB SERPL ELPH-MCNC: 0.27 G/DL (ref 0.2–0.5)
GAMMA GLOB ABNORMAL SERPL ELPH-MCNC: 1.71 G/DL (ref 0.5–1.6)
GAMMA GLOB SERPL ELPH-MCNC: 21.9 % (ref 12–22)
IGG/ALB SER: 1.16 {RATIO} (ref 1.1–1.8)
INTERPRETATION UR IFE-IMP: NORMAL
KAPPA LC FREE SER-MCNC: 50.8 MG/L (ref 3.3–19.4)
KAPPA LC FREE/LAMBDA FREE SER: 0.76 {RATIO} (ref 0.26–1.65)
LAMBDA LC FREE SERPL-MCNC: 67.2 MG/L (ref 5.7–26.3)
M PROTEIN 1 MFR SERPL ELPH: 6.2 %
M PROTEIN 1 SERPL ELPH-MCNC: 0.48 G/DL
PROT PATTERN SERPL ELPH-IMP: ABNORMAL
PROT SERPL-MCNC: 7.8 G/DL (ref 6.4–8.2)

## 2022-04-26 ENCOUNTER — TELEPHONE (OUTPATIENT)
Dept: NEPHROLOGY | Facility: CLINIC | Age: 77
End: 2022-04-26

## 2022-04-26 ENCOUNTER — HOSPITAL ENCOUNTER (EMERGENCY)
Facility: HOSPITAL | Age: 77
Discharge: HOME | End: 2022-04-26
Attending: EMERGENCY MEDICINE
Payer: MEDICARE

## 2022-04-26 ENCOUNTER — APPOINTMENT (EMERGENCY)
Dept: CT IMAGING | Facility: HOSPITAL | Age: 77
End: 2022-04-26
Payer: MEDICARE

## 2022-04-26 ENCOUNTER — OFFICE VISIT (OUTPATIENT)
Dept: URGENT CARE | Facility: CLINIC | Age: 77
End: 2022-04-26
Payer: MEDICARE

## 2022-04-26 VITALS
HEART RATE: 89 BPM | DIASTOLIC BLOOD PRESSURE: 92 MMHG | TEMPERATURE: 98.1 F | SYSTOLIC BLOOD PRESSURE: 151 MMHG | RESPIRATION RATE: 20 BRPM

## 2022-04-26 VITALS
DIASTOLIC BLOOD PRESSURE: 97 MMHG | WEIGHT: 141 LBS | BODY MASS INDEX: 20.88 KG/M2 | TEMPERATURE: 97.7 F | SYSTOLIC BLOOD PRESSURE: 165 MMHG | OXYGEN SATURATION: 98 % | HEART RATE: 85 BPM | RESPIRATION RATE: 21 BRPM | HEIGHT: 69 IN

## 2022-04-26 DIAGNOSIS — R42 DIZZINESS: Primary | ICD-10-CM

## 2022-04-26 DIAGNOSIS — R26.9 GAIT DISTURBANCE: ICD-10-CM

## 2022-04-26 DIAGNOSIS — S09.90XA HEAD INJURY: ICD-10-CM

## 2022-04-26 DIAGNOSIS — R55 SYNCOPE AND COLLAPSE: Primary | ICD-10-CM

## 2022-04-26 DIAGNOSIS — R42 DIZZINESS AND GIDDINESS: ICD-10-CM

## 2022-04-26 LAB
2HR DELTA HS TROPONIN: 0 NG/L
ALBUMIN SERPL BCP-MCNC: 3.5 G/DL (ref 3.5–5)
ALP SERPL-CCNC: 58 U/L (ref 46–116)
ALT SERPL W P-5'-P-CCNC: 13 U/L (ref 12–78)
ANION GAP SERPL CALCULATED.3IONS-SCNC: 5 MMOL/L (ref 4–13)
AST SERPL W P-5'-P-CCNC: 20 U/L (ref 5–45)
ATRIAL RATE: 87 BPM
ATRIAL RATE: 89 BPM
BASOPHILS # BLD AUTO: 0.05 THOUSANDS/ΜL (ref 0–0.1)
BASOPHILS NFR BLD AUTO: 1 % (ref 0–1)
BILIRUB SERPL-MCNC: 0.3 MG/DL (ref 0.2–1)
BUN SERPL-MCNC: 20 MG/DL (ref 5–25)
CALCIUM SERPL-MCNC: 9.7 MG/DL (ref 8.3–10.1)
CARDIAC TROPONIN I PNL SERPL HS: 5 NG/L
CARDIAC TROPONIN I PNL SERPL HS: 5 NG/L
CHLORIDE SERPL-SCNC: 104 MMOL/L (ref 100–108)
CO2 SERPL-SCNC: 29 MMOL/L (ref 21–32)
CREAT SERPL-MCNC: 1.39 MG/DL (ref 0.6–1.3)
EOSINOPHIL # BLD AUTO: 0.25 THOUSAND/ΜL (ref 0–0.61)
EOSINOPHIL NFR BLD AUTO: 4 % (ref 0–6)
ERYTHROCYTE [DISTWIDTH] IN BLOOD BY AUTOMATED COUNT: 13.2 % (ref 11.6–15.1)
GFR SERPL CREATININE-BSD FRML MDRD: 49 ML/MIN/1.73SQ M
GLUCOSE SERPL-MCNC: 86 MG/DL (ref 65–140)
HCT VFR BLD AUTO: 43.1 % (ref 36.5–49.3)
HGB BLD-MCNC: 14.2 G/DL (ref 12–17)
IMM GRANULOCYTES # BLD AUTO: 0.03 THOUSAND/UL (ref 0–0.2)
IMM GRANULOCYTES NFR BLD AUTO: 1 % (ref 0–2)
LYMPHOCYTES # BLD AUTO: 1.33 THOUSANDS/ΜL (ref 0.6–4.47)
LYMPHOCYTES NFR BLD AUTO: 21 % (ref 14–44)
MAGNESIUM SERPL-MCNC: 2.3 MG/DL (ref 1.6–2.6)
MCH RBC QN AUTO: 28.2 PG (ref 26.8–34.3)
MCHC RBC AUTO-ENTMCNC: 32.9 G/DL (ref 31.4–37.4)
MCV RBC AUTO: 86 FL (ref 82–98)
MONOCYTES # BLD AUTO: 0.5 THOUSAND/ΜL (ref 0.17–1.22)
MONOCYTES NFR BLD AUTO: 8 % (ref 4–12)
NEUTROPHILS # BLD AUTO: 4.33 THOUSANDS/ΜL (ref 1.85–7.62)
NEUTS SEG NFR BLD AUTO: 65 % (ref 43–75)
NRBC BLD AUTO-RTO: 0 /100 WBCS
P AXIS: 63 DEGREES
P AXIS: 63 DEGREES
PLATELET # BLD AUTO: 367 THOUSANDS/UL (ref 149–390)
PMV BLD AUTO: 8.9 FL (ref 8.9–12.7)
POTASSIUM SERPL-SCNC: 4.5 MMOL/L (ref 3.5–5.3)
PR INTERVAL: 166 MS
PR INTERVAL: 166 MS
PROT SERPL-MCNC: 7.8 G/DL (ref 6.4–8.2)
QRS AXIS: -56 DEGREES
QRS AXIS: -67 DEGREES
QRSD INTERVAL: 100 MS
QRSD INTERVAL: 98 MS
QT INTERVAL: 350 MS
QT INTERVAL: 352 MS
QTC INTERVAL: 421 MS
QTC INTERVAL: 428 MS
RBC # BLD AUTO: 5.03 MILLION/UL (ref 3.88–5.62)
SODIUM SERPL-SCNC: 138 MMOL/L (ref 136–145)
T WAVE AXIS: 53 DEGREES
T WAVE AXIS: 54 DEGREES
VENTRICULAR RATE: 87 BPM
VENTRICULAR RATE: 89 BPM
WBC # BLD AUTO: 6.49 THOUSAND/UL (ref 4.31–10.16)

## 2022-04-26 PROCEDURE — 93010 ELECTROCARDIOGRAM REPORT: CPT | Performed by: INTERNAL MEDICINE

## 2022-04-26 PROCEDURE — 85025 COMPLETE CBC W/AUTO DIFF WBC: CPT | Performed by: PHYSICIAN ASSISTANT

## 2022-04-26 PROCEDURE — 93005 ELECTROCARDIOGRAM TRACING: CPT | Performed by: EMERGENCY MEDICINE

## 2022-04-26 PROCEDURE — 93005 ELECTROCARDIOGRAM TRACING: CPT

## 2022-04-26 PROCEDURE — 84484 ASSAY OF TROPONIN QUANT: CPT | Performed by: PHYSICIAN ASSISTANT

## 2022-04-26 PROCEDURE — 83735 ASSAY OF MAGNESIUM: CPT | Performed by: PHYSICIAN ASSISTANT

## 2022-04-26 PROCEDURE — 99285 EMERGENCY DEPT VISIT HI MDM: CPT | Performed by: PHYSICIAN ASSISTANT

## 2022-04-26 PROCEDURE — 99213 OFFICE O/P EST LOW 20 MIN: CPT | Performed by: EMERGENCY MEDICINE

## 2022-04-26 PROCEDURE — 80053 COMPREHEN METABOLIC PANEL: CPT | Performed by: PHYSICIAN ASSISTANT

## 2022-04-26 PROCEDURE — 72125 CT NECK SPINE W/O DYE: CPT

## 2022-04-26 PROCEDURE — 99284 EMERGENCY DEPT VISIT MOD MDM: CPT

## 2022-04-26 PROCEDURE — 70450 CT HEAD/BRAIN W/O DYE: CPT

## 2022-04-26 PROCEDURE — G0463 HOSPITAL OUTPT CLINIC VISIT: HCPCS | Performed by: EMERGENCY MEDICINE

## 2022-04-26 PROCEDURE — 36415 COLL VENOUS BLD VENIPUNCTURE: CPT | Performed by: PHYSICIAN ASSISTANT

## 2022-04-26 RX ORDER — MECLIZINE HYDROCHLORIDE 25 MG/1
25 TABLET ORAL 3 TIMES DAILY PRN
Qty: 15 TABLET | Refills: 0 | Status: SHIPPED | OUTPATIENT
Start: 2022-04-26

## 2022-04-26 NOTE — PROGRESS NOTES
[id number]        NAME: Regina Moya is a 78 y o  male  : 1945    MRN: [id number]  DATE: 2022  TIME: 1:56 PM    Assessment and Plan   Syncope and collapse [R55]  1  Syncope and collapse  Transfer to other facility   2  Dizziness and giddiness  Transfer to other facility   3  Gait disturbance  Transfer to other facility         Patient Instructions   Patient Instructions     1  Go directly to the ER for your syncope work-up  2  F/u with Dr Lola Bae in 2-3 days  3  F/u with Dr Karla Moreno tomorrow  4  Drink lots of fluids and stay out of the hot sun/heat  Syncope   WHAT YOU NEED TO KNOW:   Syncope is also called fainting or passing out  Syncope is a sudden, temporary loss of consciousness, followed by a fall from a standing or sitting position  Syncope ranges from not serious to a sign of a more serious condition that needs to be treated  You can control some health conditions that cause syncope  Your healthcare providers can help you create a plan to manage syncope and prevent episodes  DISCHARGE INSTRUCTIONS:   Seek care immediately if:   · You are bleeding because you hit your head when you fainted  · You suddenly have double vision, difficulty speaking, numbness, and cannot move your arms or legs  · You have chest pain and trouble breathing  · You vomit blood or material that looks like coffee grounds  · You see blood in your bowel movement  Contact your healthcare provider if:   · You have new or worsening symptoms  · You have another syncope episode  · You have a headache, fast heartbeat, or feel too dizzy to stand up  · You have questions or concerns about your condition or care  Medicines:   · Medicines  may be needed to help your heart pump strongly and regularly  Your healthcare provider may also make changes to any medicines that are causing syncope  · Take your medicine as directed    Contact your healthcare provider if you think your medicine is not helping or if you have side effects  Tell him or her if you are allergic to any medicine  Keep a list of the medicines, vitamins, and herbs you take  Include the amounts, and when and why you take them  Bring the list or the pill bottles to follow-up visits  Carry your medicine list with you in case of an emergency  Follow up with your doctor as directed:  Write down your questions so you remember to ask them during your visits  Manage syncope:   · Keep a record of your syncope episodes  Include your symptoms and your activity before and after the episode  The record can help your healthcare provider find the cause of your syncope and help you manage episodes  · Sit or lie down when needed  This includes when you feel dizzy, your throat is getting tight, and your vision changes  Raise your legs above the level of your heart  · Take slow, deep breaths if you start to breathe faster with anxiety or fear  This can help decrease dizziness and the feeling that you might faint  · Check your blood pressure often  This is important if you take medicine to lower your blood pressure  Check your blood pressure when you are lying down and when you are standing  Ask how often to check during the day  Keep a record of your blood pressure numbers  Your healthcare provider may use the record to help plan your treatment  Prevent a syncope episode:   · Move slowly and let yourself get used to one position before you move to another position  This is very important when you change from a lying or sitting position to a standing position  Take some deep breaths before you stand up from a lying position  Stand up slowly  Sudden movements may cause a fainting spell  Sit on the side of the bed or couch for a few minutes before you stand up  If you are on bedrest, try to be upright for about 2 hours each day, or as directed  Do not lock your legs if you are standing for a long period of time   Move your legs and bend your knees to keep blood flowing  · Follow your healthcare provider's recommendations  Your provider may  recommend that you drink more liquids to prevent dehydration  You may also need to have more salt to keep your blood pressure from dropping too low and causing syncope  Your provider will tell you how much liquid and sodium to have each day  He or she will also tell you how much physical activity is safe for you  This will depend on what is causing your syncope  · Watch for signs of low blood sugar  These include hunger, nervousness, sweating, and fast or fluttery heartbeats  Talk with your healthcare provider about ways to keep your blood sugar level steady  · Do not strain if you are constipated  You may faint if you strain to have a bowel movement  Walking is the best way to get your bowels moving  Eat foods high in fiber to make it easier to have a bowel movement  Good examples are high-fiber cereals, beans, vegetables, and whole-grain breads  Prune juice may help make bowel movements softer  · Be careful in hot weather  Heat can cause a syncope episode  Limit activity done outside on hot days  Physical activity in hot weather can lead to dehydration  This can cause an episode  © Copyright [employer] 2022 Information is for End User's use only and may not be sold, redistributed or otherwise used for commercial purposes  All illustrations and images included in CareNotes® are the copyrighted property of [employer]  or [facility] Becka Morton   The above information is an  only  It is not intended as medical advice for individual conditions or treatments  Talk to your doctor, nurse or pharmacist before following any medical regimen to see if it is safe and effective for you  Follow up with PCP in 3-5 days  Proceed to  ER if symptoms worsen      Chief Complaint     Chief Complaint   Patient presents with    Dizziness     s/p  fall 2 weeks ago; verbalizes passing out; did not seek tx post syncope  Now having difficutly with dizzyness and balance  Speech imparied from hx of tounge CA per pt  History of Present Illness       77 yo w male with chief complaint of being a little "off"  Patient states that he took a fall about 2 weeks ago when he was outside in the sun and got up quickly and felt dizzy and put his head on the barbeque and then found himself on the ground after passing out  Patient does not know how long he was out  Does admit to a loss of consciousness  No prior history of passing out  Patient denies any numbness or tingling in his arms or legs  Patient denies any prior history of stroke  Patient has some slurred speech however he had tongue cancer removed about 20 years ago and his speech is slurred since that time  Patient states that sometimes his blood pressure runs low and due to  his swallowing difficulties does not drink enough fluids  Patient also has monoclonal gammopathy  Patient states that his dizziness gets worse when he gets up quickly and that he just feels off when ambulating  Patient has had a history of vertigo in the past but states that this feels different  Patient denies any headache or chest pain  Review of Systems   Review of Systems   Constitutional: Negative for chills and fever  HENT: Positive for trouble swallowing  Negative for congestion and rhinorrhea  Eyes: Negative for discharge and visual disturbance  Respiratory: Negative for shortness of breath and wheezing  Cardiovascular: Negative for chest pain and palpitations  Gastrointestinal: Negative for abdominal pain and vomiting  Endocrine: Negative for polydipsia and polyuria  Genitourinary: Negative for dysuria and hematuria  Musculoskeletal: Positive for gait problem  Negative for arthralgias and neck stiffness  Skin: Negative for rash and wound     Neurological: Positive for dizziness, weakness and light-headedness  Negative for headaches  Psychiatric/Behavioral: Negative for confusion, dysphoric mood and suicidal ideas           Current Medications       Current Outpatient Medications:     aspirin 81 MG tablet, 1 cap(s), Disp: , Rfl:     Coenzyme Q10 (CO Q 10 PO), Take by mouth, Disp: , Rfl:     cyanocobalamin (VITAMIN B-12) 100 mcg tablet, Take by mouth daily, Disp: , Rfl:     Multiple Vitamins-Minerals (MULTIVITAMIN ADULTS 50+ PO), Take by mouth, Disp: , Rfl:     pravastatin (PRAVACHOL) 20 mg tablet, TAKE ONE TABLET BY MOUTH EVERY OTHER DAY, Disp: 30 tablet, Rfl: 3    VITAMIN D, CHOLECALCIFEROL, PO, Take by mouth, Disp: , Rfl:     albuterol (ProAir HFA) 90 mcg/act inhaler, Inhale 2 puffs every 6 (six) hours as needed for wheezing (Patient not taking: Reported on 4/26/2022 ), Disp: 8 5 g, Rfl: 0    nystatin (MYCOSTATIN) 500,000 units/5 mL suspension, Apply 5 mL (500,000 Units total) to the mouth or throat 4 (four) times a day (Patient not taking: Reported on 4/26/2022 ), Disp: 60 mL, Rfl: 2    Current Allergies     Allergies as of 04/26/2022 - Reviewed 04/26/2022   Allergen Reaction Noted    Iodinated diagnostic agents Other (See Comments) 02/14/2019    Iodine - food allergy  11/30/2016            The following portions of the patient's history were reviewed and updated as appropriate: allergies, current medications, past family history, past medical history, past social history, past surgical history and problem list      Past Medical History:   Diagnosis Date    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[facility] [address] ) 03/1999    SCCA left tongue base - T2N1M0- treated at [facility] - had surgery and XRT    Hyperlipidemia     Hypertension     Left anterior fascicular block     Stage 3 chronic kidney disease ([address] )        Past Surgical History:   Procedure Laterality Date    CYSTOSCOPY  08/30/2021    EYELID CLOSURE Left     to prevent eyelashes from rubbing cornea    HERNIA REPAIR Left     MODIFIED RADICAL NECK DISSECTION Left 03/03/1999    [facility] - with mandibulotomy and resection SCCA left tongue base - Dr Owen Lovett History   Problem Relation Age of Onset    Hypertension Mother     Stroke Mother          Medications have been verified  Objective   /92   Pulse 89   Temp 98 1 °F (36 7 °C)   Resp 20        Physical Exam     Physical Exam  Vitals and nursing note reviewed  Constitutional:       General: He is not in acute distress  Appearance: Normal appearance  He is not ill-appearing, toxic-appearing or diaphoretic  Comments: 79-year-old white male sitting on the stretcher in no acute distress  Patient has some slurred speech due to cancer of the tongue surgery over 20 years ago  HENT:      Head: Normocephalic and atraumatic  Comments: There is a slight contusion to the parietal occipital region of the left side of his head  Right Ear: Tympanic membrane normal       Left Ear: Tympanic membrane normal       Nose: Nose normal       Mouth/Throat:      Mouth: Mucous membranes are moist       Pharynx: Oropharynx is clear  No oropharyngeal exudate or posterior oropharyngeal erythema  Comments: Patient has slurred speech with increased mucus production and a surgical removal of the base on the left  of his tongue due to carcinoma over 20 years ago   Eyes:      Extraocular Movements: Extraocular movements intact  Pupils: Pupils are equal, round, and reactive to light  Neck:      Vascular: No carotid bruit  Cardiovascular:      Rate and Rhythm: Normal rate and regular rhythm  Pulses: Normal pulses  Heart sounds: Normal heart sounds  Pulmonary:      Effort: Pulmonary effort is normal       Breath sounds: Normal breath sounds  Abdominal:      General: Abdomen is flat  Bowel sounds are normal  There is no distension  Palpations: Abdomen is soft  There is no mass  Tenderness:  There is no abdominal tenderness  There is no right CVA tenderness, left CVA tenderness, guarding or rebound  Hernia: No hernia is present  Musculoskeletal:         General: No swelling, tenderness, deformity or signs of injury  Normal range of motion  Cervical back: Normal range of motion and neck supple  No rigidity  No muscular tenderness  Right lower leg: No edema  Left lower leg: No edema  Lymphadenopathy:      Cervical: No cervical adenopathy  Skin:     General: Skin is warm and dry  Coloration: Skin is not jaundiced or pale  Findings: No bruising, erythema, lesion or rash  Neurological:      Mental Status: He is alert and oriented to person, place, and time  Cranial Nerves: No cranial nerve deficit  Motor: No weakness  Comments: Patient has a normal neurological exam with the exception of a slight lean to the left while ambulating  Patient does not display any nystagmus on exam   Patient has a negative Romberg's  Patient has equal  bilaterally and equal strength in flexion and extension of the upper and lower extremities  There is no facial droop  There is slurred speech again due to his prior history of cancer of the tongue  Psychiatric:         Mood and Affect: Mood normal         Patient's EKG shows a normal sinus rhythm however he does have Q-waves in V1 and V2  I reviewed his old EKG from December 2021 and it is unchanged  He also had Q waves at that time as well  Patient denies any prior history of septal infarct  I explained to patient that he needs a further workup for his syncope which would include a CT of his head  I offered to call an ambulance for the patient to go to the ER but patient declined  Patient states he drove in is okay to go himself  Patient has a follow-up with Dr James Sepulveda tomorrow

## 2022-04-26 NOTE — PATIENT INSTRUCTIONS
1   Go directly to the ER for your syncope work-up  2  F/u with Dr Kleber Strange in 2-3 days  3  F/u with Dr Hira Grant tomorrow  4  Drink lots of fluids and stay out of the hot sun/heat  Syncope   WHAT YOU NEED TO KNOW:   Syncope is also called fainting or passing out  Syncope is a sudden, temporary loss of consciousness, followed by a fall from a standing or sitting position  Syncope ranges from not serious to a sign of a more serious condition that needs to be treated  You can control some health conditions that cause syncope  Your healthcare providers can help you create a plan to manage syncope and prevent episodes  DISCHARGE INSTRUCTIONS:   Seek care immediately if:   · You are bleeding because you hit your head when you fainted  · You suddenly have double vision, difficulty speaking, numbness, and cannot move your arms or legs  · You have chest pain and trouble breathing  · You vomit blood or material that looks like coffee grounds  · You see blood in your bowel movement  Contact your healthcare provider if:   · You have new or worsening symptoms  · You have another syncope episode  · You have a headache, fast heartbeat, or feel too dizzy to stand up  · You have questions or concerns about your condition or care  Medicines:   · Medicines  may be needed to help your heart pump strongly and regularly  Your healthcare provider may also make changes to any medicines that are causing syncope  · Take your medicine as directed  Contact your healthcare provider if you think your medicine is not helping or if you have side effects  Tell him or her if you are allergic to any medicine  Keep a list of the medicines, vitamins, and herbs you take  Include the amounts, and when and why you take them  Bring the list or the pill bottles to follow-up visits  Carry your medicine list with you in case of an emergency      Follow up with your doctor as directed:  Write down your questions so you remember to ask them during your visits  Manage syncope:   · Keep a record of your syncope episodes  Include your symptoms and your activity before and after the episode  The record can help your healthcare provider find the cause of your syncope and help you manage episodes  · Sit or lie down when needed  This includes when you feel dizzy, your throat is getting tight, and your vision changes  Raise your legs above the level of your heart  · Take slow, deep breaths if you start to breathe faster with anxiety or fear  This can help decrease dizziness and the feeling that you might faint  · Check your blood pressure often  This is important if you take medicine to lower your blood pressure  Check your blood pressure when you are lying down and when you are standing  Ask how often to check during the day  Keep a record of your blood pressure numbers  Your healthcare provider may use the record to help plan your treatment  Prevent a syncope episode:   · Move slowly and let yourself get used to one position before you move to another position  This is very important when you change from a lying or sitting position to a standing position  Take some deep breaths before you stand up from a lying position  Stand up slowly  Sudden movements may cause a fainting spell  Sit on the side of the bed or couch for a few minutes before you stand up  If you are on bedrest, try to be upright for about 2 hours each day, or as directed  Do not lock your legs if you are standing for a long period of time  Move your legs and bend your knees to keep blood flowing  · Follow your healthcare provider's recommendations  Your provider may  recommend that you drink more liquids to prevent dehydration  You may also need to have more salt to keep your blood pressure from dropping too low and causing syncope  Your provider will tell you how much liquid and sodium to have each day   He or she will also tell you how much physical activity is safe for you  This will depend on what is causing your syncope  · Watch for signs of low blood sugar  These include hunger, nervousness, sweating, and fast or fluttery heartbeats  Talk with your healthcare provider about ways to keep your blood sugar level steady  · Do not strain if you are constipated  You may faint if you strain to have a bowel movement  Walking is the best way to get your bowels moving  Eat foods high in fiber to make it easier to have a bowel movement  Good examples are high-fiber cereals, beans, vegetables, and whole-grain breads  Prune juice may help make bowel movements softer  · Be careful in hot weather  Heat can cause a syncope episode  Limit activity done outside on hot days  Physical activity in hot weather can lead to dehydration  This can cause an episode  © Copyright [employer] 2022 Information is for End User's use only and may not be sold, redistributed or otherwise used for commercial purposes  All illustrations and images included in CareNotes® are the copyrighted property of [employer]  or [facility] Becka Morton   The above information is an  only  It is not intended as medical advice for individual conditions or treatments  Talk to your doctor, nurse or pharmacist before following any medical regimen to see if it is safe and effective for you

## 2022-04-26 NOTE — ED PROVIDER NOTES
History  Chief Complaint   Patient presents with    Fall     Patient states"he fell 10 days ago and struck the back of his head"  Since the fall he feels dizzy and off  Patient was sent over from Urgent care for CT of head  Patient on aspirin  Patient had loss of consciousness  79 yo with dizziness  Onset 10 days ago  Intermittent  At times feels lightheaded and other times feels like he's off balanced or "had too many drinks"  It's worsened by position changes and head movement  He has no extremity numbness, tingling or weakness  He has dysarthria which is chronic due to to h/o tongue cancer s/p resection  He fell 10 days ago when this first began and struck his head and neck  Has had dull headache since then  Rated mild  Intermittent  He's on aspirin  He went to urgent care and was told to come to ED for possible CT  History provided by:  Patient   used: No    Dizziness  Quality:  Lightheadedness  Severity:  Mild  Onset quality:  Gradual  Duration:  10 days  Timing:  Intermittent  Progression:  Unchanged  Chronicity:  New  Context: not when bending over, not with bowel movement, not with ear pain, not with eye movement, not with head movement, not with inactivity, not with loss of consciousness, not with medication, not with physical activity, not when standing up and not when urinating    Relieved by:  Nothing  Worsened by:  Nothing  Ineffective treatments:  None tried  Associated symptoms: headaches    Associated symptoms: no blood in stool, no chest pain, no diarrhea, no hearing loss, no nausea, no palpitations, no shortness of breath, no syncope, no tinnitus, no vision changes, no vomiting and no weakness        Prior to Admission Medications   Prescriptions Last Dose Informant Patient Reported? Taking?    Coenzyme Q10 (CO Q 10 PO)  Self Yes No   Sig: Take by mouth   Multiple Vitamins-Minerals (MULTIVITAMIN ADULTS 50+ PO)  Self Yes No   Sig: Take by mouth   VITAMIN D, CHOLECALCIFEROL, PO  Self Yes No   Sig: Take by mouth   albuterol (ProAir HFA) 90 mcg/act inhaler   No No   Sig: Inhale 2 puffs every 6 (six) hours as needed for wheezing   Patient not taking: Reported on 2022    aspirin 81 MG tablet  Self Yes No   Si cap(s)   cyanocobalamin (VITAMIN B-12) 100 mcg tablet  Self Yes No   Sig: Take by mouth daily   nystatin (MYCOSTATIN) 500,000 units/5 mL suspension   No No   Sig: Apply 5 mL (500,000 Units total) to the mouth or throat 4 (four) times a day   Patient not taking: Reported on 2022    pravastatin (PRAVACHOL) 20 mg tablet   No No   Sig: TAKE ONE TABLET BY MOUTH EVERY OTHER DAY      Facility-Administered Medications: None       Past Medical History:   Diagnosis Date    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    Hyperlipidemia     Hypertension     Left anterior fascicular block     Stage 3 chronic kidney disease ([facility] )        Past Surgical History:   Procedure Laterality Date    CYSTOSCOPY  2021    EYELID CLOSURE Left     to prevent eyelashes from rubbing cornea    HERNIA REPAIR Left     MODIFIED RADICAL NECK DISSECTION Left 1999    [facility] - with mandibulotomy and resection SCCA left tongue base - Dr Urban Edgar History   Problem Relation Age of Onset    Hypertension Mother     Stroke Mother      I have reviewed and agree with the history as documented      E-Cigarette/Vaping    E-Cigarette Use Never User      E-Cigarette/Vaping Substances    Nicotine No     THC No     CBD No     Flavoring No     Other No     Unknown No      Social History     Tobacco Use    Smoking status: Former Smoker    Smokeless tobacco: Former User    Tobacco comment: quit 20 years ago   Vaping Use    Vaping Use: Never used   Substance Use Topics    Alcohol use: Yes     Comment: occasional    Drug use: Never       Review of Systems   Constitutional: Negative for chills and fever  HENT: Negative for ear pain, hearing loss, sore throat and tinnitus  Eyes: Negative for pain and visual disturbance  Respiratory: Negative for cough and shortness of breath  Cardiovascular: Negative for chest pain, palpitations and syncope  Gastrointestinal: Negative for abdominal pain, blood in stool, diarrhea, nausea and vomiting  Genitourinary: Negative for dysuria and hematuria  Musculoskeletal: Negative for arthralgias and back pain  Skin: Negative for color change and rash  Neurological: Positive for dizziness and headaches  Negative for seizures, syncope and weakness  All other systems reviewed and are negative  Physical Exam  Physical Exam  Vitals and nursing note reviewed  Constitutional:       Appearance: He is well-developed  HENT:      Head: Normocephalic and atraumatic  Mouth/Throat:      Comments: S/p resection tongue tumor  Eyes:      Conjunctiva/sclera: Conjunctivae normal    Cardiovascular:      Rate and Rhythm: Normal rate and regular rhythm  Heart sounds: No murmur heard  Pulmonary:      Effort: Pulmonary effort is normal  No respiratory distress  Breath sounds: Normal breath sounds  Abdominal:      Palpations: Abdomen is soft  Tenderness: There is no abdominal tenderness  Musculoskeletal:      Cervical back: Neck supple  Tenderness present  No bony tenderness  Thoracic back: Normal       Lumbar back: Normal    Skin:     General: Skin is warm and dry  Neurological:      Mental Status: He is alert and oriented to person, place, and time  GCS: GCS eye subscore is 4  GCS verbal subscore is 5  GCS motor subscore is 6  Comments: GCS 15  AAOx3  No focal neuro deficits  CN II-XII grossly intact  Speech normal, no aphasia or dysarthria  No pronator drift  Cerebellar function normal  Finger to nose normal  PERRL  EOMI  Peripheral vision intact   No nystagmus  Upper and lower extremity strength 5/5 through   strength 5/5 b/l  Gross sensation intact b/l  Vital Signs  ED Triage Vitals   Temperature Pulse Respirations Blood Pressure SpO2   04/26/22 1524 04/26/22 1524 04/26/22 1524 04/26/22 1524 04/26/22 1524   97 7 °F (36 5 °C) 103 18 (!) 171/78 97 %      Temp Source Heart Rate Source Patient Position - Orthostatic VS BP Location FiO2 (%)   04/26/22 1524 04/26/22 1602 04/26/22 1602 04/26/22 1730 --   Oral Monitor Sitting Left arm       Pain Score       --                  Vitals:    04/26/22 1524 04/26/22 1602 04/26/22 1730 04/26/22 1830   BP: (!) 171/78 (!) 179/85 145/89 165/97   Pulse: 103 96 83 85   Patient Position - Orthostatic VS:  Sitting Sitting Lying         Visual Acuity      ED Medications  Medications - No data to display    Diagnostic Studies  Results Reviewed     Procedure Component Value Units Date/Time    HS Troponin I 4hr [[id number]]     Lab Status: No result Specimen: Blood     HS Troponin I 2hr [[id number]] Collected: 04/26/22 1818    Lab Status:  In process Specimen: Blood from Arm, Right Updated: 04/26/22 1821    HS Troponin 0hr (reflex protocol) [[id number]]  (Normal) Collected: 04/26/22 1626    Lab Status: Final result Specimen: Blood from Arm, Right Updated: 04/26/22 1710     hs TnI 0hr 5 ng/L     Comprehensive metabolic panel [[id number]]  (Abnormal) Collected: 04/26/22 1626    Lab Status: Final result Specimen: Blood from Arm, Right Updated: 04/26/22 1700     Sodium 138 mmol/L      Potassium 4 5 mmol/L      Chloride 104 mmol/L      CO2 29 mmol/L      ANION GAP 5 mmol/L      BUN 20 mg/dL      Creatinine 1 39 mg/dL      Glucose 86 mg/dL      Calcium 9 7 mg/dL      AST 20 U/L      ALT 13 U/L      Alkaline Phosphatase 58 U/L      Total Protein 7 8 g/dL      Albumin 3 5 g/dL      Total Bilirubin 0 30 mg/dL      eGFR 49 ml/min/1 73sq m     Narrative:      [place] guidelines for Chronic Kidney Disease (CKD):   Stage 1 with normal or high GFR (GFR > 90 mL/min/1 73 square meters)    Stage 2 Mild CKD (GFR = 60-89 mL/min/1 73 square meters)    Stage 3A Moderate CKD (GFR = 45-59 mL/min/1 73 square meters)    Stage 3B Moderate CKD (GFR = 30-44 mL/min/1 73 square meters)    Stage 4 Severe CKD (GFR = 15-29 mL/min/1 73 square meters)    Stage 5 End Stage CKD (GFR <15 mL/min/1 73 square meters)  Note: GFR calculation is accurate only with a steady state creatinine    Magnesium [[id number]]  (Normal) Collected: 04/26/22 1626    Lab Status: Final result Specimen: Blood from Arm, Right Updated: 04/26/22 1700     Magnesium 2 3 mg/dL     CBC and differential [[id number]] Collected: 04/26/22 1626    Lab Status: Final result Specimen: Blood from Arm, Right Updated: 04/26/22 1631     WBC 6 49 Thousand/uL      RBC 5 03 Million/uL      Hemoglobin 14 2 g/dL      Hematocrit 43 1 %      MCV 86 fL      MCH 28 2 pg      MCHC 32 9 g/dL      RDW 13 2 %      MPV 8 9 fL      Platelets 142 Thousands/uL      nRBC 0 /100 WBCs      Neutrophils Relative 65 %      Immat GRANS % 1 %      Lymphocytes Relative 21 %      Monocytes Relative 8 %      Eosinophils Relative 4 %      Basophils Relative 1 %      Neutrophils Absolute 4 33 Thousands/µL      Immature Grans Absolute 0 03 Thousand/uL      Lymphocytes Absolute 1 33 Thousands/µL      Monocytes Absolute 0 50 Thousand/µL      Eosinophils Absolute 0 25 Thousand/µL      Basophils Absolute 0 05 Thousands/µL                  CT head without contrast   Final Result by Carl Smith DO (04/26 1731)   No acute intracranial abnormality  Workstation performed: [id number]         CT spine cervical without contrast   Final Result by Carl Smith DO (04/26 1755)   No cervical spine fracture or traumatic malalignment  Partial mastoiditis                  Workstation performed: [id number]                    Procedures  ECG 12 Lead Documentation Only    Date/Time: 4/26/2022 5:00 PM  Performed by: Kit Chen PA-C  Authorized by: Kit Chen PA-C     ECG reviewed by me, the ED Provider: yes    Patient location:  ED  Interpretation:     Interpretation: normal    Quality:     Tracing quality:  Limited by artifact  Rate:     ECG rate:  89    ECG rate assessment: normal    Rhythm:     Rhythm: sinus rhythm    Ectopy:     Ectopy: none    QRS:     QRS axis:  Normal    QRS intervals:  Normal  Conduction:     Conduction: normal    ST segments:     ST segments:  Normal  T waves:     T waves: non-specific               ED Course                               SBIRT 22yo+      Most Recent Value   SBIRT (22 yo +)    In order to provide better care to our patients, we are screening all of our patients for alcohol and drug use  Would it be okay to ask you these screening questions? Yes Filed at: 04/26/2022 5556   Initial Alcohol Screen: US AUDIT-C     1  How often do you have a drink containing alcohol? 0 Filed at: 04/26/2022 1602   2  How many drinks containing alcohol do you have on a typical day you are drinking? 0 Filed at: 04/26/2022 1602   3a  Male UNDER 65: How often do you have five or more drinks on one occasion? 0 Filed at: 04/26/2022 1602   3b  FEMALE Any Age, or MALE 65+: How often do you have 4 or more drinks on one occassion? 0 Filed at: 04/26/2022 1602   Audit-C Score 0 Filed at: 04/26/2022 5763   LAURA: How many times in the past year have you    Used an illegal drug or used a prescription medication for non-medical reasons? Never Filed at: 04/26/2022 1602                    MDM  Number of Diagnoses or Management Options  Dizziness: new and requires workup  Head injury: new and requires workup  Diagnosis management comments: DDx including but not limited to: tension headache, cluster headache, migraine, sprain, strain, fracture, orthostatic syncope, anemia, , hypovolemia, dehydration, electrolyte abnormality, inner ear disorder such as BPPV, doubt CVA/TIA   Plan: Cardiac workup  Amount and/or Complexity of Data Reviewed  Clinical lab tests: reviewed and ordered  Tests in the radiology section of CPT®: reviewed and ordered    Risk of Complications, Morbidity, and/or Mortality  Presenting problems: moderate  Management options: low  General comments: 79 yo with dizziness and head injury  CT head unremarkable  C spine with evidence of mastoiditis however clinically the pt does not appear to have mastoiditis  We discussed warning signs and symptoms for this  Labs unremarkable  Cardiac workup unremarkable  Doubt cardiac etiology  Recommended PCP f/u  Return parameters provided  Pt understands and agrees with plan  Patient Progress  Patient progress: stable      Disposition  Final diagnoses:   Dizziness   Head injury     Time reflects when diagnosis was documented in both MDM as applicable and the Disposition within this note     Time User Action Codes Description Comment    4/26/2022  6:41 PM Carolyn Santizo [[id number]] Dizziness     4/26/2022  6:41 PM Carolyn Santizo [[id number]] Head injury       ED Disposition     ED Disposition Condition Date/Time Comment    Discharge Stable Tue Apr 26, 2022  6:40 PM Darcy Swain discharge to home/self care              Follow-up Information     Follow up With Specialties Details Why [address] Lakhani MD Family Medicine Call   21 [phone] [address] [facility]  [phone]            Discharge Medication List as of 4/26/2022  6:42 PM      START taking these medications    Details   meclizine (ANTIVERT) 25 mg tablet Take 1 tablet (25 mg total) by mouth 3 (three) times a day as needed for dizziness, Starting Tue 4/26/2022, Print         CONTINUE these medications which have NOT CHANGED    Details   albuterol (ProAir HFA) 90 mcg/act inhaler Inhale 2 puffs every 6 (six) hours as needed for wheezing, Starting Sat 12/18/2021, Normal      aspirin 81 MG tablet 1 cap(s), Historical Med      Coenzyme Q10 (CO Q 10 PO) Take by mouth, Historical Med      cyanocobalamin (VITAMIN B-12) 100 mcg tablet Take by mouth daily, Historical Med      Multiple Vitamins-Minerals (MULTIVITAMIN ADULTS 50+ PO) Take by mouth, Historical Med      nystatin (MYCOSTATIN) 500,000 units/5 mL suspension Apply 5 mL (500,000 Units total) to the mouth or throat 4 (four) times a day, Starting Fri 1/7/2022, Normal      pravastatin (PRAVACHOL) 20 mg tablet TAKE ONE TABLET BY MOUTH EVERY OTHER DAY, Normal      VITAMIN D, CHOLECALCIFEROL, PO Take by mouth, Historical Med             No discharge procedures on file      PDMP Review     None          ED Provider  Electronically Signed by           Mihir Mccray PA-C  04/26/22 1913

## 2022-04-26 NOTE — DISCHARGE INSTRUCTIONS
Return to the Emergency Department if increased vertigo, pain, fever, vomiting, dizziness, weakness, difficulty breathing or walking

## 2022-04-26 NOTE — PROGRESS NOTES
[address]        NAME: Lonnie Curry is a 78 y o  male  : 1945    MRN: [id number]  DATE: 2022  TIME: 7:06 PM    Assessment and Plan   Syncope and collapse [R55]  1  Syncope and collapse  Transfer to other facility   2  Dizziness and giddiness  Transfer to other facility   3  Gait disturbance  Transfer to other facility         Patient Instructions   Patient Instructions     1  Go directly to the ER for your syncope work-up  2  F/u with Dr Montana Lamb in 2-3 days  3  F/u with Dr Neris Sampson tomorrow  4  Drink lots of fluids and stay out of the hot sun/heat  Syncope   WHAT YOU NEED TO KNOW:   Syncope is also called fainting or passing out  Syncope is a sudden, temporary loss of consciousness, followed by a fall from a standing or sitting position  Syncope ranges from not serious to a sign of a more serious condition that needs to be treated  You can control some health conditions that cause syncope  Your healthcare providers can help you create a plan to manage syncope and prevent episodes  DISCHARGE INSTRUCTIONS:   Seek care immediately if:   · You are bleeding because you hit your head when you fainted  · You suddenly have double vision, difficulty speaking, numbness, and cannot move your arms or legs  · You have chest pain and trouble breathing  · You vomit blood or material that looks like coffee grounds  · You see blood in your bowel movement  Contact your healthcare provider if:   · You have new or worsening symptoms  · You have another syncope episode  · You have a headache, fast heartbeat, or feel too dizzy to stand up  · You have questions or concerns about your condition or care  Medicines:   · Medicines  may be needed to help your heart pump strongly and regularly  Your healthcare provider may also make changes to any medicines that are causing syncope  · Take your medicine as directed    Contact your healthcare provider if you think your medicine is not helping or if you have side effects  Tell him or her if you are allergic to any medicine  Keep a list of the medicines, vitamins, and herbs you take  Include the amounts, and when and why you take them  Bring the list or the pill bottles to follow-up visits  Carry your medicine list with you in case of an emergency  Follow up with your doctor as directed:  Write down your questions so you remember to ask them during your visits  Manage syncope:   · Keep a record of your syncope episodes  Include your symptoms and your activity before and after the episode  The record can help your healthcare provider find the cause of your syncope and help you manage episodes  · Sit or lie down when needed  This includes when you feel dizzy, your throat is getting tight, and your vision changes  Raise your legs above the level of your heart  · Take slow, deep breaths if you start to breathe faster with anxiety or fear  This can help decrease dizziness and the feeling that you might faint  · Check your blood pressure often  This is important if you take medicine to lower your blood pressure  Check your blood pressure when you are lying down and when you are standing  Ask how often to check during the day  Keep a record of your blood pressure numbers  Your healthcare provider may use the record to help plan your treatment  Prevent a syncope episode:   · Move slowly and let yourself get used to one position before you move to another position  This is very important when you change from a lying or sitting position to a standing position  Take some deep breaths before you stand up from a lying position  Stand up slowly  Sudden movements may cause a fainting spell  Sit on the side of the bed or couch for a few minutes before you stand up  If you are on bedrest, try to be upright for about 2 hours each day, or as directed  Do not lock your legs if you are standing for a long period of time   Move your legs and bend your knees to keep blood flowing  · Follow your healthcare provider's recommendations  Your provider may  recommend that you drink more liquids to prevent dehydration  You may also need to have more salt to keep your blood pressure from dropping too low and causing syncope  Your provider will tell you how much liquid and sodium to have each day  He or she will also tell you how much physical activity is safe for you  This will depend on what is causing your syncope  · Watch for signs of low blood sugar  These include hunger, nervousness, sweating, and fast or fluttery heartbeats  Talk with your healthcare provider about ways to keep your blood sugar level steady  · Do not strain if you are constipated  You may faint if you strain to have a bowel movement  Walking is the best way to get your bowels moving  Eat foods high in fiber to make it easier to have a bowel movement  Good examples are high-fiber cereals, beans, vegetables, and whole-grain breads  Prune juice may help make bowel movements softer  · Be careful in hot weather  Heat can cause a syncope episode  Limit activity done outside on hot days  Physical activity in hot weather can lead to dehydration  This can cause an episode  © Copyright [employer] 2022 Information is for End User's use only and may not be sold, redistributed or otherwise used for commercial purposes  All illustrations and images included in CareNotes® are the copyrighted property of [employer]  or [facility] Becka Morton   The above information is an  only  It is not intended as medical advice for individual conditions or treatments  Talk to your doctor, nurse or pharmacist before following any medical regimen to see if it is safe and effective for you  Follow up with PCP in 3-5 days  Proceed to  ER if symptoms worsen      Chief Complaint     Chief Complaint   Patient presents with    Dizziness     s/p  fall 2 weeks ago; verbalizes passing out; did not seek tx post syncope  Now having difficutly with dizzyness and balance  Speech imparied from hx of tounge CA per pt  History of Present Illness       HPI   79 yo w male with chief complaint of being a little "off"  Patient states that he took a fall about 2 weeks ago when he was outside in the sun and got up quickly and felt dizzy and put his head on the barbeque and then found himself on the ground after passing out  Patient does not know how long he was out  Does admit to a loss of consciousness  No prior history of passing out  Patient denies any numbness or tingling in his arms or legs  Patient denies any prior history of stroke  Patient has some slurred speech however he had tongue cancer removed about 20 years ago and his speech is slurred since that time  Patient states that sometimes his blood pressure runs low and due to  his swallowing difficulties does not drink enough fluids  Patient also has monoclonal gammopathy  Patient states that his dizziness gets worse when he gets up quickly and that he just feels off when ambulating  Patient has had a history of vertigo in the past but states that this feels different  Patient denies any headache or chest pain          Review of Systems   Review of Systems   Constitutional: Negative for chills and fever  HENT: Positive for trouble swallowing  Negative for congestion and rhinorrhea  Eyes: Negative for discharge and visual disturbance  Respiratory: Negative for shortness of breath and wheezing  Cardiovascular: Negative for chest pain and palpitations  Gastrointestinal: Negative for abdominal pain and vomiting  Endocrine: Negative for polydipsia and polyuria  Genitourinary: Negative for dysuria and hematuria  Musculoskeletal: Positive for gait problem  Negative for arthralgias and neck stiffness  Skin: Negative for rash and wound     Neurological: Positive for dizziness, weakness and light-headedness  Negative for headaches     Psychiatric/Behavioral: Negative for confusion, dysphoric mood and suicidal ideas             Current Medications         Current Outpatient Medications:     aspirin 81 MG tablet, 1 cap(s), Disp: , Rfl:     Coenzyme Q10 (CO Q 10 PO), Take by mouth, Disp: , Rfl:     cyanocobalamin (VITAMIN B-12) 100 mcg tablet, Take by mouth daily, Disp: , Rfl:     Multiple Vitamins-Minerals (MULTIVITAMIN ADULTS 50+ PO), Take by mouth, Disp: , Rfl:     pravastatin (PRAVACHOL) 20 mg tablet, TAKE ONE TABLET BY MOUTH EVERY OTHER DAY, Disp: 30 tablet, Rfl: 3    VITAMIN D, CHOLECALCIFEROL, PO, Take by mouth, Disp: , Rfl:     albuterol (ProAir HFA) 90 mcg/act inhaler, Inhale 2 puffs every 6 (six) hours as needed for wheezing (Patient not taking: Reported on 4/26/2022 ), Disp: 8 5 g, Rfl: 0    nystatin (MYCOSTATIN) 500,000 units/5 mL suspension, Apply 5 mL (500,000 Units total) to the mouth or throat 4 (four) times a day (Patient not taking: Reported on 4/26/2022 ), Disp: 60 mL, Rfl: 2     Current Allergies            Allergies as of 04/26/2022 - Reviewed 04/26/2022   Allergen Reaction Noted    Iodinated diagnostic agents Other (See Comments) 02/14/2019    Iodine - food allergy   11/30/2016                The following portions of the patient's history were reviewed and updated as appropriate: allergies, current medications, past family history, past medical history, past social history, past surgical history and problem list       Medical History        Past Medical History:   Diagnosis Date    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[facility] [address] ) 03/1999     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)              Surgical History         Past Surgical History:   Procedure Laterality Date    CYSTOSCOPY   08/30/2021    EYELID CLOSURE Left       to prevent eyelashes from rubbing cornea    HERNIA REPAIR Left      MODIFIED RADICAL NECK DISSECTION Left 03/03/1999     [facility] - with mandibulotomy and resection SCCA left tongue base - Dr Canas Elder                      Family History   Problem Relation Age of Onset    Hypertension Mother      Stroke Mother              Medications have been verified            Objective   /92   Pulse 89   Temp 98 1 °F (36 7 °C)   Resp 20         Physical Exam      Physical Exam  Vitals and nursing note reviewed  Constitutional:       General: He is not in acute distress  Appearance: Normal appearance  He is not ill-appearing, toxic-appearing or diaphoretic  Comments: 79-year-old white male sitting on the stretcher in no acute distress  Patient has some slurred speech due to cancer of the tongue surgery over 20 years ago  HENT:      Head: Normocephalic and atraumatic  Comments: There is a slight contusion to the parietal occipital region of the left side of his head  Right Ear: Tympanic membrane normal       Left Ear: Tympanic membrane normal       Nose: Nose normal       Mouth/Throat:      Mouth: Mucous membranes are moist       Pharynx: Oropharynx is clear  No oropharyngeal exudate or posterior oropharyngeal erythema  Comments: Patient has slurred speech with increased mucus production and a surgical removal of the base on the left  of his tongue due to carcinoma over 20 years ago   Eyes:      Extraocular Movements: Extraocular movements intact  Pupils: Pupils are equal, round, and reactive to light  Neck:      Vascular: No carotid bruit  Cardiovascular:      Rate and Rhythm: Normal rate and regular rhythm  Pulses: Normal pulses  Heart sounds: Normal heart sounds  Pulmonary:      Effort: Pulmonary effort is normal       Breath sounds: Normal breath sounds  Abdominal:      General: Abdomen is flat   Bowel sounds are normal  There is no distension  Palpations: Abdomen is soft  There is no mass  Tenderness: There is no abdominal tenderness  There is no right CVA tenderness, left CVA tenderness, guarding or rebound  Hernia: No hernia is present  Musculoskeletal:         General: No swelling, tenderness, deformity or signs of injury  Normal range of motion  Cervical back: Normal range of motion and neck supple  No rigidity  No muscular tenderness  Right lower leg: No edema  Left lower leg: No edema  Lymphadenopathy:      Cervical: No cervical adenopathy  Skin:     General: Skin is warm and dry  Coloration: Skin is not jaundiced or pale  Findings: No bruising, erythema, lesion or rash  Neurological:      Mental Status: He is alert and oriented to person, place, and time  Cranial Nerves: No cranial nerve deficit  Motor: No weakness  Comments: Patient has a normal neurological exam with the exception of a slight lean to the left while ambulating  Patient does not display any nystagmus on exam   Patient has a negative Romberg's  Patient has equal  bilaterally and equal strength in flexion and extension of the upper and lower extremities  There is no facial droop  There is slurred speech again due to his prior history of cancer of the tongue  Psychiatric:         Mood and Affect: Mood normal           Patient's EKG shows a normal sinus rhythm however he does have Q-waves in V1 and V2  I reviewed his old EKG from December 2021 and it is unchanged  He also had Q waves at that time as well  Patient denies any prior history of septal infarct      I explained to patient that he needs a further workup for his syncope which would include a CT of his head  I offered to call an ambulance for the patient to go to the ER but patient declined  Patient states he drove in is okay to go himself    Patient has a follow-up with Dr Jevon Harry of [facility]   Neurological: Positive for dizziness, tremors, syncope and light-headedness           Current Medications       Current Outpatient Medications:     aspirin 81 MG tablet, 1 cap(s), Disp: , Rfl:     Coenzyme Q10 (CO Q 10 PO), Take by mouth, Disp: , Rfl:     cyanocobalamin (VITAMIN B-12) 100 mcg tablet, Take by mouth daily, Disp: , Rfl:     Multiple Vitamins-Minerals (MULTIVITAMIN ADULTS 50+ PO), Take by mouth, Disp: , Rfl:     pravastatin (PRAVACHOL) 20 mg tablet, TAKE ONE TABLET BY MOUTH EVERY OTHER DAY, Disp: 30 tablet, Rfl: 3    VITAMIN D, CHOLECALCIFEROL, PO, Take by mouth, Disp: , Rfl:     albuterol (ProAir HFA) 90 mcg/act inhaler, Inhale 2 puffs every 6 (six) hours as needed for wheezing (Patient not taking: Reported on 4/26/2022 ), Disp: 8 5 g, Rfl: 0    meclizine (ANTIVERT) 25 mg tablet, Take 1 tablet (25 mg total) by mouth 3 (three) times a day as needed for dizziness, Disp: 15 tablet, Rfl: 0    nystatin (MYCOSTATIN) 500,000 units/5 mL suspension, Apply 5 mL (500,000 Units total) to the mouth or throat 4 (four) times a day (Patient not taking: Reported on 4/26/2022 ), Disp: 60 mL, Rfl: 2    Current Allergies     Allergies as of 04/26/2022 - Reviewed 04/26/2022   Allergen Reaction Noted    Iodinated diagnostic agents Other (See Comments) 02/14/2019    Iodine - food allergy  11/30/2016            The following portions of the patient's history were reviewed and updated as appropriate: allergies, current medications, past family history, past medical history, past social history, past surgical history and problem list      Past Medical History:   Diagnosis Date    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[facility] [address] ) 03/1999    SCCA left tongue base - T2N1M0- treated at [facility] - had surgery and XRT    Hyperlipidemia     Hypertension     Left anterior fascicular block     Stage 3 chronic kidney disease ([facility] [address] )        Past Surgical History:   Procedure Laterality Date    CYSTOSCOPY  08/30/2021  EYELID CLOSURE Left     to prevent eyelashes from rubbing cornea    HERNIA REPAIR Left     MODIFIED RADICAL NECK DISSECTION Left 03/03/1999    [facility] - with mandibulotomy and resection SCCA left tongue base - Dr Yohannes Dang History   Problem Relation Age of Onset    Hypertension Mother     Stroke Mother          Medications have been verified  Objective   /92   Pulse 89   Temp 98 1 °F (36 7 °C)   Resp 20        Physical Exam     Physical Exam   79 yo w male with chief complaint of being a little "off"  Patient states that he took a fall about 2 weeks ago when he was outside in the sun and got up quickly and felt dizzy and put his head on the barbeque and then found himself on the ground after passing out  Patient does not know how long he was out  Does admit to a loss of consciousness  No prior history of passing out  Patient denies any numbness or tingling in his arms or legs  Patient denies any prior history of stroke  Patient has some slurred speech however he had tongue cancer removed about 20 years ago and his speech is slurred since that time  Patient states that sometimes his blood pressure runs low and due to  his swallowing difficulties does not drink enough fluids  Patient also has monoclonal gammopathy  Patient states that his dizziness gets worse when he gets up quickly and that he just feels off when ambulating  Patient has had a history of vertigo in the past but states that this feels different  Patient denies any headache or chest pain          Review of Systems   Review of Systems   Constitutional: Negative for chills and fever  HENT: Positive for trouble swallowing  Negative for congestion and rhinorrhea  Eyes: Negative for discharge and visual disturbance  Respiratory: Negative for shortness of breath and wheezing  Cardiovascular: Negative for chest pain and palpitations  Gastrointestinal: Negative for abdominal pain and vomiting  Endocrine: Negative for polydipsia and polyuria  Genitourinary: Negative for dysuria and hematuria  Musculoskeletal: Positive for gait problem  Negative for arthralgias and neck stiffness  Skin: Negative for rash and wound  Neurological: Positive for dizziness, weakness and light-headedness  Negative for headaches     Psychiatric/Behavioral: Negative for confusion, dysphoric mood and suicidal ideas             Current Medications         Current Outpatient Medications:     aspirin 81 MG tablet, 1 cap(s), Disp: , Rfl:     Coenzyme Q10 (CO Q 10 PO), Take by mouth, Disp: , Rfl:     cyanocobalamin (VITAMIN B-12) 100 mcg tablet, Take by mouth daily, Disp: , Rfl:     Multiple Vitamins-Minerals (MULTIVITAMIN ADULTS 50+ PO), Take by mouth, Disp: , Rfl:     pravastatin (PRAVACHOL) 20 mg tablet, TAKE ONE TABLET BY MOUTH EVERY OTHER DAY, Disp: 30 tablet, Rfl: 3    VITAMIN D, CHOLECALCIFEROL, PO, Take by mouth, Disp: , Rfl:     albuterol (ProAir HFA) 90 mcg/act inhaler, Inhale 2 puffs every 6 (six) hours as needed for wheezing (Patient not taking: Reported on 4/26/2022 ), Disp: 8 5 g, Rfl: 0    nystatin (MYCOSTATIN) 500,000 units/5 mL suspension, Apply 5 mL (500,000 Units total) to the mouth or throat 4 (four) times a day (Patient not taking: Reported on 4/26/2022 ), Disp: 60 mL, Rfl: 2     Current Allergies            Allergies as of 04/26/2022 - Reviewed 04/26/2022   Allergen Reaction Noted    Iodinated diagnostic agents Other (See Comments) 02/14/2019    Iodine - food allergy   11/30/2016                The following portions of the patient's history were reviewed and updated as appropriate: allergies, current medications, past family history, past medical history, past social history, past surgical history and problem list       Medical History        Past Medical History:   Diagnosis Date    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[facility] [address] ) [facility]/1999     [facility] - had surgery and XRT    Hyperlipidemia      Hypertension      Left anterior fascicular block      Stage 3 chronic kidney disease [facility])              Surgical History         Past Surgical History:   Procedure Laterality Date    CYSTOSCOPY   08/30/2021    EYELID CLOSURE Left       to prevent eyelashes from rubbing cornea    HERNIA REPAIR Left      MODIFIED RADICAL NECK DISSECTION Left 03/03/1999     [facility] - with mandibulotomy and resection SCCA left tongue base - Dr Radha Gould                      Family History   Problem Relation Age of Onset    Hypertension Mother      Stroke Mother              Medications have been verified            Objective   /92   Pulse 89   Temp 98 1 °F (36 7 °C)   Resp 20         Physical Exam      Physical Exam  Vitals and nursing note reviewed  Constitutional:       General: He is not in acute distress  Appearance: Normal appearance  He is not ill-appearing, toxic-appearing or diaphoretic  Comments: 53-year-old white male sitting on the stretcher in no acute distress  Patient has some slurred speech due to cancer of the tongue surgery over 20 years ago  HENT:      Head: Normocephalic and atraumatic  Comments: There is a slight contusion to the parietal occipital region of the left side of his head  Right Ear: Tympanic membrane normal       Left Ear: Tympanic membrane normal       Nose: Nose normal       Mouth/Throat:      Mouth: Mucous membranes are moist       Pharynx: Oropharynx is clear  No oropharyngeal exudate or posterior oropharyngeal erythema  Comments: Patient has slurred speech with increased mucus production and a surgical removal of the base on the left  of his tongue due to carcinoma over 20 years ago   Eyes:      Extraocular Movements: Extraocular movements intact  Pupils: Pupils are equal, round, and reactive to light     Neck: Vascular: No carotid bruit  Cardiovascular:      Rate and Rhythm: Normal rate and regular rhythm  Pulses: Normal pulses  Heart sounds: Normal heart sounds  Pulmonary:      Effort: Pulmonary effort is normal       Breath sounds: Normal breath sounds  Abdominal:      General: Abdomen is flat  Bowel sounds are normal  There is no distension  Palpations: Abdomen is soft  There is no mass  Tenderness: There is no abdominal tenderness  There is no right CVA tenderness, left CVA tenderness, guarding or rebound  Hernia: No hernia is present  Musculoskeletal:         General: No swelling, tenderness, deformity or signs of injury  Normal range of motion  Cervical back: Normal range of motion and neck supple  No rigidity  No muscular tenderness  Right lower leg: No edema  Left lower leg: No edema  Lymphadenopathy:      Cervical: No cervical adenopathy  Skin:     General: Skin is warm and dry  Coloration: Skin is not jaundiced or pale  Findings: No bruising, erythema, lesion or rash  Neurological:      Mental Status: He is alert and oriented to person, place, and time  Cranial Nerves: No cranial nerve deficit  Motor: No weakness  Comments: Patient has a normal neurological exam with the exception of a slight lean to the left while ambulating  Patient does not display any nystagmus on exam   Patient has a negative Romberg's  Patient has equal  bilaterally and equal strength in flexion and extension of the upper and lower extremities  There is no facial droop  There is slurred speech again due to his prior history of cancer of the tongue  Psychiatric:         Mood and Affect: Mood normal           Patient's EKG shows a normal sinus rhythm however he does have Q-waves in V1 and V2  I reviewed his old EKG from December 2021 and it is unchanged  He also had Q waves at that time as well    Patient denies any prior history of septal infarct      I explained to patient that he needs a further workup for his syncope which would include a CT of his head  I offered to call an ambulance for the patient to go to the ER but patient declined  Patient states he drove in is okay to go himself    Patient has a follow-up with Dr Rachel Figueroa

## 2022-04-27 ENCOUNTER — CONSULT (OUTPATIENT)
Dept: NEPHROLOGY | Facility: CLINIC | Age: 77
End: 2022-04-27
Payer: MEDICARE

## 2022-04-27 VITALS
SYSTOLIC BLOOD PRESSURE: 140 MMHG | WEIGHT: 140.4 LBS | TEMPERATURE: 96.5 F | HEIGHT: 67 IN | BODY MASS INDEX: 22.03 KG/M2 | DIASTOLIC BLOOD PRESSURE: 80 MMHG | RESPIRATION RATE: 16 BRPM | HEART RATE: 82 BPM | OXYGEN SATURATION: 97 %

## 2022-04-27 DIAGNOSIS — M89.9 CHRONIC KIDNEY DISEASE-MINERAL AND BONE DISORDER: ICD-10-CM

## 2022-04-27 DIAGNOSIS — N18.9 CHRONIC KIDNEY DISEASE-MINERAL AND BONE DISORDER: ICD-10-CM

## 2022-04-27 DIAGNOSIS — N18.31 ACUTE RENAL FAILURE SUPERIMPOSED ON STAGE 3A CHRONIC KIDNEY DISEASE, UNSPECIFIED ACUTE RENAL FAILURE TYPE (HCC): Primary | ICD-10-CM

## 2022-04-27 DIAGNOSIS — I10 ESSENTIAL HYPERTENSION: ICD-10-CM

## 2022-04-27 DIAGNOSIS — I95.0 IDIOPATHIC HYPOTENSION: ICD-10-CM

## 2022-04-27 DIAGNOSIS — N18.31 STAGE 3A CHRONIC KIDNEY DISEASE (HCC): ICD-10-CM

## 2022-04-27 DIAGNOSIS — N17.9 ACUTE RENAL FAILURE SUPERIMPOSED ON STAGE 3A CHRONIC KIDNEY DISEASE, UNSPECIFIED ACUTE RENAL FAILURE TYPE (HCC): Primary | ICD-10-CM

## 2022-04-27 DIAGNOSIS — E83.9 CHRONIC KIDNEY DISEASE-MINERAL AND BONE DISORDER: ICD-10-CM

## 2022-04-27 DIAGNOSIS — N40.1 BENIGN LOCALIZED PROSTATIC HYPERPLASIA WITH LOWER URINARY TRACT SYMPTOMS (LUTS): ICD-10-CM

## 2022-04-27 PROCEDURE — 99204 OFFICE O/P NEW MOD 45 MIN: CPT | Performed by: INTERNAL MEDICINE

## 2022-04-27 NOTE — PROGRESS NOTES
NEPHROLOGY OFFICE CONSULT  Tish Bustamante 78 y o  male MRN: [id number]    Encounter: [id number] 4/27/2022    REASON FOR VISIT: Tish Bustamante is a 78 y  o male who was referred by Jamila Rudolph MD for evaluation of Chronic Kidney Disease and Consult    HPI:    William Zhao came in today for evaluation management of CKD  [de-identified] gentleman with fluctuating blood pressure and history of tongue cancer requiring surgery was found to have abnormal creatinine so was advised to see me     Patient claims he does history of CKD but never seen kidney doctor in the past     He had surgery done on his tongue for the cancer at [facility] about 20 years ago  Since then he has a speech problem and also swelling problem and usually it thickit only    Patient overall feeling quite well  Was in emergency room recently with dizziness which is also chronic in nature  Workup included CT scan were negative in ER      REVIEW OF SYSTEMS:    Review of Systems   Constitutional: Negative for activity change and fatigue  HENT: Negative for congestion and ear discharge  Eyes: Negative for photophobia and pain  Respiratory: Negative for apnea and choking  Cardiovascular: Negative for chest pain and palpitations  Gastrointestinal: Negative for abdominal distention and blood in stool  Endocrine: Negative for heat intolerance and polyphagia  Genitourinary: Negative for flank pain and urgency  Musculoskeletal: Negative for neck pain and neck stiffness  Skin: Negative for color change and wound  Allergic/Immunologic: Negative for food allergies and immunocompromised state  Neurological: Negative for seizures and facial asymmetry  Hematological: Negative for adenopathy  Does not bruise/bleed easily  Psychiatric/Behavioral: Negative for self-injury and suicidal ideas           PAST MEDICAL HISTORY:  Past Medical History:   Diagnosis Date    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[facility] [address] ) 03/1999    SCCA left tongue base - T2N1M0- [facility] at [facility] - had surgery and XRT    Chronic kidney disease     Hyperlipidemia     Hypertension     Left anterior fascicular block     Stage 3 chronic kidney disease ([address] )        PAST SURGICAL HISTORY:  Past Surgical History:   Procedure Laterality Date    CYSTOSCOPY  08/30/2021    EYELID CLOSURE Left     to prevent eyelashes from rubbing cornea    HERNIA REPAIR Left     MODIFIED RADICAL NECK DISSECTION Left 03/03/1999    [facility] - with mandibulotomy and resection SCCA left tongue base - Dr Azra Hector HISTORY:  Social History     Substance and Sexual Activity   Alcohol Use Yes    Comment: occasional     Social History     Substance and Sexual Activity   Drug Use Never     Social History     Tobacco Use   Smoking Status Former Smoker   Smokeless Tobacco Former User   Tobacco Comment    quit 20 years ago       FAMILY HISTORY:  Family History   Problem Relation Age of Onset    Hypertension Mother     Stroke Mother        MEDICATIONS:    Current Outpatient Medications:     aspirin 81 MG tablet, 1 cap(s), Disp: , Rfl:     Coenzyme Q10 (CO Q 10 PO), Take by mouth, Disp: , Rfl:     cyanocobalamin (VITAMIN B-12) 100 mcg tablet, Take by mouth daily, Disp: , Rfl:     Multiple Vitamins-Minerals (MULTIVITAMIN ADULTS 50+ PO), Take by mouth, Disp: , Rfl:     pravastatin (PRAVACHOL) 20 mg tablet, TAKE ONE TABLET BY MOUTH EVERY OTHER DAY, Disp: 30 tablet, Rfl: 3    VITAMIN D, CHOLECALCIFEROL, PO, Take by mouth, Disp: , Rfl:     albuterol (ProAir HFA) 90 mcg/act inhaler, Inhale 2 puffs every 6 (six) hours as needed for wheezing (Patient not taking: Reported on 4/26/2022 ), Disp: 8 5 g, Rfl: 0    meclizine (ANTIVERT) 25 mg tablet, Take 1 tablet (25 mg total) by mouth 3 (three) times a day as needed for dizziness (Patient not taking: Reported on 4/27/2022 ), Disp: 15 tablet, Rfl: 0    nystatin (MYCOSTATIN) 500,000 units/5 mL suspension, Apply 5 mL (500,000 Units total) to the mouth or throat 4 (four) times a day (Patient not taking: Reported on 4/26/2022 ), Disp: 60 mL, Rfl: 2    PHYSICAL EXAM:  Vitals:    04/27/22 1515   BP: 140/80   BP Location: Right arm   Patient Position: Sitting   Pulse: 82   Resp: 16   Temp: (!) 96 5 °F (35 8 °C)   TempSrc: Temporal   SpO2: 97%   Weight: 63 7 kg (140 lb 6 4 oz)   Height: 5' 7" (1 702 m)     Body mass index is 21 99 kg/m²  Physical Exam  Constitutional:       General: He is not in acute distress  Appearance: He is well-developed  HENT:      Head: Normocephalic  Eyes:      General: No scleral icterus  Conjunctiva/sclera: Conjunctivae normal    Neck:      Vascular: No JVD  Cardiovascular:      Rate and Rhythm: Normal rate  Heart sounds: Normal heart sounds  Pulmonary:      Effort: Pulmonary effort is normal       Breath sounds: Normal breath sounds  No wheezing  Abdominal:      General: Bowel sounds are normal       Palpations: Abdomen is soft  Tenderness: There is no abdominal tenderness  Musculoskeletal:         General: No swelling  Normal range of motion  Cervical back: Neck supple  Skin:     General: Skin is warm  Findings: No rash  Neurological:      General: No focal deficit present  Mental Status: He is alert and oriented to person, place, and time     Psychiatric:         Behavior: Behavior normal          LAB RESULTS:  Results for orders placed or performed during the hospital encounter of 04/26/22   CBC and differential   Result Value Ref Range    WBC 6 49 4 31 - 10 16 Thousand/uL    RBC 5 03 3 88 - 5 62 Million/uL    Hemoglobin 14 2 12 0 - 17 0 g/dL    Hematocrit 43 1 36 5 - 49 3 %    MCV 86 82 - 98 fL    MCH 28 2 26 8 - 34 3 pg    MCHC 32 9 31 4 - 37 4 g/dL    RDW 13 2 11 6 - 15 1 %    MPV 8 9 8 9 - 12 7 fL    Platelets 510 142 - 785 Thousands/uL    nRBC 0 /100 WBCs    Neutrophils Relative 65 43 - 75 %    Immat GRANS % 1 0 - 2 % Lymphocytes Relative 21 14 - 44 %    Monocytes Relative 8 4 - 12 %    Eosinophils Relative 4 0 - 6 %    Basophils Relative 1 0 - 1 %    Neutrophils Absolute 4 33 1 85 - 7 62 Thousands/µL    Immature Grans Absolute 0 03 0 00 - 0 20 Thousand/uL    Lymphocytes Absolute 1 33 0 60 - 4 47 Thousands/µL    Monocytes Absolute 0 50 0 17 - 1 22 Thousand/µL    Eosinophils Absolute 0 25 0 00 - 0 61 Thousand/µL    Basophils Absolute 0 05 0 00 - 0 10 Thousands/µL   Comprehensive metabolic panel   Result Value Ref Range    Sodium 138 136 - 145 mmol/L    Potassium 4 5 3 5 - 5 3 mmol/L    Chloride 104 100 - 108 mmol/L    CO2 29 21 - 32 mmol/L    ANION GAP 5 4 - 13 mmol/L    BUN 20 5 - 25 mg/dL    Creatinine 1 39 (H) 0 60 - 1 30 mg/dL    Glucose 86 65 - 140 mg/dL    Calcium 9 7 8 3 - 10 1 mg/dL    AST 20 5 - 45 U/L    ALT 13 12 - 78 U/L    Alkaline Phosphatase 58 46 - 116 U/L    Total Protein 7 8 6 4 - 8 2 g/dL    Albumin 3 5 3 5 - 5 0 g/dL    Total Bilirubin 0 30 0 20 - 1 00 mg/dL    eGFR 49 ml/min/1 73sq m   HS Troponin 0hr (reflex protocol)   Result Value Ref Range    hs TnI 0hr 5 "Refer to ACS Flowchart"- see link ng/L   Magnesium   Result Value Ref Range    Magnesium 2 3 1 6 - 2 6 mg/dL   HS Troponin I 2hr   Result Value Ref Range    hs TnI 2hr 5 "Refer to ACS Flowchart"- see link ng/L    Delta 2hr hsTnI 0 <20 ng/L   ECG 12 lead   Result Value Ref Range    Ventricular Rate 89 BPM    Atrial Rate 89 BPM    IA Interval 166 ms    QRSD Interval 100 ms    QT Interval 352 ms    QTC Interval 428 ms    P [place] 63 degrees    QRS Axis -67 degrees    T Wave Axis 54 degrees       ASSESSMENT and PLAN:    CKD (chronic kidney disease) stage 3, GFR 30-59 ml/min ([facility])  Lab Results   Component Value Date    EGFR 49 04/26/2022    EGFR 43 04/19/2022    EGFR 45 02/16/2022    CREATININE 1 39 (H) 04/26/2022    CREATININE 1 55 (H) 04/19/2022    CREATININE 1 48 (H) 02/16/2022   Patient does seems to have stage III CKD  Etiology unclear    Overall seems to be stable  Patient also kidney ultrasound last year which was essentially normal except some ceased    Pathophysiology kidney disease discussed at length with the patient  Advised hydration and avoid any nephrotoxic medication    At this point will repeat blood and urine test in 3 months and will monitor    Benign localized prostatic hyperplasia with lower urinary tract symptoms (LUTS)  At present patient seems asymptomatic without any urinary complaint  Will continue monitor and if problems arise will send him to urologist    Acute renal failure superimposed on chronic kidney disease [facility])  Lab Results   Component Value Date    EGFR 49 04/26/2022    EGFR 43 04/19/2022    EGFR 45 02/16/2022    CREATININE 1 39 (H) 04/26/2022    CREATININE 1 55 (H) 04/19/2022    CREATININE 1 48 (H) 02/16/2022   Overall seems to be stable  Advised hydration to avoid any acute kidney injury    Essential hypertension  Quite well controlled without medication  Will continue to monitor    Everything discussed with the patient at length  I will see him back in 3 months  Will get blood and urine test before that visit  Thank you very much for consultation I will monitor the patient with you        Portions of the record may have been created with voice recognition software  Occasional wrong word or "sound a like" substitutions may have occurred due to the inherent limitations of voice recognition software  Read the chart carefully and recognize, using context, where substitutions have occurred  If you have any questions, please contact the dictating provider

## 2022-04-27 NOTE — ASSESSMENT & PLAN NOTE
Lab Results   Component Value Date    EGFR 49 04/26/2022    EGFR 43 04/19/2022    EGFR 45 02/16/2022    CREATININE 1 39 (H) 04/26/2022    CREATININE 1 55 (H) 04/19/2022    CREATININE 1 48 (H) 02/16/2022   Patient does seems to have stage III CKD  Etiology unclear  Overall seems to be stable  Patient also kidney ultrasound last year which was essentially normal except some ceased    Pathophysiology kidney disease discussed at length with the patient    Advised hydration and avoid any nephrotoxic medication    At this point will repeat blood and urine test in 3 months and will monitor

## 2022-04-27 NOTE — ASSESSMENT & PLAN NOTE
Lab Results   Component Value Date    EGFR 49 04/26/2022    EGFR 43 04/19/2022    EGFR 45 02/16/2022    CREATININE 1 39 (H) 04/26/2022    CREATININE 1 55 (H) 04/19/2022    CREATININE 1 48 (H) 02/16/2022   Overall seems to be stable    Advised hydration to avoid any acute kidney injury

## 2022-04-27 NOTE — PATIENT INSTRUCTIONS
Chronic Kidney Disease   AMBULATORY CARE:   Chronic kidney disease (CKD)  is the gradual and permanent loss of kidney function  Normally, the kidneys remove fluid, chemicals, and waste from your blood  These wastes are turned into urine by your kidneys  CKD may worsen over time and lead to kidney failure  Common signs and symptoms include the following:   · Changes in how often you need to urinate    · Swelling in your arms, legs, or feet    · Shortness of breath    · Fatigue or weakness    · Bad or bitter taste in your mouth    · Nausea, vomiting, or loss of appetite    Call your local emergency number (911 in the [address] [facility]) if:   · You have a seizure  · You have shortness of breath  Seek care immediately if:   · You are confused and very drowsy  Call your doctor or nephrologist if:   · You suddenly gain or lose more weight than your healthcare provider has told you is okay  · You have itchy skin or a rash  · You urinate more or less than you normally do  · You have blood in your urine  · You have nausea and are vomiting  · You have fatigue or muscle weakness  · You have hiccups that will not stop  · You have questions or concerns about your condition or care  How CKD is diagnosed:  CKD has 5 stages  Your healthcare provider will use results from the following tests to find the stage of CKD you have:  · Blood and urine tests  show how well your kidneys are working  They may also show the cause of your CKD  · Ultrasound, CT scan, or MRI  pictures may be used to check your kidneys  You may be given contrast liquid to help your kidneys show up better in the pictures  Tell the healthcare provider if you have ever had an allergic reaction to contrast liquid  Do not enter the MRI room with anything metal  Metal can cause serious injury  Tell the healthcare provider if you have any metal in or on your body      · A biopsy  is a procedure to remove a small piece of tissue from your kidney  It is done to find the cause of your CKD  Treatment  can help control signs and symptoms, and prevent a worse stage of CKD  Your care team may include specialists, such as a dietitian or a heart specialist  This depends on the stage of your CKD and if you have other health conditions to manage  Healthcare providers will work with you to create a plan based on your decisions for treatment  Your treatment plan may include any of the following:  · Medicines  may be given to decrease your blood pressure and get rid of extra fluid  You may also receive medicine to manage health conditions that may occur with CKD, such as anemia, diabetes, and heart disease  · Dialysis  is a treatment to remove chemicals and waste from your blood when your kidneys can no longer do this  · Surgery  may be needed to create an arteriovenous fistula (AVF) in your arm or insert a catheter into your abdomen  This is done so you can receive dialysis  · A kidney transplant  may be done if your CKD becomes severe  What you can do to manage CKD: Management may include making some lifestyle changes  Tell your healthcare provider if you have any concerns about being able to make changes  He or she can help you find solutions, including working with specialists  Ask for help creating a plan to break large goals into smaller steps  Your plan may include any of the following:  · Manage other health conditions  Your healthcare provider will work with you to make a care plan that meets your needs  You will be checked regularly for heart disease or other conditions that can make CKD worse, such as diabetes  Your blood pressure will be closely monitored  You will also get a target blood pressure and help making a plan to reach your target  This may include taking your blood pressure at home  · Maintain a healthy weight  Your weight and body mass index (BMI) will be checked regularly   BMI helps find if your weight is healthy for your height  Your healthcare provider will use other tests to check your muscle and protein levels  Extra weight can strain your kidneys  A low weight or low muscle mass can make you feel more tired  You may have trouble doing your daily activities  Ask your provider what a healthy weight is for you  He or she can help you create a plan to lose or gain weight safely, if needed  The plan may include keeping a food diary  This is a list of foods and liquids you have each day  Your provider will use the diary to help you make changes, if needed  Changes are based on your health and any other conditions you have, such as diabetes  · Create an exercise plan  Regular exercise can help you manage CKD, high blood pressure, and diabetes  Exercise also helps control weight  Your provider can help you create exercise goals and a plan to reach those goals  For example, your goal may be to exercise for 30 minutes in a day  Your plan can include breaking exercise into 10 minute sessions, 3 times during the day  · Create a healthy eating plan  Your provider may tell you to eat food low in potassium, phosphorus, or protein  Your provider may also recommend vitamin or mineral supplements  Do not take any supplements without talking to your provider  A dietitian can help you plan meals if needed  Ask how much liquid to drink each day and which liquids are best for you  · Limit sodium (salt) as directed  You may need to limit sodium to less than 2,300 milligrams (mg) each day  Ask your dietitian or healthcare provider how much sodium you can have each day  The amount depends on your stage of kidney disease  Table salt, canned foods, soups, salted snacks, and processed meats, like deli meats and sausage, are high in sodium  Your provider or a dietitian can show you how to read food labels for sodium  · Limit alcohol as directed  Alcohol can cause fluid retention and can affect your kidneys   Ask how much alcohol is safe for you  A drink of alcohol is 12 ounces of beer, 5 ounces of wine, or 1½ ounces of liquor  · Do not smoke  Nicotine and other chemicals in cigarettes and cigars can cause kidney damage  Ask your provider for information if you currently smoke and need help to quit  E-cigarettes or smokeless tobacco still contain nicotine  Talk to your provider before you use these products  · Ask about over-the-counter medicines  Medicines such as NSAIDs and laxatives may harm your kidneys  Some cough and cold medicines can raise your blood pressure  Always ask if a medicine is safe before you take it  · Ask about vaccines you may need  CKD can increase your risk for infections such as pneumonia, influenza, and hepatitis  Vaccines lower your risk for infection  Your healthcare provider will tell you which vaccines you need and when to get them  Follow up with your doctor or nephrologist as directed: You will need to return for tests to monitor your kidney and nerve function, and your parathyroid hormone level  Your medicines may be changed, based on certain test results  Write down your questions so you remember to ask them during your visits  © Copyright [employer] 2022 Information is for End User's use only and may not be sold, redistributed or otherwise used for commercial purposes  All illustrations and images included in CareNotes® are the copyrighted property of [employer]  or Helen Lakhani  The above information is an  only  It is not intended as medical advice for individual conditions or treatments  Talk to your doctor, nurse or pharmacist before following any medical regimen to see if it is safe and effective for you

## 2022-04-27 NOTE — ASSESSMENT & PLAN NOTE
At present patient seems asymptomatic without any urinary complaint    Will continue monitor and if problems arise will send him to urologist

## 2022-04-29 ENCOUNTER — TELEPHONE (OUTPATIENT)
Dept: FAMILY MEDICINE CLINIC | Facility: CLINIC | Age: 77
End: 2022-04-29

## 2022-04-29 DIAGNOSIS — R42 DIZZINESS: Primary | ICD-10-CM

## 2022-04-29 NOTE — TELEPHONE ENCOUNTER
Pt fell, went to ER    is dizzy, can't walk a straight line  All tests came back negative    they gave him dramamine  Marleta Press which is not helping    is asking if  can recommend an ENT  Marleta Press ? ??

## 2022-05-18 ENCOUNTER — EVALUATION (OUTPATIENT)
Dept: PHYSICAL THERAPY | Age: 77
End: 2022-05-18
Payer: MEDICARE

## 2022-05-18 ENCOUNTER — TELEPHONE (OUTPATIENT)
Dept: FAMILY MEDICINE CLINIC | Facility: CLINIC | Age: 77
End: 2022-05-18

## 2022-05-18 DIAGNOSIS — H81.12 BPPV (BENIGN PAROXYSMAL POSITIONAL VERTIGO), LEFT: Primary | ICD-10-CM

## 2022-05-18 PROCEDURE — 95992 CANALITH REPOSITIONING PROC: CPT | Performed by: PHYSICAL THERAPIST

## 2022-05-18 PROCEDURE — 97162 PT EVAL MOD COMPLEX 30 MIN: CPT | Performed by: PHYSICAL THERAPIST

## 2022-05-18 PROCEDURE — 97110 THERAPEUTIC EXERCISES: CPT | Performed by: PHYSICAL THERAPIST

## 2022-05-18 NOTE — TELEPHONE ENCOUNTER
Note:  Pt laying down on louge chair, went to get up and fell to floor  Pt actually blacked out  Pt felt ok  A few days later he had vomiting, severe vertigo  They went to Urgent Care and then was sent to [facility]  They gave him antevert  It didn't work  It made him feel sleepy  He is now in Vertigo Therapy  Today is first day  Pt's wife called to let you know that pt has vertigo  He has been having spinning everytime he lays down  Pt walks funny like he is drunk  Pt's wife is wondering if you could give him a script for carated artery scan    I andi Epstein for 5/19

## 2022-05-18 NOTE — PROGRESS NOTES
PT Evaluation     Today's date: 2022  Patient name: Jefferson Aguilar  : 1945  MRN: [id number]  Referring provider: Salena Paredes PA-C  Dx:   Encounter Diagnosis     ICD-10-CM    1  BPPV (benign paroxysmal positional vertigo), left  H81 12        Start Time: 1355  Stop Time: 1445  Total time in clinic (min): 50 minutes    Assessment  Assessment details: Ravinder Vieyra is a 77 yo male with a a 3 week history of true spin vertigo that occurs with positional changes and quick head movments after sustaining a fall where he hit his head  He does have corrective saccades present in both horizontal and vertical testing that may be related to age vs central vestibular problem  DixHalpike testing was negative  Left Roll test was positive and produced a horizontal ageotropic nystagmus lasting >20sec induration  Sven's symptoms are consistent with the referring diagnosis of dizziness with a left horizontal canal bppv component  Due to the aforementioned impairments, the patient has difficulty with adls/iadls, and recreational activities including walking his dog  The patient would benefit from skilled physical therapy to address her impairments, improve his quality of life and restore prior functional mobility and activity level  Other impairment: vestibular dysfucntion  Functional limitations: diffiuclty with positional changes and quick head turns  Goals  STG 1-2 weeks  1  Decrease c/o dizziness/vertigo by 50%  2  Independent transfers with ease from supine-sit  LTG 4-6 weeks  1  Resolve symptoms of vertigo  2  Independent HEP for home maneuver and self management  3  DHI<10% with improved FOTO score  4  Restore to prior function level       Plan  Patient would benefit from: skilled physical therapy  Planned therapy interventions: canalith repositioning, patient education, neuromuscular re-education and therapeutic exercise  Frequency: 1-2x per week    Duration in weeks: 6  Plan of Care beginning date: 5/18/2022  Plan of Care expiration date: 8/18/2022        Subjective Evaluation    History of Present Illness  Date of onset: 4/26/2022  Mechanism of injury: Pt was sitting by the pool in the sun when he tried to stand and got dizzy  He initially rested his head on his grill but thenHe fell to the ground and hit his head and hip  The next day he experienced vertigo  He went the urgent care and was referred to the ER  CTscan was negative  He was presribed meclizine and it made him feel worse  He continues to c/o spinning when laying down in bed and moving his head to the side left>right  He stated he uses a  focal point to reduce the spinning  He was evaluated by ENT and referred for vestibular therapy  Pain  No pain reported    Patient Goals  Patient goal: relieve         Objective     Concurrent Complaints  Positive for nausea/motion sickness and hearing loss  Negative for headaches, tinnitus, visual change, memory loss, aural fullness, poor concentration and peripheral neuropathy    Active Range of Motion   Cervical/Thoracic Spine       Cervical    Flexion: Neck active flexion: cervical ROM all WFL  Neuro Exam:     Dizziness  Positive for disequilibrium, vertigo and light-headedness  Negative for oscillopsia, motion sickness, rocking or swaying, diplopia and floating or swimming  Exacerbating factors  Positive for bending over, rolling in bed, looking up, turning head and supine to/from sitting  Negative for walking, optokinetic movement and walking in busy environment  Symptoms   Duration: seconds  Frequency: several times per day    Headaches   Patient reports headaches: No      Cervical exam   Ligament Laxity Testing   Alar ligament: WNL  Sharp Angie:  WNL  Modified VBI   Left: asymptomatic  Right: asymptomatic    Oculomotor exam   Oculomotor ROM: WNL  Resting nystagmus: not present   Gaze holding nystagmus: not present left   Smooth pursuits: within normal limits  Vertical saccades: hypometric  Horizontal saccades: hypometric     Positional testing   Sandwich-Hallpike   Left posterior canal: WNL  Right posterior canal: WNL  Roll test   Left horizontal canal: ageotropic  Duration: 25 (seconds)  Roll test comments: 10             Precautions: hx of tongue CA,       Procedure 5/18            L BBQ roll perfromed                         Pt Education (TE)/HEP 10'                                                                                                                                                                                                                                                      Ther Activity                                       Gait Training                                       Modalities

## 2022-05-18 NOTE — TELEPHONE ENCOUNTER
Since he has an appt tomorrow with Corrine Big recommend to discuss with her   I did mentioned it to her today thus she might order this study tomorrow

## 2022-05-19 ENCOUNTER — OFFICE VISIT (OUTPATIENT)
Dept: FAMILY MEDICINE CLINIC | Facility: CLINIC | Age: 77
End: 2022-05-19
Payer: MEDICARE

## 2022-05-19 VITALS
OXYGEN SATURATION: 97 % | TEMPERATURE: 98.5 F | BODY MASS INDEX: 22.6 KG/M2 | SYSTOLIC BLOOD PRESSURE: 98 MMHG | HEART RATE: 87 BPM | HEIGHT: 67 IN | WEIGHT: 144 LBS | DIASTOLIC BLOOD PRESSURE: 70 MMHG

## 2022-05-19 DIAGNOSIS — R55 SYNCOPE, UNSPECIFIED SYNCOPE TYPE: Primary | ICD-10-CM

## 2022-05-19 DIAGNOSIS — R42 VERTIGO: ICD-10-CM

## 2022-05-19 DIAGNOSIS — Z00.00 MEDICARE ANNUAL WELLNESS VISIT, SUBSEQUENT: ICD-10-CM

## 2022-05-19 PROCEDURE — 99214 OFFICE O/P EST MOD 30 MIN: CPT | Performed by: PHYSICIAN ASSISTANT

## 2022-05-19 PROCEDURE — G0438 PPPS, INITIAL VISIT: HCPCS | Performed by: PHYSICIAN ASSISTANT

## 2022-05-19 NOTE — PATIENT INSTRUCTIONS
Medicare Preventive Visit Patient Instructions  Thank you for completing your Welcome to Medicare Visit or Medicare Annual Wellness Visit today  Your next wellness visit will be due in one year (5/20/2023)  The screening/preventive services that you may require over the next 5-10 years are detailed below  Some tests may not apply to you based off risk factors and/or age  Screening tests ordered at today's visit but not completed yet may show as past due  Also, please note that scanned in results may not display below  Preventive Screenings:  Service Recommendations Previous Testing/Comments   Colorectal Cancer Screening  · Colonoscopy    · Fecal Occult Blood Test (FOBT)/Fecal Immunochemical Test (FIT)  · Fecal DNA/Cologuard Test  · Flexible Sigmoidoscopy Age: 54-65 years old   Colonoscopy: every 10 years (May be performed more frequently if at higher risk)  OR  FOBT/FIT: every 1 year  OR  Cologuard: every 3 years  OR  Sigmoidoscopy: every 5 years  Screening may be recommended earlier than age 48 if at higher risk for colorectal cancer  Also, an individualized decision between you and your healthcare provider will decide whether screening between the ages of 74-80 would be appropriate   Colonoscopy: 01/28/2019  FOBT/FIT: Not on file  Cologuard: Not on file  Sigmoidoscopy: Not on file          Prostate Cancer Screening Individualized decision between patient and health care provider in men between ages of 53-78   Medicare will cover every 12 months beginning on the day after your 50th birthday PSA: 1 7 ng/mL     Screening Not Indicated     Hepatitis C Screening Once for adults born between 1945 and 1965  More frequently in patients at high risk for Hepatitis C Hep C Antibody: 04/16/2019    Screening Current   Diabetes Screening 1-2 times per year if you're at risk for diabetes or have pre-diabetes Fasting glucose: 83 mg/dL   A1C: No results in last 5 years    Screening Current   Cholesterol Screening Once every 5 years if you don't have a lipid disorder  May order more often based on risk factors  Lipid panel: 02/16/2022    Screening Not Indicated  History Lipid Disorder      Other Preventive Screenings Covered by Medicare:  1  Abdominal Aortic Aneurysm (AAA) Screening: covered once if your at risk  You're considered to be at risk if you have a family history of AAA or a male between the age of 73-68 who smoking at least 100 cigarettes in your lifetime  2  Lung Cancer Screening: covers low dose CT scan once per year if you meet all of the following conditions: (1) Age 50-69; (2) No signs or symptoms of lung cancer; (3) Current smoker or have quit smoking within the last 15 years; (4) You have a tobacco smoking history of at least 30 pack years (packs per day x number of years you smoked); (5) You get a written order from a healthcare provider  3  Glaucoma Screening: covered annually if you're considered high risk: (1) You have diabetes OR (2) Family history of glaucoma OR (3)  aged 48 and older OR (3)  American aged 72 and older  3  Osteoporosis Screening: covered every 2 years if you meet one of the following conditions: (1) Have a vertebral abnormality; (2) On glucocorticoid therapy for more than 3 months; (3) Have primary hyperparathyroidism; (4) On osteoporosis medications and need to assess response to drug therapy  5  HIV Screening: covered annually if you're between the age of 12-76  Also covered annually if you are younger than 13 and older than 72 with risk factors for HIV infection  For pregnant patients, it is covered up to 3 times per pregnancy      Immunizations:  Immunization Recommendations   Influenza Vaccine Annual influenza vaccination during flu season is recommended for all persons aged >= 6 months who do not have contraindications   Pneumococcal Vaccine (Prevnar and Pneumovax)  * Prevnar = PCV13  * Pneumovax = PPSV23 Adults 25-60 years old: 1-3 doses may be recommended based on certain risk factors  Adults 72 years old: Prevnar (PCV13) vaccine recommended followed by Pneumovax (PPSV23) vaccine  If already received PPSV23 since turning 65, then PCV13 recommended at least one year after PPSV23 dose  Hepatitis B Vaccine 3 dose series if at intermediate or high risk (ex: diabetes, end stage renal disease, liver disease)   Tetanus (Td) Vaccine - COST NOT COVERED BY MEDICARE PART B Following completion of primary series, a booster dose should be given every 10 years to maintain immunity against tetanus  Td may also be given as tetanus wound prophylaxis  Tdap Vaccine - COST NOT COVERED BY MEDICARE PART B Recommended at least once for all adults  For pregnant patients, recommended with each pregnancy  Shingles Vaccine (Shingrix) - COST NOT COVERED BY MEDICARE PART B  2 shot series recommended in those aged 48 and above     Health Maintenance Due:      Topic Date Due    Colorectal Cancer Screening  01/28/2022    Hepatitis C Screening  Completed     Immunizations Due:      Topic Date Due    Pneumococcal Vaccine: 65+ Years (1 - PCV) Never done    DTaP,Tdap,and Td Vaccines (1 - Tdap) Never done    COVID-19 Vaccine (3 - Booster for Pfizer series) 10/04/2021     Advance Directives   What are advance directives? Advance directives are legal documents that state your wishes and plans for medical care  These plans are made ahead of time in case you lose your ability to make decisions for yourself  Advance directives can apply to any medical decision, such as the treatments you want, and if you want to donate organs  What are the types of advance directives? There are many types of advance directives, and each state has rules about how to use them  You may choose a combination of any of the following:  · Living will: This is a written record of the treatment you want  You can also choose which treatments you do not want, which to limit, and which to stop at a certain time   This includes surgery, medicine, IV fluid, and tube feedings  · Durable power of  for healthcare [facility]): This is a written record that states who you want to make healthcare choices for you when you are unable to make them for yourself  This person, called a proxy, is usually a family member or a friend  You may choose more than 1 proxy  · Do not resuscitate (DNR) order:  A DNR order is used in case your heart stops beating or you stop breathing  It is a request not to have certain forms of treatment, such as CPR  A DNR order may be included in other types of advance directives  · Medical directive: This covers the care that you want if you are in a coma, near death, or unable to make decisions for yourself  You can list the treatments you want for each condition  Treatment may include pain medicine, surgery, blood transfusions, dialysis, IV or tube feedings, and a ventilator (breathing machine)  · Values history: This document has questions about your views, beliefs, and how you feel and think about life  This information can help others choose the care that you would choose  Why are advance directives important? An advance directive helps you control your care  Although spoken wishes may be used, it is better to have your wishes written down  Spoken wishes can be misunderstood, or not followed  Treatments may be given even if you do not want them  An advance directive may make it easier for your family to make difficult choices about your care  Fall Prevention    Fall prevention  includes ways to make your home and other areas safer  It also includes ways you can move more carefully to prevent a fall  Health conditions that cause changes in your blood pressure, vision, or muscle strength and coordination may increase your risk for falls  Medicines may also increase your risk for falls if they make you dizzy, weak, or sleepy  Fall prevention tips:   · Stand or sit up slowly      · Use assistive devices as directed  · Wear shoes that fit well and have soles that   · Wear a personal alarm  · Stay active  · Manage your medical conditions  Home Safety Tips:  · Add items to prevent falls in the bathroom  · Keep paths clear  · Install bright lights in your home  · Keep items you use often on shelves within reach  · Paint or place reflective tape on the edges of your stairs  © Copyright [employer] 2018 Information is for End User's use only and may not be sold, redistributed or otherwise used for commercial purposes   All illustrations and images included in CareNotes® are the copyrighted property of A D A M , [employer]  or [address] [place], [place] [address]pe

## 2022-05-19 NOTE — PROGRESS NOTES
Assessment and Plan:     Problem List Items Addressed This Visit        Other    Medicare annual wellness visit, subsequent    Dizziness      Other Visit Diagnoses     Syncope, unspecified syncope type    -  Primary    Relevant Orders    VAS carotid complete study    US abdominal aorta screening aaa           Preventive health issues were discussed with patient, and age appropriate screening tests were ordered as noted in patient's After Visit Summary  Personalized health advice and appropriate referrals for health education or preventive services given if needed, as noted in patient's After Visit Summary       History of Present Illness:     Patient presents for Medicare Annual Wellness visit    Patient Care Team:  Kellie Carbajal MD as PCP - General (Family Medicine)     Problem List:     Patient Active Problem List   Diagnosis    Excessive salivation    Medicare annual wellness visit, subsequent    Colon cancer screening    Encounter for hepatitis C screening test for low risk patient    Screening, lipid    Abnormal kidney function    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    Essential hypertension    Uncontrolled hypertension    Dizziness    History of head and neck radiation    Idiopathic hypotension    Dysuria    Weight loss    Overactive bladder    Benign localized prostatic hyperplasia with lower urinary tract symptoms (LUTS)    Hematuria    Exposure to SARS-associated coronavirus    CKD (chronic kidney disease) stage 3, GFR 30-59 ml/min ([facility]    Fatigue    Screening for lipid disorders    SOB (shortness of breath) on exertion    Screening for prostate cancer    Esophageal dysphagia    COVID-19 with hypoxic respiratory failure    Acute renal failure superimposed on chronic kidney disease ([facility]    Pneumonia due to infectious organism    Leukopenia    Chronic kidney disease-mineral and bone disorder      Past Medical and Surgical History:     Past Medical History:   Diagnosis Date    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[facility] ) 03/1999    SCCA left tongue base - [id number]- [facility] at [facility] - had surgery and XRT    Chronic kidney disease     Hyperlipidemia     Hypertension     Left anterior fascicular block     Stage 3 chronic kidney disease ([facility] )      Past Surgical History:   Procedure Laterality Date    CYSTOSCOPY  08/30/2021    EYELID CLOSURE Left     to prevent eyelashes from rubbing cornea    HERNIA REPAIR Left     MODIFIED RADICAL NECK DISSECTION Left 03/03/1999    [facility] - with mandibulotomy and resection SCCA left tongue base - Dr Sigrid Cooley        Family History:     Family History   Problem Relation Age of Onset    Hypertension Mother     Stroke Mother       Social History:     Social History     Socioeconomic History    Marital status: /Civil Union     Spouse name: None    Number of children: None    Years of education: None    Highest education level: None   Occupational History    None   Tobacco Use    Smoking status: Former Smoker    Smokeless tobacco: Former User    Tobacco comment: quit 20 years ago   Vaping Use    Vaping Use: Never used   Substance and Sexual Activity    Alcohol use: Yes     Comment: occasional    Drug use: Never    Sexual activity: None   Other Topics Concern    None   Social History Narrative    None     Social Determinants of Health     Financial Resource Strain: Not on file   Food Insecurity: No Food Insecurity    Worried About Running Out of Food in the Last Year: Never true    Abelardo of Food in the Last Year: Never true   Transportation Needs: Unknown    Lack of Transportation (Medical): No    Lack of Transportation (Non-Medical):  Not on file   Physical Activity: Not on file   Stress: Not on file   Social Connections: Not on file   Intimate Partner Violence: Not on file   Housing Stability: Low Risk     Unable to Pay for Housing in the Last Year: No    Number of Places Lived in the Last Year: 1    Unstable Housing in the Last Year: No      Medications and Allergies:     Current Outpatient Medications   Medication Sig Dispense Refill    aspirin 81 MG tablet 1 cap(s)      Coenzyme Q10 (CO Q 10 PO) Take by mouth      cyanocobalamin (VITAMIN B-12) 100 mcg tablet Take by mouth daily      Multiple Vitamins-Minerals (MULTIVITAMIN ADULTS 50+ PO) Take by mouth      pravastatin (PRAVACHOL) 20 mg tablet TAKE ONE TABLET BY MOUTH EVERY OTHER DAY 30 tablet 3    VITAMIN D, CHOLECALCIFEROL, PO Take by mouth      albuterol (ProAir HFA) 90 mcg/act inhaler Inhale 2 puffs every 6 (six) hours as needed for wheezing (Patient not taking: No sig reported) 8 5 g 0    meclizine (ANTIVERT) 25 mg tablet Take 1 tablet (25 mg total) by mouth 3 (three) times a day as needed for dizziness (Patient not taking: No sig reported) 15 tablet 0    nystatin (MYCOSTATIN) 500,000 units/5 mL suspension Apply 5 mL (500,000 Units total) to the mouth or throat 4 (four) times a day (Patient not taking: No sig reported) 60 mL 2     No current facility-administered medications for this visit       Allergies   Allergen Reactions    Iodinated Diagnostic Agents Other (See Comments)    Iodine - Food Allergy       Immunizations:     Immunization History   Administered Date(s) Administered    COVID-19 PFIZER VACCINE 0 3 ML IM 04/13/2021, 05/04/2021    INFLUENZA 02/13/2018, 10/17/2018, 10/14/2019    Influenza, high dose seasonal 0 7 mL 09/15/2020    influenza, trivalent, adjuvanted 09/15/2020      Health Maintenance:         Topic Date Due    Colorectal Cancer Screening  01/28/2022    Hepatitis C Screening  Completed         Topic Date Due    Pneumococcal Vaccine: 65+ Years (1 - PCV) Never done    DTaP,Tdap,and Td Vaccines (1 - Tdap) Never done    COVID-19 Vaccine (3 - Booster for Pfizer series) 10/04/2021      Medicare Health Risk Assessment:     BP 98/70 (BP Location: Left arm, Patient Position: Sitting, Cuff Size: Large) Pulse 87   Temp 98 5 °F (36 9 °C)   Ht 5' 7" (1 702 m)   Wt 65 3 kg (144 lb)   SpO2 97%   BMI 22 55 kg/m²      Maciej Pepe is here for his Subsequent Wellness visit  Health Risk Assessment:   Patient rates overall health as good  Patient feels that their physical health rating is slightly worse  Eyesight was rated as same  Hearing was rated as same  Patient feels that their emotional and mental health rating is slightly worse  Patients states they are never, rarely angry  Patient states they are never, rarely unusually tired/fatigued  Pain experienced in the last 7 days has been none  Patient states that he has experienced no weight loss or gain in last 6 months  Depression Screening:   PHQ-9 Score: 2      Fall Risk Screening: In the past year, patient has experienced: history of falling in past year    Number of falls: 1  Injured during fall?: Yes    Feels unsteady when standing or walking?: Yes    Worried about falling?: Yes      Home Safety:  Patient has trouble with stairs inside or outside of their home  Patient has working smoke alarms and has working carbon monoxide detector  Home safety hazards include: none  Nutrition:   Current diet is Regular  Medications:   Patient is currently taking over-the-counter supplements  OTC medications include: see medication list  Patient is able to manage medications  Activities of Daily Living (ADLs)/Instrumental Activities of Daily Living (IADLs):   Walk and transfer into and out of bed and chair?: Yes  Dress and groom yourself?: Yes    Bathe or shower yourself?: Yes    Feed yourself?  Yes  Do your laundry/housekeeping?: Yes  Manage your money, pay your bills and track your expenses?: Yes  Make your own meals?: Yes    Do your own shopping?: Yes    PREVENTIVE SCREENINGS      Cardiovascular Screening:    General: Screening Not Indicated and History Lipid Disorder      Diabetes Screening:     General: Screening Current      Colorectal Cancer Screening: Due for: Cologuard      Prostate Cancer Screening:    General: Screening Not Indicated      Abdominal Aortic Aneurysm (AAA) Screening:    Risk factors include: age between 73-69 yo and tobacco use        Lung Cancer Screening:     General: Screening Not Indicated      Hepatitis C Screening:    General: Screening Current    Screening, Brief Intervention, and Referral to Treatment (SBIRT)    Screening  Typical number of drinks in a day: 0  Typical number of drinks in a week: 0  Interpretation: Low risk drinking behavior      Single Item Drug Screening:  How often have you used an illegal drug (including marijuana) or a prescription medication for non-medical reasons in the past year? never    Single Item Drug Screen Score: 0  Interpretation: Negative screen for possible drug use disorder      Angela Gonzales PA-C

## 2022-05-19 NOTE — PROGRESS NOTES
Assessment/Plan:    No problem-specific Assessment & Plan notes found for this encounter  Diagnoses and all orders for this visit:    Syncope, unspecified syncope type  -     VAS carotid complete study; Future  -     US abdominal aorta screening aaa; Future    Vertigo  -     VAS carotid complete study; Future    Medicare annual wellness visit, subsequent        Subjective:      Patient ID: Krista Domingo is a 78 y o  male  Patient presents for follow up on syncopal episode and vertigo   Had episode of syncope that he was seen in the ER for on 4/26/22  He was laying by the pool when he got up got immediately dizzy stopped to rest and fell striking his head   Has been seeing ENT with some relief now  Has had several episodes after the fall with severe spinning sensation  Not responsive to meclizine but very responsive to vestibular therapy     He does have a smoking history  Has hx carotid stenosis of <50% - on ASA and Statin therapy       ER course 4/26/22:  77 yo with dizziness  Onset 10 days ago  Intermittent  At times feels lightheaded and other times feels like he's off balanced or "had too many drinks"  It's worsened by position changes and head movement  He has no extremity numbness, tingling or weakness  He has dysarthria which is chronic due to to h/o tongue cancer s/p resection  He fell 10 days ago when this first began and struck his head and neck  Has had dull headache since then  Rated mild  Intermittent  He's on aspirin  He went to urgent care and was told to come to ED for possible CT           The following portions of the patient's history were reviewed and updated as appropriate: allergies, current medications, past family history, past medical history, past surgical history and problem list     Review of Systems   Constitutional: Negative for chills, fatigue and fever  HENT: Negative for congestion, ear pain, sinus pain, sore throat and trouble swallowing      Eyes: Negative for pain, discharge and redness  Respiratory: Negative for cough, chest tightness, shortness of breath and wheezing  Cardiovascular: Negative for chest pain, palpitations and leg swelling  Gastrointestinal: Negative for abdominal pain, diarrhea, nausea and vomiting  Musculoskeletal: Negative for arthralgias, joint swelling and myalgias  Skin: Negative for rash  Neurological: Positive for dizziness and syncope (1 episode on 4/26/22)  Negative for weakness, numbness and headaches  Objective:      BP 98/70 (BP Location: Left arm, Patient Position: Sitting, Cuff Size: Large)   Pulse 87   Temp 98 5 °F (36 9 °C)   Ht 5' 7" (1 702 m)   Wt 65 3 kg (144 lb)   SpO2 97%   BMI 22 55 kg/m²          Physical Exam  Vitals and nursing note reviewed  Constitutional:       General: He is not in acute distress  Appearance: Normal appearance  He is well-developed  Cardiovascular:      Rate and Rhythm: Normal rate and regular rhythm  Heart sounds: Normal heart sounds  No murmur heard  Comments: No carotid bruit noted  Pulmonary:      Effort: Pulmonary effort is normal       Breath sounds: Normal breath sounds  Abdominal:      General: Bowel sounds are normal       Palpations: Abdomen is soft  Abdomen is not rigid  Tenderness: There is no abdominal tenderness  There is no guarding or rebound  Negative signs include Lopez's sign and McBurney's sign  Hernia: No hernia is present  Skin:     General: Skin is warm and dry

## 2022-05-20 ENCOUNTER — OFFICE VISIT (OUTPATIENT)
Dept: PHYSICAL THERAPY | Age: 77
End: 2022-05-20
Payer: MEDICARE

## 2022-05-20 DIAGNOSIS — H81.12 BPPV (BENIGN PAROXYSMAL POSITIONAL VERTIGO), LEFT: Primary | ICD-10-CM

## 2022-05-20 PROCEDURE — 95992 CANALITH REPOSITIONING PROC: CPT | Performed by: PHYSICAL THERAPIST

## 2022-05-20 NOTE — PROGRESS NOTES
Daily Note     Today's date: 2022  Patient name: Gigi Correa  : 1945  MRN: [id number]  Referring provider: Paras Jackson PA-C  Dx:   Encounter Diagnosis     ICD-10-CM    1  BPPV (benign paroxysmal positional vertigo), left  H81 12        Start Time: 1245  Stop Time: 1310  Total time in clinic (min): 25 minutes    Subjective: Pt reports he feels better overall, decreased episodes of vertigo  Objective: See treatment diary below      Assessment: Pt had no nystagmus in left roll test position  Balance and gait improved today  Pt affect improved  Plan: Continue per plan of care        Precautions: hx of tongue CA,       Procedure            L BBQ roll perfromed perfromed                        Pt Education (TE)/HEP 10'                                                                                                                                                                                                                                                      Ther Activity                                       Gait Training                                       Modalities

## 2022-06-01 ENCOUNTER — OFFICE VISIT (OUTPATIENT)
Dept: PHYSICAL THERAPY | Age: 77
End: 2022-06-01
Payer: MEDICARE

## 2022-06-01 DIAGNOSIS — H81.12 BPPV (BENIGN PAROXYSMAL POSITIONAL VERTIGO), LEFT: Primary | ICD-10-CM

## 2022-06-01 PROCEDURE — 95992 CANALITH REPOSITIONING PROC: CPT | Performed by: PHYSICAL THERAPIST

## 2022-06-01 NOTE — PROGRESS NOTES
Daily Note/Discharge    Today's date: 2022  Patient name: Lonnie Curry  : 1945  MRN: [id number]  Referring provider: Robert Samuels PA-C  Dx:   Encounter Diagnosis     ICD-10-CM    1  BPPV (benign paroxysmal positional vertigo), left  H81 12        Start Time: 1405  Stop Time: 1425  Total time in clinic (min): 20 minutes    Subjective: Pt reports he has no vertigo in over a week  He does get light headed occasionally  We did discuss hydration, eating and orthostatic as possible causes  Objective: See treatment diary below      Assessment: Pt no longer symptomatic  Gait and transfers normalized  FOTO improved  Plan: Discharge PT       Precautions: hx of tongue CA,       Procedure           L BBQ roll perfromed perfromed                        Pt Education (TE)/HEP 10'                                                                                                                                                                                                                                                      Ther Activity                                       Gait Training                                       Modalities

## 2022-06-08 ENCOUNTER — HOSPITAL ENCOUNTER (OUTPATIENT)
Dept: ULTRASOUND IMAGING | Facility: HOSPITAL | Age: 77
Discharge: HOME | End: 2022-06-08
Payer: MEDICARE

## 2022-06-08 ENCOUNTER — HOSPITAL ENCOUNTER (OUTPATIENT)
Dept: VASCULAR ULTRASOUND | Facility: HOSPITAL | Age: 77
Discharge: HOME | End: 2022-06-08
Payer: MEDICARE

## 2022-06-08 DIAGNOSIS — R55 SYNCOPE, UNSPECIFIED SYNCOPE TYPE: ICD-10-CM

## 2022-06-08 DIAGNOSIS — R42 VERTIGO: ICD-10-CM

## 2022-06-08 PROCEDURE — 93880 EXTRACRANIAL BILAT STUDY: CPT | Performed by: SURGERY

## 2022-06-08 PROCEDURE — 93880 EXTRACRANIAL BILAT STUDY: CPT

## 2022-06-08 PROCEDURE — 76706 US ABDL AORTA SCREEN AAA: CPT

## 2022-06-13 DIAGNOSIS — I77.9 CAROTID ARTERY DISEASE, UNSPECIFIED LATERALITY, UNSPECIFIED TYPE (HCC): Primary | ICD-10-CM

## 2022-06-13 DIAGNOSIS — I77.819 DILATATION OF AORTA (HCC): ICD-10-CM

## 2022-07-05 ENCOUNTER — APPOINTMENT (OUTPATIENT)
Dept: LAB | Facility: CLINIC | Age: 77
End: 2022-07-05
Payer: MEDICARE

## 2022-07-05 DIAGNOSIS — E83.9 CHRONIC KIDNEY DISEASE-MINERAL AND BONE DISORDER: ICD-10-CM

## 2022-07-05 DIAGNOSIS — M89.9 CHRONIC KIDNEY DISEASE-MINERAL AND BONE DISORDER: ICD-10-CM

## 2022-07-05 DIAGNOSIS — N18.31 STAGE 3A CHRONIC KIDNEY DISEASE (HCC): ICD-10-CM

## 2022-07-05 DIAGNOSIS — N17.9 ACUTE RENAL FAILURE SUPERIMPOSED ON STAGE 3A CHRONIC KIDNEY DISEASE, UNSPECIFIED ACUTE RENAL FAILURE TYPE (HCC): ICD-10-CM

## 2022-07-05 DIAGNOSIS — N18.31 ACUTE RENAL FAILURE SUPERIMPOSED ON STAGE 3A CHRONIC KIDNEY DISEASE, UNSPECIFIED ACUTE RENAL FAILURE TYPE (HCC): ICD-10-CM

## 2022-07-05 DIAGNOSIS — N18.9 CHRONIC KIDNEY DISEASE-MINERAL AND BONE DISORDER: ICD-10-CM

## 2022-07-05 LAB
25(OH)D3 SERPL-MCNC: 71.1 NG/ML (ref 30–100)
BACTERIA UR QL AUTO: ABNORMAL /HPF
BASOPHILS # BLD AUTO: 0.03 THOUSANDS/ΜL (ref 0–0.1)
BASOPHILS NFR BLD AUTO: 1 % (ref 0–1)
BILIRUB UR QL STRIP: NEGATIVE
CLARITY UR: CLEAR
COLOR UR: YELLOW
EOSINOPHIL # BLD AUTO: 0.19 THOUSAND/ΜL (ref 0–0.61)
EOSINOPHIL NFR BLD AUTO: 4 % (ref 0–6)
ERYTHROCYTE [DISTWIDTH] IN BLOOD BY AUTOMATED COUNT: 13.9 % (ref 11.6–15.1)
GLUCOSE UR STRIP-MCNC: NEGATIVE MG/DL
HCT VFR BLD AUTO: 41.4 % (ref 36.5–49.3)
HGB BLD-MCNC: 13.2 G/DL (ref 12–17)
HGB UR QL STRIP.AUTO: NEGATIVE
IMM GRANULOCYTES # BLD AUTO: 0.02 THOUSAND/UL (ref 0–0.2)
IMM GRANULOCYTES NFR BLD AUTO: 0 % (ref 0–2)
KETONES UR STRIP-MCNC: NEGATIVE MG/DL
LEUKOCYTE ESTERASE UR QL STRIP: ABNORMAL
LYMPHOCYTES # BLD AUTO: 1.42 THOUSANDS/ΜL (ref 0.6–4.47)
LYMPHOCYTES NFR BLD AUTO: 30 % (ref 14–44)
MCH RBC QN AUTO: 27.2 PG (ref 26.8–34.3)
MCHC RBC AUTO-ENTMCNC: 31.9 G/DL (ref 31.4–37.4)
MCV RBC AUTO: 85 FL (ref 82–98)
MONOCYTES # BLD AUTO: 0.45 THOUSAND/ΜL (ref 0.17–1.22)
MONOCYTES NFR BLD AUTO: 10 % (ref 4–12)
NEUTROPHILS # BLD AUTO: 2.57 THOUSANDS/ΜL (ref 1.85–7.62)
NEUTS SEG NFR BLD AUTO: 55 % (ref 43–75)
NITRITE UR QL STRIP: NEGATIVE
NON-SQ EPI CELLS URNS QL MICRO: ABNORMAL /HPF
NRBC BLD AUTO-RTO: 0 /100 WBCS
PH UR STRIP.AUTO: 6.5 [PH]
PLATELET # BLD AUTO: 343 THOUSANDS/UL (ref 149–390)
PMV BLD AUTO: 9.4 FL (ref 8.9–12.7)
PROT UR STRIP-MCNC: NEGATIVE MG/DL
PTH-INTACT SERPL-MCNC: 31 PG/ML (ref 18.4–80.1)
RBC # BLD AUTO: 4.85 MILLION/UL (ref 3.88–5.62)
RBC #/AREA URNS AUTO: ABNORMAL /HPF
SP GR UR STRIP.AUTO: 1.02 (ref 1–1.03)
UROBILINOGEN UR STRIP-ACNC: <2 MG/DL
WBC # BLD AUTO: 4.68 THOUSAND/UL (ref 4.31–10.16)
WBC #/AREA URNS AUTO: ABNORMAL /HPF

## 2022-07-05 PROCEDURE — 85025 COMPLETE CBC W/AUTO DIFF WBC: CPT

## 2022-07-05 PROCEDURE — 82306 VITAMIN D 25 HYDROXY: CPT

## 2022-07-05 PROCEDURE — 84156 ASSAY OF PROTEIN URINE: CPT

## 2022-07-05 PROCEDURE — 82570 ASSAY OF URINE CREATININE: CPT

## 2022-07-05 PROCEDURE — 80048 BASIC METABOLIC PNL TOTAL CA: CPT

## 2022-07-05 PROCEDURE — 36415 COLL VENOUS BLD VENIPUNCTURE: CPT

## 2022-07-05 PROCEDURE — 83970 ASSAY OF PARATHORMONE: CPT

## 2022-07-05 PROCEDURE — 81001 URINALYSIS AUTO W/SCOPE: CPT

## 2022-07-05 PROCEDURE — 84100 ASSAY OF PHOSPHORUS: CPT

## 2022-07-06 ENCOUNTER — OFFICE VISIT (OUTPATIENT)
Dept: URGENT CARE | Facility: CLINIC | Age: 77
End: 2022-07-06
Payer: MEDICARE

## 2022-07-06 VITALS
OXYGEN SATURATION: 95 % | WEIGHT: 145 LBS | TEMPERATURE: 97.3 F | RESPIRATION RATE: 18 BRPM | HEIGHT: 67 IN | HEART RATE: 73 BPM | BODY MASS INDEX: 22.76 KG/M2

## 2022-07-06 DIAGNOSIS — H91.92 HEARING LOSS OF LEFT EAR, UNSPECIFIED HEARING LOSS TYPE: Primary | ICD-10-CM

## 2022-07-06 LAB
ANION GAP SERPL CALCULATED.3IONS-SCNC: 7 MMOL/L (ref 4–13)
BUN SERPL-MCNC: 22 MG/DL (ref 5–25)
CALCIUM SERPL-MCNC: 9.4 MG/DL (ref 8.3–10.1)
CHLORIDE SERPL-SCNC: 107 MMOL/L (ref 100–108)
CO2 SERPL-SCNC: 26 MMOL/L (ref 21–32)
CREAT SERPL-MCNC: 1.64 MG/DL (ref 0.6–1.3)
CREAT UR-MCNC: 150 MG/DL
GFR SERPL CREATININE-BSD FRML MDRD: 40 ML/MIN/1.73SQ M
GLUCOSE P FAST SERPL-MCNC: 84 MG/DL (ref 65–99)
PHOSPHATE SERPL-MCNC: 3.3 MG/DL (ref 2.3–4.1)
POTASSIUM SERPL-SCNC: 4.6 MMOL/L (ref 3.5–5.3)
PROT UR-MCNC: 16 MG/DL
PROT/CREAT UR: 0.11 MG/G{CREAT} (ref 0–0.1)
SODIUM SERPL-SCNC: 140 MMOL/L (ref 136–145)

## 2022-07-06 PROCEDURE — G0463 HOSPITAL OUTPT CLINIC VISIT: HCPCS

## 2022-07-06 PROCEDURE — 99213 OFFICE O/P EST LOW 20 MIN: CPT

## 2022-07-06 NOTE — PATIENT INSTRUCTIONS
Recommend flonase nasal spray daily  Follow up with ENT  Follow up with PCP in 3-5 days  Proceed to  ER if symptoms worsen

## 2022-07-06 NOTE — PROGRESS NOTES
[address]        NAME: Mira Thayer is a 78 y o  male  : 1945    MRN: [id number]  DATE: 2022  TIME: 9:26 AM    Assessment and Plan   Hearing loss of left ear, unspecified hearing loss type [H91 [id number]]  1  Hearing loss of left ear, unspecified hearing loss type       No cerumen impaction to BL ears on exam today  Effusion noted on L ear  Recommended flonase daily with follow up with ENT  Patient saw ENT in May and noted sensorineural hearing loss and recommended MRI but patient declined at the time  Patient Instructions     Recommend flonase nasal spray daily  Follow up with ENT  Follow up with PCP in 3-5 days  Proceed to  ER if symptoms worsen  Chief Complaint     Chief Complaint   Patient presents with    Ear Fullness     Left ear clogged          History of Present Illness       The patient presents today with complaints of L ear fullness, and decreased hearing for a few weeks  He denies nasal congestion, fevers/chills, body aches, cough, ear pain, sinus pressure/pain, dizziness  He has not taken anything OTC including ear drops for his symptoms  He recently finished vestibular therapy for dizziness which resolved that was ordered by ENT  Review of Systems   Review of Systems   Constitutional: Negative for chills, diaphoresis, fatigue and fever  HENT: Positive for hearing loss (L ear fullness, decreased hearing)  Negative for congestion, postnasal drip, rhinorrhea, sinus pressure, sinus pain, sneezing, sore throat and trouble swallowing  Eyes: Negative  Respiratory: Negative for cough, shortness of breath and wheezing  Cardiovascular: Negative  Negative for chest pain and palpitations  Gastrointestinal: Negative for abdominal distention, diarrhea, nausea and vomiting  Endocrine: Negative  Genitourinary: Negative for dysuria  Musculoskeletal: Negative  Skin: Negative for rash  Allergic/Immunologic: Negative  Neurological: Negative  Negative for light-headedness and headaches  Hematological: Negative  Psychiatric/Behavioral: Negative            Current Medications       Current Outpatient Medications:     albuterol (ProAir HFA) 90 mcg/act inhaler, Inhale 2 puffs every 6 (six) hours as needed for wheezing (Patient not taking: No sig reported), Disp: 8 5 g, Rfl: 0    aspirin 81 MG tablet, 1 cap(s), Disp: , Rfl:     Coenzyme Q10 (CO Q 10 PO), Take by mouth, Disp: , Rfl:     cyanocobalamin (VITAMIN B-12) 100 mcg tablet, Take by mouth daily, Disp: , Rfl:     meclizine (ANTIVERT) 25 mg tablet, Take 1 tablet (25 mg total) by mouth 3 (three) times a day as needed for dizziness (Patient not taking: No sig reported), Disp: 15 tablet, Rfl: 0    Multiple Vitamins-Minerals (MULTIVITAMIN ADULTS 50+ PO), Take by mouth, Disp: , Rfl:     nystatin (MYCOSTATIN) 500,000 units/5 mL suspension, Apply 5 mL (500,000 Units total) to the mouth or throat 4 (four) times a day (Patient not taking: No sig reported), Disp: 60 mL, Rfl: 2    pravastatin (PRAVACHOL) 20 mg tablet, TAKE ONE TABLET BY MOUTH EVERY OTHER DAY, Disp: 30 tablet, Rfl: 3    VITAMIN D, CHOLECALCIFEROL, PO, Take by mouth, Disp: , Rfl:     Current Allergies     Allergies as of 07/06/2022 - Reviewed 07/06/2022   Allergen Reaction Noted    Iodinated diagnostic agents Other (See Comments) 02/14/2019    Iodine - food allergy  11/30/2016            The following portions of the patient's history were reviewed and updated as appropriate: allergies, current medications, past family history, past medical history, past social history, past surgical history and problem list      Past Medical History:   Diagnosis Date    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[facility] [address] ) 03/1999    SCCA left tongue base - [id number]- treated at [facility] - had surgery and XRT    Chronic kidney disease     Hyperlipidemia     Hypertension     Left anterior fascicular block     Stage 3 chronic kidney disease ([facility] [address] )        Past Surgical History:   Procedure Laterality Date    CYSTOSCOPY  08/30/2021    EYELID CLOSURE Left     to prevent eyelashes from rubbing cornea    HERNIA REPAIR Left     MODIFIED RADICAL NECK DISSECTION Left 03/03/1999    [facility] - with mandibulotomy and resection SCCA left tongue base - Dr Arnulfo Rodriguez         Family History   Problem Relation Age of Onset    Hypertension Mother     Stroke Mother          Medications have been verified  Objective   Pulse 73   Temp (!) 97 3 °F (36 3 °C) (Temporal)   Resp 18   Ht 5' 7" (1 702 m)   Wt 65 8 kg (145 lb)   SpO2 95%   BMI 22 71 kg/m²        Physical Exam     Physical Exam  Vitals and nursing note reviewed  Constitutional:       General: He is not in acute distress  Appearance: Normal appearance  He is well-developed  He is not ill-appearing or diaphoretic  HENT:      Head: Normocephalic and atraumatic  Right Ear: Tympanic membrane, ear canal and external ear normal  No middle ear effusion  There is no impacted cerumen  No foreign body  No mastoid tenderness  Tympanic membrane is not erythematous  Left Ear: Ear canal and external ear normal  Decreased hearing noted  No tenderness  A middle ear effusion is present  There is no impacted cerumen  No foreign body  No mastoid tenderness  Tympanic membrane is not erythematous  Nose: Nose normal  No congestion  Mouth/Throat:      Mouth: Mucous membranes are moist       Pharynx: No oropharyngeal exudate  Eyes:      General:         Right eye: No discharge  Left eye: No discharge  Conjunctiva/sclera: Conjunctivae normal       Pupils: Pupils are equal, round, and reactive to light  Cardiovascular:      Rate and Rhythm: Normal rate and regular rhythm  Pulses: Normal pulses  Heart sounds: Normal heart sounds  No murmur heard  Pulmonary:      Effort: Pulmonary effort is normal  No respiratory distress        Breath sounds: Normal breath sounds  No wheezing or rales  Abdominal:      General: Bowel sounds are normal    Musculoskeletal:         General: No deformity  Normal range of motion  Cervical back: Normal range of motion and neck supple  Skin:     General: Skin is warm  Findings: No rash  Neurological:      Mental Status: He is alert and oriented to person, place, and time     Psychiatric:         Mood and Affect: Mood normal          Behavior: Behavior normal

## 2022-07-18 ENCOUNTER — CONSULT (OUTPATIENT)
Dept: VASCULAR SURGERY | Facility: CLINIC | Age: 77
End: 2022-07-18
Payer: MEDICARE

## 2022-07-18 VITALS
DIASTOLIC BLOOD PRESSURE: 82 MMHG | TEMPERATURE: 97.4 F | WEIGHT: 140 LBS | BODY MASS INDEX: 21.97 KG/M2 | SYSTOLIC BLOOD PRESSURE: 126 MMHG | HEART RATE: 94 BPM | HEIGHT: 67 IN

## 2022-07-18 DIAGNOSIS — I71.40 ABDOMINAL AORTIC ANEURYSM (AAA) WITHOUT RUPTURE: Primary | ICD-10-CM

## 2022-07-18 DIAGNOSIS — I77.9 CAROTID ARTERY DISEASE, UNSPECIFIED LATERALITY, UNSPECIFIED TYPE (HCC): ICD-10-CM

## 2022-07-18 DIAGNOSIS — I77.819 DILATATION OF AORTA (HCC): ICD-10-CM

## 2022-07-18 DIAGNOSIS — I65.23 BILATERAL CAROTID ARTERY STENOSIS: ICD-10-CM

## 2022-07-18 PROBLEM — I71.4 ABDOMINAL AORTIC ANEURYSM (AAA) WITHOUT RUPTURE (HCC): Status: ACTIVE | Noted: 2022-07-18

## 2022-07-18 PROCEDURE — 99203 OFFICE O/P NEW LOW 30 MIN: CPT | Performed by: NURSE PRACTITIONER

## 2022-07-18 NOTE — ASSESSMENT & PLAN NOTE
57-year-old male, former smoker with HLD, h/o tongue ca s/p resection and radiation >20yrs ago w/ excessive salivation, and asymptomatic bilateral carotid artery stenosis presents to establish care and review recent carotid duplex imaging and AAA screening ultrasound  - patient presents without complaints  He denies TIA or stroke-like symptoms      - neuro exam intact  - baseline difficulty swallowing, speech disturbance and excessive salivation 2/2 h/o base of tongue ca with resection  - Carotid Duplex 6/8/22:  < 50% ICA stenosis bilaterally    Plan:  - Continue ASA  - Continue statin   - carotid duplex in 1 year with return office visit  - call 911 or go to ED with TIA or stroke-like symptoms

## 2022-07-18 NOTE — ASSESSMENT & PLAN NOTE
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

## 2022-07-18 NOTE — PROGRESS NOTES
Assessment/Plan:    Bilateral carotid artery stenosis  68-year-old male, former smoker with HLD, h/o tongue ca s/p resection and radiation >20yrs ago w/ excessive salivation, and asymptomatic bilateral carotid artery stenosis presents to establish care and review recent carotid duplex imaging and AAA screening ultrasound  - patient presents without complaints  He denies TIA or stroke-like symptoms  - neuro exam intact  - baseline difficulty swallowing, speech disturbance and excessive salivation 2/2 h/o base of tongue ca with resection  - Carotid Duplex 6/8/22:  < 50% ICA stenosis bilaterally    Plan:  - Continue ASA  - Continue statin   - carotid duplex in 1 year with return office visit  - call 911 or go to ED with TIA or stroke-like symptoms    Abdominal aortic aneurysm (AAA) without rupture ([address] )  - asymptomatic  - BP well controlled  - US abdominal aorta AAA screening 6/8/22  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)  -     Ambulatory Referral to Vascular Surgery          Subjective:      Patient ID: Corrine Halsted is a 78 y o  male  New patient, referred by PCP for carotid artery disease, presents today for evaluation  Carotid duplex done 6/8  Denies any s/s of CVA  Denies any     HPI  See assessment and plan      Adelia Kirby is a 68-year-old male, former smoker with HLD, history of tongue carcinoma s/p resection and radiation with residual baseline difficulty swallowing and excessive salivation presents to review recent imaging and establish care  Recent carotid duplex demonstrates <50% ICA stenosis bilaterally  Patient reports imaging initially ordered secondary to history of neck radiation  Patient is asymptomatic he denies TIA or stroke-like symptoms  Neuro exam intact  Patient also had baseline abdominal aorta AAA screening ultrasound demonstrating 2 6 x 3 3 cm AAA  Patient is asymptomatic  We will continue yearly surveillance with carotid duplex and AOIL  We discussed pathophysiology of carotid artery disease and AAA  Patient BP is well controlled and he is on optimal medical management with daily ASA and statin  TIA and stroke-like symptoms reviewed  Return to office in 1 year  Patient without further questions  The following portions of the patient's history were reviewed and updated as appropriate: allergies, current medications, past family history, past medical history, past social history, past surgical history and problem list     Review of Systems   Constitutional: Negative  HENT: Positive for trouble swallowing  Eyes: Negative  Respiratory: Negative  Cardiovascular: Negative  Gastrointestinal: Negative  Endocrine: Negative  Genitourinary: Negative  Musculoskeletal: Negative  Skin: Negative  Allergic/Immunologic: Negative  Neurological: Positive for dizziness  Hematological: Negative  Psychiatric/Behavioral: Negative  I have reviewed and made appropriate changes to the review of systems input by the medical assistant  Objective:      /82 (BP Location: Right arm, Patient Position: Sitting)   Pulse 94   Temp (!) 97 4 °F (36 3 °C) (Tympanic)   Ht 5' 7" (1 702 m)   Wt 63 5 kg (140 lb)   BMI 21 93 kg/m²          Physical Exam  Vitals reviewed  Constitutional:       General: He is not in acute distress  Appearance: Normal appearance  HENT:      Head: Normocephalic and atraumatic        Mouth/Throat:      Mouth: Mucous membranes are moist  Comments: S/p base of tongue carcinoma with resection and radiation  - increased saliva production  - baseline difficulty swallowing  Neck:      Vascular: No carotid bruit  Cardiovascular:      Rate and Rhythm: Normal rate and regular rhythm  Pulses: Normal pulses  Carotid pulses are 2+ on the right side and 2+ on the left side  Radial pulses are 2+ on the right side and 2+ on the left side  Dorsalis pedis pulses are 2+ on the right side and 2+ on the left side  Posterior tibial pulses are 2+ on the right side and 2+ on the left side  Heart sounds: Normal heart sounds  Pulmonary:      Effort: Pulmonary effort is normal  No respiratory distress  Breath sounds: Normal breath sounds  No wheezing  Abdominal:      General: Abdomen is flat  Bowel sounds are normal  There is no abdominal bruit  Palpations: Abdomen is soft  There is no mass or pulsatile mass  Tenderness: There is no abdominal tenderness  Musculoskeletal:         General: Normal range of motion  Cervical back: Normal range of motion  Right lower leg: No edema  Left lower leg: No edema  Skin:     General: Skin is warm and dry  Capillary Refill: Capillary refill takes less than 2 seconds  Neurological:      General: No focal deficit present  Mental Status: He is alert and oriented to person, place, and time  Mental status is at baseline  Sensory: No sensory deficit  Motor: No weakness     Psychiatric:         Behavior: Behavior normal              Vitals:    07/18/22 0853   BP: 126/82   BP Location: Right arm   Patient Position: Sitting   Pulse: 94   Temp: (!) 97 4 °F (36 3 °C)   TempSrc: Tympanic   Weight: 63 5 kg (140 lb)   Height: 5' 7" (1 702 m)       Patient Active Problem List   Diagnosis    Excessive salivation    Medicare annual wellness visit, subsequent    Colon cancer screening    Encounter for hepatitis C screening test for low risk patient    Screening, lipid    Abnormal kidney function    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HCC)    Essential hypertension    Uncontrolled hypertension    Dizziness    History of head and neck radiation    Idiopathic hypotension    Dysuria    Weight loss    Overactive bladder    Benign localized prostatic hyperplasia with lower urinary tract symptoms (LUTS)    Hematuria    Exposure to SARS-associated coronavirus    CKD (chronic kidney disease) stage 3, GFR 30-59 ml/min (HCC)    Fatigue    Screening for lipid disorders    SOB (shortness of breath) on exertion    Screening for prostate cancer    Esophageal dysphagia    COVID-19 with hypoxic respiratory failure    Acute renal failure superimposed on chronic kidney disease ([facility] [address] )    Pneumonia due to infectious organism    Leukopenia    Chronic kidney disease-mineral and bone disorder    Bilateral carotid artery stenosis    Abdominal aortic aneurysm (AAA) without rupture (HCC)       Past Surgical History:   Procedure Laterality Date    CYSTOSCOPY  08/30/2021    EYELID CLOSURE Left     to prevent eyelashes from rubbing cornea    HERNIA REPAIR Left     MODIFIED RADICAL NECK DISSECTION Left 03/03/1999    [facility] - with mandibulotomy and resection SCCA left tongue base - Dr Conroy Members         Family History   Problem Relation Age of Onset    Hypertension Mother     Stroke Mother        Social History     Socioeconomic History    Marital status: /Civil Union     Spouse name: Not on file    Number of children: Not on file    Years of education: Not on file    Highest education level: Not on file   Occupational History    Not on file   Tobacco Use    Smoking status: Former Smoker    Smokeless tobacco: Former User    Tobacco comment: quit 20 years ago   Vaping Use    Vaping Use: Never used   Substance and Sexual Activity    Alcohol use: Yes     Comment: occasional    Drug use: Never    Sexual activity: Not on file   Other Topics Concern    Not on file   Social History Narrative    Not on file     Social Determinants of Health     Financial Resource Strain: Not on file   Food Insecurity: No Food Insecurity    Worried About Running Out of Food in the Last Year: Never true    Abelardo of Food in the Last Year: Never true   Transportation Needs: Unknown    Lack of Transportation (Medical): No    Lack of Transportation (Non-Medical):  Not on file   Physical Activity: Not on file   Stress: Not on file   Social Connections: Not on file   Intimate Partner Violence: Not on file   Housing Stability: Low Risk     Unable to Pay for Housing in the Last Year: No    Number of Places Lived in the Last Year: 1    Unstable Housing in the Last Year: No       Allergies   Allergen Reactions    Iodinated Diagnostic Agents Other (See Comments)    Iodine - Food Allergy          Current Outpatient Medications:     aspirin 81 MG tablet, 1 cap(s), Disp: , Rfl:     Coenzyme Q10 (CO Q 10 PO), Take by mouth, Disp: , Rfl:     cyanocobalamin (VITAMIN B-12) 100 mcg tablet, Take by mouth daily, Disp: , Rfl:     Multiple Vitamins-Minerals (MULTIVITAMIN ADULTS 50+ PO), Take by mouth, Disp: , Rfl:     pravastatin (PRAVACHOL) 20 mg tablet, TAKE ONE TABLET BY MOUTH EVERY OTHER DAY, Disp: 30 tablet, Rfl: 3    VITAMIN D, CHOLECALCIFEROL, PO, Take by mouth, Disp: , Rfl:     albuterol (ProAir HFA) 90 mcg/act inhaler, Inhale 2 puffs every 6 (six) hours as needed for wheezing (Patient not taking: No sig reported), Disp: 8 5 g, Rfl: 0    meclizine (ANTIVERT) 25 mg tablet, Take 1 tablet (25 mg total) by mouth 3 (three) times a day as needed for dizziness (Patient not taking: No sig reported), Disp: 15 tablet, Rfl: 0    nystatin (MYCOSTATIN) 500,000 units/5 mL suspension, Apply 5 mL (500,000 Units total) to the mouth or throat 4 (four) times a day (Patient not taking: No sig reported), Disp: 60 mL, Rfl: 2

## 2022-07-18 NOTE — PATIENT INSTRUCTIONS
Continue Aspirin  Continue Statin  Repeat Carotid ultrasound and AOIL in 1 year  Continued BP control  Call 911 or go to the ED with TIA or stroke-like symptoms that we discussed in office today     Carotid Artery Disease   AMBULATORY CARE:   Carotid artery disease (CAD)  means the major blood vessels in your neck are narrowed or becoming blocked  These 2 major blood vessels are called the carotid arteries  They supply your brain with blood  The narrow or blocked blood vessels increase your risk for a stroke  CAD is also called carotid artery stenosis  Have someone call your local emergency number (911 in the [address] [facility]) if:   You have any of the following signs of a stroke:      Numbness or drooping on one side of your face     Weakness in an arm or leg    Confusion or difficulty speaking    Dizziness, a severe headache, or vision loss    You have any of the following signs of a heart attack:      Squeezing, pressure, or pain in your chest    You may  also have any of the following:     Discomfort or pain in your back, neck, jaw, stomach, or arm    Shortness of breath    Nausea or vomiting    Lightheadedness or a sudden cold sweat    Call your doctor if:   You have questions or concerns about your condition or care  Common signs and symptoms:  CAD develops slowly  You may have no signs or symptoms until you have a mini-stroke, or transient ischemic attack (TIA)  A TIA is a temporary lack of blood flow to your brain  A TIA goes away quickly and does not cause permanent damage  A TIA may be a warning sign that you are about to have a stroke  If you have any symptoms of a TIA or stroke, seek care immediately  Warning signs of a stroke:   The words BE FAST can help you remember and recognize warning signs of a stroke:  B = Balance:  Sudden loss of balance    E = Eyes:  Loss of vision in one or both eyes    F = Face:  Face droops on one side    A = Arms:  Arm drops when both arms are raised    S = Speech:  Speech is slurred or sounds different    T = Time:  Time to get help immediately       Treatment  depends on how narrow your arteries have become  Treatment also depends on your symptoms and your general health  The goal of treatment is to lower your risk for a stroke  You may need any of the following:  Medicines:      Aspirin,  or other blood thinner, may be recommended  These will help prevent blood clots from forming in your carotid arteries  If your healthcare provider wants you to take aspirin, do not take acetaminophen or ibuprofen instead  Cholesterol medicine  lowers your cholesterol level  Blood pressure medicine  lowers or helps control your blood pressure  Procedures  can help open blocked arteries:     Carotid endarterectomy (CEA)  is used to cut and remove plaque buildup from your arteries  Carotid angioplasty and stenting (CARLTON)  is used to push the plaque against the artery wall with a balloon device  Then, a stent is placed to keep the artery open  A stent is a small metal mesh tube  Prevent a stroke:   Do not smoke, and avoid secondhand smoke  Nicotine and other chemicals in cigarettes and cigars increase your risk for a stroke  Ask your healthcare provider for information if you currently smoke and need help to quit  E-cigarettes or smokeless tobacco still contain nicotine  Talk to your healthcare provider before you use these products  Eat a variety of healthy foods  Healthy foods include fruit, vegetables, whole-grain breads, low-fat dairy products, chicken, and fish  Choose fish that are high in omega-3 fatty acids, such as salmon and fresh tuna  Ask your healthcare provider for more information on a heart healthy diet and the DASH eating plan  Limit sodium (salt)  Sodium may increase your blood pressure  Add less table salt to your foods  Read food labels and choose foods that are low in sodium  Your healthcare provider may suggest you follow a low sodium diet           Reach or maintain a healthy weight  Extra weight makes your heart work harder  Ask your healthcare provider what a healthy weight is for you  He or she can help you create a safe weight loss plan  Even a weight loss of 10% of your extra body weight can help your heart function better  Exercise regularly  Exercise helps improve heart function and can help you manage your weight  Exercise can also help lower your cholesterol and blood sugar levels  Try to get at least 30 minutes of exercise 5 times each week  Try to be physically active every day  This may include walking, riding a bicycle, or swimming  Your healthcare provider can help you create an exercise plan that works best for you  Limit alcohol  Alcohol can increase your blood pressure and triglyceride levels  A drink of alcohol is 12 ounces of beer, 5 ounces of wine, or 1½ ounces of liquor  Follow up with your doctor as directed:  Write down your questions so you remember to ask them during your visits  © [employer] 2022 Information is for End User's use only and may not be sold, redistributed or otherwise used for commercial purposes  All illustrations and images included in CareNotes® are the copyrighted property of A D A M , Inc  or [facility] [employer]   The above information is an  only  It is not intended as medical advice for individual conditions or treatments  Talk to your doctor, nurse or pharmacist before following any medical regimen to see if it is safe and effective for you  Nonruptured Abdominal Aortic Aneurysm   AMBULATORY CARE:   An abdominal aortic aneurysm (AAA)  is a bulging or weak area in your abdominal aorta  Over time, the bulge may grow and is at risk for tearing or rupturing  The aorta is a large blood vessel that extends from your heart to your abdomen  The part of the aorta that extends into your abdomen is called your abdominal aorta   Your abdominal aorta brings blood to your stomach, pelvis, and legs  An AAA that ruptures is a life-threatening emergency  Signs and symptoms: An AAA usually does not have signs or symptoms if it has not ruptured  If the AAA starts to leak or ruptures, you may have any of the following:  Sudden pain in your abdomen, groin, back, legs, or buttocks    Nausea and vomiting    A lump or swelling in your abdomen    Stiff abdominal muscles    Numbness or tingling in your legs    Pale, sweaty, or clammy skin    Dizziness, fainting or loss of consciousness    Call your local emergency number (911 in the [place]), or have someone else call if:   You faint or lose consciousness  You cannot be woken  Seek care immediately if:  The following signs or symptoms may mean the AAA is at risk of rupturing: You have sudden sharp pain in your abdomen, groin, back, legs, or buttocks  You have nausea and vomiting  You feel dizzy  You have stiffness or swelling in your abdomen, or a lump in your abdomen  You have numbness or tingling in your legs  Your skin is pale, sweaty, or clammy  Call your doctor if:   You have questions or concerns about your condition or care  Treatment for an AAA  may not be needed  Your healthcare provider may monitor the size of your AAA with tests, such as an ultrasound  You may be given medicines to prevent the AAA from growing  Examples include medicines to lower your blood pressure or cholesterol level  If your AAA gets bigger, starts to leak, or ruptures, you may need any of the following:  Endovascular repair  is a procedure that uses a graft to repair your AAA  The graft stops blood flow to the aneurysm and protects your abdominal aorta  You may need to have more than 1 endovascular repair  Open repair  is surgery to repair or remove an AAA  Manage a nonruptured AAA:  You can help prevent your AAA from growing or rupturing by doing the following:  Do not smoke    Nicotine and other chemicals in cigarettes and cigars can increase your blood pressure  It can also damage your aorta and increase the size of your AAA  Ask your healthcare provider for information if you currently smoke and need help to quit  E-cigarettes or smokeless tobacco still contain nicotine  Talk to your healthcare provider before you use these products  Exercise as directed  Exercise can help control your blood pressure and cholesterol level  Ask your healthcare provider how much exercise you need each day and which exercises are best for you  Follow the meal plan recommended by your healthcare provider  Talk to your dietitian about a heart-healthy or low-sodium eating plan  Meal plans will help you lower your cholesterol and blood pressure  They will also help you reach a healthy weight  Do not lift anything heavier than 10 pounds  Heavy lifting can increase pressure in your abdominal aorta  This can increase your risk for a ruptured AAA  Follow up with your doctor as directed: You will need regular tests and follow-up visits to monitor the size of your AAA  Keep all appointments  Write down your questions so you remember to ask them during your visits  © Copyright [employer] 2022 Information is for End User's use only and may not be sold, redistributed or otherwise used for commercial purposes  All illustrations and images included in CareNotes® are the copyrighted property of A D A M , Inc  or [facility] [employer]   The above information is an  only  It is not intended as medical advice for individual conditions or treatments  Talk to your doctor, nurse or pharmacist before following any medical regimen to see if it is safe and effective for you

## 2022-07-26 ENCOUNTER — TELEPHONE (OUTPATIENT)
Dept: NEPHROLOGY | Facility: CLINIC | Age: 77
End: 2022-07-26

## 2022-07-27 ENCOUNTER — OFFICE VISIT (OUTPATIENT)
Dept: NEPHROLOGY | Facility: CLINIC | Age: 77
End: 2022-07-27
Payer: MEDICARE

## 2022-07-27 VITALS
HEIGHT: 68 IN | HEART RATE: 94 BPM | WEIGHT: 139.8 LBS | SYSTOLIC BLOOD PRESSURE: 120 MMHG | RESPIRATION RATE: 16 BRPM | OXYGEN SATURATION: 96 % | TEMPERATURE: 97.5 F | DIASTOLIC BLOOD PRESSURE: 70 MMHG | BODY MASS INDEX: 21.19 KG/M2

## 2022-07-27 DIAGNOSIS — M89.9 CHRONIC KIDNEY DISEASE-MINERAL AND BONE DISORDER: ICD-10-CM

## 2022-07-27 DIAGNOSIS — N18.9 CHRONIC KIDNEY DISEASE-MINERAL AND BONE DISORDER: ICD-10-CM

## 2022-07-27 DIAGNOSIS — C02.9 TONGUE CARCINOMA (HCC): ICD-10-CM

## 2022-07-27 DIAGNOSIS — E83.9 CHRONIC KIDNEY DISEASE-MINERAL AND BONE DISORDER: ICD-10-CM

## 2022-07-27 DIAGNOSIS — N18.30 STAGE 3 CHRONIC KIDNEY DISEASE, UNSPECIFIED WHETHER STAGE 3A OR 3B CKD (HCC): Primary | ICD-10-CM

## 2022-07-27 DIAGNOSIS — I10 ESSENTIAL HYPERTENSION: ICD-10-CM

## 2022-07-27 PROCEDURE — 99214 OFFICE O/P EST MOD 30 MIN: CPT | Performed by: INTERNAL MEDICINE

## 2022-07-27 NOTE — ASSESSMENT & PLAN NOTE
Lab Results   Component Value Date    EGFR 40 07/05/2022    EGFR 49 04/26/2022    EGFR 43 04/19/2022    CREATININE 1 64 (H) 07/05/2022    CREATININE 1 39 (H) 04/26/2022    CREATININE 1 55 (H) 04/19/2022   PTH and phosphorus along with the vitamin-D are within acceptable range and will continue to monitor

## 2022-07-27 NOTE — PROGRESS NOTES
NEPHROLOGY OFFICE FOLLOW UP  Gabbi Tucker 78 y o  male MRN: [id number]    Encounter: [id number] 7/27/2022    REASON FOR VISIT: Gabbi Tucker is a 78 y o  male who is here on 7/27/2022 for Chronic Kidney Disease and Follow-up    HPI:    Beverley Vasques came in today for follow-up of CKD  Patient does have a history of tongue cancer requiring surgery  Since surgery he had difficult time eating and drinking so his volume status fluctuate    Denies any other complaint    No chest pain no palpitation or shortness of breath     Denies any urinary complaint      REVIEW OF SYSTEMS:    Review of Systems   Constitutional: Negative for activity change and fatigue  HENT: Negative for congestion and ear discharge  Eyes: Negative for photophobia and pain  Respiratory: Negative for apnea and choking  Cardiovascular: Negative for chest pain and palpitations  Gastrointestinal: Negative for abdominal distention and blood in stool  Endocrine: Negative for heat intolerance and polyphagia  Genitourinary: Negative for flank pain and urgency  Musculoskeletal: Negative for neck pain and neck stiffness  Skin: Negative for color change and wound  Allergic/Immunologic: Negative for food allergies and immunocompromised state  Neurological: Negative for seizures and facial asymmetry  Hematological: Negative for adenopathy  Does not bruise/bleed easily  Psychiatric/Behavioral: Negative for self-injury and suicidal ideas           PAST MEDICAL HISTORY:  Past Medical History:   Diagnosis Date    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[facility] ) 03/1999    SCCA left tongue base - [id number]- treated at [facility] - had surgery and XRT    Chronic kidney disease     Hyperlipidemia     Hypertension     Left anterior fascicular block     Stage 3 chronic kidney disease ([facility] )        PAST SURGICAL HISTORY:  Past Surgical History:   Procedure Laterality Date    CYSTOSCOPY  08/30/2021    EYELID CLOSURE Left     to prevent eyelashes from rubbing cornea    HERNIA REPAIR Left     MODIFIED RADICAL NECK DISSECTION Left 03/03/1999    [facility] - with mandibulotomy and resection SCCA left tongue base - Dr Guillen Left HISTORY:  Social History     Substance and Sexual Activity   Alcohol Use Yes    Comment: occasional     Social History     Substance and Sexual Activity   Drug Use Never     Social History     Tobacco Use   Smoking Status Former Smoker   Smokeless Tobacco Former User   Tobacco Comment    quit 20 years ago       FAMILY HISTORY:  Family History   Problem Relation Age of Onset    Hypertension Mother     Stroke Mother        MEDICATIONS:    Current Outpatient Medications:     aspirin 81 MG tablet, 1 cap(s), Disp: , Rfl:     Coenzyme Q10 (CO Q 10 PO), Take by mouth, Disp: , Rfl:     cyanocobalamin (VITAMIN B-12) 100 mcg tablet, Take by mouth daily, Disp: , Rfl:     Multiple Vitamins-Minerals (MULTIVITAMIN ADULTS 50+ PO), Take by mouth, Disp: , Rfl:     pravastatin (PRAVACHOL) 20 mg tablet, TAKE ONE TABLET BY MOUTH EVERY OTHER DAY, Disp: 30 tablet, Rfl: 3    VITAMIN D, CHOLECALCIFEROL, PO, Take by mouth, Disp: , Rfl:     albuterol (ProAir HFA) 90 mcg/act inhaler, Inhale 2 puffs every 6 (six) hours as needed for wheezing (Patient not taking: No sig reported), Disp: 8 5 g, Rfl: 0    meclizine (ANTIVERT) 25 mg tablet, Take 1 tablet (25 mg total) by mouth 3 (three) times a day as needed for dizziness (Patient not taking: No sig reported), Disp: 15 tablet, Rfl: 0    nystatin (MYCOSTATIN) 500,000 units/5 mL suspension, Apply 5 mL (500,000 Units total) to the mouth or throat 4 (four) times a day (Patient not taking: No sig reported), Disp: 60 mL, Rfl: 2    PHYSICAL EXAM:  Vitals:    07/27/22 1405   BP: 120/70   BP Location: Right arm   Patient Position: Sitting   Pulse: 94   Resp: 16   Temp: 97 5 °F (36 4 °C)   TempSrc: Temporal   SpO2: 96%   Weight: 63 4 kg (139 lb 12 8 oz) Height: 5' 7 5" (1 715 m)     Body mass index is 21 57 kg/m²  Physical Exam  Constitutional:       General: He is not in acute distress  Appearance: He is well-developed  HENT:      Head: Normocephalic  Eyes:      General: No scleral icterus  Conjunctiva/sclera: Conjunctivae normal    Neck:      Vascular: No JVD  Cardiovascular:      Rate and Rhythm: Normal rate  Heart sounds: Normal heart sounds  Pulmonary:      Effort: Pulmonary effort is normal       Breath sounds: No wheezing  Abdominal:      Palpations: Abdomen is soft  Tenderness: There is no abdominal tenderness  Musculoskeletal:         General: Normal range of motion  Cervical back: Neck supple  Skin:     General: Skin is warm  Findings: No rash  Neurological:      Mental Status: He is alert and oriented to person, place, and time     Psychiatric:         Behavior: Behavior normal          LAB RESULTS:  Results for orders placed or performed in visit on 81/15/50   Basic metabolic panel   Result Value Ref Range    Sodium 140 136 - 145 mmol/L    Potassium 4 6 3 5 - 5 3 mmol/L    Chloride 107 100 - 108 mmol/L    CO2 26 21 - 32 mmol/L    ANION GAP 7 4 - 13 mmol/L    BUN 22 5 - 25 mg/dL    Creatinine 1 64 (H) 0 60 - 1 30 mg/dL    Glucose, Fasting 84 65 - 99 mg/dL    Calcium 9 4 8 3 - 10 1 mg/dL    eGFR 40 ml/min/1 73sq m   CBC and differential   Result Value Ref Range    WBC 4 68 4 31 - 10 16 Thousand/uL    RBC 4 85 3 88 - 5 62 Million/uL    Hemoglobin 13 2 12 0 - 17 0 g/dL    Hematocrit 41 4 36 5 - 49 3 %    MCV 85 82 - 98 fL    MCH 27 2 26 8 - 34 3 pg    MCHC 31 9 31 4 - 37 4 g/dL    RDW 13 9 11 6 - 15 1 %    MPV 9 4 8 9 - 12 7 fL    Platelets 347 400 - 339 Thousands/uL    nRBC 0 /100 WBCs    Neutrophils Relative 55 43 - 75 %    Immat GRANS % 0 0 - 2 %    Lymphocytes Relative 30 14 - 44 %    Monocytes Relative 10 4 - 12 %    Eosinophils Relative 4 0 - 6 %    Basophils Relative 1 0 - 1 %    Neutrophils Absolute 2  57 1 85 - 7 62 Thousands/µL    Immature Grans Absolute 0 02 0 00 - 0 20 Thousand/uL    Lymphocytes Absolute 1 42 0 60 - 4 47 Thousands/µL    Monocytes Absolute 0 45 0 17 - 1 22 Thousand/µL    Eosinophils Absolute 0 19 0 00 - 0 61 Thousand/µL    Basophils Absolute 0 03 0 00 - 0 10 Thousands/µL   Phosphorus   Result Value Ref Range    Phosphorus 3 3 2 3 - 4 1 mg/dL   Protein / creatinine ratio, urine   Result Value Ref Range    Creatinine, Ur 150 0 mg/dL    Protein Urine Random 16 mg/dL    Prot/Creat Ratio, Ur 0 11 (H) 0 00 - 0 10   PTH, intact   Result Value Ref Range    PTH 31 0 18 4 - 80 1 pg/mL   UA (URINE) with reflex to Scope   Result Value Ref Range    Color, UA Yellow     Clarity, UA Clear     Specific [place], UA 1 016 1 003 - 1 030    pH, UA 6 5 4 5, 5 0, 5 5, 6 0, 6 5, 7 0, 7 5, 8 0    Leukocytes, UA Moderate (A) Negative    Nitrite, UA Negative Negative    Protein, UA Negative Negative mg/dl    Glucose, UA Negative Negative mg/dl    Ketones, UA Negative Negative mg/dl    Urobilinogen, UA <2 0 <2 0 mg/dl mg/dl    Bilirubin, UA Negative Negative    Occult Blood, UA Negative Negative   Vitamin D 25 hydroxy   Result Value Ref Range    Vit D, 25-Hydroxy 71 1 30 0 - 100 0 ng/mL   Urine Microscopic   Result Value Ref Range    RBC, UA None Seen None Seen, 1-2 /hpf    WBC, UA 10-20 (A) None Seen, 1-2 /hpf    Epithelial Cells Occasional None Seen, Occasional /hpf    Bacteria, UA Occasional None Seen, Occasional /hpf       ASSESSMENT and PLAN:      CKD (chronic kidney disease) stage 3, GFR 30-59 ml/min ([facility])  Lab Results   Component Value Date    EGFR 40 07/05/2022    EGFR 49 04/26/2022    EGFR 43 04/19/2022    CREATININE 1 64 (H) 07/05/2022    CREATININE 1 39 (H) 04/26/2022    CREATININE 1 55 (H) 04/19/2022   Overall seems to be stable with some fluctuations based on volume status    Advised to drink drink as much as he can and avoid any nephrotoxic medication    Chronic kidney disease-mineral and bone disorder  Lab Results   Component Value Date    EGFR 40 07/05/2022    EGFR 49 04/26/2022    EGFR 43 04/19/2022    CREATININE 1 64 (H) 07/05/2022    CREATININE 1 39 (H) 04/26/2022    CREATININE 1 55 (H) 04/19/2022   PTH and phosphorus along with the vitamin-D are within acceptable range and will continue to monitor    Essential hypertension  Blood pressure is very well controlled        Everything discussed with patient at length  I will see him back in 6 months and we will get blood and urine test before that visit      Portions of the record may have been created with voice recognition software  Occasional wrong word or "sound a like" substitutions may have occurred due to the inherent limitations of voice recognition software  Read the chart carefully and recognize, using context, where substitutions have occurred  If you have any questions, please contact the dictating provider

## 2022-07-27 NOTE — ASSESSMENT & PLAN NOTE
Lab Results   Component Value Date    EGFR 40 07/05/2022    EGFR 49 04/26/2022    EGFR 43 04/19/2022    CREATININE 1 64 (H) 07/05/2022    CREATININE 1 39 (H) 04/26/2022    CREATININE 1 55 (H) 04/19/2022   Overall seems to be stable with some fluctuations based on volume status    Advised to drink drink as much as he can and avoid any nephrotoxic medication

## 2022-09-07 DIAGNOSIS — E78.5 HYPERLIPIDEMIA: ICD-10-CM

## 2022-09-07 RX ORDER — PRAVASTATIN SODIUM 20 MG
20 TABLET ORAL EVERY OTHER DAY
Qty: 30 TABLET | Refills: 3 | Status: SHIPPED | OUTPATIENT
Start: 2022-09-07

## 2022-10-10 ENCOUNTER — APPOINTMENT (OUTPATIENT)
Dept: LAB | Facility: CLINIC | Age: 77
End: 2022-10-10
Payer: MEDICARE

## 2022-10-10 DIAGNOSIS — D47.2 MONOCLONAL PARAPROTEINEMIA: ICD-10-CM

## 2022-10-10 LAB
ALBUMIN SERPL BCP-MCNC: 3.6 G/DL (ref 3.5–5)
ALP SERPL-CCNC: 65 U/L (ref 46–116)
ALT SERPL W P-5'-P-CCNC: 18 U/L (ref 12–78)
ANION GAP SERPL CALCULATED.3IONS-SCNC: 3 MMOL/L (ref 4–13)
AST SERPL W P-5'-P-CCNC: 12 U/L (ref 5–45)
BASOPHILS # BLD AUTO: 0.05 THOUSANDS/ΜL (ref 0–0.1)
BASOPHILS NFR BLD AUTO: 1 % (ref 0–1)
BILIRUB SERPL-MCNC: 0.4 MG/DL (ref 0.2–1)
BUN SERPL-MCNC: 22 MG/DL (ref 5–25)
CALCIUM SERPL-MCNC: 9.9 MG/DL (ref 8.3–10.1)
CHLORIDE SERPL-SCNC: 106 MMOL/L (ref 96–108)
CO2 SERPL-SCNC: 29 MMOL/L (ref 21–32)
CREAT SERPL-MCNC: 1.59 MG/DL (ref 0.6–1.3)
EOSINOPHIL # BLD AUTO: 0.19 THOUSAND/ΜL (ref 0–0.61)
EOSINOPHIL NFR BLD AUTO: 4 % (ref 0–6)
ERYTHROCYTE [DISTWIDTH] IN BLOOD BY AUTOMATED COUNT: 14.2 % (ref 11.6–15.1)
GFR SERPL CREATININE-BSD FRML MDRD: 41 ML/MIN/1.73SQ M
GLUCOSE P FAST SERPL-MCNC: 84 MG/DL (ref 65–99)
HCT VFR BLD AUTO: 45.3 % (ref 36.5–49.3)
HGB BLD-MCNC: 14.4 G/DL (ref 12–17)
IGA SERPL-MCNC: 60 MG/DL (ref 70–400)
IGG SERPL-MCNC: 1670 MG/DL (ref 700–1600)
IGM SERPL-MCNC: 23 MG/DL (ref 40–230)
IMM GRANULOCYTES # BLD AUTO: 0.03 THOUSAND/UL (ref 0–0.2)
IMM GRANULOCYTES NFR BLD AUTO: 1 % (ref 0–2)
LYMPHOCYTES # BLD AUTO: 1.25 THOUSANDS/ΜL (ref 0.6–4.47)
LYMPHOCYTES NFR BLD AUTO: 25 % (ref 14–44)
MCH RBC QN AUTO: 27.9 PG (ref 26.8–34.3)
MCHC RBC AUTO-ENTMCNC: 31.8 G/DL (ref 31.4–37.4)
MCV RBC AUTO: 88 FL (ref 82–98)
MONOCYTES # BLD AUTO: 0.46 THOUSAND/ΜL (ref 0.17–1.22)
MONOCYTES NFR BLD AUTO: 9 % (ref 4–12)
NEUTROPHILS # BLD AUTO: 2.98 THOUSANDS/ΜL (ref 1.85–7.62)
NEUTS SEG NFR BLD AUTO: 60 % (ref 43–75)
NRBC BLD AUTO-RTO: 0 /100 WBCS
PLATELET # BLD AUTO: 370 THOUSANDS/UL (ref 149–390)
PMV BLD AUTO: 9.3 FL (ref 8.9–12.7)
POTASSIUM SERPL-SCNC: 4.8 MMOL/L (ref 3.5–5.3)
PROT SERPL-MCNC: 7.8 G/DL (ref 6.4–8.4)
RBC # BLD AUTO: 5.16 MILLION/UL (ref 3.88–5.62)
SODIUM SERPL-SCNC: 138 MMOL/L (ref 135–147)
WBC # BLD AUTO: 4.96 THOUSAND/UL (ref 4.31–10.16)

## 2022-10-10 PROCEDURE — 80053 COMPREHEN METABOLIC PANEL: CPT

## 2022-10-10 PROCEDURE — 83521 IG LIGHT CHAINS FREE EACH: CPT

## 2022-10-10 PROCEDURE — 36415 COLL VENOUS BLD VENIPUNCTURE: CPT

## 2022-10-10 PROCEDURE — 85025 COMPLETE CBC W/AUTO DIFF WBC: CPT

## 2022-10-10 PROCEDURE — 82784 ASSAY IGA/IGD/IGG/IGM EACH: CPT

## 2022-10-11 LAB
KAPPA LC FREE SER-MCNC: 52.9 MG/L (ref 3.3–19.4)
KAPPA LC FREE/LAMBDA FREE SER: 0.86 {RATIO} (ref 0.26–1.65)
LAMBDA LC FREE SERPL-MCNC: 61.5 MG/L (ref 5.7–26.3)

## 2022-10-12 PROBLEM — J18.9 PNEUMONIA DUE TO INFECTIOUS ORGANISM: Status: RESOLVED | Noted: 2021-12-31 | Resolved: 2022-10-12

## 2022-10-12 PROBLEM — Z12.5 SCREENING FOR PROSTATE CANCER: Status: RESOLVED | Noted: 2021-08-30 | Resolved: 2022-10-12

## 2022-10-19 ENCOUNTER — OFFICE VISIT (OUTPATIENT)
Dept: CARDIOLOGY CLINIC | Facility: CLINIC | Age: 77
End: 2022-10-19
Payer: MEDICARE

## 2022-10-19 VITALS
BODY MASS INDEX: 21.98 KG/M2 | HEIGHT: 68 IN | SYSTOLIC BLOOD PRESSURE: 126 MMHG | HEART RATE: 79 BPM | WEIGHT: 145 LBS | DIASTOLIC BLOOD PRESSURE: 80 MMHG

## 2022-10-19 DIAGNOSIS — I10 ESSENTIAL HYPERTENSION: Primary | ICD-10-CM

## 2022-10-19 DIAGNOSIS — R13.19 ESOPHAGEAL DYSPHAGIA: ICD-10-CM

## 2022-10-19 DIAGNOSIS — E78.2 MIXED HYPERLIPIDEMIA: ICD-10-CM

## 2022-10-19 DIAGNOSIS — I44.4 LEFT ANTERIOR FASCICULAR BLOCK: ICD-10-CM

## 2022-10-19 PROCEDURE — 93000 ELECTROCARDIOGRAM COMPLETE: CPT | Performed by: INTERNAL MEDICINE

## 2022-10-19 PROCEDURE — 99214 OFFICE O/P EST MOD 30 MIN: CPT | Performed by: INTERNAL MEDICINE

## 2022-10-19 NOTE — PROGRESS NOTES
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, radiation  20+ yrs ago  Has some dysphagia, and recently had esophageal dilatation  Monoclonal gammopathy of unknown significance    Plan  Hypertension  BP previously was high, and he needed amlodipine  He has lost 20 lbs over the last 2 years, mainly from inability to eat much / dysphagia, and his BP is now lower, event without his anti-hypertensive medication  Monitor    Hyperlipidemia    10-yr ASCVD risk score 34 3% --> 23 5% (10/2022)  Recently well controlled on intermittent a statin  Continue pravastatin 20 mg every other day with Co Q10     Left anterior fascicular block  Stable, asymptomatic  ECG today unchanged  No dizziness or lightheadedness, near-syncope or syncope  monitor    Orders Placed This Encounter   Procedures   • POCT ECG     Results for orders placed or performed in visit on 10/19/22   POCT ECG    Impression    Normal sinus rhythm  Left anterior fascicular block  Cannot rule out old anteroseptal infarct     Return in about 1 year (around 10/19/2023), or if symptoms worsen or fail to improve  History of Present Illness   78 y o   gentleman comes in as a new patient for evaluation of new onset shortness of breath over the past 2 weeks  At baseline, he is very active for his age, and mows several acres of his land with a self-propelled push mower    But over the last week, he noticed getting out of breath with lifting up a chainsaw and trying to cut a tree  He feels he is still able to climb up a flight of stair, but shortness of breath with this level of exertion was unusual for him  No complains of chest pain, palpitations, dizziness or lightheadedness  No prior history of CAD  He had tongue cancer back in 1999, and underwent radical neck dissection and radiation therapy at [facility]  He has remained in remission, and has been getting routine follow-up  Interval history - 9/30/2020  He returns for follow-up, and has been doing well overall  He does not report any active symptoms of chest pain or shortness of breath  He has been fairly active, and takes care of his 4 acres, including cutting, trimming lawn mowing  Interval history - 9/15/2021   he comes back for follow-up after 1 year  He continues to do well in terms of which shortness of breath, and remains very active taking care of his for acres  No current complains of chest pain, palpitations, dizziness or lightheadedness  No recent history of near-syncope or syncope  He has had dysphagia, and as a result recently underwent EGD with esophageal dilatation    Interval history - 10/19/2022  He comes back for follow-up after 1 year  He has been doing very well overall from the cardiac standpoint and has not had any symptoms of chest pain shortness of breath, palpitations or dizziness or lightheadedness  He continues to have issues with dysphagia even after his esophageal dilation    As a result of not being able to eat much, he has lost another 10 lb and is now down 20+ lb over the last 2 years        Historical Information   Past Medical History:   Diagnosis Date   • 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[facility] [address] ) 03/1999    SCCA left tongue base - T2N1M0- treated at [facility] - had surgery and XRT   • Chronic kidney disease    • Hyperlipidemia    • Hypertension    • Left anterior fascicular block    • Stage 3 chronic kidney disease [facility])      Past Surgical History:   Procedure Laterality Date   • CYSTOSCOPY  08/30/2021   • EYELID CLOSURE Left     to prevent eyelashes from rubbing cornea   • HERNIA REPAIR Left    • MODIFIED RADICAL NECK DISSECTION Left 03/03/1999    [facility] - with mandibulotomy and resection SCCA left tongue base - Dr Arnaud Gutierrez   • TONGUE SURGERY     • TONSILLECTOMY       Family History   Problem Relation Age of Onset   • Hypertension Mother    • Stroke Mother      Current Outpatient Medications on File Prior to Visit   Medication Sig Dispense Refill   • aspirin 81 MG tablet 1 cap(s)     • Coenzyme Q10 (CO Q 10 PO) Take by mouth     • cyanocobalamin (VITAMIN B-12) 100 mcg tablet Take by mouth daily     • Multiple Vitamins-Minerals (MULTIVITAMIN ADULTS 50+ PO) Take by mouth     • pravastatin (PRAVACHOL) 20 mg tablet Take 1 tablet (20 mg total) by mouth every other day 30 tablet 3   • VITAMIN D, CHOLECALCIFEROL, PO Take by mouth     • albuterol (ProAir HFA) 90 mcg/act inhaler Inhale 2 puffs every 6 (six) hours as needed for wheezing (Patient not taking: No sig reported) 8 5 g 0   • meclizine (ANTIVERT) 25 mg tablet Take 1 tablet (25 mg total) by mouth 3 (three) times a day as needed for dizziness (Patient not taking: No sig reported) 15 tablet 0   • nystatin (MYCOSTATIN) 500,000 units/5 mL suspension Apply 5 mL (500,000 Units total) to the mouth or throat 4 (four) times a day (Patient not taking: No sig reported) 60 mL 2     No current facility-administered medications on file prior to visit       Allergies   Allergen Reactions   • Iodinated Diagnostic Agents Other (See Comments)   • Iodine - Food Allergy      Social History     Socioeconomic History   • Marital status: /Civil Union     Spouse name: None   • Number of children: None   • Years of education: None   • Highest education level: None   Occupational History   • None   Tobacco Use   • Smoking status: Former Smoker   • Smokeless tobacco: Former User   • Tobacco comment: quit 20 years ago   Vaping Use   • Vaping Use: Never used   Substance and Sexual Activity   • Alcohol use: Yes     Comment: occasional   • Drug use: Never   • Sexual activity: None   Other Topics Concern   • None   Social History Narrative   • None     Social Determinants of Health     Financial Resource Strain: Not on file   Food Insecurity: No Food Insecurity   • Worried About Running Out of Food in the Last Year: Never true   • Ran Out of Food in the Last Year: Never true   Transportation Needs: Unknown   • Lack of Transportation (Medical): No   • Lack of Transportation (Non-Medical): Not on file   Physical Activity: Not on file   Stress: Not on file   Social Connections: Not on file   Intimate Partner Violence: Not on file   Housing Stability: Low Risk    • Unable to Pay for Housing in the Last Year: No   • Number of Places Lived in the Last Year: 1   • Unstable Housing in the Last Year: No        Review of Systems   HENT: Positive for voice change  All other systems reviewed and are negative  Vitals:  Vitals:    10/19/22 1410   BP: 126/80   Pulse: 79   Weight: 65 8 kg (145 lb)   Height: 5' 7 5" (1 715 m)     BMI - Body mass index is 22 38 kg/m²  Wt Readings from Last 7 Encounters:   10/19/22 65 8 kg (145 lb)   09/15/22 64 4 kg (142 lb)   08/18/22 64 4 kg (142 lb)   07/27/22 63 4 kg (139 lb 12 8 oz)   07/18/22 63 5 kg (140 lb)   07/06/22 65 8 kg (145 lb)   05/19/22 65 3 kg (144 lb)       Physical Exam  Vitals and nursing note reviewed  Constitutional:       General: He is not in acute distress  Appearance: He is well-developed  He is not ill-appearing or diaphoretic  HENT:      Head: Normocephalic and atraumatic  Eyes:      General: No scleral icterus  Conjunctiva/sclera: Conjunctivae normal    Neck:      Vascular: No carotid bruit or JVD  Cardiovascular:      Rate and Rhythm: Normal rate and regular rhythm  Heart sounds: Normal heart sounds   No murmur heard  No friction rub  No gallop  Pulmonary:      Effort: Pulmonary effort is normal  No respiratory distress  Breath sounds: Normal breath sounds  No wheezing or rales  Chest:      Chest wall: No tenderness  Abdominal:      General: There is no distension  Palpations: Abdomen is soft  Tenderness: There is no abdominal tenderness  Musculoskeletal:         General: No deformity  Skin:     General: Skin is warm  Neurological:      General: No focal deficit present  Mental Status: He is alert and oriented to person, place, and time  Mental status is at baseline  Comments: hoarseness   Psychiatric:         Mood and Affect: Mood normal          Behavior: Behavior normal          Thought Content: Thought content normal          Labs:  CBC:   Lab Results   Component Value Date    WBC 4 96 10/10/2022    RBC 5 16 10/10/2022    HGB 14 4 10/10/2022    HCT 45 3 10/10/2022    MCV 88 10/10/2022     10/10/2022    RDW 14 2 10/10/2022       CMP:   Lab Results   Component Value Date    K 4 8 10/10/2022     10/10/2022    CO2 29 10/10/2022    BUN 22 10/10/2022    CREATININE 1 59 (H) 10/10/2022    EGFR 41 10/10/2022    CALCIUM 9 9 10/10/2022    AST 12 10/10/2022    ALT 18 10/10/2022    ALKPHOS 65 10/10/2022       Magnesium:  Lab Results   Component Value Date    MG 2 3 04/26/2022       Lipid Profile:   Lab Results   Component Value Date    HDL 47 02/16/2022    TRIG 89 02/16/2022    LDLCALC 107 (H) 02/16/2022       Thyroid Studies:   Lab Results   Component Value Date    IFM7TEUUBUKJ 2 760 02/16/2022       No components found for: [facility]    Lab Results   Component Value Date    INR 1 06 12/31/2021   5    Imaging: Xr Chest Pa & Lateral    Result Date: 6/17/2020  Narrative: CHEST INDICATION:   R06 02: Shortness of breath   COMPARISON:  6/25/2013 EXAM PERFORMED/VIEWS:  XR CHEST PA & LATERAL Images: 3 FINDINGS: Retrocardiac air-fluid level consistent with moderate to large hiatal hernia Strand-like opacities left lung base likely scarring, unchanged Cardiomediastinal silhouette appears unremarkable  The lungs are otherwise clear  No pneumothorax or pleural effusion  Osseous structures appear within normal limits for patient age  Impression: Persistent left lung base linear strand-like scarring Increased conspicuity of moderate to large hiatal hernia with air-fluid level No acute cardiopulmonary disease  Workstation performed: [id number]       Cardiac testing:   No results found for this or any previous visit  No results found for this or any previous visit  No results found for this or any previous visit  No results found for this or any previous visit

## 2022-12-01 ENCOUNTER — TELEPHONE (OUTPATIENT)
Dept: NEPHROLOGY | Facility: CLINIC | Age: 77
End: 2022-12-01

## 2022-12-15 ENCOUNTER — OFFICE VISIT (OUTPATIENT)
Dept: FAMILY MEDICINE CLINIC | Facility: CLINIC | Age: 77
End: 2022-12-15

## 2022-12-15 VITALS
DIASTOLIC BLOOD PRESSURE: 96 MMHG | TEMPERATURE: 95.8 F | SYSTOLIC BLOOD PRESSURE: 136 MMHG | HEIGHT: 68 IN | WEIGHT: 144 LBS | OXYGEN SATURATION: 98 % | BODY MASS INDEX: 21.82 KG/M2 | HEART RATE: 92 BPM

## 2022-12-15 DIAGNOSIS — Z11.59 SCREENING FOR VIRAL DISEASE: ICD-10-CM

## 2022-12-15 DIAGNOSIS — J39.8 CONGESTION OF UPPER RESPIRATORY TRACT: Primary | ICD-10-CM

## 2022-12-15 LAB
SARS-COV-2 AG UPPER RESP QL IA: NEGATIVE
VALID CONTROL: NORMAL

## 2022-12-15 NOTE — PROGRESS NOTES
Assessment/Plan:           Problem List Items Addressed This Visit        Respiratory    Congestion of upper respiratory tract - Primary     Patient does not have an active infection IH COVID was negative and will call for any new symptoms             Other    Screening for viral disease    Relevant Orders    POCT Rapid Covid Ag (Completed)         Subjective:      Patient ID: Doni Mullins is a 78 y o  male  Patient here today and reports that he had the flu shot about three weeks ago and since has not been feeling well      The following portions of the patient's history were reviewed and updated as appropriate:   He  has a past medical history of 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[address] ) (03/1999), Chronic kidney disease, Hyperlipidemia, Hypertension, Left anterior fascicular block, and Stage 3 chronic kidney disease ([address] )    He   Patient Active Problem List    Diagnosis Date Noted   • Screening for viral disease 12/15/2022   • Congestion of upper respiratory tract 12/15/2022   • Bilateral carotid artery stenosis 07/18/2022   • Abdominal aortic aneurysm (AAA) without rupture 07/18/2022   • Chronic kidney disease-mineral and bone disorder 04/27/2022   • Leukopenia 01/01/2022   • COVID-19 with hypoxic respiratory failure 12/31/2021   • Acute renal failure superimposed on chronic kidney disease ([address] ) 12/31/2021   • Esophageal dysphagia 09/08/2021   • SOB (shortness of breath) on exertion 06/17/2020   • Exposure to SARS-associated coronavirus 05/26/2020   • CKD (chronic kidney disease) stage 3, GFR 30-59 ml/min ([address] ) 05/26/2020   • Fatigue 05/26/2020   • Screening for lipid disorders 05/26/2020   • Benign localized prostatic hyperplasia with lower urinary tract symptoms (LUTS) 10/15/2019   • Hematuria 10/15/2019   • Overactive bladder 10/09/2019   • Idiopathic hypotension 07/31/2019   • Dysuria 07/31/2019   • Weight loss 07/31/2019   • Uncontrolled hypertension 05/14/2019   • Dizziness 05/14/2019   • History of head and neck radiation 05/14/2019   • Essential hypertension 05/06/2019   • 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[facility] [address] ) 02/14/2019   • Excessive salivation 01/10/2019   • Medicare annual wellness visit, subsequent 01/10/2019   • Colon cancer screening 01/10/2019   • Encounter for hepatitis C screening test for low risk patient 01/10/2019   • Screening, lipid 01/10/2019   • Abnormal kidney function 01/10/2019     He  has a past surgical history that includes Hernia repair (Left); Eyelid closure (Left); Modified radical neck dissection (Left, 03/03/1999); Tonsillectomy; Cystoscopy (08/30/2021); and Tongue surgery  His family history includes Hypertension in his mother; Stroke in his mother  He  reports that he has quit smoking  He has quit using smokeless tobacco  He reports current alcohol use  He reports that he does not use drugs  Current Outpatient Medications   Medication Sig Dispense Refill   • aspirin 81 MG tablet 1 cap(s)     • Coenzyme Q10 (CO Q 10 PO) Take by mouth     • cyanocobalamin (VITAMIN B-12) 100 mcg tablet Take by mouth daily     • Multiple Vitamins-Minerals (MULTIVITAMIN ADULTS 50+ PO) Take by mouth     • pravastatin (PRAVACHOL) 20 mg tablet Take 1 tablet (20 mg total) by mouth every other day 30 tablet 3   • VITAMIN D, CHOLECALCIFEROL, PO Take by mouth       No current facility-administered medications for this visit  He is allergic to iodinated diagnostic agents and iodine - food allergy       Review of Systems   Constitutional: Negative for activity change, appetite change, chills, diaphoresis, fatigue, fever and unexpected weight change  HENT: Negative for congestion, ear pain, hearing loss, postnasal drip, sinus pressure, sinus pain, sneezing and sore throat  Eyes: Negative for pain, redness and visual disturbance  Respiratory: Negative for cough and shortness of breath  Cardiovascular: Negative for chest pain and leg swelling  Gastrointestinal: Negative for abdominal pain, diarrhea, nausea and vomiting  Endocrine: Negative  Genitourinary: Negative  Musculoskeletal: Negative for arthralgias  Allergic/Immunologic: Negative  Neurological: Negative for dizziness and light-headedness  Hematological: Negative  Psychiatric/Behavioral: Negative for behavioral problems and dysphoric mood  Objective:      /96 (BP Location: Left arm, Patient Position: Sitting, Cuff Size: Adult)   Pulse 92   Temp (!) 95 8 °F (35 4 °C)   Ht 5' 7 5" (1 715 m)   Wt 65 3 kg (144 lb)   SpO2 98%   BMI 22 22 kg/m²          Physical Exam  Constitutional:       General: He is not in acute distress  Appearance: Normal appearance  He is well-developed  HENT:      Head: Normocephalic and atraumatic  Right Ear: A middle ear effusion is present  Left Ear: A middle ear effusion is present  Mouth/Throat:      Lips: Pink  Mouth: Mucous membranes are moist       Comments: Patient oral clear missing about 1/2 tongue   Eyes:      Pupils: Pupils are equal, round, and reactive to light  Neck:      Thyroid: No thyromegaly  Cardiovascular:      Rate and Rhythm: Normal rate and regular rhythm  Heart sounds: Normal heart sounds  No murmur heard  Pulmonary:      Effort: Pulmonary effort is normal  No respiratory distress  Breath sounds: Normal breath sounds  No wheezing  Abdominal:      General: Bowel sounds are normal       Palpations: Abdomen is soft  Musculoskeletal:         General: Normal range of motion  Cervical back: Normal range of motion  Skin:     General: Skin is warm and dry  Neurological:      Mental Status: He is alert and oriented to person, place, and time  Psychiatric:         Behavior: Behavior is cooperative

## 2023-01-06 ENCOUNTER — APPOINTMENT (OUTPATIENT)
Dept: LAB | Facility: CLINIC | Age: 78
End: 2023-01-06

## 2023-01-06 DIAGNOSIS — N18.9 CHRONIC KIDNEY DISEASE-MINERAL AND BONE DISORDER: ICD-10-CM

## 2023-01-06 DIAGNOSIS — E83.9 CHRONIC KIDNEY DISEASE-MINERAL AND BONE DISORDER: ICD-10-CM

## 2023-01-06 DIAGNOSIS — M89.9 CHRONIC KIDNEY DISEASE-MINERAL AND BONE DISORDER: ICD-10-CM

## 2023-01-06 DIAGNOSIS — N18.30 STAGE 3 CHRONIC KIDNEY DISEASE, UNSPECIFIED WHETHER STAGE 3A OR 3B CKD (HCC): ICD-10-CM

## 2023-01-06 LAB
25(OH)D3 SERPL-MCNC: 48.4 NG/ML (ref 30–100)
ANION GAP SERPL CALCULATED.3IONS-SCNC: 0 MMOL/L (ref 4–13)
BASOPHILS # BLD AUTO: 0.06 THOUSANDS/ÂΜL (ref 0–0.1)
BASOPHILS NFR BLD AUTO: 1 % (ref 0–1)
BILIRUB UR QL STRIP: NEGATIVE
BUN SERPL-MCNC: 20 MG/DL (ref 5–25)
CALCIUM SERPL-MCNC: 10.1 MG/DL (ref 8.3–10.1)
CHLORIDE SERPL-SCNC: 108 MMOL/L (ref 96–108)
CLARITY UR: CLEAR
CO2 SERPL-SCNC: 29 MMOL/L (ref 21–32)
COLOR UR: NORMAL
CREAT SERPL-MCNC: 1.63 MG/DL (ref 0.6–1.3)
CREAT UR-MCNC: 123 MG/DL
EOSINOPHIL # BLD AUTO: 0.22 THOUSAND/ÂΜL (ref 0–0.61)
EOSINOPHIL NFR BLD AUTO: 2 % (ref 0–6)
ERYTHROCYTE [DISTWIDTH] IN BLOOD BY AUTOMATED COUNT: 13.5 % (ref 11.6–15.1)
GFR SERPL CREATININE-BSD FRML MDRD: 40 ML/MIN/1.73SQ M
GLUCOSE P FAST SERPL-MCNC: 95 MG/DL (ref 65–99)
GLUCOSE UR STRIP-MCNC: NEGATIVE MG/DL
HCT VFR BLD AUTO: 46.3 % (ref 36.5–49.3)
HGB BLD-MCNC: 14.7 G/DL (ref 12–17)
HGB UR QL STRIP.AUTO: NEGATIVE
IMM GRANULOCYTES # BLD AUTO: 0.03 THOUSAND/UL (ref 0–0.2)
IMM GRANULOCYTES NFR BLD AUTO: 0 % (ref 0–2)
KETONES UR STRIP-MCNC: NEGATIVE MG/DL
LEUKOCYTE ESTERASE UR QL STRIP: NEGATIVE
LYMPHOCYTES # BLD AUTO: 1.4 THOUSANDS/ÂΜL (ref 0.6–4.47)
LYMPHOCYTES NFR BLD AUTO: 15 % (ref 14–44)
MCH RBC QN AUTO: 27.9 PG (ref 26.8–34.3)
MCHC RBC AUTO-ENTMCNC: 31.7 G/DL (ref 31.4–37.4)
MCV RBC AUTO: 88 FL (ref 82–98)
MONOCYTES # BLD AUTO: 0.41 THOUSAND/ÂΜL (ref 0.17–1.22)
MONOCYTES NFR BLD AUTO: 4 % (ref 4–12)
NEUTROPHILS # BLD AUTO: 7.2 THOUSANDS/ÂΜL (ref 1.85–7.62)
NEUTS SEG NFR BLD AUTO: 78 % (ref 43–75)
NITRITE UR QL STRIP: NEGATIVE
NRBC BLD AUTO-RTO: 0 /100 WBCS
PH UR STRIP.AUTO: 7 [PH]
PHOSPHATE SERPL-MCNC: 3.4 MG/DL (ref 2.3–4.1)
PLATELET # BLD AUTO: 395 THOUSANDS/UL (ref 149–390)
PMV BLD AUTO: 9.3 FL (ref 8.9–12.7)
POTASSIUM SERPL-SCNC: 5 MMOL/L (ref 3.5–5.3)
PROT UR STRIP-MCNC: NEGATIVE MG/DL
PROT UR-MCNC: 14 MG/DL
PROT/CREAT UR: 0.11 MG/G{CREAT} (ref 0–0.1)
PTH-INTACT SERPL-MCNC: 30.1 PG/ML (ref 18.4–80.1)
RBC # BLD AUTO: 5.27 MILLION/UL (ref 3.88–5.62)
SODIUM SERPL-SCNC: 137 MMOL/L (ref 135–147)
SP GR UR STRIP.AUTO: 1.01 (ref 1–1.03)
UROBILINOGEN UR STRIP-ACNC: <2 MG/DL
WBC # BLD AUTO: 9.32 THOUSAND/UL (ref 4.31–10.16)

## 2023-01-27 ENCOUNTER — TELEPHONE (OUTPATIENT)
Dept: NEPHROLOGY | Facility: CLINIC | Age: 78
End: 2023-01-27

## 2023-01-27 NOTE — TELEPHONE ENCOUNTER
I called and left message on patients answering machine confirming appointment for Monday 01/30/2023 with Dr Welhc

## 2023-01-30 ENCOUNTER — OFFICE VISIT (OUTPATIENT)
Dept: NEPHROLOGY | Facility: CLINIC | Age: 78
End: 2023-01-30

## 2023-01-30 VITALS
OXYGEN SATURATION: 97 % | TEMPERATURE: 97.1 F | BODY MASS INDEX: 20.66 KG/M2 | RESPIRATION RATE: 16 BRPM | DIASTOLIC BLOOD PRESSURE: 80 MMHG | HEART RATE: 90 BPM | HEIGHT: 70 IN | SYSTOLIC BLOOD PRESSURE: 110 MMHG

## 2023-01-30 DIAGNOSIS — C02.9 TONGUE CARCINOMA (HCC): ICD-10-CM

## 2023-01-30 DIAGNOSIS — E83.9 CHRONIC KIDNEY DISEASE-MINERAL AND BONE DISORDER: ICD-10-CM

## 2023-01-30 DIAGNOSIS — I71.40 ABDOMINAL AORTIC ANEURYSM (AAA) WITHOUT RUPTURE, UNSPECIFIED PART: ICD-10-CM

## 2023-01-30 DIAGNOSIS — N18.30 STAGE 3 CHRONIC KIDNEY DISEASE, UNSPECIFIED WHETHER STAGE 3A OR 3B CKD (HCC): Primary | ICD-10-CM

## 2023-01-30 DIAGNOSIS — I10 ESSENTIAL HYPERTENSION: ICD-10-CM

## 2023-01-30 DIAGNOSIS — N18.9 CHRONIC KIDNEY DISEASE-MINERAL AND BONE DISORDER: ICD-10-CM

## 2023-01-30 DIAGNOSIS — M89.9 CHRONIC KIDNEY DISEASE-MINERAL AND BONE DISORDER: ICD-10-CM

## 2023-01-30 NOTE — PROGRESS NOTES
NEPHROLOGY OFFICE FOLLOW UP  Anthony Hopkins 78 y o  male MRN: [id number]    Encounter: [id number] 1/30/2023    REASON FOR VISIT: Anthony Hopkins is a 78 y o  male who is here on 1/30/2023 for Chronic Kidney Disease and Follow-up    HPI:    Bj Mccarthy came in today for follow-up of CKD  37-year-old gentleman with history of tongue cancer treated with surgery and in remission at this point    Overall doing quite well denies any acute complaint    No chest pain no palpitation    Does have problems swallowing and very thick saliva which is chronic in nature    Occasional feeling dizzy particularly in the morning    Denies any urinary complaint      REVIEW OF SYSTEMS:    Review of Systems   Constitutional: Negative for activity change and fatigue  HENT: Negative for congestion and ear discharge  Eyes: Negative for photophobia and pain  Respiratory: Negative for apnea and choking  Cardiovascular: Negative for chest pain and palpitations  Gastrointestinal: Negative for abdominal distention and blood in stool  Endocrine: Negative for heat intolerance and polyphagia  Genitourinary: Negative for flank pain and urgency  Musculoskeletal: Negative for neck pain and neck stiffness  Skin: Negative for color change and wound  Allergic/Immunologic: Negative for food allergies and immunocompromised state  Neurological: Negative for seizures and facial asymmetry  Hematological: Negative for adenopathy  Does not bruise/bleed easily  Psychiatric/Behavioral: Negative for self-injury and suicidal ideas           PAST MEDICAL HISTORY:  Past Medical History:   Diagnosis Date   • 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[facility] ) 03/1999    SCCA left tongue base - T2N1M0- treated at [facility] - had surgery and XRT   • Chronic kidney disease    • Hyperlipidemia    • Hypertension    • Left anterior fascicular block    • Stage 3 chronic kidney disease ([facility] )        PAST SURGICAL HISTORY:  Past Surgical History:   Procedure Laterality Date   • CYSTOSCOPY  08/30/2021   • EYELID CLOSURE Left     to prevent eyelashes from rubbing cornea   • HERNIA REPAIR Left    • MODIFIED RADICAL NECK DISSECTION Left 03/03/1999    [facility] - with mandibulotomy and resection SCCA left tongue base - Dr Lyndsey Joseph   • TONGUE SURGERY     • TONSILLECTOMY         SOCIAL HISTORY:  Social History     Substance and Sexual Activity   Alcohol Use Yes    Comment: occasional     Social History     Substance and Sexual Activity   Drug Use Never     Social History     Tobacco Use   Smoking Status Former   Smokeless Tobacco Former   Tobacco Comments    quit 20 years ago       FAMILY HISTORY:  Family History   Problem Relation Age of Onset   • Hypertension Mother    • Stroke Mother        MEDICATIONS:    Current Outpatient Medications:   •  aspirin 81 MG tablet, 1 cap(s), Disp: , Rfl:   •  Coenzyme Q10 (CO Q 10 PO), Take by mouth, Disp: , Rfl:   •  cyanocobalamin (VITAMIN B-12) 100 mcg tablet, Take by mouth daily, Disp: , Rfl:   •  Multiple Vitamins-Minerals (MULTIVITAMIN ADULTS 50+ PO), Take by mouth, Disp: , Rfl:   •  pravastatin (PRAVACHOL) 20 mg tablet, Take 1 tablet (20 mg total) by mouth every other day, Disp: 30 tablet, Rfl: 3  •  VITAMIN D, CHOLECALCIFEROL, PO, Take by mouth, Disp: , Rfl:     PHYSICAL EXAM:  Vitals:    01/30/23 1153   BP: 110/80   BP Location: Right arm   Patient Position: Sitting   Pulse: 90   Resp: 16   Temp: (!) 97 1 °F (36 2 °C)   TempSrc: Temporal   SpO2: 97%   Height: 5' 10" (1 778 m)     Body mass index is 20 66 kg/m²  Physical Exam  Constitutional:       General: He is not in acute distress  Appearance: He is well-developed  HENT:      Head: Normocephalic  Eyes:      General: No scleral icterus  Conjunctiva/sclera: Conjunctivae normal    Neck:      Vascular: No JVD  Cardiovascular:      Rate and Rhythm: Normal rate  Heart sounds: Normal heart sounds     Pulmonary:      Effort: Pulmonary effort is normal       Breath sounds: No wheezing  Abdominal:      Palpations: Abdomen is soft  Tenderness: There is no abdominal tenderness  Musculoskeletal:         General: Normal range of motion  Cervical back: Neck supple  Skin:     General: Skin is warm  Findings: No rash  Neurological:      Mental Status: He is alert and oriented to person, place, and time     Psychiatric:         Behavior: Behavior normal          LAB RESULTS:  Results for orders placed or performed in visit on 58/43/22   Basic metabolic panel   Result Value Ref Range    Sodium 137 135 - 147 mmol/L    Potassium 5 0 3 5 - 5 3 mmol/L    Chloride 108 96 - 108 mmol/L    CO2 29 21 - 32 mmol/L    ANION GAP 0 (L) 4 - 13 mmol/L    BUN 20 5 - 25 mg/dL    Creatinine 1 63 (H) 0 60 - 1 30 mg/dL    Glucose, Fasting 95 65 - 99 mg/dL    Calcium 10 1 8 3 - 10 1 mg/dL    eGFR 40 ml/min/1 73sq m   CBC and differential   Result Value Ref Range    WBC 9 32 4 31 - 10 16 Thousand/uL    RBC 5 27 3 88 - 5 62 Million/uL    Hemoglobin 14 7 12 0 - 17 0 g/dL    Hematocrit 46 3 36 5 - 49 3 %    MCV 88 82 - 98 fL    MCH 27 9 26 8 - 34 3 pg    MCHC 31 7 31 4 - 37 4 g/dL    RDW 13 5 11 6 - 15 1 %    MPV 9 3 8 9 - 12 7 fL    Platelets 124 (H) 480 - 390 Thousands/uL    nRBC 0 /100 WBCs    Neutrophils Relative 78 (H) 43 - 75 %    Immat GRANS % 0 0 - 2 %    Lymphocytes Relative 15 14 - 44 %    Monocytes Relative 4 4 - 12 %    Eosinophils Relative 2 0 - 6 %    Basophils Relative 1 0 - 1 %    Neutrophils Absolute 7 20 1 85 - 7 62 Thousands/µL    Immature Grans Absolute 0 03 0 00 - 0 20 Thousand/uL    Lymphocytes Absolute 1 40 0 60 - 4 47 Thousands/µL    Monocytes Absolute 0 41 0 17 - 1 22 Thousand/µL    Eosinophils Absolute 0 22 0 00 - 0 61 Thousand/µL    Basophils Absolute 0 06 0 00 - 0 10 Thousands/µL   Phosphorus   Result Value Ref Range    Phosphorus 3 4 2 3 - 4 1 mg/dL   Protein / creatinine ratio, urine   Result Value Ref Range    Creatinine, Ur 123 0 mg/dL Protein Urine Random 14 mg/dL    Prot/Creat Ratio, Ur 0 11 (H) 0 00 - 0 10   PTH, intact   Result Value Ref Range    PTH 30 1 18 4 - 80 1 pg/mL   UA (URINE) with reflex to Scope   Result Value Ref Range    Color, UA Light Yellow     Clarity, UA Clear     Specific [place], UA 1 014 1 003 - 1 030    pH, UA 7 0 4 5, 5 0, 5 5, 6 0, 6 5, 7 0, 7 5, 8 0    Leukocytes, UA Negative Negative    Nitrite, UA Negative Negative    Protein, UA Negative Negative mg/dl    Glucose, UA Negative Negative mg/dl    Ketones, UA Negative Negative mg/dl    Urobilinogen, UA <2 0 <2 0 mg/dl mg/dl    Bilirubin, UA Negative Negative    Occult Blood, UA Negative Negative   Vitamin D 25 hydroxy   Result Value Ref Range    Vit D, 25-Hydroxy 48 4 30 0 - 100 0 ng/mL       ASSESSMENT and PLAN:      CKD (chronic kidney disease) stage 3, GFR 30-59 ml/min ([facility])  Lab Results   Component Value Date    EGFR 40 01/06/2023    EGFR 41 10/10/2022    EGFR 40 07/05/2022    CREATININE 1 63 (H) 01/06/2023    CREATININE 1 59 (H) 10/10/2022    CREATININE 1 64 (H) 07/05/2022   Renal function is stable overall with some fluctuation  Advised hydration and avoiding nephrotoxic medication    Chronic kidney disease-mineral and bone disorder  Lab Results   Component Value Date    EGFR 40 01/06/2023    EGFR 41 10/10/2022    EGFR 40 07/05/2022    CREATININE 1 63 (H) 01/06/2023    CREATININE 1 59 (H) 10/10/2022    CREATININE 1 64 (H) 07/05/2022   PTH and phosphorus along with vitamin D are within acceptable range and will continue to monitor    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[address] )  In remission at this point      Everything discussed with the patient at length  I will see him back in 6 months  We will get blood and urine test before that visit        Portions of the record may have been created with voice recognition software  Occasional wrong word or "sound a like" substitutions may have occurred due to the inherent limitations of voice recognition software   Read the chart carefully and recognize, using context, where substitutions have occurred  If you have any questions, please contact the dictating provider

## 2023-01-30 NOTE — ASSESSMENT & PLAN NOTE
Lab Results   Component Value Date    EGFR 40 01/06/2023    EGFR 41 10/10/2022    EGFR 40 07/05/2022    CREATININE 1 63 (H) 01/06/2023    CREATININE 1 59 (H) 10/10/2022    CREATININE 1 64 (H) 07/05/2022   Renal function is stable overall with some fluctuation    Advised hydration and avoiding nephrotoxic medication

## 2023-01-30 NOTE — ASSESSMENT & PLAN NOTE
Lab Results   Component Value Date    EGFR 40 01/06/2023    EGFR 41 10/10/2022    EGFR 40 07/05/2022    CREATININE 1 63 (H) 01/06/2023    CREATININE 1 59 (H) 10/10/2022    CREATININE 1 64 (H) 07/05/2022   PTH and phosphorus along with vitamin D are within acceptable range and will continue to monitor

## 2023-02-13 PROBLEM — Z11.59 SCREENING FOR VIRAL DISEASE: Status: RESOLVED | Noted: 2022-12-15 | Resolved: 2023-02-13

## 2023-03-31 ENCOUNTER — APPOINTMENT (OUTPATIENT)
Dept: LAB | Facility: CLINIC | Age: 78
End: 2023-03-31

## 2023-03-31 ENCOUNTER — OFFICE VISIT (OUTPATIENT)
Dept: FAMILY MEDICINE CLINIC | Facility: CLINIC | Age: 78
End: 2023-03-31

## 2023-03-31 VITALS
SYSTOLIC BLOOD PRESSURE: 144 MMHG | OXYGEN SATURATION: 92 % | TEMPERATURE: 96 F | HEIGHT: 70 IN | WEIGHT: 143 LBS | DIASTOLIC BLOOD PRESSURE: 90 MMHG | BODY MASS INDEX: 20.47 KG/M2 | HEART RATE: 83 BPM

## 2023-03-31 DIAGNOSIS — R53.83 FATIGUE, UNSPECIFIED TYPE: Primary | ICD-10-CM

## 2023-03-31 DIAGNOSIS — E46 PROTEIN-CALORIE MALNUTRITION, UNSPECIFIED SEVERITY (HCC): ICD-10-CM

## 2023-03-31 DIAGNOSIS — R53.83 FATIGUE, UNSPECIFIED TYPE: ICD-10-CM

## 2023-03-31 DIAGNOSIS — E55.9 VITAMIN D DEFICIENCY: ICD-10-CM

## 2023-03-31 PROBLEM — R30.0 DYSURIA: Status: RESOLVED | Noted: 2019-07-31 | Resolved: 2023-03-31

## 2023-03-31 PROBLEM — Z85.810: Status: ACTIVE | Noted: 2018-12-05

## 2023-03-31 PROBLEM — R06.02 SOB (SHORTNESS OF BREATH) ON EXERTION: Status: RESOLVED | Noted: 2020-06-17 | Resolved: 2023-03-31

## 2023-03-31 PROBLEM — Z11.59 ENCOUNTER FOR HEPATITIS C SCREENING TEST FOR LOW RISK PATIENT: Status: RESOLVED | Noted: 2019-01-10 | Resolved: 2023-03-31

## 2023-03-31 PROBLEM — I77.819 DILATATION OF AORTA (HCC): Status: RESOLVED | Noted: 2023-03-31 | Resolved: 2023-03-31

## 2023-03-31 PROBLEM — M79.18 MYOFASCIAL PAIN ON LEFT SIDE: Status: ACTIVE | Noted: 2020-11-17

## 2023-03-31 PROBLEM — Z13.220 SCREENING FOR LIPID DISORDERS: Status: RESOLVED | Noted: 2020-05-26 | Resolved: 2023-03-31

## 2023-03-31 PROBLEM — M99.03 SEGMENTAL DYSFUNCTION OF LUMBAR REGION: Status: ACTIVE | Noted: 2020-11-17

## 2023-03-31 PROBLEM — M99.04 SACROILIAC JOINT SOMATIC DYSFUNCTION: Status: RESOLVED | Noted: 2020-11-17 | Resolved: 2023-03-31

## 2023-03-31 PROBLEM — I10 UNCONTROLLED HYPERTENSION: Status: RESOLVED | Noted: 2019-05-14 | Resolved: 2023-03-31

## 2023-03-31 PROBLEM — Z13.220 SCREENING, LIPID: Status: RESOLVED | Noted: 2019-01-10 | Resolved: 2023-03-31

## 2023-03-31 PROBLEM — M71.9 DISORDER OF BURSAE OF SHOULDER REGION: Status: ACTIVE | Noted: 2023-03-31

## 2023-03-31 PROBLEM — I95.0 IDIOPATHIC HYPOTENSION: Status: RESOLVED | Noted: 2019-07-31 | Resolved: 2023-03-31

## 2023-03-31 PROBLEM — Z92.3 HISTORY OF RADIATION THERAPY: Status: ACTIVE | Noted: 2021-01-11

## 2023-03-31 PROBLEM — N28.9 ABNORMAL KIDNEY FUNCTION: Status: RESOLVED | Noted: 2019-01-10 | Resolved: 2023-03-31

## 2023-03-31 PROBLEM — R63.4 WEIGHT LOSS: Status: RESOLVED | Noted: 2019-07-31 | Resolved: 2023-03-31

## 2023-03-31 PROBLEM — I77.819 DILATATION OF AORTA (HCC): Status: ACTIVE | Noted: 2023-03-31

## 2023-03-31 PROBLEM — M54.16 LUMBAR RADICULOPATHY: Status: ACTIVE | Noted: 2020-11-17

## 2023-03-31 PROBLEM — U07.1 COVID-19: Status: RESOLVED | Noted: 2021-12-31 | Resolved: 2023-03-31

## 2023-03-31 PROBLEM — M99.04 SACROILIAC JOINT SOMATIC DYSFUNCTION: Status: ACTIVE | Noted: 2020-11-17

## 2023-03-31 PROBLEM — Z20.828 EXPOSURE TO SARS-ASSOCIATED CORONAVIRUS: Status: RESOLVED | Noted: 2020-05-26 | Resolved: 2023-03-31

## 2023-03-31 PROBLEM — M71.38 SYNOVIAL CYST OF LUMBAR FACET JOINT: Status: ACTIVE | Noted: 2021-03-17

## 2023-03-31 PROBLEM — M99.03 SEGMENTAL DYSFUNCTION OF LUMBAR REGION: Status: RESOLVED | Noted: 2020-11-17 | Resolved: 2023-03-31

## 2023-03-31 PROBLEM — Z00.00 MEDICARE ANNUAL WELLNESS VISIT, SUBSEQUENT: Status: RESOLVED | Noted: 2019-01-10 | Resolved: 2023-03-31

## 2023-03-31 PROBLEM — N17.9 ACUTE RENAL FAILURE SUPERIMPOSED ON CHRONIC KIDNEY DISEASE (HCC): Status: RESOLVED | Noted: 2021-12-31 | Resolved: 2023-03-31

## 2023-03-31 PROBLEM — R42 DIZZINESS: Status: RESOLVED | Noted: 2019-05-14 | Resolved: 2023-03-31

## 2023-03-31 PROBLEM — Z12.11 COLON CANCER SCREENING: Status: RESOLVED | Noted: 2019-01-10 | Resolved: 2023-03-31

## 2023-03-31 PROBLEM — D72.819 LEUKOPENIA: Status: RESOLVED | Noted: 2022-01-01 | Resolved: 2023-03-31

## 2023-03-31 PROBLEM — R31.9 HEMATURIA: Status: RESOLVED | Noted: 2019-10-15 | Resolved: 2023-03-31

## 2023-03-31 PROBLEM — N18.9 ACUTE RENAL FAILURE SUPERIMPOSED ON CHRONIC KIDNEY DISEASE (HCC): Status: RESOLVED | Noted: 2021-12-31 | Resolved: 2023-03-31

## 2023-03-31 PROBLEM — M75.40 IMPINGEMENT SYNDROME OF SHOULDER REGION: Status: ACTIVE | Noted: 2023-03-31

## 2023-03-31 PROBLEM — M24.119 ARTICULAR CARTILAGE DISORDER OF SHOULDER REGION: Status: ACTIVE | Noted: 2023-03-31

## 2023-03-31 PROBLEM — R31.29 MICROSCOPIC HEMATURIA: Status: ACTIVE | Noted: 2018-12-05

## 2023-03-31 PROBLEM — I10 ESSENTIAL HYPERTENSION: Status: RESOLVED | Noted: 2019-05-06 | Resolved: 2023-03-31

## 2023-03-31 PROBLEM — M47.812 CERVICAL SPONDYLOSIS: Status: ACTIVE | Noted: 2023-03-31

## 2023-03-31 PROBLEM — C02.9 TONGUE CARCINOMA (HCC): Status: RESOLVED | Noted: 2019-02-14 | Resolved: 2023-03-31

## 2023-03-31 PROBLEM — M16.12 OSTEOARTHRITIS OF LEFT HIP: Status: ACTIVE | Noted: 2021-04-28

## 2023-03-31 PROBLEM — J39.8 CONGESTION OF UPPER RESPIRATORY TRACT: Status: RESOLVED | Noted: 2022-12-15 | Resolved: 2023-03-31

## 2023-03-31 PROBLEM — Z92.3 HISTORY OF RADIATION THERAPY: Status: RESOLVED | Noted: 2021-01-11 | Resolved: 2023-03-31

## 2023-03-31 LAB
BASOPHILS # BLD AUTO: 0.06 THOUSANDS/ÂΜL (ref 0–0.1)
BASOPHILS NFR BLD AUTO: 1 % (ref 0–1)
EOSINOPHIL # BLD AUTO: 0.25 THOUSAND/ÂΜL (ref 0–0.61)
EOSINOPHIL NFR BLD AUTO: 5 % (ref 0–6)
ERYTHROCYTE [DISTWIDTH] IN BLOOD BY AUTOMATED COUNT: 13.5 % (ref 11.6–15.1)
HCT VFR BLD AUTO: 44.7 % (ref 36.5–49.3)
HGB BLD-MCNC: 14.4 G/DL (ref 12–17)
IMM GRANULOCYTES # BLD AUTO: 0.02 THOUSAND/UL (ref 0–0.2)
IMM GRANULOCYTES NFR BLD AUTO: 0 % (ref 0–2)
LYMPHOCYTES # BLD AUTO: 1.45 THOUSANDS/ÂΜL (ref 0.6–4.47)
LYMPHOCYTES NFR BLD AUTO: 29 % (ref 14–44)
MCH RBC QN AUTO: 28.2 PG (ref 26.8–34.3)
MCHC RBC AUTO-ENTMCNC: 32.2 G/DL (ref 31.4–37.4)
MCV RBC AUTO: 88 FL (ref 82–98)
MONOCYTES # BLD AUTO: 0.46 THOUSAND/ÂΜL (ref 0.17–1.22)
MONOCYTES NFR BLD AUTO: 9 % (ref 4–12)
NEUTROPHILS # BLD AUTO: 2.85 THOUSANDS/ÂΜL (ref 1.85–7.62)
NEUTS SEG NFR BLD AUTO: 56 % (ref 43–75)
NRBC BLD AUTO-RTO: 0 /100 WBCS
PLATELET # BLD AUTO: 382 THOUSANDS/UL (ref 149–390)
PMV BLD AUTO: 9.2 FL (ref 8.9–12.7)
RBC # BLD AUTO: 5.11 MILLION/UL (ref 3.88–5.62)
WBC # BLD AUTO: 5.09 THOUSAND/UL (ref 4.31–10.16)

## 2023-03-31 NOTE — PROGRESS NOTES
"Rodo Tom 1945 male MRN: [id number]      ASSESSMENT/PLAN  Problem List Items Addressed This Visit    None  Visit Diagnoses     Fatigue, unspecified type    -  Primary    Relevant Orders    CBC and differential    Comprehensive metabolic panel    TSH, 3rd generation with Free T4 reflex    Vitamin D 25 hydroxy    Vitamin B12    Iron Panel (Includes Ferritin, Iron Sat%, Iron, and TIBC)    Protein-calorie malnutrition, unspecified severity (HCC)        Relevant Orders    CBC and differential    Comprehensive metabolic panel    TSH, 3rd generation with Free T4 reflex    Vitamin D 25 hydroxy    Vitamin B12    Iron Panel (Includes Ferritin, Iron Sat%, Iron, and TIBC)    Vitamin D deficiency        Relevant Orders    Vitamin D 25 hydroxy        Unclear etiology of symptoms; however, based on pt's limited diet, would recommend evaluating for iron/vitamin deficiency, as well and thyroid function  Future Appointments   Date Time Provider Henna Guthrie   6/20/2023  8:00 AM [facility]   6/20/2023  9:00 AM [facility] 1 [facility]   7/5/2023  4:20 PM Olesya Yen MD [facility]          SUBJECTIVE  CC: Fatigue (X 1 5 months now and not getting any better)      HPI:  Rodo Tom is a 70 y o  male who presents due to fatigue  1 5 months of fatigue   \"I can't swallow so I have an odd diet\" (pt is s/p tongue CA requiring neck dissection and radiation), but drinking plenty of fluids and taking Boost   Denies medication changes   Sleeping so so   Otherwise ROS as below         Review of Systems   Constitutional: Positive for fatigue  HENT: Positive for rhinorrhea  Negative for congestion, ear pain (hears fluid behind his ears) and sore throat  Respiratory: Positive for cough (due to thick saliva)  Negative for shortness of breath  Cardiovascular: Negative for chest pain and palpitations  Gastrointestinal: Negative for abdominal pain, constipation and diarrhea   " Endocrine: Positive for polyuria  Genitourinary: Negative for dysuria  Neurological: Positive for dizziness (intermittent episodes of vertigo if he turns on L side while sleeping)  Negative for headaches         Historical Information   The patient history was reviewed and updated as follows:    Past Medical History:   Diagnosis Date   • Acute renal failure superimposed on chronic kidney disease ([facility] [address] ) 12/31/2021   • 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[facility] [address] ) 03/1999    SCCA left tongue base - T2N1M0- treated at [facility] - had surgery and XRT   • Chronic kidney disease    • COVID-19 with hypoxic respiratory failure 12/31/2021   • Hematuria 10/15/2019   • Hyperlipidemia    • Hypertension    • Left anterior fascicular block    • Stage 3 chronic kidney disease ([facility] [address] )    • 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[facility] [address] ) 2/14/2019     Past Surgical History:   Procedure Laterality Date   • CYSTOSCOPY  08/30/2021   • EYELID CLOSURE Left     to prevent eyelashes from rubbing cornea   • HERNIA REPAIR Left    • MODIFIED RADICAL NECK DISSECTION Left 03/03/1999    [facility] - with mandibulotomy and resection SCCA left tongue base - Dr Jacqueline Joseph   • TONGUE SURGERY     • TONSILLECTOMY       Family History   Problem Relation Age of Onset   • Hypertension Mother    • Stroke Mother       Social History   Social History     Substance and Sexual Activity   Alcohol Use Yes    Comment: occasional     Social History     Substance and Sexual Activity   Drug Use Never     Social History     Tobacco Use   Smoking Status Former   Smokeless Tobacco Former   Tobacco Comments    quit 20 years ago       Medications:     Current Outpatient Medications:   •  aspirin 81 MG tablet, 1 cap(s), Disp: , Rfl:   •  Coenzyme Q10 (CO Q 10 PO), Take by mouth, Disp: , Rfl:   •  cyanocobalamin (VITAMIN B-12) 100 mcg tablet, Take by mouth daily, Disp: , Rfl:   •  Multiple Vitamins-Minerals (MULTIVITAMIN ADULTS 50+ PO), Take by mouth, Disp: , Rfl:   •  pravastatin "(PRAVACHOL) 20 mg tablet, Take 1 tablet (20 mg total) by mouth every other day, Disp: 30 tablet, Rfl: 3  •  VITAMIN D, CHOLECALCIFEROL, PO, Take by mouth, Disp: , Rfl:   Allergies   Allergen Reactions   • Iodinated Contrast Media Other (See Comments)   • Iodine - Food Allergy        OBJECTIVE    Vitals:   Vitals:    03/31/23 1402   BP: 144/90   BP Location: Left arm   Patient Position: Sitting   Cuff Size: Standard   Pulse: 83   Temp: (!) 96 °F (35 6 °C)   SpO2: 92%   Weight: 64 9 kg (143 lb)   Height: 5' 10\" (1 778 m)           Physical Exam  Vitals and nursing note reviewed  Constitutional:       General: He is not in acute distress  Appearance: He is underweight  HENT:      Head: Normocephalic and atraumatic  Comments: Chronic changes in speech/swallow/oropharynx s/p tongue CA     Right Ear: Tympanic membrane, ear canal and external ear normal       Left Ear: Tympanic membrane, ear canal and external ear normal       Nose: Nose normal       Mouth/Throat:      Mouth: Mucous membranes are moist       Pharynx: No oropharyngeal exudate or posterior oropharyngeal erythema  Eyes:      Conjunctiva/sclera: Conjunctivae normal    Cardiovascular:      Rate and Rhythm: Normal rate and regular rhythm  Pulmonary:      Effort: Pulmonary effort is normal  No respiratory distress  Breath sounds: Normal breath sounds  Abdominal:      General: Bowel sounds are normal  There is no distension  Palpations: Abdomen is soft  Tenderness: There is no abdominal tenderness  Musculoskeletal:      Right lower leg: No edema  Left lower leg: No edema  Lymphadenopathy:      Cervical: No cervical adenopathy  Skin:     General: Skin is warm and dry  Neurological:      Mental Status: He is alert  Mental status is at baseline     Psychiatric:         Mood and Affect: Mood normal                     Cat Helton DO  [facility]   3/31/2023  3:00 PM    "

## 2023-04-01 LAB
25(OH)D3 SERPL-MCNC: 63 NG/ML (ref 30–100)
ALBUMIN SERPL BCP-MCNC: 3.7 G/DL (ref 3.5–5)
ALP SERPL-CCNC: 62 U/L (ref 46–116)
ALT SERPL W P-5'-P-CCNC: 15 U/L (ref 12–78)
ANION GAP SERPL CALCULATED.3IONS-SCNC: 0 MMOL/L (ref 4–13)
AST SERPL W P-5'-P-CCNC: 12 U/L (ref 5–45)
BILIRUB SERPL-MCNC: 0.42 MG/DL (ref 0.2–1)
BUN SERPL-MCNC: 21 MG/DL (ref 5–25)
CALCIUM SERPL-MCNC: 9.6 MG/DL (ref 8.3–10.1)
CHLORIDE SERPL-SCNC: 104 MMOL/L (ref 96–108)
CO2 SERPL-SCNC: 30 MMOL/L (ref 21–32)
CREAT SERPL-MCNC: 1.43 MG/DL (ref 0.6–1.3)
FERRITIN SERPL-MCNC: 25 NG/ML (ref 8–388)
GFR SERPL CREATININE-BSD FRML MDRD: 47 ML/MIN/1.73SQ M
GLUCOSE SERPL-MCNC: 86 MG/DL (ref 65–140)
IRON SATN MFR SERPL: 19 % (ref 20–50)
IRON SERPL-MCNC: 55 UG/DL (ref 65–175)
POTASSIUM SERPL-SCNC: 4.4 MMOL/L (ref 3.5–5.3)
PROT SERPL-MCNC: 7.7 G/DL (ref 6.4–8.4)
SODIUM SERPL-SCNC: 134 MMOL/L (ref 135–147)
TIBC SERPL-MCNC: 286 UG/DL (ref 250–450)
TSH SERPL DL<=0.05 MIU/L-ACNC: 2.09 UIU/ML (ref 0.45–4.5)
VIT B12 SERPL-MCNC: 2431 PG/ML (ref 100–900)

## 2023-04-03 DIAGNOSIS — E61.1 IRON DEFICIENCY: Primary | ICD-10-CM

## 2023-04-03 RX ORDER — FERROUS SULFATE TAB EC 324 MG (65 MG FE EQUIVALENT) 324 (65 FE) MG
324 TABLET DELAYED RESPONSE ORAL
Qty: 90 TABLET | Refills: 1 | Status: SHIPPED | OUTPATIENT
Start: 2023-04-03 | End: 2023-04-04 | Stop reason: SDUPTHER

## 2023-04-04 ENCOUNTER — TELEPHONE (OUTPATIENT)
Dept: FAMILY MEDICINE CLINIC | Facility: CLINIC | Age: 78
End: 2023-04-04

## 2023-04-04 DIAGNOSIS — E61.1 IRON DEFICIENCY: ICD-10-CM

## 2023-04-04 RX ORDER — FERROUS SULFATE TAB EC 324 MG (65 MG FE EQUIVALENT) 324 (65 FE) MG
324 TABLET DELAYED RESPONSE ORAL
Qty: 90 TABLET | Refills: 1 | Status: SHIPPED | OUTPATIENT
Start: 2023-04-04

## 2023-04-04 NOTE — TELEPHONE ENCOUNTER
Pt needs ferrous sulfate 324 (65 Fe) mg sent to [facility]  Steffany Kerr it was sent to [facility] instead  Steffany Kerr

## 2023-05-02 DIAGNOSIS — E78.5 HYPERLIPIDEMIA: ICD-10-CM

## 2023-05-02 RX ORDER — PRAVASTATIN SODIUM 20 MG
TABLET ORAL
Qty: 30 TABLET | Refills: 3 | Status: SHIPPED | OUTPATIENT
Start: 2023-05-02

## 2023-06-20 ENCOUNTER — HOSPITAL ENCOUNTER (OUTPATIENT)
Dept: VASCULAR ULTRASOUND | Facility: HOSPITAL | Age: 78
Discharge: HOME | End: 2023-06-20
Payer: MEDICARE

## 2023-06-20 DIAGNOSIS — I71.40 ABDOMINAL AORTIC ANEURYSM (AAA) WITHOUT RUPTURE (HCC): ICD-10-CM

## 2023-06-20 DIAGNOSIS — I65.23 BILATERAL CAROTID ARTERY STENOSIS: ICD-10-CM

## 2023-06-20 PROCEDURE — 93978 VASCULAR STUDY: CPT | Performed by: SURGERY

## 2023-06-20 PROCEDURE — 93978 VASCULAR STUDY: CPT

## 2023-06-20 PROCEDURE — 93880 EXTRACRANIAL BILAT STUDY: CPT | Performed by: SURGERY

## 2023-06-20 PROCEDURE — 93880 EXTRACRANIAL BILAT STUDY: CPT

## 2023-06-22 ENCOUNTER — TELEPHONE (OUTPATIENT)
Dept: VASCULAR SURGERY | Facility: CLINIC | Age: 78
End: 2023-06-22

## 2023-06-22 ENCOUNTER — TELEPHONE (OUTPATIENT)
Dept: NEPHROLOGY | Facility: CLINIC | Age: 78
End: 2023-06-22

## 2023-06-22 NOTE — TELEPHONE ENCOUNTER
Attempted to contact patient to schedule appointment(s) listed below  Requested patient call [phone] option 3 to schedule appointment(s)  Patient's appointment(s) are due on or after 7/18/23      Dopplers  [] Abdominal Aorta Iliac (AOIL)  [] Carotid (CV)   [] Celiac and/or Mesenteric  [] Endovascular Aortic Repair (EVAR)   [] Hemodialysis Access (HD)   [] Lower Limb Arterial (JUNIOR)  [] Lower Limb Venous (LEV)  [] Lower Limb Venous Duplex with Reflux (LEVDR)  [] Renal Artery  [] Upper Limb Arterial (UEA)    [] Upper Limb Venous (UEV)              [] WING and Waveform analysis     Advanced Imaging   [] CTA head/neck    [] CTA abdomen    [] CTA abdomen & pelvis    [] CT abdomen with/ without contrast  [] CT abdomen with contrast  [] CT abdomen without contrast    [] CT abdomen & pelvis with/ without contrast  [] CT abdomen & pelvis with contrast  [] CT abdomen & pelvis without contrast    Office Visit   [] New patient, patient last seen over 3 years ago  [] Risk factor modification (RFM)   [x] Follow up   [] Lost to follow up (LTFU)  Called patient & LMOM to schedule 1 yr fu ov/Rev AOIL 6/20/23

## 2023-06-22 NOTE — TELEPHONE ENCOUNTER
Called spoke with patient advised patient to get labs done prior to 07/05/23 fu appt with Dr Welch  patient understood and is okay with it

## 2023-06-29 ENCOUNTER — APPOINTMENT (OUTPATIENT)
Dept: LAB | Facility: CLINIC | Age: 78
End: 2023-06-29
Payer: MEDICARE

## 2023-06-29 DIAGNOSIS — N18.30 STAGE 3 CHRONIC KIDNEY DISEASE, UNSPECIFIED WHETHER STAGE 3A OR 3B CKD (HCC): ICD-10-CM

## 2023-06-29 DIAGNOSIS — E83.9 CHRONIC KIDNEY DISEASE-MINERAL AND BONE DISORDER: ICD-10-CM

## 2023-06-29 DIAGNOSIS — M89.9 CHRONIC KIDNEY DISEASE-MINERAL AND BONE DISORDER: ICD-10-CM

## 2023-06-29 DIAGNOSIS — N18.9 CHRONIC KIDNEY DISEASE-MINERAL AND BONE DISORDER: ICD-10-CM

## 2023-06-29 LAB
25(OH)D3 SERPL-MCNC: 90.5 NG/ML (ref 30–100)
ANION GAP SERPL CALCULATED.3IONS-SCNC: 5 MMOL/L
BACTERIA UR QL AUTO: ABNORMAL /HPF
BASOPHILS # BLD AUTO: 0.08 THOUSANDS/ÂΜL (ref 0–0.1)
BASOPHILS NFR BLD AUTO: 1 % (ref 0–1)
BILIRUB UR QL STRIP: NEGATIVE
BUN SERPL-MCNC: 20 MG/DL (ref 5–25)
CALCIUM SERPL-MCNC: 9.7 MG/DL (ref 8.3–10.1)
CHLORIDE SERPL-SCNC: 108 MMOL/L (ref 96–108)
CLARITY UR: CLEAR
CO2 SERPL-SCNC: 28 MMOL/L (ref 21–32)
COLOR UR: ABNORMAL
CREAT SERPL-MCNC: 1.51 MG/DL (ref 0.6–1.3)
CREAT UR-MCNC: 144 MG/DL
EOSINOPHIL # BLD AUTO: 0.48 THOUSAND/ÂΜL (ref 0–0.61)
EOSINOPHIL NFR BLD AUTO: 7 % (ref 0–6)
ERYTHROCYTE [DISTWIDTH] IN BLOOD BY AUTOMATED COUNT: 13.4 % (ref 11.6–15.1)
GFR SERPL CREATININE-BSD FRML MDRD: 44 ML/MIN/1.73SQ M
GLUCOSE P FAST SERPL-MCNC: 93 MG/DL (ref 65–99)
GLUCOSE UR STRIP-MCNC: NEGATIVE MG/DL
HCT VFR BLD AUTO: 44.6 % (ref 36.5–49.3)
HGB BLD-MCNC: 14.6 G/DL (ref 12–17)
HGB UR QL STRIP.AUTO: ABNORMAL
IMM GRANULOCYTES # BLD AUTO: 0.02 THOUSAND/UL (ref 0–0.2)
IMM GRANULOCYTES NFR BLD AUTO: 0 % (ref 0–2)
KETONES UR STRIP-MCNC: NEGATIVE MG/DL
LEUKOCYTE ESTERASE UR QL STRIP: ABNORMAL
LYMPHOCYTES # BLD AUTO: 1.3 THOUSANDS/ÂΜL (ref 0.6–4.47)
LYMPHOCYTES NFR BLD AUTO: 18 % (ref 14–44)
MCH RBC QN AUTO: 28.6 PG (ref 26.8–34.3)
MCHC RBC AUTO-ENTMCNC: 32.7 G/DL (ref 31.4–37.4)
MCV RBC AUTO: 88 FL (ref 82–98)
MONOCYTES # BLD AUTO: 0.52 THOUSAND/ÂΜL (ref 0.17–1.22)
MONOCYTES NFR BLD AUTO: 7 % (ref 4–12)
NEUTROPHILS # BLD AUTO: 4.71 THOUSANDS/ÂΜL (ref 1.85–7.62)
NEUTS SEG NFR BLD AUTO: 67 % (ref 43–75)
NITRITE UR QL STRIP: NEGATIVE
NON-SQ EPI CELLS URNS QL MICRO: ABNORMAL /HPF
NRBC BLD AUTO-RTO: 0 /100 WBCS
PH UR STRIP.AUTO: 7 [PH]
PHOSPHATE SERPL-MCNC: 3.1 MG/DL (ref 2.3–4.1)
PLATELET # BLD AUTO: 369 THOUSANDS/UL (ref 149–390)
PMV BLD AUTO: 9 FL (ref 8.9–12.7)
POTASSIUM SERPL-SCNC: 4.7 MMOL/L (ref 3.5–5.3)
PROT UR STRIP-MCNC: ABNORMAL MG/DL
PROT UR-MCNC: 16 MG/DL
PROT/CREAT UR: 0.11 MG/G{CREAT} (ref 0–0.1)
PTH-INTACT SERPL-MCNC: 24.4 PG/ML (ref 12–88)
RBC # BLD AUTO: 5.1 MILLION/UL (ref 3.88–5.62)
RBC #/AREA URNS AUTO: ABNORMAL /HPF
SODIUM SERPL-SCNC: 141 MMOL/L (ref 135–147)
SP GR UR STRIP.AUTO: 1.01 (ref 1–1.03)
UROBILINOGEN UR STRIP-ACNC: <2 MG/DL
WBC # BLD AUTO: 7.11 THOUSAND/UL (ref 4.31–10.16)
WBC #/AREA URNS AUTO: ABNORMAL /HPF

## 2023-06-29 PROCEDURE — 81001 URINALYSIS AUTO W/SCOPE: CPT

## 2023-06-29 PROCEDURE — 85025 COMPLETE CBC W/AUTO DIFF WBC: CPT

## 2023-06-29 PROCEDURE — 82570 ASSAY OF URINE CREATININE: CPT

## 2023-06-29 PROCEDURE — 83970 ASSAY OF PARATHORMONE: CPT

## 2023-06-29 PROCEDURE — 84100 ASSAY OF PHOSPHORUS: CPT

## 2023-06-29 PROCEDURE — 80048 BASIC METABOLIC PNL TOTAL CA: CPT

## 2023-06-29 PROCEDURE — 82306 VITAMIN D 25 HYDROXY: CPT

## 2023-06-29 PROCEDURE — 36415 COLL VENOUS BLD VENIPUNCTURE: CPT

## 2023-06-29 PROCEDURE — 84156 ASSAY OF PROTEIN URINE: CPT

## 2023-07-06 ENCOUNTER — TELEPHONE (OUTPATIENT)
Dept: NEPHROLOGY | Facility: CLINIC | Age: 78
End: 2023-07-06

## 2023-07-06 ENCOUNTER — OFFICE VISIT (OUTPATIENT)
Dept: NEPHROLOGY | Facility: CLINIC | Age: 78
End: 2023-07-06
Payer: MEDICARE

## 2023-07-06 VITALS
TEMPERATURE: 98.4 F | BODY MASS INDEX: 19.64 KG/M2 | OXYGEN SATURATION: 94 % | HEART RATE: 84 BPM | RESPIRATION RATE: 18 BRPM | SYSTOLIC BLOOD PRESSURE: 120 MMHG | WEIGHT: 137.2 LBS | DIASTOLIC BLOOD PRESSURE: 70 MMHG | HEIGHT: 70 IN

## 2023-07-06 DIAGNOSIS — N18.9 CHRONIC KIDNEY DISEASE-MINERAL AND BONE DISORDER: ICD-10-CM

## 2023-07-06 DIAGNOSIS — N18.30 STAGE 3 CHRONIC KIDNEY DISEASE, UNSPECIFIED WHETHER STAGE 3A OR 3B CKD (HCC): Primary | ICD-10-CM

## 2023-07-06 DIAGNOSIS — E83.9 CHRONIC KIDNEY DISEASE-MINERAL AND BONE DISORDER: ICD-10-CM

## 2023-07-06 DIAGNOSIS — M89.9 CHRONIC KIDNEY DISEASE-MINERAL AND BONE DISORDER: ICD-10-CM

## 2023-07-06 DIAGNOSIS — D47.2 MONOCLONAL GAMMOPATHY OF UNKNOWN SIGNIFICANCE: ICD-10-CM

## 2023-07-06 DIAGNOSIS — Z85.810: ICD-10-CM

## 2023-07-06 PROCEDURE — 99214 OFFICE O/P EST MOD 30 MIN: CPT | Performed by: INTERNAL MEDICINE

## 2023-07-06 NOTE — ASSESSMENT & PLAN NOTE
Lab Results   Component Value Date    EGFR 44 06/29/2023    EGFR 44 04/18/2023    EGFR 47 03/31/2023    CREATININE 1.51 (H) 06/29/2023    CREATININE 1.49 (H) 04/18/2023    CREATININE 1.43 (H) 03/31/2023   Renal function is stable overall.   Advise hydration and avoiding nephrotoxic medication

## 2023-07-06 NOTE — ASSESSMENT & PLAN NOTE
Lab Results   Component Value Date    EGFR 44 06/29/2023    EGFR 44 04/18/2023    EGFR 47 03/31/2023    CREATININE 1.51 (H) 06/29/2023    CREATININE 1.49 (H) 04/18/2023    CREATININE 1.43 (H) 03/31/2023   PTH and phosphorus along with vitamin D are within acceptable range and will continue to monitor

## 2023-07-06 NOTE — PROGRESS NOTES
NEPHROLOGY OFFICE FOLLOW UP  Sandie Perez 70 y.o. male MRN: [id number]    Encounter: [id number] 7/6/2023    REASON FOR VISIT: Sandie Perez is a 70 y.o. male who is here on 7/6/2023 for Chronic Kidney Disease and Follow-up  . HPI:    Italo Jack came in today for follow-up of CKD. Patient with history of tongue cancer who is doing well overall    Denies any acute complaint    No chest pain no palpitation or shortness of breath    No nausea no vomiting    No abdominal discomfort      REVIEW OF SYSTEMS:    Review of Systems   Constitutional: Negative for activity change and fatigue. HENT: Negative for congestion and ear discharge. Eyes: Negative for photophobia and pain. Respiratory: Negative for apnea and choking. Cardiovascular: Negative for chest pain and palpitations. Gastrointestinal: Negative for abdominal distention and blood in stool. Endocrine: Negative for heat intolerance and polyphagia. Genitourinary: Negative for flank pain and urgency. Musculoskeletal: Negative for neck pain and neck stiffness. Skin: Negative for color change and wound. Allergic/Immunologic: Negative for food allergies and immunocompromised state. Neurological: Negative for seizures and facial asymmetry. Hematological: Negative for adenopathy. Does not bruise/bleed easily. Psychiatric/Behavioral: Negative for self-injury and suicidal ideas.          PAST MEDICAL HISTORY:  Past Medical History:   Diagnosis Date   • Acute renal failure superimposed on chronic kidney disease ([address]) 12/31/2021   • 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[address]) 03/1999    SCCA left tongue base - T2N1M0- treated at [facility] - had surgery and XRT   • Chronic kidney disease    • COVID-19 with hypoxic respiratory failure 12/31/2021   • Hematuria 10/15/2019   • Hyperlipidemia    • Hypertension    • Left anterior fascicular block    • Stage 3 chronic kidney disease ([address])    • 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[address]) 2/14/2019       PAST SURGICAL HISTORY:  Past Surgical History:   Procedure Laterality Date   • CYSTOSCOPY  08/30/2021   • EYELID CLOSURE Left     to prevent eyelashes from rubbing cornea   • HERNIA REPAIR Left    • MODIFIED RADICAL NECK DISSECTION Left 03/03/1999    [facility] - with mandibulotomy and resection SCCA left tongue base - Dr. Guille Marroquin   • MYRINGOTOMY W/ TUBES Left 06/07/2023    office procedure - Dr. Calli Rodriguez   • TONGUE SURGERY     • TONSILLECTOMY         SOCIAL HISTORY:  Social History     Substance and Sexual Activity   Alcohol Use Yes    Comment: occasional     Social History     Substance and Sexual Activity   Drug Use Never     Social History     Tobacco Use   Smoking Status Former   • Passive exposure: Current   Smokeless Tobacco Former   Tobacco Comments    quit 20 years ago       FAMILY HISTORY:  Family History   Problem Relation Age of Onset   • Hypertension Mother    • Stroke Mother        MEDICATIONS:    Current Outpatient Medications:   •  aspirin 81 MG tablet, 1 cap(s), Disp: , Rfl:   •  Coenzyme Q10 (CO Q 10 PO), Take by mouth, Disp: , Rfl:   •  ferrous sulfate 324 (65 Fe) mg, Take 1 tablet (324 mg total) by mouth daily before breakfast, Disp: 90 tablet, Rfl: 1  •  fluticasone (FLONASE) 50 mcg/act nasal spray, 2 sprays into each nostril daily, Disp: 16 g, Rfl: 3  •  loratadine (CLARITIN) 10 mg tablet, Take 1 tablet (10 mg total) by mouth daily, Disp: 30 tablet, Rfl: 2  •  Multiple Vitamins-Minerals (MULTIVITAMIN ADULTS 50+ PO), Take by mouth, Disp: , Rfl:   •  pravastatin (PRAVACHOL) 20 mg tablet, TAKE ONE TABLET BY MOUTH EVERY OTHER DAY, Disp: 30 tablet, Rfl: 3  •  VITAMIN D, CHOLECALCIFEROL, PO, Take by mouth, Disp: , Rfl:     PHYSICAL EXAM:  Vitals:    07/06/23 1457   BP: 120/70   BP Location: Right arm   Patient Position: Sitting   Pulse: 84   Resp: 18   Temp: 98.4 °F (36.9 °C)   TempSrc: Temporal   SpO2: 94%   Weight: 62.2 kg (137 lb 3.2 oz)   Height: 5' 10" (1.778 m)     Body mass index is 19.69 kg/m². Physical Exam  Constitutional:       General: He is not in acute distress. Appearance: He is well-developed. HENT:      Head: Normocephalic. Eyes:      General: No scleral icterus. Conjunctiva/sclera: Conjunctivae normal.   Neck:      Vascular: No JVD. Cardiovascular:      Rate and Rhythm: Normal rate. Heart sounds: Normal heart sounds. Pulmonary:      Effort: Pulmonary effort is normal.      Breath sounds: No wheezing. Abdominal:      Palpations: Abdomen is soft. Tenderness: There is no abdominal tenderness. Musculoskeletal:         General: Normal range of motion. Cervical back: Neck supple. Skin:     General: Skin is warm. Findings: No rash. Neurological:      Mental Status: He is alert and oriented to person, place, and time.    Psychiatric:         Behavior: Behavior normal.         LAB RESULTS:  Results for orders placed or performed in visit on 48/29/69   Basic metabolic panel   Result Value Ref Range    Sodium 141 135 - 147 mmol/L    Potassium 4.7 3.5 - 5.3 mmol/L    Chloride 108 96 - 108 mmol/L    CO2 28 21 - 32 mmol/L    ANION GAP 5 mmol/L    BUN 20 5 - 25 mg/dL    Creatinine 1.51 (H) 0.60 - 1.30 mg/dL    Glucose, Fasting 93 65 - 99 mg/dL    Calcium 9.7 8.3 - 10.1 mg/dL    eGFR 44 ml/min/1.73sq m   CBC and differential   Result Value Ref Range    WBC 7.11 4.31 - 10.16 Thousand/uL    RBC 5.10 3.88 - 5.62 Million/uL    Hemoglobin 14.6 12.0 - 17.0 g/dL    Hematocrit 44.6 36.5 - 49.3 %    MCV 88 82 - 98 fL    MCH 28.6 26.8 - 34.3 pg    MCHC 32.7 31.4 - 37.4 g/dL    RDW 13.4 11.6 - 15.1 %    MPV 9.0 8.9 - 12.7 fL    Platelets 136 100 - 554 Thousands/uL    nRBC 0 /100 WBCs    Neutrophils Relative 67 43 - 75 %    Immat GRANS % 0 0 - 2 %    Lymphocytes Relative 18 14 - 44 %    Monocytes Relative 7 4 - 12 %    Eosinophils Relative 7 (H) 0 - 6 %    Basophils Relative 1 0 - 1 %    Neutrophils Absolute 4.71 1.85 - 7.62 Thousands/µL    Immature Grans Absolute 0.02 0.00 - 0.20 Thousand/uL    Lymphocytes Absolute 1.30 0.60 - 4.47 Thousands/µL    Monocytes Absolute 0.52 0.17 - 1.22 Thousand/µL    Eosinophils Absolute 0.48 0.00 - 0.61 Thousand/µL    Basophils Absolute 0.08 0.00 - 0.10 Thousands/µL   Phosphorus   Result Value Ref Range    Phosphorus 3.1 2.3 - 4.1 mg/dL   Protein / creatinine ratio, urine   Result Value Ref Range    Creatinine, Ur 144.0 mg/dL    Protein Urine Random 16 mg/dL    Prot/Creat Ratio, Ur 0.11 (H) 0.00 - 0.10   PTH, intact   Result Value Ref Range    PTH 24.4 12.0 - 88.0 pg/mL   UA (URINE) with reflex to Scope   Result Value Ref Range    Color, UA Light Yellow     Clarity, UA Clear     Specific Gravity, UA 1.015 1.003 - 1.030    pH, UA 7.0 4.5, 5.0, 5.5, 6.0, 6.5, 7.0, 7.5, 8.0    Leukocytes, UA Trace (A) Negative    Nitrite, UA Negative Negative    Protein, UA Trace (A) Negative mg/dl    Glucose, UA Negative Negative mg/dl    Ketones, UA Negative Negative mg/dl    Urobilinogen, UA <2.0 <2.0 mg/dl mg/dl    Bilirubin, UA Negative Negative    Occult Blood, UA Small (A) Negative   Vitamin D 25 hydroxy   Result Value Ref Range    Vit D, 25-Hydroxy 90.5 30.0 - 100.0 ng/mL   Urine Microscopic   Result Value Ref Range    RBC, UA 4-10 (A) None Seen, 1-2 /hpf    WBC, UA 10-20 (A) None Seen, 1-2 /hpf    Epithelial Cells None Seen None Seen, Occasional /hpf    Bacteria, UA Occasional None Seen, Occasional /hpf       ASSESSMENT and PLAN:      CKD (chronic kidney disease) stage 3, GFR 30-59 ml/min ([facility])  Lab Results   Component Value Date    EGFR 44 06/29/2023    EGFR 44 04/18/2023    EGFR 47 03/31/2023    CREATININE 1.51 (H) 06/29/2023    CREATININE 1.49 (H) 04/18/2023    CREATININE 1.43 (H) 03/31/2023   Renal function is stable overall.   Advise hydration and avoiding nephrotoxic medication    Chronic kidney disease-mineral and bone disorder  Lab Results   Component Value Date    EGFR 44 06/29/2023    EGFR 44 04/18/2023    EGFR 47 03/31/2023    CREATININE 1.51 (H) 06/29/2023    CREATININE 1.49 (H) 04/18/2023    CREATININE 1.43 (H) 03/31/2023   PTH and phosphorus along with vitamin D are within acceptable range and will continue to monitor    History of carcinoma of tongue  Stable at this point and seems to be in remission      Advised to continue what he is doing with hydration. I will see him back in 6 months. We will get blood and urine test before that        Portions of the record may have been created with voice recognition software. Occasional wrong word or "sound a like" substitutions may have occurred due to the inherent limitations of voice recognition software. Read the chart carefully and recognize, using context, where substitutions have occurred. If you have any questions, please contact the dictating provider.

## 2023-07-15 ENCOUNTER — APPOINTMENT (EMERGENCY)
Dept: RADIOLOGY | Facility: HOSPITAL | Age: 78
End: 2023-07-15
Payer: MEDICARE

## 2023-07-15 ENCOUNTER — HOSPITAL ENCOUNTER (EMERGENCY)
Facility: HOSPITAL | Age: 78
Discharge: HOME | End: 2023-07-15
Attending: EMERGENCY MEDICINE
Payer: MEDICARE

## 2023-07-15 ENCOUNTER — OFFICE VISIT (OUTPATIENT)
Dept: URGENT CARE | Facility: CLINIC | Age: 78
End: 2023-07-15
Payer: MEDICARE

## 2023-07-15 VITALS
TEMPERATURE: 98.3 F | RESPIRATION RATE: 18 BRPM | DIASTOLIC BLOOD PRESSURE: 56 MMHG | OXYGEN SATURATION: 97 % | SYSTOLIC BLOOD PRESSURE: 94 MMHG | HEART RATE: 104 BPM

## 2023-07-15 VITALS
TEMPERATURE: 98.8 F | SYSTOLIC BLOOD PRESSURE: 102 MMHG | RESPIRATION RATE: 21 BRPM | HEART RATE: 84 BPM | DIASTOLIC BLOOD PRESSURE: 66 MMHG | OXYGEN SATURATION: 97 %

## 2023-07-15 DIAGNOSIS — J18.9 PNEUMONIA OF LEFT LOWER LOBE DUE TO INFECTIOUS ORGANISM: ICD-10-CM

## 2023-07-15 DIAGNOSIS — R53.81 MALAISE: ICD-10-CM

## 2023-07-15 DIAGNOSIS — R42 DIZZINESS AND GIDDINESS: Primary | ICD-10-CM

## 2023-07-15 DIAGNOSIS — R42 LIGHTHEADEDNESS: Primary | ICD-10-CM

## 2023-07-15 LAB
2HR DELTA HS TROPONIN: -2 NG/L
ALBUMIN SERPL BCP-MCNC: 3.8 G/DL (ref 3.5–5)
ALP SERPL-CCNC: 67 U/L (ref 34–104)
ALT SERPL W P-5'-P-CCNC: 12 U/L (ref 7–52)
ANION GAP SERPL CALCULATED.3IONS-SCNC: 7 MMOL/L
AST SERPL W P-5'-P-CCNC: 15 U/L (ref 13–39)
ATRIAL RATE: 100 BPM
ATRIAL RATE: 104 BPM
ATRIAL RATE: 85 BPM
BASOPHILS # BLD AUTO: 0.04 THOUSANDS/ÂΜL (ref 0–0.1)
BASOPHILS NFR BLD AUTO: 0 % (ref 0–1)
BILIRUB SERPL-MCNC: 0.52 MG/DL (ref 0.2–1)
BUN SERPL-MCNC: 28 MG/DL (ref 5–25)
CALCIUM SERPL-MCNC: 9.6 MG/DL (ref 8.4–10.2)
CARDIAC TROPONIN I PNL SERPL HS: 7 NG/L
CARDIAC TROPONIN I PNL SERPL HS: 9 NG/L
CHLORIDE SERPL-SCNC: 99 MMOL/L (ref 96–108)
CO2 SERPL-SCNC: 27 MMOL/L (ref 21–32)
CREAT SERPL-MCNC: 1.49 MG/DL (ref 0.6–1.3)
EOSINOPHIL # BLD AUTO: 0.04 THOUSAND/ÂΜL (ref 0–0.61)
EOSINOPHIL NFR BLD AUTO: 0 % (ref 0–6)
ERYTHROCYTE [DISTWIDTH] IN BLOOD BY AUTOMATED COUNT: 13.2 % (ref 11.6–15.1)
FLUAV RNA RESP QL NAA+PROBE: NEGATIVE
FLUBV RNA RESP QL NAA+PROBE: NEGATIVE
GFR SERPL CREATININE-BSD FRML MDRD: 44 ML/MIN/1.73SQ M
GLUCOSE SERPL-MCNC: 98 MG/DL (ref 65–140)
HCT VFR BLD AUTO: 38.9 % (ref 36.5–49.3)
HGB BLD-MCNC: 13.3 G/DL (ref 12–17)
IMM GRANULOCYTES # BLD AUTO: 0.1 THOUSAND/UL (ref 0–0.2)
IMM GRANULOCYTES NFR BLD AUTO: 1 % (ref 0–2)
LYMPHOCYTES # BLD AUTO: 1.29 THOUSANDS/ÂΜL (ref 0.6–4.47)
LYMPHOCYTES NFR BLD AUTO: 8 % (ref 14–44)
MAGNESIUM SERPL-MCNC: 2.3 MG/DL (ref 1.9–2.7)
MCH RBC QN AUTO: 29.4 PG (ref 26.8–34.3)
MCHC RBC AUTO-ENTMCNC: 34.2 G/DL (ref 31.4–37.4)
MCV RBC AUTO: 86 FL (ref 82–98)
MONOCYTES # BLD AUTO: 0.88 THOUSAND/ÂΜL (ref 0.17–1.22)
MONOCYTES NFR BLD AUTO: 6 % (ref 4–12)
NEUTROPHILS # BLD AUTO: 13.4 THOUSANDS/ÂΜL (ref 1.85–7.62)
NEUTS SEG NFR BLD AUTO: 85 % (ref 43–75)
NRBC BLD AUTO-RTO: 0 /100 WBCS
P AXIS: 60 DEGREES
P AXIS: 67 DEGREES
P AXIS: 69 DEGREES
PLATELET # BLD AUTO: 359 THOUSANDS/UL (ref 149–390)
PMV BLD AUTO: 8.5 FL (ref 8.9–12.7)
POTASSIUM SERPL-SCNC: 4.1 MMOL/L (ref 3.5–5.3)
PR INTERVAL: 164 MS
PR INTERVAL: 166 MS
PR INTERVAL: 168 MS
PROT SERPL-MCNC: 7.6 G/DL (ref 6.4–8.4)
QRS AXIS: -27 DEGREES
QRS AXIS: -51 DEGREES
QRS AXIS: -60 DEGREES
QRSD INTERVAL: 92 MS
QRSD INTERVAL: 94 MS
QRSD INTERVAL: 98 MS
QT INTERVAL: 336 MS
QT INTERVAL: 348 MS
QT INTERVAL: 356 MS
QTC INTERVAL: 423 MS
QTC INTERVAL: 441 MS
QTC INTERVAL: 448 MS
RBC # BLD AUTO: 4.53 MILLION/UL (ref 3.88–5.62)
RSV RNA RESP QL NAA+PROBE: NEGATIVE
SARS-COV-2 RNA RESP QL NAA+PROBE: NEGATIVE
SODIUM SERPL-SCNC: 133 MMOL/L (ref 135–147)
T WAVE AXIS: 53 DEGREES
T WAVE AXIS: 54 DEGREES
T WAVE AXIS: 57 DEGREES
VENTRICULAR RATE: 100 BPM
VENTRICULAR RATE: 104 BPM
VENTRICULAR RATE: 85 BPM
WBC # BLD AUTO: 15.75 THOUSAND/UL (ref 4.31–10.16)

## 2023-07-15 PROCEDURE — 83735 ASSAY OF MAGNESIUM: CPT | Performed by: EMERGENCY MEDICINE

## 2023-07-15 PROCEDURE — 93005 ELECTROCARDIOGRAM TRACING: CPT

## 2023-07-15 PROCEDURE — 84484 ASSAY OF TROPONIN QUANT: CPT | Performed by: EMERGENCY MEDICINE

## 2023-07-15 PROCEDURE — 93005 ELECTROCARDIOGRAM TRACING: CPT | Performed by: PHYSICIAN ASSISTANT

## 2023-07-15 PROCEDURE — 71046 X-RAY EXAM CHEST 2 VIEWS: CPT

## 2023-07-15 PROCEDURE — G0463 HOSPITAL OUTPT CLINIC VISIT: HCPCS | Performed by: PHYSICIAN ASSISTANT

## 2023-07-15 PROCEDURE — 80053 COMPREHEN METABOLIC PANEL: CPT | Performed by: EMERGENCY MEDICINE

## 2023-07-15 PROCEDURE — 36415 COLL VENOUS BLD VENIPUNCTURE: CPT | Performed by: EMERGENCY MEDICINE

## 2023-07-15 PROCEDURE — 96360 HYDRATION IV INFUSION INIT: CPT

## 2023-07-15 PROCEDURE — 93010 ELECTROCARDIOGRAM REPORT: CPT | Performed by: INTERNAL MEDICINE

## 2023-07-15 PROCEDURE — 99285 EMERGENCY DEPT VISIT HI MDM: CPT

## 2023-07-15 PROCEDURE — 99213 OFFICE O/P EST LOW 20 MIN: CPT | Performed by: PHYSICIAN ASSISTANT

## 2023-07-15 PROCEDURE — 0241U HB NFCT DS VIR RESP RNA 4 TRGT: CPT | Performed by: EMERGENCY MEDICINE

## 2023-07-15 PROCEDURE — 85025 COMPLETE CBC W/AUTO DIFF WBC: CPT | Performed by: EMERGENCY MEDICINE

## 2023-07-15 RX ADMIN — SODIUM CHLORIDE 1000 ML: 0.9 INJECTION, SOLUTION INTRAVENOUS at 16:00

## 2023-07-15 NOTE — PROGRESS NOTES
[facility]        NAME: Carrillo Mejia is a 70 y.o. male  : 1945    MRN: [id number]  DATE: July 15, 2023  TIME: 12:43 PM    Assessment and Plan   Dizziness and giddiness [R42]  1. Dizziness and giddiness            Given the patient's medical history and vitals today I recommend he go to the emergency room. He did drive himself so I recommend he go by ambulance which he refused at this time. Discussed that with his medical history the symptoms could be caused by a heart attack or stroke which could cause him to go unresponsive while driving to the hospital.  He is aware that risks of not taking an ambulance include death. Patient Instructions   There are no Patient Instructions on file for this visit. Follow up with PCP in 3-5 days. Proceed to  ER if symptoms worsen. Chief Complaint     Chief Complaint   Patient presents with   • Cold Like Symptoms     Pt c/o feeling dizzy, sob, unsure of temperature but having sweats          History of Present Illness       The patient is a 68-year-old male with a pertinent medical history history of 50% carotid artery stenosis and abdominal aortic aneurysm resenting today for feeling dizzy, shortness of breath and diaphoresis. In the office his blood pressure was 94/56 and pulse 104. Review of Systems   Review of Systems   Constitutional: Positive for diaphoresis. Negative for activity change, appetite change, chills and fever. HENT: Negative for congestion, rhinorrhea and sore throat. Respiratory: Positive for shortness of breath. Negative for cough and chest tightness. Cardiovascular: Negative for chest pain and palpitations. Gastrointestinal: Negative for abdominal pain, diarrhea, nausea and vomiting. Musculoskeletal: Negative for arthralgias and myalgias. Skin: Negative for color change and pallor. Neurological: Positive for dizziness. Negative for headaches.          Current Medications       Current Outpatient Medications:   •  aspirin 81 MG tablet, 1 cap(s), Disp: , Rfl:   •  ferrous sulfate 324 (65 Fe) mg, Take 1 tablet (324 mg total) by mouth daily before breakfast, Disp: 90 tablet, Rfl: 1  •  loratadine (CLARITIN) 10 mg tablet, Take 1 tablet (10 mg total) by mouth daily, Disp: 30 tablet, Rfl: 2  •  Multiple Vitamins-Minerals (MULTIVITAMIN ADULTS 50+ PO), Take by mouth, Disp: , Rfl:   •  pravastatin (PRAVACHOL) 20 mg tablet, TAKE ONE TABLET BY MOUTH EVERY OTHER DAY, Disp: 30 tablet, Rfl: 3  •  VITAMIN D, CHOLECALCIFEROL, PO, Take by mouth, Disp: , Rfl:   •  Coenzyme Q10 (CO Q 10 PO), Take by mouth, Disp: , Rfl:   •  fluticasone (FLONASE) 50 mcg/act nasal spray, 2 sprays into each nostril daily, Disp: 16 g, Rfl: 3    Current Allergies     Allergies as of 07/15/2023 - Reviewed 07/15/2023   Allergen Reaction Noted   • Iodinated contrast media Other (See Comments) 02/14/2019   • Iodine - food allergy  11/30/2016            The following portions of the patient's history were reviewed and updated as appropriate: allergies, current medications, past family history, past medical history, past social history, past surgical history and problem list.     Past Medical History:   Diagnosis Date   • Acute renal failure superimposed on chronic kidney disease ([address]) 12/31/2021   • 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[address]) 03/1999    SCCA left tongue base - T2N1M0- treated at [facility] - had surgery and XRT   • Chronic kidney disease    • COVID-19 with hypoxic respiratory failure 12/31/2021   • Hematuria 10/15/2019   • Hyperlipidemia    • Hypertension    • Left anterior fascicular block    • Stage 3 chronic kidney disease ([address])    • 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[address]) 2/14/2019       Past Surgical History:   Procedure Laterality Date   • CYSTOSCOPY  08/30/2021   • EYELID CLOSURE Left     to prevent eyelashes from rubbing cornea   • HERNIA REPAIR Left    • MODIFIED RADICAL NECK DISSECTION Left 03/03/1999    [facility] - with mandibulotomy and resection SCCA left tongue base - Dr. Temo Lyn   • MYRINGOTOMY W/ TUBES Left 06/07/2023    office procedure - Dr. Andino   • TONGUE SURGERY     • TONSILLECTOMY         Family History   Problem Relation Age of Onset   • Hypertension Mother    • Stroke Mother          Medications have been verified. Objective   BP 94/56   Pulse 104   Temp 98.3 °F (36.8 °C) (Temporal)   Resp 18   SpO2 97%        Physical Exam     Physical Exam  Vitals and nursing note reviewed. Constitutional:       General: He is not in acute distress. Appearance: Normal appearance. He is normal weight. He is not ill-appearing, toxic-appearing or diaphoretic. HENT:      Head: Normocephalic and atraumatic. Cardiovascular:      Rate and Rhythm: Tachycardia present. Pulmonary:      Effort: Pulmonary effort is normal.   Musculoskeletal:      Cervical back: Normal range of motion. Skin:     General: Skin is warm and dry. Capillary Refill: Capillary refill takes less than 2 seconds. Neurological:      Mental Status: He is alert.

## 2023-07-15 NOTE — ED PROVIDER NOTES
Pt Name: Eddie Mccullough  MRN: [id number]  [address] 1945  Age/Sex: 70 y.o. male  Date of evaluation: 7/15/2023  PCP: Layla Acevedo MD    [address]    Chief Complaint   Patient presents with   • Dizziness     Pt reports being sick for the past 4 days, symptoms include cold sweats, feeling off balance, slightly SOB. Urgent care did an ekg and said it was abnormal and sent him over          \[facility]\"" and [facility]    70 y.o. male presenting with fatigue, malaise, lightheadedness. Patient states 4 days ago symptoms started, at night he had cold sweats and some shakes. Then he was fine the next 2 to 3 days and then last night he started having the cold sweats and shakes again. No fevers or chills. States he has a chronic cough and runny nose. Also mentions chronic shortness of breath to me. States he just generally feels tired and fatigued. Went to urgent care today, and was recommended to go to the emergency department. Differential diagnosis considered includes but not limited to electrolyte abnormalities, pneumonia, viral infection, ACS, dehydration. Patient states his lightheadedness does not feel like vertigo, no spinning sensation, he has had vertigo in the past.  It is primarily with standing up from a seated position when he feels lightheaded. Per my independent interpretation of EKG, normal sinus rhythm with heart rate of 100, narrow QRS, intervals reassuring, no STEMI, incomplete right bundle branch block. ED Course as of 07/15/23 1838   Sat Jul 15, 2023   1457 XR chest 2 views  Per my independent interpretation, no acute cardiopulmonary processes. 1806 Per my independent interpretation of repeat EKG, normal sinus rhythm heart rate of 85, narrow QRS, intervals reassuring, incomplete right bundle branch block, no STEMI. Blood work reveals mild leukocytosis, mild hyponatremia. No obvious infectious etiology.   Patient is afebrile, vitals are reassuring, has had some tenuous blood pressures. Patient given IV fluids. Swab is negative. Creatinine is around baseline. I considered obtaining CTA to evaluate patient's aorta, he however does not have any pain, recently had ultrasound of abdominal aorta/iliacs, and was found to have proximal infrarenal fusiform abdominal aortic aneurysm measuring 2.8 x 3.2 cm, before that it was 2.6 x 3.3 cm. He was recommended to have repeat testing in 1 year. He also has IV dye allergy, and states his throat closes, and would not want any dye at this time and he would like to defer CTA scan at this time although I did discuss possibility of pretreatment. I do not have significant concern for worsening AAA at this time. Upon reevaluation, patient is feeling much better, perhaps his symptoms were due to dehydration. I did advise close PCP follow-up, strict return precautions were discussed, he verbalized understanding and is in agreement with plan.     Medications   sodium chloride 0.9 % bolus 1,000 mL (0 mL Intravenous Stopped 7/15/23 1700)         Past Medical and Surgical History    Past Medical History:   Diagnosis Date   • Acute renal failure superimposed on chronic kidney disease ([address]) 12/31/2021   • 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[address]) 03/1999    SCCA left tongue base - T2N1M0- treated at [facility] - had surgery and XRT   • Chronic kidney disease    • COVID-19 with hypoxic respiratory failure 12/31/2021   • Hematuria 10/15/2019   • Hyperlipidemia    • Hypertension    • Left anterior fascicular block    • Stage 3 chronic kidney disease ([address])    • 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[address]) 2/14/2019       Past Surgical History:   Procedure Laterality Date   • CYSTOSCOPY  08/30/2021   • EYELID CLOSURE Left     to prevent eyelashes from rubbing cornea   • HERNIA REPAIR Left    • MODIFIED RADICAL NECK DISSECTION Left 03/03/1999    [facility] - with mandibulotomy and resection SCCA left tongue base - Dr. Liban North   • MYRINGOTOMY W/ TUBES Left 06/07/2023 office procedure - Dr. Valjean Mohs   • TONGUE SURGERY     • TONSILLECTOMY         Family History   Problem Relation Age of Onset   • Hypertension Mother    • Stroke Mother        Social History     Tobacco Use   • Smoking status: Former     Passive exposure: Current   • Smokeless tobacco: Former   • Tobacco comments:     quit 20 years ago   Vaping Use   • Vaping Use: Never used   Substance Use Topics   • Alcohol use: Yes     Comment: occasional   • Drug use: Never           Allergies    Allergies   Allergen Reactions   • Iodinated Contrast Media Other (See Comments)   • Iodine - Food Allergy        Home Medications    Prior to Admission medications    Medication Sig Start Date End Date Taking?  Authorizing Provider   aspirin 81 MG tablet 1 cap(s) 1/1/1901   Historical Provider, MD   Coenzyme Q10 (CO Q 10 PO) Take by mouth    Historical Provider, MD   ferrous sulfate 324 (65 Fe) mg Take 1 tablet (324 mg total) by mouth daily before breakfast 4/4/23   Cat Helton DO   fluticasone (FLONASE) 50 mcg/act nasal spray 2 sprays into each nostril daily 5/8/23   Barak Godwin PA-C   loratadine (CLARITIN) 10 mg tablet Take 1 tablet (10 mg total) by mouth daily 5/8/23   Jose Moss PA-C   Multiple Vitamins-Minerals (MULTIVITAMIN ADULTS 50+ PO) Take by mouth    Historical Provider, MD   pravastatin (PRAVACHOL) 20 mg tablet TAKE ONE TABLET BY MOUTH EVERY OTHER DAY 5/2/23   Hannah Salcido MD   VITAMIN D, CHOLECALCIFEROL, PO Take by mouth    Historical Provider, MD           Physical Exam      ED Triage Vitals   Temperature Pulse Respirations Blood Pressure SpO2   07/15/23 1344 07/15/23 1344 07/15/23 1344 07/15/23 1344 07/15/23 1344   98.8 °F (37.1 °C) 98 20 144/86 98 %      Temp Source Heart Rate Source Patient Position - Orthostatic VS BP Location FiO2 (%)   07/15/23 1344 07/15/23 1500 07/15/23 1344 07/15/23 1344 --   Oral Monitor Sitting Left arm       Pain Score       --                      Physical Exam  Constitutional: General: He is not in acute distress. Appearance: He is not ill-appearing. HENT:      Head: Normocephalic and atraumatic. Nose: Nose normal.   Eyes:      Extraocular Movements: Extraocular movements intact. Pupils: Pupils are equal, round, and reactive to light. Cardiovascular:      Rate and Rhythm: Normal rate and regular rhythm. Pulmonary:      Effort: Pulmonary effort is normal. No respiratory distress. Abdominal:      General: There is no distension. Palpations: Abdomen is soft. Tenderness: There is no abdominal tenderness. There is no guarding or rebound. Comments: No pulsatile mass   Musculoskeletal:         General: No swelling or deformity. Normal range of motion. Cervical back: Normal range of motion and neck supple. Skin:     General: Skin is warm. Findings: No erythema. Neurological:      Mental Status: He is alert and oriented to person, place, and time. Mental status is at baseline. Sensory: No sensory deficit. Motor: No weakness. Diagnostic Results      Labs:    Results Reviewed     Procedure Component Value Units Date/Time    HS Troponin I 2hr [[id number]]  (Normal) Collected: 07/15/23 1648    Lab Status: Final result Specimen: Blood from Arm, Left Updated: 07/15/23 1739     hs TnI 2hr 7 ng/L      Delta 2hr hsTnI -2 ng/L     HS Troponin I 4hr [[id number]]     Lab Status: No result Specimen: Blood     FLU/RSV/COVID - if FLU/RSV clinically relevant [[id number]]  (Normal) Collected: 07/15/23 1432    Lab Status: Final result Specimen: Nares from Nose Updated: 07/15/23 1520     SARS-CoV-2 Negative     INFLUENZA A PCR Negative     INFLUENZA B PCR Negative     RSV PCR Negative    Narrative:      FOR PEDIATRIC PATIENTS - copy/paste COVID Guidelines URL to browser: [email or web address]    SARS-CoV-2 assay is a Nucleic Acid Amplification assay intended for the  qualitative detection of nucleic acid from SARS-CoV-2 in nasopharyngeal  swabs. Results are for the presumptive identification of SARS-CoV-2 RNA. Positive results are indicative of infection with SARS-CoV-2, the virus  causing COVID-19, but do not rule out bacterial infection or co-infection  with other viruses. Laboratories within the [facility] and its  territories are required to report all positive results to the appropriate  public health authorities. Negative results do not preclude SARS-CoV-2  infection and should not be used as the sole basis for treatment or other  patient management decisions. Negative results must be combined with  clinical observations, patient history, and epidemiological information. This test has not been FDA cleared or approved. This test has been authorized by FDA under an Emergency Use Authorization  (EUA). This test is only authorized for the duration of time the  declaration that circumstances exist justifying the authorization of the  emergency use of an in vitro diagnostic tests for detection of SARS-CoV-2  virus and/or diagnosis of COVID-19 infection under section 564(b)(1) of  the Act, 21 U. S.C. 745OND-6(J)(1), unless the authorization is terminated  or revoked sooner. The test has been validated but independent review by FDA  and CLIA is pending. Test performed using [employer] GeneXpert: This RT-PCR assay targets N2,  a region unique to SARS-CoV-2. A conserved region in the E-gene was chosen  for pan-Sarbecovirus detection which includes SARS-CoV-2. According to CMS-2020-01-R, this platform meets the definition of high-throughput technology.     HS Troponin 0hr (reflex protocol) [[id number]]  (Normal) Collected: 07/15/23 1431    Lab Status: Final result Specimen: Blood from Arm, Right Updated: 07/15/23 1507     hs TnI 0hr 9 ng/L     Comprehensive metabolic panel [[id number]]  (Abnormal) Collected: 07/15/23 1431    Lab Status: Final result Specimen: Blood from Arm, Right Updated: 07/15/23 1502     Sodium 133 mmol/L      Potassium 4.1 mmol/L      Chloride 99 mmol/L      CO2 27 mmol/L      ANION GAP 7 mmol/L      BUN 28 mg/dL      Creatinine 1.49 mg/dL      Glucose 98 mg/dL      Calcium 9.6 mg/dL      AST 15 U/L      ALT 12 U/L      Alkaline Phosphatase 67 U/L      Total Protein 7.6 g/dL      Albumin 3.8 g/dL      Total Bilirubin 0.52 mg/dL      eGFR 44 ml/min/1.73sq m     Narrative:      [facility] guidelines for Chronic Kid[facility] Disease (CKD):   •  Stage 1 with normal or high GFR (GFR > 90 mL/min/1.73 square meters)  •  Stage 2 Mild CKD (GFR = 60-89 mL/min/1.73 square meters)  •  Stage 3A Moderate CKD (GFR = 45-59 mL/min/1.73 square meters)  •  Stage 3B Moderate CKD (GFR = 30-44 mL/min/1.73 square meters)  •  Stage 4 Severe CKD (GFR = 15-29 mL/min/1.73 square meters)  •  Stage 5 End Stage CKD (GFR <15 mL/min/1.73 square meters)  Note: GFR calculation is accurate only with a steady state creatinine    Magnesium [[id number]]  (Normal) Collected: 07/15/23 1431    Lab Status: Final result Specimen: Blood from Arm, Right Updated: 07/15/23 1502     Magnesium 2.3 mg/dL     CBC and differential [[id number]]  (Abnormal) Collected: 07/15/23 1431    Lab Status: Final result Specimen: Blood from Arm, Right Updated: 07/15/23 1439     WBC 15.75 Thousand/uL      RBC 4.53 Million/uL      Hemoglobin 13.3 g/dL      Hematocrit 38.9 %      MCV 86 fL      MCH 29.4 pg      MCHC 34.2 g/dL      RDW 13.2 %      MPV 8.5 fL      Platelets 776 Thousands/uL      nRBC 0 /100 WBCs      Neutrophils Relative 85 %      Immat GRANS % 1 %      Lymphocytes Relative 8 %      Monocytes Relative 6 %      Eosinophils Relative 0 %      Basophils Relative 0 %      Neutrophils Absolute 13.40 Thousands/µL      Immature Grans Absolute 0.10 Thousand/uL      Lymphocytes Absolute 1.29 Thousands/µL      Monocytes Absolute 0.88 Thousand/µL      Eosinophils Absolute 0.04 Thousand/µL      Basophils Absolute 0.04 Thousands/µL           All labs reviewed and utilized in the medical decision making process    Radiology:    XR chest 2 views    (Results Pending)       All radiology studies independently viewed by me and interpreted by the radiologist.    Procedure    Procedures        FINAL IMPRESSION    Final diagnoses:   Lightheadedness   Malaise         DISPOSITION    Time reflects when diagnosis was documented in both MDM as applicable and the Disposition within this note     Time User Action Codes Description Comment    7/15/2023  6:12  [address] [place], [address] [R42] Lightheadedness     7/15/2023  6:12 PM Yuly Rosenda Add [R53.1] Generalized weakness     7/15/2023  6:12 PM Yuly Rosenda Remove [R53.1] Generalized weakness     7/15/2023  6:12  [address] [place], [address] [R53.81] [facility]       ED Disposition     ED Disposition   Discharge    Condition   Stable    Date/Time   Sat Jul 15, 2023  6:12 PM    [facility] discharge to home/self care.                Follow-up Information     Follow up With Specialties Details Why [address], MD Family Medicine Call in 2 days  [address]  [address] [facility]              PATIENT REFERRED TO:    Torsten Martínez MD  [address] [facility]  [address]  [address] [facility]  [phone]    Call in 2 days        DISCHARGE MEDICATIONS:    Discharge Medication List as of 7/15/2023  6:13 PM      CONTINUE these medications which have NOT CHANGED    Details   aspirin 81 MG tablet 1 cap(s), Historical Med      Coenzyme Q10 (CO Q 10 PO) Take by mouth, Historical Med      ferrous sulfate 324 (65 Fe) mg Take 1 tablet (324 mg total) by mouth daily before breakfast, Starting Tue 4/4/2023, Normal      fluticasone (FLONASE) 50 mcg/act nasal spray 2 sprays into each nostril daily, Starting Mon 5/8/2023, Normal      loratadine (CLARITIN) 10 mg tablet Take 1 tablet (10 mg total) by mouth daily, Starting Mon 5/8/2023, Normal      Multiple Vitamins-Minerals (MULTIVITAMIN ADULTS 50+ PO) Take by mouth, Historical Med      pravastatin (PRAVACHOL) 20 mg tablet TAKE ONE TABLET BY MOUTH EVERY OTHER DAY, Normal      VITAMIN D, CHOLECALCIFEROL, PO Take by mouth, Historical Med             No discharge procedures on file. Juni Zaragoza DO        This note was partially completed using voice recognition technology, and was scanned for gross errors; however some errors may still exist. Please contact the author with any questions or requests for clarification.       Juni Zaragoza DO  07/15/23 Blair Bailey

## 2023-07-15 NOTE — ED NOTES
Ambulated with patient to the bathroom, gait is steady, no distress noted. Pt states he feels better after receiving fluids via IV.      Juan Francisco Haddad  07/15/23 [id number]

## 2023-07-16 ENCOUNTER — HOSPITAL ENCOUNTER (EMERGENCY)
Facility: HOSPITAL | Age: 78
Discharge: HOME | End: 2023-07-16
Attending: EMERGENCY MEDICINE
Payer: MEDICARE

## 2023-07-16 VITALS
HEART RATE: 102 BPM | TEMPERATURE: 98.7 F | OXYGEN SATURATION: 96 % | RESPIRATION RATE: 17 BRPM | SYSTOLIC BLOOD PRESSURE: 91 MMHG | DIASTOLIC BLOOD PRESSURE: 56 MMHG

## 2023-07-16 DIAGNOSIS — J18.9 PNEUMONIA: Primary | ICD-10-CM

## 2023-07-16 PROCEDURE — 99284 EMERGENCY DEPT VISIT MOD MDM: CPT | Performed by: EMERGENCY MEDICINE

## 2023-07-16 PROCEDURE — 99285 EMERGENCY DEPT VISIT HI MDM: CPT

## 2023-07-16 RX ORDER — AZITHROMYCIN 250 MG/1
TABLET, FILM COATED ORAL
Qty: 6 TABLET | Refills: 0 | Status: SHIPPED | OUTPATIENT
Start: 2023-07-16 | End: 2023-07-18

## 2023-07-16 RX ORDER — AMOXICILLIN AND CLAVULANATE POTASSIUM 875; 125 MG/1; MG/1
1 TABLET, FILM COATED ORAL ONCE
Status: COMPLETED | OUTPATIENT
Start: 2023-07-16 | End: 2023-07-16

## 2023-07-16 RX ORDER — AMOXICILLIN AND CLAVULANATE POTASSIUM 875; 125 MG/1; MG/1
1 TABLET, FILM COATED ORAL EVERY 12 HOURS
Qty: 14 TABLET | Refills: 0 | Status: SHIPPED | OUTPATIENT
Start: 2023-07-16 | End: 2023-07-18

## 2023-07-16 RX ADMIN — AMOXICILLIN AND CLAVULANATE POTASSIUM 1 TABLET: 875; 125 TABLET, FILM COATED ORAL at 21:02

## 2023-07-16 NOTE — RESULT ENCOUNTER NOTE
I spoke to patient's wife, Roxann Iqbal, updated her with x-ray results concerning for pneumonia. She states he is not feeling well, is coughing, it is productive. I recommended for patient to return to the emergency department for repeat evaluation since he is not feeling well now, that he may need to be hospitalized for IV antibiotics. However, they do not want to come back to the emergency department at this time, would like to try with outpatient treatment. Therefore, I prescribed antibiotics to their pharmacy of choice. I did multiple times discuss and recommend for patient to come back to the emergency department, she verbalized understanding, however wife stated that if he is still not getting better after antibiotics at home, they will come back to the emergency department.

## 2023-07-17 ENCOUNTER — TELEPHONE (OUTPATIENT)
Dept: FAMILY MEDICINE CLINIC | Facility: CLINIC | Age: 78
End: 2023-07-17

## 2023-07-17 ENCOUNTER — TELEPHONE (OUTPATIENT)
Dept: OTHER | Facility: OTHER | Age: 78
End: 2023-07-17

## 2023-07-17 NOTE — TELEPHONE ENCOUNTER
Patient wife called office stating that patient was seen at the ER and rx Zithromax and Augmentin - do to his throat cancer, he has a hard time swallowing and was wondering if you would be able to change them to liquid?

## 2023-07-17 NOTE — ED PROVIDER NOTES
History  Chief Complaint   Patient presents with   • Shortness of Breath     Pt c/o worsening sob, weakness and fever x 2 days. Pt was prescribed antibiotics but has not received them yet      Patient is 70-year-old male past medical history of hypertension, hyperlipidemia, AAA, CKD stage III, peripheral artery disease, tongue cancer 25 years ago, MGUS presenting with shortness of breath. Patient was seen here yesterday for dizziness and discharge, got a call today that he had a pneumonia and was given prescription for Augmentin but states he has not been able to pick it up as of yet so came here for his first dose. Notes shortness of breath for the past 2 days associated with weakness which is worse with exertion notes decreased p.o. intake and feeling off balance for 4 days. Notes subjective fevers but denies vertigo. Denies any co change to his baseline cough. Denies any leg pain or swelling, nausea/vomiting/diarrhea, rashes, vision changes, chest pain. Is not on chemotherapy. Prior to Admission Medications   Prescriptions Last Dose Informant Patient Reported? Taking?    Coenzyme Q10 (CO Q 10 PO)  Self Yes No   Sig: Take by mouth   Multiple Vitamins-Minerals (MULTIVITAMIN ADULTS 50+ PO)  Self Yes No   Sig: Take by mouth   VITAMIN D, CHOLECALCIFEROL, PO  Self Yes No   Sig: Take by mouth   amoxicillin-clavulanate (AUGMENTIN) 875-125 mg per tablet   No No   Sig: Take 1 tablet by mouth every 12 (twelve) hours for 7 days   aspirin 81 MG tablet  Self Yes No   Si cap(s)   azithromycin (ZITHROMAX) 250 mg tablet   No No   Sig: Take 2 tablets today then 1 tablet daily x 4 days   ferrous sulfate 324 (65 Fe) mg  Self No No   Sig: Take 1 tablet (324 mg total) by mouth daily before breakfast   fluticasone (FLONASE) 50 mcg/act nasal spray  Self No No   Si sprays into each nostril daily   loratadine (CLARITIN) 10 mg tablet  Self No No   Sig: Take 1 tablet (10 mg total) by mouth daily   pravastatin (PRAVACHOL) 20 mg tablet  Self No No   Sig: TAKE ONE TABLET BY MOUTH EVERY OTHER DAY      Facility-Administered Medications: None       Past Medical History:   Diagnosis Date   • Acute renal failure superimposed on chronic kidney disease ([address]) 12/31/2021   • 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[address]) 03/1999    SCCA left tongue base - T2N1M0- treated at [facility] - had surgery and XRT   • Chronic kidney disease    • COVID-19 with hypoxic respiratory failure 12/31/2021   • Hematuria 10/15/2019   • Hyperlipidemia    • Hypertension    • Left anterior fascicular block    • Stage 3 chronic kidney disease ([address])    • 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[address]) 2/14/2019       Past Surgical History:   Procedure Laterality Date   • CYSTOSCOPY  08/30/2021   • EYELID CLOSURE Left     to prevent eyelashes from rubbing cornea   • HERNIA REPAIR Left    • MODIFIED RADICAL NECK DISSECTION Left 03/03/1999    [facility] - with mandibulotomy and resection SCCA left tongue base - Dr. Karo Evans   • MYRINGOTOMY W/ TUBES Left 06/07/2023    office procedure - Dr. Bill Dominguez   • TONGUE SURGERY     • TONSILLECTOMY         Family History   Problem Relation Age of Onset   • Hypertension Mother    • Stroke Mother      I have reviewed and agree with the history as documented. E-Cigarette/Vaping   • E-Cigarette Use Never User      E-Cigarette/Vaping Substances   • Nicotine No    • THC No    • CBD No    • Flavoring No    • Other No    • Unknown No      Social History     Tobacco Use   • Smoking status: Former     Passive exposure: Current   • Smokeless tobacco: Former   • Tobacco comments:     quit 20 years ago   Vaping Use   • Vaping Use: Never used   Substance Use Topics   • Alcohol use: Yes     Comment: occasional   • Drug use: Never       Review of Systems   All other systems reviewed and are negative. Physical Exam  Physical Exam  Vitals reviewed. Constitutional:       General: He is not in acute distress. Appearance: Normal appearance.  He is not ill-appearing. HENT:      Mouth/Throat:      Mouth: Mucous membranes are moist.   Eyes:      Conjunctiva/sclera: Conjunctivae normal.      Comments: Normal conjunctiva   Cardiovascular:      Rate and Rhythm: Normal rate and regular rhythm. Pulses: Normal pulses. Heart sounds: Normal heart sounds. Pulmonary:      Effort: Pulmonary effort is normal.      Breath sounds: Normal breath sounds. Abdominal:      General: Abdomen is flat. Palpations: Abdomen is soft. Tenderness: There is no abdominal tenderness. Musculoskeletal:         General: No swelling. Normal range of motion. Cervical back: Neck supple. Right lower leg: No tenderness. No edema. Left lower leg: No edema. Skin:     General: Skin is warm and dry. Neurological:      General: No focal deficit present. Mental Status: He is alert. Psychiatric:         Mood and Affect: Mood normal.         Vital Signs  ED Triage Vitals [07/16/23 2003]   Temperature Pulse Respirations Blood Pressure SpO2   98.7 °F (37.1 °C) 102 17 91/56 96 %      Temp Source Heart Rate Source Patient Position - Orthostatic VS BP Location FiO2 (%)   Temporal Monitor Sitting Left arm --      Pain Score       --           Vitals:    07/16/23 2003   BP: 91/56   Pulse: 102   Patient Position - Orthostatic VS: Sitting         Visual Acuity      ED Medications  Medications   amoxicillin-clavulanate (AUGMENTIN) 875-125 mg per tablet 1 tablet (1 tablet Oral Given 7/16/23 2102)       Diagnostic Studies  Results Reviewed     None                 No orders to display              Procedures  Procedures         ED Course                               SBIRT 20yo+    Flowsheet Row Most Recent Value   Initial Alcohol Screen: US AUDIT-C     1. How often do you have a drink containing alcohol? 0 Filed at: 07/16/2023 2102   2. How many drinks containing alcohol do you have on a typical day you are drinking? 0 Filed at: 07/16/2023 2102   3a.  Male UNDER 65: How often do you have five or more drinks on one occasion? 0 Filed at: 07/16/2023 2102   Audit-C Score 0 Filed at: 07/16/2023 2102   LAURA: How many times in the past year have you. .. Used an illegal drug or used a prescription medication for non-medical reasons? Never Filed at: 07/16/2023 2102                    Medical Decision Making  Patient is 72-year-old male past medical history of hypertension, hyperlipidemia, peripheral artery disease, AAA, CKD stage III, tongue carcinoma, MGUS presenting with shortness of breath, pneumonia. Patient is well-appearing at bedside though mildly hypotensive 91/56 however may be patient's baseline, mentating appropriately with low-grade tachycardia saturating appropriately on room air with lungs clear to auscultation. Have advised work-up today as he is noting decreased p.o. intake, feeling off balance and short of breath would like to evaluate for electrolyte abnormalities, kidney failure, extension of pneumonia however patient declining work-up at this time and states that he just wants his dose of Augmentin and that he can  the rest of his medications tomorrow and does not want any work-up at this time. I have discussed that without further work-up we cannot rule out kidney failure or sepsis or any other acute life-threatening illness and that he could worsen and even die without work-up. He states that he understands but does not want any work-up at this time just wants his medication. Risk  Prescription drug management.           Disposition  Final diagnoses:   Pneumonia     Time reflects when diagnosis was documented in both MDM as applicable and the Disposition within this note     Time User Action Codes Description Comment    7/16/2023  8:59 PM Ehsan Dominique Add [J18.9] Pneumonia       ED Disposition     ED Disposition   Discharge    Condition   Stable    Date/Time   Sun Jul 16, 2023  8:59 PM    [address] [facility] discharge to home/self care.               Follow-up Information     Follow up With Specialties Details Why Contact Info Additional Information    [address] [facility] Emergency Department Emergency Medicine   [address] [facility], [place], [place], [address], [place] Family Medicine In 1 week  [address] [facility]  [address]  [address] [facility]  [phone]             Discharge Medication List as of 7/16/2023  9:00 PM      CONTINUE these medications which have NOT CHANGED    Details   amoxicillin-clavulanate (AUGMENTIN) 875-125 mg per tablet Take 1 tablet by mouth every 12 (twelve) hours for 7 days, Starting Sun 7/16/2023, Until Sun 7/23/2023, Normal      aspirin 81 MG tablet 1 cap(s), Historical Med      azithromycin (ZITHROMAX) 250 mg tablet Take 2 tablets today then 1 tablet daily x 4 days, Normal      Coenzyme Q10 (CO Q 10 PO) Take by mouth, Historical Med      ferrous sulfate 324 (65 Fe) mg Take 1 tablet (324 mg total) by mouth daily before breakfast, Starting Tue 4/4/2023, Normal      fluticasone (FLONASE) 50 mcg/act nasal spray 2 sprays into each nostril daily, Starting Mon 5/8/2023, Normal      loratadine (CLARITIN) 10 mg tablet Take 1 tablet (10 mg total) by mouth daily, Starting Mon 5/8/2023, Normal      Multiple Vitamins-Minerals (MULTIVITAMIN ADULTS 50+ PO) Take by mouth, Historical Med      pravastatin (PRAVACHOL) 20 mg tablet TAKE ONE TABLET BY MOUTH EVERY OTHER DAY, Normal      VITAMIN D, CHOLECALCIFEROL, PO Take by mouth, Historical Med             No discharge procedures on file.     PDMP Review     None          ED Provider  Electronically Signed by           Linda Youssef DO  07/16/23 [id number]

## 2023-07-18 ENCOUNTER — OFFICE VISIT (OUTPATIENT)
Dept: FAMILY MEDICINE CLINIC | Facility: CLINIC | Age: 78
End: 2023-07-18
Payer: MEDICARE

## 2023-07-18 VITALS
HEART RATE: 82 BPM | OXYGEN SATURATION: 92 % | BODY MASS INDEX: 19.39 KG/M2 | WEIGHT: 135.4 LBS | SYSTOLIC BLOOD PRESSURE: 110 MMHG | TEMPERATURE: 97.9 F | HEIGHT: 70 IN | DIASTOLIC BLOOD PRESSURE: 78 MMHG

## 2023-07-18 DIAGNOSIS — J18.9 PNEUMONIA OF LEFT LOWER LOBE DUE TO INFECTIOUS ORGANISM: Primary | ICD-10-CM

## 2023-07-18 DIAGNOSIS — J18.9 PNEUMONIA DUE TO INFECTIOUS ORGANISM, UNSPECIFIED LATERALITY, UNSPECIFIED PART OF LUNG: Primary | ICD-10-CM

## 2023-07-18 DIAGNOSIS — R47.81 SLURRED SPEECH: ICD-10-CM

## 2023-07-18 PROCEDURE — 99214 OFFICE O/P EST MOD 30 MIN: CPT | Performed by: FAMILY MEDICINE

## 2023-07-18 RX ORDER — AMOXICILLIN AND CLAVULANATE POTASSIUM 400; 57 MG/5ML; MG/5ML
875 POWDER, FOR SUSPENSION ORAL 2 TIMES DAILY
Qty: 152.6 ML | Refills: 0 | Status: SHIPPED | OUTPATIENT
Start: 2023-07-18 | End: 2023-07-28

## 2023-07-18 RX ORDER — AZITHROMYCIN 200 MG/5ML
10 POWDER, FOR SUSPENSION ORAL DAILY
Qty: 100 ML | Refills: 0 | Status: SHIPPED | OUTPATIENT
Start: 2023-07-18 | End: 2023-07-28

## 2023-07-18 NOTE — PROGRESS NOTES
Name: Richard Phillips      : 1945      MRN: [id number]  Encounter Provider: Bernard Landa MD  Encounter Date: 2023   Encounter department: [address] [place] [address]     1. Pneumonia of left lower lobe due to infectious organism  His antibiotics were switched to liquid form    2. Slurred speech  -     CT head wo contrast; Future; Expected date: 2023  If he develops arm weakness, facial drooping advised to go to the ER immediately    If symptoms worsen recommend going to the ER immediatly         Subjective     Patient is here because he has been having shortness of breath, fevers, weakness and dizziness for the past several days, 4 days. He went to the ER 2 days ago and was diagnosed with Left lower lobe pneumonia. He was started on Augmentin and azithromycin however he has been unable to tolerate pills. He is feeling about the same. Per wife he had an episode of slurred speech that lasted several minutes. He does have somewhat of a baseline of slurred speech given Hx of throat cancer however it became worse per wife. He denies any facial drooping, arm weakness when the episode occurred. Review of Systems   Constitutional: Negative for activity change, appetite change, fatigue and fever. HENT: Negative for congestion and ear discharge. Respiratory: Negative for cough and shortness of breath. Cardiovascular: Negative for chest pain and palpitations. Gastrointestinal: Negative for diarrhea and nausea. Musculoskeletal: Negative for arthralgias and back pain. Skin: Negative for color change and rash. Neurological: Positive for speech difficulty. Negative for dizziness and headaches. Psychiatric/Behavioral: Negative for agitation and behavioral problems.        Past Medical History:   Diagnosis Date   • Acute renal failure superimposed on chronic kidney disease ([address] [facility]) 2021   • 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[address]) 1999    SCCA left tongue base - T2N1M0- treated at [facility] - had surgery and XRT   • Chronic kidney disease    • COVID-19 with hypoxic respiratory failure 12/31/2021   • Hematuria 10/15/2019   • Hyperlipidemia    • Hypertension    • Left anterior fascicular block    • Stage 3 chronic kidney disease ([address])    • 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[address]) 2/14/2019     Past Surgical History:   Procedure Laterality Date   • CYSTOSCOPY  08/30/2021   • EYELID CLOSURE Left     to prevent eyelashes from rubbing cornea   • HERNIA REPAIR Left    • MODIFIED RADICAL NECK DISSECTION Left 03/03/1999    [facility] - with mandibulotomy and resection SCCA left tongue base - Dr. Merline Bradley   • MYRINGOTOMY W/ TUBES Left 06/07/2023    office procedure - Dr. Abbe Claude   • TONGUE SURGERY     • TONSILLECTOMY       Family History   Problem Relation Age of Onset   • Hypertension Mother    • Stroke Mother      Social History     Socioeconomic History   • Marital status: /Civil Union     Spouse name: None   • Number of children: None   • Years of education: None   • Highest education level: None   Occupational History   • None   Tobacco Use   • Smoking status: Former     Passive exposure: Current   • Smokeless tobacco: Former   • Tobacco comments:     quit 20 years ago   Vaping Use   • Vaping Use: Never used   Substance and Sexual Activity   • Alcohol use: Yes     Comment: occasional   • Drug use: Never   • Sexual activity: Not Currently     Partners: Female   Other Topics Concern   • None   Social History Narrative   • None     Social Determinants of Health     Financial Resource Strain: Not on file   Food Insecurity: No Food Insecurity (1/2/2022)    Hunger Vital Sign    • Worried About Running Out of Food in the Last Year: Never true    • Ran Out of Food in the Last Year: Never true   Transportation Needs: Unknown (1/2/2022)    PRAPARE - Transportation    • Lack of Transportation (Medical): No    • Lack of Transportation (Non-Medical):  Not on file   Physical Activity: Not on file   Stress: Not on file   Social Connections: Not on file   Intimate Partner Violence: Not on file   Housing Stability: Low Risk  (1/2/2022)    Housing Stability Vital Sign    • Unable to Pay for Housing in the Last Year: No    • Number of Places Lived in the Last Year: 1    • Unstable Housing in the Last Year: No     Current Outpatient Medications on File Prior to Visit   Medication Sig   • aspirin 81 MG tablet 1 cap(s)   • Coenzyme Q10 (CO Q 10 PO) Take by mouth   • ferrous sulfate 324 (65 Fe) mg Take 1 tablet (324 mg total) by mouth daily before breakfast   • fluticasone (FLONASE) 50 mcg/act nasal spray 2 sprays into each nostril daily   • Multiple Vitamins-Minerals (MULTIVITAMIN ADULTS 50+ PO) Take by mouth   • pravastatin (PRAVACHOL) 20 mg tablet TAKE ONE TABLET BY MOUTH EVERY OTHER DAY   • VITAMIN D, CHOLECALCIFEROL, PO Take by mouth   • loratadine (CLARITIN) 10 mg tablet Take 1 tablet (10 mg total) by mouth daily (Patient not taking: Reported on 7/18/2023)   • [DISCONTINUED] amoxicillin-clavulanate (AUGMENTIN) 875-125 mg per tablet Take 1 tablet by mouth every 12 (twelve) hours for 7 days (Patient not taking: Reported on 7/18/2023)   • [DISCONTINUED] azithromycin (ZITHROMAX) 250 mg tablet Take 2 tablets today then 1 tablet daily x 4 days (Patient not taking: Reported on 7/18/2023)     Allergies   Allergen Reactions   • Iodinated Contrast Media Other (See Comments)   • Iodine - Food Allergy      Immunization History   Administered Date(s) Administered   • COVID-19 PFIZER VACCINE 0.3 ML IM 04/13/2021, 05/04/2021   • INFLUENZA 02/13/2018, 10/17/2018, 10/14/2019   • Influenza, high dose seasonal 0.7 mL 09/15/2020   • influenza, trivalent, adjuvanted 09/15/2020       Objective     /78 (BP Location: Left arm, Patient Position: Sitting)   Pulse 82   Temp 97.9 °F (36.6 °C) (Temporal)   Ht 5' 10" (1.778 m)   Wt 61.4 kg (135 lb 6.4 oz)   SpO2 92%   BMI 19.43 kg/m²     Physical Exam  Constitutional:       General: He is not in acute distress. Appearance: He is well-developed. He is not diaphoretic. Eyes:      General: No scleral icterus. Pupils: Pupils are equal, round, and reactive to light. Cardiovascular:      Rate and Rhythm: Normal rate and regular rhythm. Heart sounds: Normal heart sounds. No murmur heard. Pulmonary:      Effort: Pulmonary effort is normal. No respiratory distress. Breath sounds: Normal breath sounds. No wheezing. Abdominal:      General: Bowel sounds are normal. There is no distension. Palpations: Abdomen is soft. Tenderness: There is no abdominal tenderness. Skin:     General: Skin is warm and dry. Findings: No rash. Neurological:      Mental Status: He is alert and oriented to person, place, and time.        Francis Levine MD

## 2023-07-19 ENCOUNTER — HOSPITAL ENCOUNTER (INPATIENT)
Facility: HOSPITAL | Age: 78
LOS: 8 days | Discharge: HOME HEALTH | DRG: 871 | End: 2023-07-28
Attending: EMERGENCY MEDICINE | Admitting: FAMILY MEDICINE
Payer: MEDICARE

## 2023-07-19 ENCOUNTER — APPOINTMENT (EMERGENCY)
Dept: CT IMAGING | Facility: HOSPITAL | Age: 78
DRG: 871 | End: 2023-07-19
Payer: MEDICARE

## 2023-07-19 DIAGNOSIS — E43 SEVERE PROTEIN-CALORIE MALNUTRITION (HCC): ICD-10-CM

## 2023-07-19 DIAGNOSIS — J69.0 ASPIRATION PNEUMONIA (HCC): ICD-10-CM

## 2023-07-19 DIAGNOSIS — I48.91 ATRIAL FIBRILLATION WITH RAPID VENTRICULAR RESPONSE (HCC): ICD-10-CM

## 2023-07-19 DIAGNOSIS — R13.10 DYSPHAGIA: ICD-10-CM

## 2023-07-19 DIAGNOSIS — J18.9 PNEUMONIA OF LEFT LOWER LOBE DUE TO INFECTIOUS ORGANISM: ICD-10-CM

## 2023-07-19 DIAGNOSIS — J18.9 PNEUMONIA: Primary | ICD-10-CM

## 2023-07-19 LAB
ALBUMIN SERPL BCP-MCNC: 4.3 G/DL (ref 3.5–5)
ALP SERPL-CCNC: 121 U/L (ref 34–104)
ALT SERPL W P-5'-P-CCNC: 23 U/L (ref 7–52)
ANION GAP SERPL CALCULATED.3IONS-SCNC: 11 MMOL/L
APTT PPP: 33 SECONDS (ref 23–37)
AST SERPL W P-5'-P-CCNC: 23 U/L (ref 13–39)
BASOPHILS # BLD AUTO: 0.06 THOUSANDS/ÂΜL (ref 0–0.1)
BASOPHILS NFR BLD AUTO: 1 % (ref 0–1)
BILIRUB SERPL-MCNC: 0.67 MG/DL (ref 0.2–1)
BUN SERPL-MCNC: 22 MG/DL (ref 5–25)
CALCIUM SERPL-MCNC: 10.7 MG/DL (ref 8.4–10.2)
CARDIAC TROPONIN I PNL SERPL HS: 10 NG/L
CHLORIDE SERPL-SCNC: 97 MMOL/L (ref 96–108)
CO2 SERPL-SCNC: 26 MMOL/L (ref 21–32)
CREAT SERPL-MCNC: 1.38 MG/DL (ref 0.6–1.3)
D DIMER PPP FEU-MCNC: 3.11 UG/ML FEU
EOSINOPHIL # BLD AUTO: 0.04 THOUSAND/ÂΜL (ref 0–0.61)
EOSINOPHIL NFR BLD AUTO: 0 % (ref 0–6)
ERYTHROCYTE [DISTWIDTH] IN BLOOD BY AUTOMATED COUNT: 12.9 % (ref 11.6–15.1)
GFR SERPL CREATININE-BSD FRML MDRD: 49 ML/MIN/1.73SQ M
GLUCOSE SERPL-MCNC: 110 MG/DL (ref 65–140)
HCT VFR BLD AUTO: 47.2 % (ref 36.5–49.3)
HGB BLD-MCNC: 15.7 G/DL (ref 12–17)
IMM GRANULOCYTES # BLD AUTO: 0.2 THOUSAND/UL (ref 0–0.2)
IMM GRANULOCYTES NFR BLD AUTO: 2 % (ref 0–2)
INR PPP: 1.09 (ref 0.84–1.19)
LACTATE SERPL-SCNC: 1 MMOL/L (ref 0.5–2)
LYMPHOCYTES # BLD AUTO: 1.15 THOUSANDS/ÂΜL (ref 0.6–4.47)
LYMPHOCYTES NFR BLD AUTO: 9 % (ref 14–44)
MCH RBC QN AUTO: 28.4 PG (ref 26.8–34.3)
MCHC RBC AUTO-ENTMCNC: 33.3 G/DL (ref 31.4–37.4)
MCV RBC AUTO: 86 FL (ref 82–98)
MONOCYTES # BLD AUTO: 0.76 THOUSAND/ÂΜL (ref 0.17–1.22)
MONOCYTES NFR BLD AUTO: 6 % (ref 4–12)
NEUTROPHILS # BLD AUTO: 10.52 THOUSANDS/ÂΜL (ref 1.85–7.62)
NEUTS SEG NFR BLD AUTO: 82 % (ref 43–75)
NRBC BLD AUTO-RTO: 0 /100 WBCS
PLATELET # BLD AUTO: 544 THOUSANDS/UL (ref 149–390)
PMV BLD AUTO: 8.1 FL (ref 8.9–12.7)
POTASSIUM SERPL-SCNC: 4 MMOL/L (ref 3.5–5.3)
PROCALCITONIN SERPL-MCNC: 0.92 NG/ML
PROT SERPL-MCNC: 9.2 G/DL (ref 6.4–8.4)
PROTHROMBIN TIME: 13.9 SECONDS (ref 11.6–14.5)
RBC # BLD AUTO: 5.52 MILLION/UL (ref 3.88–5.62)
SODIUM SERPL-SCNC: 134 MMOL/L (ref 135–147)
WBC # BLD AUTO: 12.73 THOUSAND/UL (ref 4.31–10.16)

## 2023-07-19 PROCEDURE — 83605 ASSAY OF LACTIC ACID: CPT | Performed by: EMERGENCY MEDICINE

## 2023-07-19 PROCEDURE — 71250 CT THORAX DX C-: CPT

## 2023-07-19 PROCEDURE — 85379 FIBRIN DEGRADATION QUANT: CPT | Performed by: EMERGENCY MEDICINE

## 2023-07-19 PROCEDURE — 87040 BLOOD CULTURE FOR BACTERIA: CPT | Performed by: EMERGENCY MEDICINE

## 2023-07-19 PROCEDURE — 84145 PROCALCITONIN (PCT): CPT | Performed by: EMERGENCY MEDICINE

## 2023-07-19 PROCEDURE — 84484 ASSAY OF TROPONIN QUANT: CPT | Performed by: EMERGENCY MEDICINE

## 2023-07-19 PROCEDURE — 85610 PROTHROMBIN TIME: CPT | Performed by: EMERGENCY MEDICINE

## 2023-07-19 PROCEDURE — 36415 COLL VENOUS BLD VENIPUNCTURE: CPT | Performed by: EMERGENCY MEDICINE

## 2023-07-19 PROCEDURE — 85730 THROMBOPLASTIN TIME PARTIAL: CPT | Performed by: EMERGENCY MEDICINE

## 2023-07-19 PROCEDURE — 85025 COMPLETE CBC W/AUTO DIFF WBC: CPT | Performed by: EMERGENCY MEDICINE

## 2023-07-19 PROCEDURE — 99285 EMERGENCY DEPT VISIT HI MDM: CPT | Performed by: EMERGENCY MEDICINE

## 2023-07-19 PROCEDURE — 93005 ELECTROCARDIOGRAM TRACING: CPT

## 2023-07-19 PROCEDURE — 80053 COMPREHEN METABOLIC PANEL: CPT | Performed by: EMERGENCY MEDICINE

## 2023-07-19 RX ORDER — CEFTRIAXONE 2 G/50ML
2000 INJECTION, SOLUTION INTRAVENOUS ONCE
Status: COMPLETED | OUTPATIENT
Start: 2023-07-19 | End: 2023-07-19

## 2023-07-19 RX ADMIN — CEFTRIAXONE 2000 MG: 2 INJECTION, SOLUTION INTRAVENOUS at 22:43

## 2023-07-19 NOTE — Clinical Note
Shannon Hooks. accompanied Nani Giraldo to the emergency department on 7/19/2023. Return date if applicable: 53/72/9879    Ganesh Grover is the primary caregiver for his father who was seen and treated in this ER 7/19/2023    If you have any questions or concerns, please don't hesitate to call.       Ifrah Wright RN

## 2023-07-20 PROBLEM — K44.9 HIATAL HERNIA: Status: RESOLVED | Noted: 2022-12-15 | Resolved: 2023-02-13

## 2023-07-20 PROBLEM — R53.1 GENERALIZED WEAKNESS: Status: ACTIVE | Noted: 2021-08-30

## 2023-07-20 PROBLEM — E86.0 DEHYDRATION: Status: ACTIVE | Noted: 2019-07-31

## 2023-07-20 PROBLEM — A41.9 SEPSIS (HCC): Status: RESOLVED | Noted: 2019-01-10 | Resolved: 2023-03-31

## 2023-07-20 PROBLEM — A41.9 SEPSIS (HCC): Status: ACTIVE | Noted: 2019-01-10

## 2023-07-20 PROBLEM — E86.0 DEHYDRATION: Status: RESOLVED | Noted: 2019-07-31 | Resolved: 2023-03-31

## 2023-07-20 PROBLEM — R53.1 GENERALIZED WEAKNESS: Status: RESOLVED | Noted: 2021-08-30 | Resolved: 2022-10-12

## 2023-07-20 PROBLEM — K44.9 HIATAL HERNIA: Status: ACTIVE | Noted: 2022-12-15

## 2023-07-20 LAB
2HR DELTA HS TROPONIN: 0 NG/L
4HR DELTA HS TROPONIN: -1 NG/L
ATRIAL RATE: 111 BPM
BACTERIA UR QL AUTO: ABNORMAL /HPF
BILIRUB UR QL STRIP: NEGATIVE
CARDIAC TROPONIN I PNL SERPL HS: 10 NG/L
CARDIAC TROPONIN I PNL SERPL HS: 9 NG/L
CLARITY UR: CLEAR
COLOR UR: YELLOW
GLUCOSE UR STRIP-MCNC: NEGATIVE MG/DL
HGB UR QL STRIP.AUTO: ABNORMAL
KETONES UR STRIP-MCNC: ABNORMAL MG/DL
LEUKOCYTE ESTERASE UR QL STRIP: NEGATIVE
MUCOUS THREADS UR QL AUTO: ABNORMAL
NITRITE UR QL STRIP: NEGATIVE
NON-SQ EPI CELLS URNS QL MICRO: ABNORMAL /HPF
P AXIS: 58 DEGREES
PH UR STRIP.AUTO: 5.5 [PH]
PR INTERVAL: 166 MS
PROT UR STRIP-MCNC: ABNORMAL MG/DL
QRS AXIS: -27 DEGREES
QRSD INTERVAL: 100 MS
QT INTERVAL: 326 MS
QTC INTERVAL: 443 MS
RBC #/AREA URNS AUTO: ABNORMAL /HPF
SP GR UR STRIP.AUTO: 1.02 (ref 1–1.03)
T WAVE AXIS: 64 DEGREES
UROBILINOGEN UR STRIP-ACNC: 4 MG/DL
VENTRICULAR RATE: 111 BPM
WBC #/AREA URNS AUTO: ABNORMAL /HPF

## 2023-07-20 PROCEDURE — 97163 PT EVAL HIGH COMPLEX 45 MIN: CPT

## 2023-07-20 PROCEDURE — 99205 OFFICE O/P NEW HI 60 MIN: CPT | Performed by: INTERNAL MEDICINE

## 2023-07-20 PROCEDURE — 97166 OT EVAL MOD COMPLEX 45 MIN: CPT

## 2023-07-20 PROCEDURE — 82948 REAGENT STRIP/BLOOD GLUCOSE: CPT

## 2023-07-20 PROCEDURE — 36415 COLL VENOUS BLD VENIPUNCTURE: CPT | Performed by: EMERGENCY MEDICINE

## 2023-07-20 PROCEDURE — 93010 ELECTROCARDIOGRAM REPORT: CPT | Performed by: INTERNAL MEDICINE

## 2023-07-20 PROCEDURE — 99223 1ST HOSP IP/OBS HIGH 75: CPT | Performed by: FAMILY MEDICINE

## 2023-07-20 PROCEDURE — 92610 EVALUATE SWALLOWING FUNCTION: CPT

## 2023-07-20 PROCEDURE — 84484 ASSAY OF TROPONIN QUANT: CPT | Performed by: EMERGENCY MEDICINE

## 2023-07-20 PROCEDURE — 81001 URINALYSIS AUTO W/SCOPE: CPT | Performed by: EMERGENCY MEDICINE

## 2023-07-20 PROCEDURE — 84484 ASSAY OF TROPONIN QUANT: CPT | Performed by: PHYSICIAN ASSISTANT

## 2023-07-20 RX ORDER — POLYETHYLENE GLYCOL 3350 17 G/17G
17 POWDER, FOR SOLUTION ORAL DAILY PRN
Status: DISCONTINUED | OUTPATIENT
Start: 2023-07-20 | End: 2023-07-28 | Stop reason: HOSPADM

## 2023-07-20 RX ORDER — FERROUS SULFATE 325(65) MG
325 TABLET ORAL
Status: DISCONTINUED | OUTPATIENT
Start: 2023-07-20 | End: 2023-07-21

## 2023-07-20 RX ORDER — SODIUM CHLORIDE, SODIUM GLUCONATE, SODIUM ACETATE, POTASSIUM CHLORIDE, MAGNESIUM CHLORIDE, SODIUM PHOSPHATE, DIBASIC, AND POTASSIUM PHOSPHATE .53; .5; .37; .037; .03; .012; .00082 G/100ML; G/100ML; G/100ML; G/100ML; G/100ML; G/100ML; G/100ML
75 INJECTION, SOLUTION INTRAVENOUS CONTINUOUS
Status: DISPENSED | OUTPATIENT
Start: 2023-07-20 | End: 2023-07-20

## 2023-07-20 RX ORDER — ONDANSETRON 2 MG/ML
4 INJECTION INTRAMUSCULAR; INTRAVENOUS EVERY 6 HOURS PRN
Status: DISCONTINUED | OUTPATIENT
Start: 2023-07-20 | End: 2023-07-28 | Stop reason: HOSPADM

## 2023-07-20 RX ORDER — ACETAMINOPHEN 325 MG/1
650 TABLET ORAL EVERY 6 HOURS PRN
Status: DISCONTINUED | OUTPATIENT
Start: 2023-07-20 | End: 2023-07-28 | Stop reason: HOSPADM

## 2023-07-20 RX ORDER — HEPARIN SODIUM 5000 [USP'U]/ML
5000 INJECTION, SOLUTION INTRAVENOUS; SUBCUTANEOUS EVERY 8 HOURS SCHEDULED
Status: DISCONTINUED | OUTPATIENT
Start: 2023-07-20 | End: 2023-07-26

## 2023-07-20 RX ORDER — CEFTRIAXONE 1 G/50ML
1000 INJECTION, SOLUTION INTRAVENOUS EVERY 24 HOURS
Status: DISCONTINUED | OUTPATIENT
Start: 2023-07-21 | End: 2023-07-27

## 2023-07-20 RX ORDER — PRAVASTATIN SODIUM 20 MG
20 TABLET ORAL EVERY OTHER DAY
Status: DISCONTINUED | OUTPATIENT
Start: 2023-07-20 | End: 2023-07-28 | Stop reason: HOSPADM

## 2023-07-20 RX ADMIN — SODIUM CHLORIDE, SODIUM GLUCONATE, SODIUM ACETATE, POTASSIUM CHLORIDE, MAGNESIUM CHLORIDE, SODIUM PHOSPHATE, DIBASIC, AND POTASSIUM PHOSPHATE 125 ML/HR: .53; .5; .37; .037; .03; .012; .00082 INJECTION, SOLUTION INTRAVENOUS at 05:47

## 2023-07-20 RX ADMIN — ACETAMINOPHEN 650 MG: 325 TABLET, FILM COATED ORAL at 08:16

## 2023-07-20 RX ADMIN — PRAVASTATIN SODIUM 20 MG: 20 TABLET ORAL at 08:12

## 2023-07-20 RX ADMIN — FERROUS SULFATE TAB 325 MG (65 MG ELEMENTAL FE) 325 MG: 325 (65 FE) TAB at 08:12

## 2023-07-20 RX ADMIN — HEPARIN SODIUM 5000 UNITS: 5000 INJECTION INTRAVENOUS; SUBCUTANEOUS at 22:28

## 2023-07-20 RX ADMIN — ASPIRIN 81 MG: 81 TABLET, COATED ORAL at 08:12

## 2023-07-20 RX ADMIN — HEPARIN SODIUM 5000 UNITS: 5000 INJECTION INTRAVENOUS; SUBCUTANEOUS at 14:03

## 2023-07-20 RX ADMIN — HEPARIN SODIUM 5000 UNITS: 5000 INJECTION INTRAVENOUS; SUBCUTANEOUS at 05:47

## 2023-07-20 NOTE — ASSESSMENT & PLAN NOTE
· IV Rocephin  · Aspiration precautions  · Respiratory protocol  · Symptom management  · GI consult to discussed possibility of G-tube

## 2023-07-20 NOTE — RESPIRATORY THERAPY NOTE
RT Protocol Note  Yolande Chilel 70 y.o. male MRN: [id number]  Unit/Bed#: -02 Encounter: [id number]    Assessment    Principal Problem:    Multifocal pneumonia from recurring aspiration  Active Problems:    Sepsis ([address])    Dehydration    CKD (chronic kidney disease) stage 3, GFR 30-59 ml/min ([facility])    Generalized weakness    Esophageal dysphagia    Hiatal hernia      Home Pulmonary Medications:     07/1945   Respiratory Protocol   Protocol Initiated? Yes   Protocol Selection Respiratory   Language Barrier? No   Medical & Social History Reviewed? Yes   Diagnostic Studies Reviewed? Yes   Physical Assessment Performed? Yes   Respiratory Plan No distress/Pulmonary history   Respiratory Assessment   Assessment Type Assess only   General Appearance Alert; Awake   Respiratory Pattern Normal   Chest Assessment Chest expansion symmetrical   Bilateral Breath Sounds Diminished   R Breath Sounds Diminished   L Breath Sounds Diminished   Location Specific No   Cough None   Resp Comments Resp protocol completed. Pt does not use inhalers or nebulizer machines at home.  No home O2 use or cpap machines   O2 Device NC            Past Medical History:   Diagnosis Date    Acute renal failure superimposed on chronic kidney disease ([address]) 12/31/2021    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[address]) 03/1999    SCCA left tongue base - T2N1M0- treated at [facility] - had surgery and XRT    Chronic kidney disease     COVID-19 with hypoxic respiratory failure 12/31/2021    Essential hypertension 5/6/2019    Hematuria 10/15/2019    Hyperlipidemia     Hypertension     Left anterior fascicular block     Stage 3 chronic kidney disease ([address])     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[address]) 2/14/2019     Social History     Socioeconomic History    Marital status: /Civil Union     Spouse name: None    Number of children: None    Years of education: None    Highest education level: None   Occupational History    None   Tobacco Use    Smoking status: Former Passive exposure: Current    Smokeless tobacco: Former    Tobacco comments:     quit 20 years ago   Vaping Use    Vaping Use: Never used   Substance and Sexual Activity    Alcohol use: Yes     Comment: occasional    Drug use: Never    Sexual activity: Not Currently     Partners: Female   Other Topics Concern    None   Social History Narrative    None     Social Determinants of Health     Financial Resource Strain: Not on file   Food Insecurity: No Food Insecurity (7/20/2023)    Hunger Vital Sign     Worried About Running Out of Food in the Last Year: Never true     Ran Out of Food in the Last Year: Never true   Transportation Needs: No Transportation Needs (7/20/2023)    PRAPARE - Transportation     Lack of Transportation (Medical): No     Lack of Transportation (Non-Medical): No   Physical Activity: Not on file   Stress: Not on file   Social Connections: Not on file   Intimate Partner Violence: Not on file   Housing Stability: Low Risk  (7/20/2023)    Housing Stability Vital Sign     Unable to Pay for Housing in the Last Year: No     Number of Places Lived in the Last Year: 1     Unstable Housing in the Last Year: No       Subjective         Objective    Physical Exam:   Assessment Type: Assess only  General Appearance: Alert, Awake  Respiratory Pattern: Normal  Chest Assessment: Chest expansion symmetrical  Bilateral Breath Sounds: Diminished  R Breath Sounds: Diminished  L Breath Sounds: Diminished  Location Specific: No  Cough: None  O2 Device: NC    Vitals:  Blood pressure 124/79, pulse (!) 111, temperature 98 °F (36.7 °C), temperature source Axillary, resp. rate 17, SpO2 (!) 87 %. Imaging and other studies:     O2 Device: NC     Plan    Respiratory Plan: No distress/Pulmonary history        Resp Comments: Resp protocol completed. Pt does not use inhalers or nebulizer machines at home.  No home O2 use or cpap machines

## 2023-07-20 NOTE — ASSESSMENT & PLAN NOTE
Encounter Date: 10/5/2018       History     Chief Complaint   Patient presents with    Vomiting     pt awoke at 1am with n/v/d      HPI   The patient is 19-year-old girl who presents emergency department with acute onset of nausea vomiting and diarrhea that worker from sleep around 1:00 a.m..  Patient has had several episodes of nonbloody nonbilious emesis.  Denies any bloody bowel movements.  No fever.  No dysuria.  Denies abdominal pain.  Review of patient's allergies indicates:  No Known Allergies  History reviewed. No pertinent past medical history.  Past Surgical History:   Procedure Laterality Date    MOUTH SURGERY      WISDOM TOOTH EXTRACTION       Family History   Adopted: Yes   Problem Relation Age of Onset    Glaucoma Father     Macular degeneration Neg Hx     Retinal detachment Neg Hx      Social History     Tobacco Use    Smoking status: Never Smoker    Smokeless tobacco: Never Used   Substance Use Topics    Alcohol use: No    Drug use: No     Review of Systems  REVIEW OF SYSTEMS  CONSTITUTIONAL: Negative for fever.  HEENT:  Negative for sore throat.   HEART:   Negative for chest pain..  LUNG:  Negative for shortness of breath.  ABDOMEN:  Positive for nausea, vomiting, diarrhea.  :  No discharge, dysuria  EXTREMITIES:  No swelling  NEURO:  Negative for weakness.   SKIN:  Negative for rash.  Psych: No depression  HEME: Does not bruise/bleed easily.           Physical Exam     Initial Vitals [10/05/18 0409]   BP Pulse Resp Temp SpO2   (!) 140/90 89 18 98.6 °F (37 °C) 100 %      MAP       --         Physical Exam  Physical Exam   Nursing note and vitals reviewed.   Constitutional: Oriented to person, place, and time. Appears well-developed and well-nourished.   HENT:  Mucous membranes are moist.  Head: Normocephalic and atraumatic.   Eyes: EOM are normal. Sclera is anicteric.  Neck: Normal range of motion. Neck supple.   Cardiovascular: Normal rate.   Pulmonary/Chest: [place] normal. Clear BS b/l  · From history of oropharyngeal cancer presently in remission  · Speech therapy consult  · Present diet: Purée, thin   Abdominal: Soft, mild epigastric tenderness., no distension.  Bowel sounds are normal.  Musculoskeletal: Normal range of motion.   Neurological:Alert and oriented to person, place, and time.   Psychiatric: Normal mood and affect. Behavior is normal.         ED Course   Procedures  Labs Reviewed   URINALYSIS - Abnormal; Notable for the following components:       Result Value    Appearance, UA Hazy (*)     Specific Gravity, UA >=1.030 (*)     Occult Blood UA 2+ (*)     All other components within normal limits   URINALYSIS MICROSCOPIC - Abnormal; Notable for the following components:    RBC, UA 7 (*)     Bacteria, UA Few (*)     All other components within normal limits   POCT URINE PREGNANCY          Imaging Results    None          Medical Decision Making:   Initial Assessment:   This is a 19 y.o. female patient with presentation of abdominal pain and diarrhea. After my evaluation and workup, I believe this patient likely has viral gastroenteritis or food poisoning based upon history and physical exam.  Patient denied any bloody stool, fever/chills, and on exam had no abdominal tenderness. The patient was managed with Zofran and they subsequently improved.. Labs reviewed and were unremarkable. At this time, I do not feel CAT scan is necessary - patient symptoms resolved, appears well-hydrated.   Patient is stable for discharge. Results, clinical impression and rx discussed with patient. Pt to call for follow up with PCP or referred physician in 2-3 days. Pt is to return to the ED immediately for any new or worsening symptoms, including but not limited to: bloody stool, worsening abdominal pain, fever chills, inability to tolerate food or drink, severe weakness/dehydration, or for any other concerns.  Pt had time for ask questions to which they were addressed. Pt expressed understanding.                        Clinical Impression:   The encounter diagnosis was Vomiting, intractability of vomiting not specified,  presence of nausea not specified, unspecified vomiting type.                             Rigo Amato MD  10/05/18 0609

## 2023-07-20 NOTE — H&P
[address]  H&P  Name: Yeni Gee 70 y.o. male I MRN: [id number]  Unit/Bed#: ED 21 I Date of Admission: 7/19/2023   Date of Service: 7/20/2023 I Hospital Day: 0      Assessment/Plan   * Multifocal pneumonia from recurring aspiration  Assessment & Plan  · IV Rocephin  · Aspiration precautions  · Respiratory protocol  · Symptom management  · GI consult to discussed possibility of G-tube    Hiatal hernia  Assessment & Plan  · PPI  · Likely contributing to aspiration  · Has been referred to bariatric surgery in the past    Esophageal dysphagia  Assessment & Plan  · From history of oropharyngeal cancer presently in remission  · Speech therapy consult  · Present diet: Purée, thin    Generalized weakness  Assessment & Plan  · From sepsis, multifocal pneumonia, poor p.o. intake, dehydration, malnutrition  · PT/OT evals ordered    CKD (chronic kidney disease) stage 3, GFR 30-59 ml/min [facility])  Assessment & Plan  Lab Results   Component Value Date    EGFR 49 07/19/2023    EGFR 44 07/15/2023    EGFR 44 06/29/2023    CREATININE 1.38 (H) 07/19/2023    CREATININE 1.49 (H) 07/15/2023    CREATININE 1.51 (H) 06/29/2023     · Chronic and stable    Dehydration  Assessment & Plan  · Noted on physical exam and labs  · IVF    Sepsis ([address])  Assessment & Plan  · Tachypnea, tachycardia. White blood cells 13 actually improved compared to last week  · Trend end points  · Source: multifocal pneumonia from recurring aspiration  · IVF  · IV Rocephin  · Await BCx x2        VTE Pharmacologic Prophylaxis: VTE Score: 5 High Risk (Score >/= 5) - Pharmacological DVT Prophylaxis Ordered: heparin. Sequential Compression Devices Ordered. Code Status: Level 1 - Full Code   Discussion with family: pt alone.      Anticipated Length of Stay: Patient will be admitted on an inpatient basis with an anticipated length of stay of greater than 2 midnights secondary to iv abx, st consult, gi consult, nutrition consult, ivf, trend labs, pt ot evals. Total Time Spent on Date of Encounter in care of patient: 65 minutes This time was spent on one or more of the following: performing physical exam; counseling and coordination of care; obtaining or reviewing history; documenting in the medical record; reviewing/ordering tests, medications or procedures; communicating with other healthcare professionals and discussing with patient's family/caregivers. Chief Complaint: "I didn't get better"    History of Present Illness:  Marysol Bishop is a 70 y.o. male with a PMH of oropharyngeal cancer in remission, with history of dysphagia, recurring aspiration pneumonia, abdominal aortic aneurysm without rupture, CKD, hiatal hernia who presents with the complaint of "I did not get better.  "  Patient was seen in this emergency room 7/15/2023 for complaint dizziness, cold sweats, shortness of breath and found to have pneumonia on the left lower lobe. Prescribed Augmentin and discharged home however he did have trouble getting the antibiotics for 1 day. He followed up with his primary care provider and he was still having generalized weakness and shortness of breath and slurred speech. He was sent to the emergency room and wife drove him here. Patient reports continual cough which is unchanged from his baseline, denies chest pain, back pain, trouble breathing presently, choking, fever, chills at home. His main complaint is that he has low energy and generally weak. He does endorse very low p.o. intake of food and drink. Patient is interested in talking to her doctor about feeding tube basement during this hospitalization. Review of Systems:  Review of Systems   Constitutional: Positive for fatigue and unexpected weight change. Negative for chills, diaphoresis and fever. HENT: Positive for trouble swallowing. Eyes: Negative. Respiratory: Positive for cough. Cardiovascular: Negative for chest pain.    Gastrointestinal: Negative for abdominal pain and vomiting. Endocrine: Negative. Genitourinary: Negative. Musculoskeletal: Negative. Negative for back pain. Skin: Negative. Allergic/Immunologic: Negative. Neurological: Positive for speech difficulty and weakness. Negative for tremors. Psychiatric/Behavioral: Negative for confusion. Past Medical and Surgical History:   Past Medical History:   Diagnosis Date   • Acute renal failure superimposed on chronic kidney disease ([address]) 12/31/2021   • 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[address]) 03/1999    SCCA left tongue base - T2N1M0- treated at [facility] - had surgery and XRT   • Chronic kidney disease    • COVID-19 with hypoxic respiratory failure 12/31/2021   • Essential hypertension 5/6/2019   • Hematuria 10/15/2019   • Hyperlipidemia    • Hypertension    • Left anterior fascicular block    • Stage 3 chronic kidney disease ([address])    • 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[address]) 2/14/2019       Past Surgical History:   Procedure Laterality Date   • CYSTOSCOPY  08/30/2021   • EYELID CLOSURE Left     to prevent eyelashes from rubbing cornea   • HERNIA REPAIR Left    • MODIFIED RADICAL NECK DISSECTION Left 03/03/1999    [facility] - with mandibulotomy and resection SCCA left tongue base - Dr. Jamar Bradshaw   • MYRINGOTOMY W/ TUBES Left 06/07/2023    office procedure - Dr. Tiara Treviño   • TONGUE SURGERY     • TONSILLECTOMY         Meds/Allergies:  Prior to Admission medications    Medication Sig Start Date End Date Taking?  Authorizing Provider   amoxicillin-clavulanate (AUGMENTIN) 400-57 mg/5 mL suspension Take 10.9 mL (875 mg total) by mouth 2 (two) times a day for 7 days 7/18/23 7/25/23  Vlad Tinajero MD   aspirin 81 MG tablet 1 cap(s) 1/1/1901   Historical Provider, MD   azithromycin (ZITHROMAX) 200 mg/5 mL suspension Take 15.6 mL (624 mg total) by mouth daily for 5 days 7/18/23 7/23/23  Vlad Tinajero MD   Coenzyme Q10 (CO Q 10 PO) Take by mouth    Historical Provider, MD   ferrous sulfate 324 (65 Fe) mg Take 1 tablet (324 mg total) by mouth daily before breakfast 4/4/23   Cat Helton DO   fluticasone [facility]) 50 mcg/act nasal spray 2 sprays into each nostril daily 5/8/23   Priyank Middleton PA-C   Multiple Vitamins-Minerals (MULTIVITAMIN ADULTS 50+ PO) Take by mouth    Historical Provider, MD   pravastatin (PRAVACHOL) 20 mg tablet TAKE ONE TABLET BY MOUTH EVERY OTHER DAY 5/2/23   Emily Rasheed MD   VITAMIN D, CHOLECALCIFEROL, PO Take by mouth    Historical Provider, MD   loratadine (CLARITIN) 10 mg tablet Take 1 tablet (10 mg total) by mouth daily  Patient not taking: Reported on 7/18/2023 5/8/23 7/20/23  Priyank Middleton PA-C     I have reviewed home medications using recent Epic encounter. Allergies: Allergies   Allergen Reactions   • Iodinated Contrast Media Other (See Comments)   • Iodine - Food Allergy        Social History:  Marital Status: /Civil Union   Occupation: non-contributory  Patient Pre-hospital Living Situation: Home w wife  Patient Pre-hospital Level of Mobility: walks wo assistance  Patient Pre-hospital Diet Restrictions: puree/ thins  Substance Use History:   Social History     Substance and Sexual Activity   Alcohol Use Yes    Comment: occasional     Social History     Tobacco Use   Smoking Status Former   • Passive exposure: Current   Smokeless Tobacco Former   Tobacco Comments    quit 20 years ago     Social History     Substance and Sexual Activity   Drug Use Never       Family History:  Family History   Problem Relation Age of Onset   • Hypertension Mother    • Stroke Mother        Physical Exam:     Vitals:   Blood Pressure: 113/62 (07/20/23 0600)  Pulse: 98 (07/20/23 0600)  Temperature: 99.4 °F (37.4 °C) (07/19/23 2133)  Temp Source: Oral (07/19/23 2133)  Respirations: 18 (07/20/23 0600)  SpO2: 93 % (07/20/23 0600)    Physical Exam  Vitals and nursing note reviewed. Constitutional:       Appearance: He is ill-appearing. He is not diaphoretic. HENT:      Head: Atraumatic. Nose: Nose normal.      Mouth/Throat:      Mouth: Mucous membranes are dry. Comments: Sounds like his tongue is large inside his mouth  Eyes:      Conjunctiva/sclera: Conjunctivae normal.   Cardiovascular:      Rate and Rhythm: Normal rate and regular rhythm. Pulses: Normal pulses. Pulmonary:      Comments: Tachypnea at rest, unable to speak more than a few words because of work of breathing    Constant wet cough     Referred upper airway sounds  Chest:      Chest wall: No tenderness. Abdominal:      Tenderness: There is no abdominal tenderness. Musculoskeletal:         General: No swelling. Cervical back: Normal range of motion. Skin:     General: Skin is warm and dry. Neurological:      Mental Status: He is alert. Mental status is at baseline.    Psychiatric:      Comments: Cognitive changes in the older adult          Additional Data:     Lab Results:  Results from last 7 days   Lab Units 07/19/23  2153   WBC Thousand/uL 12.73*   HEMOGLOBIN g/dL 15.7   HEMATOCRIT % 47.2   PLATELETS Thousands/uL 544*   NEUTROS PCT % 82*   LYMPHS PCT % 9*   MONOS PCT % 6   EOS PCT % 0     Results from last 7 days   Lab Units 07/19/23  2153   SODIUM mmol/L 134*   POTASSIUM mmol/L 4.0   CHLORIDE mmol/L 97   CO2 mmol/L 26   BUN mg/dL 22   CREATININE mg/dL 1.38*   ANION GAP mmol/L 11   CALCIUM mg/dL 10.7*   ALBUMIN g/dL 4.3   TOTAL BILIRUBIN mg/dL 0.67   ALK PHOS U/L 121*   ALT U/L 23   AST U/L 23   GLUCOSE RANDOM mg/dL 110     Results from last 7 days   Lab Units 07/19/23  2153   INR  1.09             Results from last 7 days   Lab Units 07/19/23  2153   LACTIC ACID mmol/L 1.0   PROCALCITONIN ng/ml 0.92*       Lines/Drains:  Invasive Devices     Peripheral Intravenous Line  Duration           Peripheral IV 07/19/23 Left;Ventral (anterior) Forearm <1 day    Peripheral IV 07/19/23 Right Antecubital <1 day                    Imaging: Reviewed radiology reports from this admission including: chest CT scan  CT chest wo contrast   Final Result by Nolberto Nance DO (07/20 0146)      Patchy groundglass opacities in the periphery of the right upper lobe. Scattered reticulonodular opacities in the bilateral lungs. Groundglass and alveolar opacities as well as focal consolidation with air bronchograms in the right lower lobe. Findings    taken together suspicious for multifocal infection in the appropriate clinical setting. Small dependent pleural effusions. Tiny pericardial effusion. Coronary atherosclerosis, hypoplastic thyroid gland, large hiatal hernia, left-sided nephrolithiasis, and other findings as above. Workstation performed: [id number]             EKG and Other Studies Reviewed on Admission:   · EKG: Sinus Tachycardia. .    ** Please Note: This note has been constructed using a voice recognition system.  **

## 2023-07-20 NOTE — PLAN OF CARE
Problem: PHYSICAL THERAPY ADULT  Goal: Performs mobility at highest level of function for planned discharge setting. See evaluation for individualized goals. Description: Treatment/Interventions: Functional transfer training, LE strengthening/ROM, Elevations, Therapeutic exercise, Endurance training, Patient/family training, Equipment eval/education, Bed mobility, Gait training          See flowsheet documentation for full assessment, interventions and recommendations. Note: Prognosis: Good  Problem List: Decreased strength, Decreased endurance, Impaired balance, Decreased mobility  Assessment: pt is a 79y/o m who [place] [facility]. PMH significant for acute renal failure, tongue cancer, CKD, COVID-19, HTN, CKD 3. at baseline, pt (I) c functional mobility tasks s AD. resides c spouse in 2 story home c 1st floor set up + 3 ISAIAS. currently requires min (A)x1 to complete mobility tasks 2* deficits in strength, balance, gait quality, + activity tolerance noted in PT exam above. Barthel Index 50/100, Modified [place] 4. min verbal cues required for safe technique. ambulated 10' s AD limited 2* fatigue. would benefit from skilled PT to maximize functional mobility + improve quality of life. AM-PAC raw score 18 indicating pt safe for d/c home c follow up c hhpt, however please refer to PT d/c recommendation for safe d/c planning. PT eval of high complexity 2* unstable med status c pt requiring ongoing medical management 2* PNA. pt c multiple co-morbidities + mobility deficits requiring min (A)x1 to perform mobility tasks. resides in 2 story home c 3 ISAIAS; also currently on supplemental O2 which pt does not use at baseline. Barriers to Discharge: Inaccessible home environment     PT Discharge Recommendation: Home with home health rehabilitation    See flowsheet documentation for full assessment.

## 2023-07-20 NOTE — ED NOTES
Patient transported to [address] [place], [place] [address]clau Morgan,[facility] Robin Donaldirene, [address] [place] [address]  07/19/23 4603

## 2023-07-20 NOTE — CASE MANAGEMENT
Case Management Assessment & Discharge Planning Note    Patient name Cesar Guerrero  Location ED 20/ED 20 MRN [id number]  : 1945 Date 2023       Current Admission Date: 2023  Current Admission Diagnosis:Multifocal pneumonia from recurring aspiration   Patient Active Problem List    Diagnosis Date Noted   • Pneumonia of left lower lobe due to infectious organism 2023   • Slurred speech 2023   • Articular cartilage disorder of shoulder region 2023   • Cervical spondylosis 2023   • Disorder of bursae of shoulder region 2023   • Impingement syndrome of shoulder region 2023   • Hiatal hernia 12/15/2022   • Bilateral carotid artery stenosis 2022   • Abdominal aortic aneurysm (AAA) without rupture ([address]) 2022   • Chronic kidney disease-mineral and bone disorder 2022   • Multifocal pneumonia from recurring aspiration 2021   • Esophageal dysphagia 2021   • Generalized weakness 2021   • Osteoarthritis of left hip 2021   • Synovial cyst of lumbar facet joint 2021   • Myofascial pain on left side 2020   • Lumbar radiculopathy 2020   • CKD (chronic kidney disease) stage 3, GFR 30-59 ml/min ([address]) 2020   • Fatigue 2020   • Benign localized prostatic hyperplasia with lower urinary tract symptoms (LUTS) 10/15/2019   • Overactive bladder 10/09/2019   • Dehydration 2019   • History of head and neck radiation 2019   • Excessive salivation 01/10/2019   • Sepsis ([address]) 01/10/2019   • History of carcinoma of tongue 2018   • Microscopic hematuria 2018   • Monoclonal gammopathy of unknown significance 2016      LOS (days): 0  Geometric Mean LOS (GMLOS) (days):   Days to GMLOS:     OBJECTIVE:    Risk of Unplanned Readmission Score: 12.81         Current admission status: Inpatient       Preferred Pharmacy:   [facility]pharmacy [id number]- [place], [place] - [address] [facility] [address]  Phone: [phone] Fax: [address], [address] [facility] [address]  [address]  [place] [address]  Phone: [phone] Fax: [phone]    Primary Care Provider: Vlad Tinajero MD    Primary Insurance: MEDICARE  Secondary Insurance: BLUE CROSS    ASSESSMENT:  [address] [facility], [address] - Spouse   Primary Phone: [phone] [facility]  Home Phone: [phone]               Advance Directives  Does patient have a [address]?: Yes  Does patient have Advance Directives?: Yes  Advance Directives: Living will, Power of  for finance, Power of  for health care  Primary Contact: wife         Readmission Root Cause  30 Day Readmission: Yes  Who directed you to return to the hospital?: Self  Did you understand whom to contact if you had questions or problems?: Yes  Did you get your prescriptions before you left the hospital?: No  Reason[de-identified] Not preferred pharmacy  Were you able to get your prescriptions filled when you left the hospital?: Yes  Did you take your medications as prescribed?: Yes  Were you able to get to your follow-up appointments?: Yes  Patient was readmitted due to: Increased SOB    Patient Information  Admitted from[de-identified] Home  Mental Status: Alert  During Assessment patient was accompanied by: Not accompanied during assessment  Assessment information provided by[de-identified] Patient  Primary Caregiver: Self  Support Systems: Spouse/significant other  [place] of Residence: [address] [place] [address] do you live in?: [place]  Home entry access options.  Select all that apply.: Stairs  Number of steps to enter home.: 4  Do the steps have railings?: Yes  Type of Current Residence: Bi-level  Upon entering residence, is there a bedroom on the main floor (no further steps)?: Yes  Upon entering residence, is there a bathroom on the main floor (no further steps)?: Yes  In the last 12 months, was there a time when you were not able to pay the mortgage or rent on time?: No  In the last 12 months, how many places have you lived?: 1  In the last 12 months, was there a time when you did not have a steady place to sleep or slept in a shelter (including now)?: No  Homeless/housing insecurity resource given?: No  Living Arrangements: Lives w/ Spouse/significant other  Is patient a ?: No    Activities of Daily Living Prior to Admission  Functional Status: Independent  Completes ADLs independently?: Yes  Ambulates independently?: Yes  Does patient use assisted devices?: No  Does patient currently own DME?: No  Does patient have a history of Outpatient Therapy (PT/OT)?: Yes  Does the patient have a history of Short-Term Rehab?: No  Does patient have a history of HHC?: No  Does patient currently have [address]?: No         Patient Information Continued  Income Source: Unemployed  Does patient have prescription coverage?: Yes  Within the past 12 months, you worried that your food would run out before you got the money to buy more.: Never true  Within the past 12 months, the food you bought just didn't last and you didn't have money to get more.: Never true  Food insecurity resource given?: No  Does patient receive dialysis treatments?: No  Does patient have a history of substance abuse?: No  Does patient have a history of Mental Health Diagnosis?: No         Means of Transportation  Means of Transport to Appts[de-identified] Drives Self  In the past 12 months, has lack of transportation kept you from medical appointments or from getting medications?: No  In the past 12 months, has lack of transportation kept you from meetings, work, or from getting things needed for daily living?: No  Was application for public transport provided?: No        DISCHARGE DETAILS:    Discharge planning discussed with[de-identified] patient  Freedom of Choice: Yes  Comments - Freedom of Choice: DC needs TBD  CM contacted family/caregiver?: No- see comments                  [facility] Is the patient interested in [address] at discharge?: No    DME Referral Provided  Referral made for DME?: No    Other Referral/Resources/Interventions Provided:  Referral Comments: DC needs TBD         Treatment Team Recommendation: Home  Discharge Destination Plan[de-identified] Home  Transport at Discharge : Automobile, Family

## 2023-07-20 NOTE — OCCUPATIONAL THERAPY NOTE
Occupational Therapy Evaluation      [facility]    7/20/2023    Patient Active Problem List   Diagnosis    Excessive salivation    Sepsis ([address])    History of head and neck radiation    Dehydration    Overactive bladder    Benign localized prostatic hyperplasia with lower urinary tract symptoms (LUTS)    CKD (chronic kidney disease) stage 3, GFR 30-59 ml/min ([facility])    Fatigue    Generalized weakness    Esophageal dysphagia    Multifocal pneumonia from recurring aspiration    Chronic kidney disease-mineral and bone disorder    Bilateral carotid artery stenosis    Abdominal aortic aneurysm (AAA) without rupture ([facility])    Hiatal hernia    Articular cartilage disorder of shoulder region    Cervical spondylosis    Disorder of bursae of shoulder region    Impingement syndrome of shoulder region    History of carcinoma of tongue    Microscopic hematuria    Monoclonal gammopathy of unknown significance    Myofascial pain on left side    Osteoarthritis of left hip    Lumbar radiculopathy    Synovial cyst of lumbar facet joint    Pneumonia of left lower lobe due to infectious organism    Slurred speech       Past Medical History:   Diagnosis Date    Acute renal failure superimposed on chronic kidney disease ([address]) 12/31/2021    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[address]) 03/1999    SCCA left tongue base - T2N1M0- treated at [facility] - had surgery and XRT    Chronic kidney disease     COVID-19 with hypoxic respiratory failure 12/31/2021    Essential hypertension 5/6/2019    Hematuria 10/15/2019    Hyperlipidemia     Hypertension     Left anterior fascicular block     Stage 3 chronic kidney disease ([address])     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[address]) 2/14/2019       Past Surgical History:   Procedure Laterality Date    CYSTOSCOPY  08/30/2021    EYELID CLOSURE Left     to prevent eyelashes from rubbing cornea    HERNIA REPAIR Left     MODIFIED RADICAL NECK DISSECTION Left 03/03/1999    [facility] - with mandibulotomy and resection SCCA left tongue base - Dr. Kayla Toney    MYRINGOTOMY W/ TUBES Left 06/07/2023    office procedure - Dr. Denise Lopes          07/20/23 6350   OT Last Visit   OT Visit Date 07/20/23   Note Type   Note type Evaluation   Additional Comments Pt supine in bed upon arrival. Pt agreeable to OT evaluation. Pain Assessment   Pain Assessment Tool 0-10   Pain Score No Pain   Restrictions/Precautions   Weight Bearing Precautions Per Order No   Other Precautions Chair Alarm; Bed Alarm;Multiple lines;Telemetry;O2;Fall Risk  (3L O2 via NC; pt reports no O2 use at home)   Home Living   Type of [address] [place] [address] Dr Two level;Stairs to enter with rails; Able to live on main level with bedroom/bathroom; Performs ADLs on one level  (3 ISAIAS)   Bathroom Shower/Tub Tub/shower unit   Bathroom Toilet Raised   Bathroom Equipment Grab bars in shower   [address] Other (Comment)  (none per pt)   Additional Comments Pt reports ambulating with no AD   Prior Function   Level of [place] Independent with ADLs; Independent with functional mobility; Needs assistance with IADLS   Lives With Spouse   Receives Help From Family   IADLs Independent with driving; Independent with medication management; Family/Friend/Other provides meals   Falls in the last 6 months 0   Vocational Retired   Lifestyle   Autonomy Pt reports being independent with ADLs, ambulatory with no AD, and lives with wife   Reciprocal Relationships wife   Service to Others retired   Intrinsic Gratification "doing things around the house"   ADL   Where Assessed Edge of bed   Eating Assistance 6  Modified independent   Grooming Assistance 6  [address] 5  Supervision/Setup   LB Bathing Assistance 3  Moderate Assistance   [address] 3  Moderate [address] [place]  4  Minimal Assistance   Additional Comments ADL levels based on functional performance during OT evaluation   Bed Mobility   Supine to Sit 4  Minimal assistance   Additional items Assist x 1;HOB elevated; Increased time required;Verbal cues   Sit to Supine 4  Minimal assistance   Additional items Assist x 1; Increased time required;Verbal cues   Additional Comments Pt required verbal cues for body mechanics. Transfers   Sit to Stand 4  Minimal assistance   Additional items Assist x 1; Increased time required;Verbal cues   Stand to Sit 4  Minimal assistance   Additional items Assist x 1; Increased time required;Verbal cues   Additional Comments Pt required verbal cues for hand placements. Functional Mobility   Functional Mobility 4  Minimal assistance   Additional Comments Pt ambulated in room with min assist of 1 and no AD. Pt noted to be unsteady but no overt LOB noted. Pt denied dizziness or SOB with movement. Pt noted to experience coughing throughout session. Balance   Static Sitting Fair +   Dynamic Sitting Fair   Static Standing Fair -   Dynamic Standing Fair -   Activity Tolerance   Activity Tolerance Patient limited by fatigue   Medical Staff Made Aware Co-evaluation with PT Kody Thorne due to patient's medical complexity and limited activity tolerance   RUE Assessment   RUE Assessment X  (Full AROM, 3+/5 MMT)   LUE Assessment   LUE Assessment X  (Full AROM, 3+/5 MMT)   Hand Function   Gross Motor Coordination Functional   Fine Motor Coordination Functional   Sensation   Light Touch No apparent deficits   Vision-Basic Assessment   Current Vision Wears glasses all the time   Cognition   Overall Cognitive Status [facility]   Arousal/Participation Alert; Responsive; Cooperative   Attention Within functional limits   Orientation Level Oriented X4   Memory Within functional limits   Following Commands Follows all commands and directions without difficulty   Assessment   Limitation Decreased ADL status; Decreased UE strength;Decreased endurance;Decreased self-care trans;Decreased high-level ADLs   Prognosis Good   Assessment Patient is a 70 y.o. male admitted to [address] [place] [address] on 7/19/2023 s/p Multifocal pneumonia. Comorbidities impacting patient include sepsis, hiatal hernia, generalized weakness, and slurred speech. Orders received for OT evaluation and treatment. Personal factors impacting patient's occupational performance include: Stairs to enter environment, difficulty with ADLs and difficulty with IADLs. Patient is Alert, O x 4, and agreeable to OT session. Patient lives with wife and reports being independent with ADLs prior to admission. Upon evaluation, patient requires min assist for UB ADLs, mod assist for LB ADLs, and min assist of 1 for functional transfers. Patient's functional performance is impacted by deficits including: decreased standing tolerance to complete meaningful activities, decreased endurance, decreased static/dynamic standing balance, delayed righting and equilibrium reactions, and decreased BUE strength . Continued Occupational Therapy tx while in hospital would benefit the patient to address deficits as defined above and improve independence. Occupational Performance areas to address include: bathing/ shower, dressing, toilet hygiene, transfer to all surfaces, functional ambulation and medication routine/ management to increase independence in desired occupations. OT discharge recommendation would be Home with home health rehabilitation with family support. Goals   Patient Goals "to go out with my wife"   Plan   Treatment Interventions ADL retraining;Functional transfer training;UE strengthening/ROM; Endurance training;Patient/family training;Energy conservation   Goal Expiration Date 07/30/23   OT Treatment Day 0   OT Frequency 2-3x/wk   Recommendation   OT Discharge Recommendation Home with home health rehabilitation  (with family support)   AM-PAC Daily Activity Inpatient   Lower Body Dressing 2   Bathing 2   Toileting 3   Upper Body Dressing 3   Grooming 4   Eating 4   Daily Activity Raw Score 18   Daily Activity Standardized Score (Calc for Raw Score >=11) 38.66   AM-PAC Applied Cognition Inpatient   Following a Speech/Presentation 4   Understanding Ordinary Conversation 4   Taking Medications 3   Remembering Where Things Are Placed or Put Away 3   Remembering List of 4-5 Errands 3   Taking Care of Complicated Tasks 3   Applied Cognition Raw Score 20   Applied Cognition Standardized Score 41.76   End of Consult   Patient Position at End of Consult Supine; All needs within reach   Nurse Communication Nurse aware of consult     GOALS        Patient will complete toileting/hygiene with supervision assist without loss of balance. Patient will complete UB ADLs with supervision assist to increase independence in ADLs. Patient will complete LB ADLs with min assist without loss of balance to increase independence in ADLs. Patient will verbalize and demonstrate understanding of energy conservation techniques by implementing two energy conservation techniques during OT session with no verbal cues to increase participation in valued activities. Patient will perform functional transfers with supervision assist with AD as needed and demonstrating good safety awareness and body mechanics. Patient will increase standing tolerance to 5 min at edge of bed with supervision assist to improve participation in ADLs and increase activity tolerance. Patient/family will demonstrate and verbalize understanding of BUE HE program to increase UE strength and endurance to improve ADLs. Patient will improve BUE strength by one MMT grade to increase (I) with ADLs and participation in valued activities.    José Miguel Boyd OTS

## 2023-07-20 NOTE — ASSESSMENT & PLAN NOTE
· Tachypnea, tachycardia.   White blood cells 13 actually improved compared to last week  · Trend end points  · Source: multifocal pneumonia from recurring aspiration  · IVF  · IV Rocephin  · Await BCx x2

## 2023-07-20 NOTE — SPEECH THERAPY NOTE
Speech-Language Pathology Bedside Swallow Evaluation        Patient Name: Rosanna Longo  [id number] Date: 7/20/2023     Problem List  Principal Problem:    Multifocal pneumonia from recurring aspiration  Active Problems:    Sepsis ([address])    Dehydration    CKD (chronic kidney disease) stage 3, GFR 30-59 ml/min ([facility])    Generalized weakness    Esophageal dysphagia    Hiatal hernia       Past Medical History  Past Medical History:   Diagnosis Date   • Acute renal failure superimposed on chronic kidney disease ([address]) 12/31/2021   • 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[address]) 03/1999    SCCA left tongue base - T2N1M0- treated at [facility] - had surgery and XRT   • Chronic kidney disease    • COVID-19 with hypoxic respiratory failure 12/31/2021   • Essential hypertension 5/6/2019   • Hematuria 10/15/2019   • Hyperlipidemia    • Hypertension    • Left anterior fascicular block    • Stage 3 chronic kidney disease ([address])    • 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[address]) 2/14/2019       Past Surgical History  Past Surgical History:   Procedure Laterality Date   • CYSTOSCOPY  08/30/2021   • EYELID CLOSURE Left     to prevent eyelashes from rubbing cornea   • HERNIA REPAIR Left    • MODIFIED RADICAL NECK DISSECTION Left 03/03/1999    [facility] - with mandibulotomy and resection SCCA left tongue base - Dr. Emiliano Scott   • MYRINGOTOMY W/ TUBES Left 06/07/2023    office procedure - Dr. Klarissa Keating   • TONGUE SURGERY     • TONSILLECTOMY       Summary/Impressions:    Pt presents with oropharyngeal dysphagia; known hx of esophogeal dysphagia as well. Maintained on smooth puree solids and thin liquids at baseline however intake has decreased significantly over the last year; pt reports swallow function has as well. Now pt w/ inability to manage secretions or advance solid/liquid boluses through the oropharynx. All material expectorated shortly after PO trials. Pt also noted to exhibit s/s of aspiration w/ ice chips and sips of water.   Crushed medications provided earlier today noted in tracheal secretions. Cannot r/o risk of aspiration, malnutrition or dehydration. GI is consulted. Pt expresses desire to discuss possible feeding tube. Will benefit from updated instrumental (MBS) to determine least restrictive diet and trial strategies following GI intervention. Recommendations:   Diet: NPO   Meds: non-oral if possible   Feeding assistance: NA  Frequent Oral care: 2-4x/day  Aspiration precautions and compensatory swallowing strategies: upright posture and oral care, suction set up  Other Recommendations/ considerations: SLP will continue to follow to ensure adequate management of oral diet and adjust as clinically indicated x1-3     Current Medical Status  Pt is a 70 y.o. male who presented to [address] [facility] with PMH of tongue cancer s/p radical neck dissection and radiation about 25 years ago, pharyngeal dysphagia, HTN, CKD, who presented yesterday due to weakness, chronic cough, and some SOB which did not improve with oral augmentin after he was diagnosed with pneumonia on 7/15. GI is consulted to discuss PEG tube placement. He reports over the past 3 years that his dysphagia has worsened but more drastically over the past several months. He reports significant saliva/secretions and difficulty initiating his swallow. He has lost about 40 lbs in the past several years due to poor PO intake. He reports it can take him hours to eat a meal and he often ends up regurgitating half of it up. He had a EGD in 2021 showing a moderate to large hiatal hernia as well as a ring in the upper esophagus and Schatzki's ring that both required dilation. He has been seen by speech pathology in the past and has been diagnosed with moderate to severe pharyngeal dysphagia. Past medical history:  Please see H&P for details    Special Studies:  7/19/23 CT chest:  Patchy groundglass opacities in the periphery of the right upper lobe.  Scattered reticulonodular opacities in the bilateral lungs. Groundglass and alveolar opacities as well as focal consolidation with air bronchograms in the right lower lobe. Findings   taken together suspicious for multifocal infection in the appropriate clinical setting.     Small dependent pleural effusions. Tiny pericardial effusion.     Coronary atherosclerosis, hypoplastic thyroid gland, large hiatal hernia, left-sided nephrolithiasis, and other findings as above. Social/Education/Vocational Hx:  Pt lives with family    Swallow Information   Current Risks for Dysphagia & Aspiration: known history of dysphagia and known history of aspiration  Current Symptoms/Concerns: coughing with po and change in respiratory status  Current Diet: puree/level 1 diet and thin liquids   Baseline Diet: puree/level 1 diet and thin liquids    Baseline Assessment   Behavior/Cognition: alert  Speech/Language Status: able to participate in conversation  Patient Positioning: upright in bed    Swallow Mechanism Exam   Facial: symmetrical  Labial: bilateral decreased ROM  Lingual: left sided tongue deviation  Velum: unable to visualize  Mandible:  decreased ROM  Dentition: adequate  Vocal quality:gurgly   Volitional Cough: weak   Respiratory: 3L NC    Consistencies Assessed and Performance   Consistencies Administered: ice chips, thin liquids and puree    Oral Stage: Adequate bolus retrieval and functional containment. Prolonged oral processing and transfer. Pooling of secretions noted though oral retention appears minimal.     Pharyngeal Stage: Laryngeal rise noted upon palpation. Significant delay in swallow initiation apparent. Suspected to be incomplete as well w/ multiple swallows across a single bolus. Expectoration of all consistencies tested w/ overt cough. Cannot r/o aspiration at this time.        Esophageal Concerns: see above      Results Reviewed with: patient, RN and MD     Plan  Will continue to follow for x1-3  Dysphagia Goals: pt will participate in VBS  Pt will participate in trials for advancement across x1-3 diagnostic sessions.     Discharge recommendation: outpt    Speech Therapy Prognosis   Prognosis: deferred  Prognosis Considerations: progressive disease process        Lam Ashley, [address] [address]  Speech-Language Pathologist  [place] #[id number]  [place] #[id number]

## 2023-07-20 NOTE — CONSULTS
Consultation - [facility]  Jose Ramon Ramirez 70 y.o. male MRN: [id number]  Unit/Bed#: ED 20 Encounter: [id number]        Inpatient consult to gastroenterology  Consult performed by: Shalonda Morgan PA-C  Consult ordered by: Chencho Carter PA-C          Reason for Consult / Principal Problem: dysphagia, malnutrition, recurrent aspiration pneumonia    HPI: Edmundo lBum is a 71 yo M with a PMH of tongue cancer s/p radical neck dissection and radiation about 25 years ago, pharyngeal dysphagia, HTN, CKD, who presented yesterday due to weakness, chronic cough, and some SOB which did not improve with oral augmentin after he was diagnosed with pneumonia on 7/15. GI is consulted to discuss PEG tube placement. He reports over the past 3 years that his dysphagia has worsened but more drastically over the past several months. He reports significant saliva/secretions and difficulty initiating his swallow. He has lost about 40 lbs in the past several years due to poor PO intake. He reports it can take him hours to eat a meal and he often ends up regurgitating half of it up. He had a EGD in 2021 showing a moderate to large hiatal hernia as well as a ring in the upper esophagus and Schatzki's ring that both required dilation. He has been seen by speech pathology in the past and has been diagnosed with moderate to severe pharyngeal dysphagia.     REVIEW OF SYSTEMS: Negative except for as stated above    Historical Information   Past Medical History:   Diagnosis Date   • Acute renal failure superimposed on chronic kidney disease ([address]) 12/31/2021   • 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[address]) 03/1999    SCCA left tongue base - T2N1M0- treated at [facility] - had surgery and XRT   • Chronic kidney disease    • COVID-19 with hypoxic respiratory failure 12/31/2021   • Essential hypertension 5/6/2019   • Hematuria 10/15/2019   • Hyperlipidemia    • Hypertension    • Left anterior fascicular block    • Stage 3 chronic kidney disease [facility])    • 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[address]) 2/14/2019     Past Surgical History:   Procedure Laterality Date   • CYSTOSCOPY  08/30/2021   • EYELID CLOSURE Left     to prevent eyelashes from rubbing cornea   • HERNIA REPAIR Left    • MODIFIED RADICAL NECK DISSECTION Left 03/03/1999    [facility] - with mandibulotomy and resection SCCA left tongue base - Dr. Mare Salmeron   • MYRINGOTOMY W/ TUBES Left 06/07/2023    office procedure - Dr. Herb Sexton   • TONGUE SURGERY     • TONSILLECTOMY       Social History   Social History     Substance and Sexual Activity   Alcohol Use Yes    Comment: occasional     Social History     Substance and Sexual Activity   Drug Use Never     Social History     Tobacco Use   Smoking Status Former   • Passive exposure: Current   Smokeless Tobacco Former   Tobacco Comments    quit 20 years ago     Family History   Problem Relation Age of Onset   • Hypertension Mother    • Stroke Mother        Meds/Allergies     (Not in a hospital admission)    Current Facility-Administered Medications   Medication Dose Route Frequency   • acetaminophen (TYLENOL) tablet 650 mg  650 mg Oral Q6H PRN   • aspirin (ECOTRIN LOW STRENGTH) EC tablet 81 mg  81 mg Oral Daily   • [START ON 7/21/2023] cefTRIAXone (ROCEPHIN) IVPB (premix in dextrose) 1,000 mg 50 mL  1,000 mg Intravenous Q24H   • ferrous sulfate tablet 325 mg  325 mg Oral Daily With Breakfast   • heparin (porcine) subcutaneous injection 5,000 Units  5,000 Units Subcutaneous Q8H [address]   • ondansetron (ZOFRAN) injection 4 mg  4 mg Intravenous Q6H PRN   • polyethylene glycol (MIRALAX) packet 17 g  17 g Oral Daily PRN   • pravastatin (PRAVACHOL) tablet 20 mg  20 mg Oral Every Other Day       Allergies   Allergen Reactions   • Iodinated Contrast Media Other (See Comments)   • Iodine - Food Allergy            Objective     Blood pressure 113/73, pulse 85, temperature 99.4 °F (37.4 °C), temperature source Oral, resp. rate 18, SpO2 96 %.       Intake/Output Summary (Last 24 hours) at 7/20/2023 1349  Last data filed at 7/19/2023 2334  Gross per 24 hour   Intake 50 ml   Output --   Net 50 ml         PHYSICAL EXAM:      General Appearance:   Alert, oriented x3, chronically ill and cachectic appearing   HEENT:   Normocephalic, atraumatic, anicteric.     Neck:  Supple, symmetrical, trachea midline   Lungs:   Mild rhonchi on the R side, no wheezing or respiratory distress, on 3L NC   Heart[de-identified]   S1 and S2 normal; regular rate and rhythm; no murmur, rub, or gallop. Abdomen:   Soft, non-tender, non-distended; normal bowel sounds; no masses, no organomegaly    Rectal:   Deferred    Extremities:  No cyanosis, clubbing or edema    Pulses:  2+ and symmetric all extremities    Skin:  Skin color, texture, turgor normal, no rashes or lesions      Lab Results:   Results from last 7 days   Lab Units 07/19/23  2153   WBC Thousand/uL 12.73*   HEMOGLOBIN g/dL 15.7   HEMATOCRIT % 47.2   PLATELETS Thousands/uL 544*   NEUTROS PCT % 82*   LYMPHS PCT % 9*   MONOS PCT % 6   EOS PCT % 0     Results from last 7 days   Lab Units 07/19/23  2153   POTASSIUM mmol/L 4.0   CHLORIDE mmol/L 97   CO2 mmol/L 26   BUN mg/dL 22   CREATININE mg/dL 1.38*   CALCIUM mg/dL 10.7*   ALK PHOS U/L 121*   ALT U/L 23   AST U/L 23     Results from last 7 days   Lab Units 07/19/23  2153   INR  1.09           Imaging Studies: I have personally reviewed pertinent imaging studies. CT chest wo contrast  Result Date: 7/20/2023  Impression: Patchy groundglass opacities in the periphery of the right upper lobe. Scattered reticulonodular opacities in the bilateral lungs. Groundglass and alveolar opacities as well as focal consolidation with air bronchograms in the right lower lobe. Findings taken together suspicious for multifocal infection in the appropriate clinical setting. Small dependent pleural effusions. Tiny pericardial effusion.  Coronary atherosclerosis, hypoplastic thyroid gland, large hiatal hernia, left-sided nephrolithiasis, and other findings as above. Workstation performed: [id number]       ASSESSMENT and PLAN:      Pharyngeal Dysphagia  Malnutrition  Aspiration Pneumonia  - He has a history of pharyngeal dysphagia 2/2 prior radical neck dissection and radiation for tongue cancer with a 40 lb weight loss over the past several years and noticeable worsening dysphagia and increased secretions in the past several months here with aspiration pneumonia  - Discussed PEG tube placement for primary source of nutrition, pt agreeable but timing to be determined pending respiratory status as he is on 3 L O2 and has some shortness of breath currently  - Speech eval to see if there is any PO intake he can safely tolerate in the meantime or even after PEG tube placement  - Nutrition consult for tube feeding recommendations  - During PEG tube placement, we can endoscopically evaluate for any recurrent stricturing/rings and dilate these if necessary  - Protonix 40 mg BID  - Treatment of pneumonia per SLIM  - Will make NPO after midnight tentatively and assess his respiratory status in the AM to see if he can tolerate PEG tube placement tomorrow, otherwise would plan for Monday  - Consider scopolamine patch for his report of significant saliva/secretions       The patient will be sen by Dr. Kleber Rosario.

## 2023-07-20 NOTE — PLAN OF CARE
Problem: OCCUPATIONAL THERAPY ADULT  Goal: Performs self-care activities at highest level of function for planned discharge setting. See evaluation for individualized goals. Description: Treatment Interventions: ADL retraining, Functional transfer training, UE strengthening/ROM, Endurance training, Patient/family training, Energy conservation          See flowsheet documentation for full assessment, interventions and recommendations. Note: Limitation: Decreased ADL status, Decreased UE strength, Decreased endurance, Decreased self-care trans, Decreased high-level ADLs  Prognosis: Good  Assessment: Patient is a 70 y.o. male admitted to [address] [place] [address] on 7/19/2023 s/p Multifocal pneumonia. Comorbidities impacting patient include sepsis, hiatal hernia, generalized weakness, and slurred speech. Orders received for OT evaluation and treatment. Personal factors impacting patient's occupational performance include: Stairs to enter environment, difficulty with ADLs and difficulty with IADLs. Patient is Alert, O x 4, and agreeable to OT session. Patient lives with wife and reports being independent with ADLs prior to admission. Upon evaluation, patient requires min assist for UB ADLs, mod assist for LB ADLs, and min assist of 1 for functional transfers. Patient's functional performance is impacted by deficits including: decreased standing tolerance to complete meaningful activities, decreased endurance, decreased static/dynamic standing balance, delayed righting and equilibrium reactions, and decreased BUE strength . Continued Occupational Therapy tx while in hospital would benefit the patient to address deficits as defined above and improve independence. Occupational Performance areas to address include: bathing/ shower, dressing, toilet hygiene, transfer to all surfaces, functional ambulation and medication routine/ management to increase independence in desired occupations.  OT discharge recommendation would be Home with home health rehabilitation with family support.      OT Discharge Recommendation: Home with home health rehabilitation (with family support)        Severa Cram OTS

## 2023-07-20 NOTE — PHYSICAL THERAPY NOTE
PT Evaluation (23min)  (7:32-7:55)    Past Medical History:   Diagnosis Date    Acute renal failure superimposed on chronic kidney disease ([address]) 12/31/2021    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[address]) 03/1999    SCCA left tongue base - [id number]- treated at [facility] - had surgery and XRT    Chronic kidney disease     COVID-19 with hypoxic respiratory failure 12/31/2021    Essential hypertension 5/6/2019    Hematuria 10/15/2019    Hyperlipidemia     Hypertension     Left anterior fascicular block     Stage 3 chronic kidney disease ([address])     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[address]) 2/14/2019 07/20/23 0739   PT Last Visit   PT Visit Date 07/20/23   Note Type   Note type Evaluation   Pain Assessment   Pain Assessment Tool 0-10   Pain Score No Pain   Restrictions/Precautions   Weight Bearing Precautions Per Order No   Other Precautions Chair Alarm; Bed Alarm;Telemetry;Multiple lines; Fall Risk;O2   Home Living   Type of [address] [place] [address] Two level;Stairs to enter with rails; Performs ADLs on one level; Able to live on main level with bedroom/bathroom  (3 ISAIAS)   Bathroom Shower/Tub Tub/shower unit   [employer] Raised   Bathroom Equipment Grab bars in shower   Bathroom Accessibility Accessible   Prior Function   Level of [place] Independent with ADLs; Independent with functional mobility; Needs assistance with IADLS   Lives With Spouse   Receives Help From Family   IADLs Independent with driving;Family/Friend/Other provides meals; Independent with medication management   Falls in the last 6 months 0   Vocational Retired   General   Additional Pertinent History pt presents to [facility] c [facility]. PT consulted for mobility + d/c planning. Family/Caregiver Present No   Cognition   Orientation Level Oriented X4   Subjective   Subjective "I have a lot of spit coming up".    RUE Assessment   RUE Assessment WFL   LUE Assessment   LUE Assessment WFL   RLE Assessment   RLE Assessment WFL   LLE Assessment   LLE Assessment WFL   Vision-Basic Assessment   Current Vision Wears glasses all the time   Coordination   Sensation WFL   Bed Mobility   Supine to Sit 4  Minimal assistance   Additional items Assist x 1;HOB elevated; Increased time required;Verbal cues   Sit to Supine 4  Minimal assistance   Additional items Assist x 1;Verbal cues; Increased time required   Transfers   Sit to Stand 4  Minimal assistance   Additional items Assist x 1;Verbal cues; Increased time required   Stand to Sit 4  Minimal assistance   Additional items Assist x 1;Verbal cues; Increased time required   Ambulation/Elevation   Gait pattern Narrow ELVER; Forward Flexion;Decreased foot clearance   Gait Assistance 4  Minimal assist   Additional items Assist x 1;Verbal cues   Assistive Device None   Distance 10'   Stair Management Assistance Not tested   Balance   Static Sitting Fair +   Dynamic Sitting Fair   Static Standing Fair -   Dynamic Standing Fair -   Ambulatory Fair -   Endurance Deficit   Endurance Deficit Yes   Activity Tolerance   Activity Tolerance Patient limited by fatigue   Medical Staff Made Aware OT student (Valerie)-co-eval 2* mobility deficits + co-morbidities. Nurse Made Aware RN aware of outcomes   Assessment   Prognosis Good   Problem List Decreased strength;Decreased endurance; Impaired balance;Decreased mobility   Assessment pt is a 79y/o m who presents to [facility] c PNA. PMH significant for acute renal failure, tongue cancer, CKD, COVID-19, HTN, CKD 3. at baseline, pt (I) c functional mobility tasks s AD. resides c spouse in 2 story home c 1st floor set up + 3 ISAIAS. currently requires min (A)x1 to complete mobility tasks 2* deficits in strength, balance, gait quality, + activity tolerance noted in PT exam above. Barthel Index 50/100, Modified [place] 4. min verbal cues required for safe technique. ambulated 10' s AD limited 2* fatigue. would benefit from skilled PT to maximize functional mobility + improve quality of life.  AM-PAC raw score 18 indicating pt safe for d/c home c follow up c hhpt, however please refer to PT d/c recommendation for safe d/c planning. PT eval of high complexity 2* unstable med status c pt requiring ongoing medical management 2* PNA. pt c multiple co-morbidities + mobility deficits requiring min (A)x1 to perform mobility tasks. resides in 2 story home c 3 ISAIAS; also currently on supplemental O2 which pt does not use at baseline. Barriers to Discharge Inaccessible home environment   Goals   Patient Goals "to go out c my wife". STG Expiration Date 07/30/23   Short Term Goal #1 1. increase strength 1/2 grade to improve overall functional mobility, 2. perform bed mobility mod (I) to decrease caregiver burden, 3. perform transfers mod (I) to safely perform ADLs, 4. ambulate >150' mod (I)/(I) to safely navigate home environment, 5. negotiate 3 stairs mod (I) to safely enter home   Plan   Treatment/Interventions Functional transfer training;LE strengthening/ROM; Elevations; Therapeutic exercise; Endurance training;Patient/family training;Equipment eval/education; Bed mobility;Gait training   PT Frequency 3-5x/wk   Recommendation   PT Discharge Recommendation Home with home health rehabilitation   AM-PAC Basic Mobility Inpatient   Turning in Flat Bed Without Bedrails 3   Lying on Back to Sitting on Edge of Flat Bed Without Bedrails 3   Moving Bed to Chair 3   Standing Up From Chair Using Arms 3   Walk in Room 3   Climb 3-5 Stairs With Railing 3   Basic Mobility Inpatient Raw Score 18   Basic Mobility Standardized Score 41.05   Highest Level Of Mobility   JH-HLM Goal 6: Walk 10 steps or more   JH-HLM Achieved 6: Walk 10 steps or more   Modified [place] Scale   Modified [place] Scale 4   Barthel Index   Feeding 5   Bathing 0   Grooming Score 5   Dressing Score 5   Bladder Score 10   Bowels Score 10   Toilet Use Score 5   Transfers (Bed/Chair) Score 10   Mobility (Level Surface) Score 0   Stairs Score 0   Barthel Index Score 50     WALKER HortaT

## 2023-07-20 NOTE — ASSESSMENT & PLAN NOTE
Lab Results   Component Value Date    EGFR 49 07/19/2023    EGFR 44 07/15/2023    EGFR 44 06/29/2023    CREATININE 1.38 (H) 07/19/2023    CREATININE 1.49 (H) 07/15/2023    CREATININE 1.51 (H) 06/29/2023     · Chronic and stable

## 2023-07-20 NOTE — ASSESSMENT & PLAN NOTE
· From sepsis, multifocal pneumonia, poor p.o. intake, dehydration, malnutrition  · PT/OT evals ordered

## 2023-07-20 NOTE — ED PROVIDER NOTES
History  Chief Complaint   Patient presents with   • Weakness - Generalized     Pt arrives via EMS c/o generalized weakness, Pt diagnosed w/ pneumonia approx. 4 days ago. Pt prescribed antibx w/ no relied. Pt presenting w/ productive cough. Denies fevers. 56-year-old male presents to the emergency room with a chief complaint of "I have pneumonia."    Patient has a history of prior oropharyngeal cancer remotely with persistent episodes of dysphagia since that time along with recurrent aspiration pneumonia. Patient was seen in the emergency room on 7/15 for lightheadedness and diagnosed with pneumonia for which he was prescribed amoxicillin/clavulanate. Patient affirms compliance with the medication though he was switched to the liquid dosage by his primary care physician yesterday due to tolerance as he has significant difficulty swallowing any solids. Patient states despite this, his symptoms have persisted including his lightheadedness though he denies any syncopal episodes. Patient does note occasional episodes of chest pain though these have improved today, patient does note bilateral lower thoracic pain. Patient has increasing secretions that have been progressive and ongoing. Patient's family notes intermittent episodes of fevers though none today. Patient is 99.4 °F on initial evaluation. Patient has been referred for discussion regarding obtaining a G-tube but has not had a consultation yet. Impression and plan: Multiple symptoms with a broad differential but based on patient's history and physical, most consistent with aspiration pneumonia and likely sepsis secondary to this. Patient is tachycardic and tachypneic, borderline hypoxic dropping to 89% during our initial evaluation. Patient has no history of prior COPD or lung pathology though he does have an extensive smoking history previously.   Will obtain septic evaluation including imaging of patient's chest to more carefully evaluate progression of his symptoms. We will monitor and reassess. Prior to Admission Medications   Prescriptions Last Dose Informant Patient Reported? Taking?    Coenzyme Q10 (CO Q 10 PO) 2023 Self Yes Yes   Sig: Take by mouth   Multiple Vitamins-Minerals (MULTIVITAMIN ADULTS 50+ PO) 2023 Self Yes Yes   Sig: Take by mouth   VITAMIN D, CHOLECALCIFEROL, PO 2023 Self Yes Yes   Sig: Take by mouth   amoxicillin-clavulanate (AUGMENTIN) 400-57 mg/5 mL suspension 2023  No Yes   Sig: Take 10.9 mL (875 mg total) by mouth 2 (two) times a day for 7 days   aspirin 81 MG tablet 2023 Self Yes Yes   Si cap(s)   azithromycin (ZITHROMAX) 200 mg/5 mL suspension 2023  No Yes   Sig: Take 15.6 mL (624 mg total) by mouth daily for 5 days   ferrous sulfate 324 (65 Fe) mg 2023 Self No Yes   Sig: Take 1 tablet (324 mg total) by mouth daily before breakfast   fluticasone (FLONASE) 50 mcg/act nasal spray Not Taking Self No No   Si sprays into each nostril daily   Patient not taking: Reported on 2023   pravastatin (PRAVACHOL) 20 mg tablet 2023 Self No Yes   Sig: TAKE ONE TABLET BY MOUTH EVERY OTHER DAY      Facility-Administered Medications: None       Past Medical History:   Diagnosis Date   • Acute renal failure superimposed on chronic kidney disease ([address]) 2021   • 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[address]) 1999    SCCA left tongue base - T2N1M0- treated at [facility] - had surgery and XRT   • Chronic kidney disease    • COVID-19 with hypoxic respiratory failure 2021   • Essential hypertension 2019   • Hematuria 10/15/2019   • Hyperlipidemia    • Hypertension    • Left anterior fascicular block    • Stage 3 chronic kidney disease ([address])    • 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[address]) 2019       Past Surgical History:   Procedure Laterality Date   • CYSTOSCOPY  2021   • EYELID CLOSURE Left     to prevent eyelashes from rubbing cornea   • HERNIA REPAIR Left    • MODIFIED RADICAL NECK DISSECTION Left 03/03/1999    [facility], [facility] - with mandibulotomy and resection SCCA left tongue base - Dr. Crissy Roe   • MYRINGOTOMY W/ TUBES Left 06/07/2023    office procedure - Dr. Meron Llanos   • TONGUE SURGERY     • TONSILLECTOMY         Family History   Problem Relation Age of Onset   • Hypertension Mother    • Stroke Mother      I have reviewed and agree with the history as documented. E-Cigarette/Vaping   • E-Cigarette Use Never User      E-Cigarette/Vaping Substances   • Nicotine No    • THC No    • CBD No    • Flavoring No    • Other No    • Unknown No      Social History     Tobacco Use   • Smoking status: Former     Passive exposure: Current   • Smokeless tobacco: Former   • Tobacco comments:     quit 20 years ago   Vaping Use   • Vaping Use: Never used   Substance Use Topics   • Alcohol use: Yes     Comment: occasional   • Drug use: Never       Review of Systems    Physical Exam  Physical Exam  Vitals reviewed. HENT:      Head: Atraumatic. Mouth/Throat:      Comments: Copious oropharyngeal secretions that patient is expressing during interview. Cardiovascular:      Rate and Rhythm: Regular rhythm. Tachycardia present. Pulmonary:      Effort: Pulmonary effort is normal.      Breath sounds: Rales present. Abdominal:      General: There is no distension. Tenderness: There is no abdominal tenderness. There is no guarding or rebound. Musculoskeletal:         General: No deformity. Cervical back: Neck supple. Skin:     General: Skin is warm and dry. Neurological:      General: No focal deficit present. Mental Status: He is alert. Comments: Slurred speech at baseline per patient's family.          Vital Signs  ED Triage Vitals   Temperature Pulse Respirations Blood Pressure SpO2   07/19/23 2133 07/19/23 2130 07/19/23 2130 07/19/23 2135 07/19/23 2130   99.4 °F (37.4 °C) (!) 114 (!) 23 158/91 91 %      Temp Source Heart Rate Source Patient Position - Orthostatic VS BP Location FiO2 (%)   07/19/23 2133 07/19/23 2130 07/19/23 2135 07/19/23 2135 --   Oral Monitor Lying Left arm       Pain Score       07/20/23 0739       No Pain           Vitals:    07/26/23 1545 07/26/23 2205 07/27/23 0723 07/27/23 1436   BP: 137/86 130/81 144/86 120/78   Pulse: 89  81 103   Patient Position - Orthostatic VS:             Visual Acuity      ED Medications  Medications   aspirin (ECOTRIN LOW STRENGTH) EC tablet 81 mg (81 mg Oral Given 7/27/23 0915)   pravastatin (PRAVACHOL) tablet 20 mg (20 mg Oral Given 7/26/23 1036)   multi-electrolyte (PLASMALYTE-A/ISOLYTE-S PH 7.4) IV solution (0 mL/hr Intravenous Stopped 7/20/23 1401)   acetaminophen (TYLENOL) tablet 650 mg (650 mg Oral Given 7/20/23 0816)   ondansetron (ZOFRAN) injection 4 mg (has no administration in time range)   polyethylene glycol (MIRALAX) packet 17 g (has no administration in time range)   lidocaine (LIDODERM) 5 % patch 1 patch (1 patch Topical Not Given 7/27/23 0915)   dextrose 5 % and sodium chloride 0.9 % infusion (0 mL/hr Intravenous Hold 7/27/23 1411)   diphenhydrAMINE (BENADRYL) injection 50 mg (50 mg Intravenous Given 7/25/23 0309)   ceFAZolin (ANCEF) IVPB (premix in dextrose) 2,000 mg 50 mL (has no administration in time range)   metoprolol (LOPRESSOR) injection 5 mg (5 mg Intravenous Not Given 7/26/23 0638)   heparin (porcine) subcutaneous injection 5,000 Units (5,000 Units Subcutaneous Given 7/27/23 1412)   cefTRIAXone (ROCEPHIN) IVPB (premix in dextrose) 2,000 mg 50 mL (0 mg Intravenous Stopped 7/19/23 2334)   hydrocortisone (Solu-CORTEF) injection 200 mg (200 mg Intravenous Given 7/25/23 0150)   iohexol (OMNIPAQUE) 350 MG/ML injection (SINGLE-DOSE) 85 mL (85 mL Intravenous Given 7/25/23 0437)   vancomycin (VANCOCIN) IVPB (premix in dextrose) 1,000 mg 200 mL (1,000 mg Intravenous New Bag 7/25/23 1318)   HYDROmorphone HCl (DILAUDID) injection 0.2 mg (0.2 mg Intravenous Given 7/25/23 9474)   HYDROmorphone HCl (DILAUDID) injection 0.2 mg (0.2 mg Intravenous Given 7/25/23 2315)   potassium chloride 20 mEq IVPB (premix) (0 mEq Intravenous Stopped 7/27/23 0912)   barium sulfate 60 % cream 2.5 g (2.5 g Oral Given 7/27/23 1008)   barium sulfate (LIQUID E-Z-PAQUE) oral suspension 355 mL (75 mL Oral Given 7/27/23 1008)   barium sulfate 98 % oral suspension 135 mL (50 mL Oral Given 7/27/23 1007)       Diagnostic Studies  Results Reviewed     Procedure Component Value Units Date/Time    Blood culture #1 [[id number]] Collected: 07/19/23 2153    Lab Status: Final result Specimen: Blood from Arm, Left Updated: 07/25/23 0801     Blood Culture No Growth After 5 Days. Blood culture #2 [[id number]] Collected: 07/19/23 2153    Lab Status: Final result Specimen: Blood from Arm, Right Updated: 07/25/23 0801     Blood Culture No Growth After 5 Days.     Prealbumin [[id number]]  (Abnormal) Collected: 07/21/23 0509    Lab Status: Final result Specimen: Blood from Arm, Left Updated: 07/21/23 0841     Prealbumin 7.7 mg/dL     Procalcitonin [[id number]]  (Abnormal) Collected: 07/21/23 0509    Lab Status: Final result Specimen: Blood from Arm, Left Updated: 07/21/23 0705     Procalcitonin 0.46 ng/ml     TSH, 3rd generation with Free T4 reflex [[id number]]  (Normal) Collected: 07/21/23 0509    Lab Status: Final result Specimen: Blood from Arm, Left Updated: 07/21/23 0705     TSH 3RD GENERATON 1.807 uIU/mL     Basic metabolic panel [[id number]] Collected: 07/21/23 0509    Lab Status: Final result Specimen: Blood from Arm, Left Updated: 07/21/23 0600     Sodium 138 mmol/L      Potassium 4.1 mmol/L      Chloride 103 mmol/L      CO2 24 mmol/L      ANION GAP 11 mmol/L      BUN 23 mg/dL      Creatinine 1.14 mg/dL      Glucose 99 mg/dL      Calcium 9.8 mg/dL      eGFR 62 ml/min/1.73sq m     Narrative:      [place] guidelines for Chronic Kidney Disease (CKD):   •  Stage 1 with normal or high GFR (GFR > 90 mL/min/1.73 square meters)  •  Stage 2 Mild CKD (GFR = 60-89 mL/min/1.73 square meters)  •  Stage 3A Moderate CKD (GFR = 45-59 mL/min/1.73 square meters)  •  Stage 3B Moderate CKD (GFR = 30-44 mL/min/1.73 square meters)  •  Stage 4 Severe CKD (GFR = 15-29 mL/min/1.73 square meters)  •  Stage 5 End Stage CKD (GFR <15 mL/min/1.73 square meters)  Note: GFR calculation is accurate only with a steady state creatinine    CBC [[id number]]  (Abnormal) Collected: 07/21/23 0509    Lab Status: Final result Specimen: Blood from Arm, Left Updated: 07/21/23 0536     WBC 13.32 Thousand/uL      RBC 4.76 Million/uL      Hemoglobin 13.6 g/dL      Hematocrit 40.6 %      MCV 85 fL      MCH 28.6 pg      MCHC 33.5 g/dL      RDW 12.9 %      Platelets 129 Thousands/uL      MPV 8.3 fL     HS Troponin I 4hr [[id number]]  (Normal) Collected: 07/20/23 0553    Lab Status: Final result Specimen: Blood from Arm, Left Updated: 07/20/23 0628     hs TnI 4hr 9 ng/L      Delta 4hr hsTnI -1 ng/L     HS Troponin I 2hr [[id number]]  (Normal) Collected: 07/20/23 0127    Lab Status: Final result Specimen: Blood from Arm, Left Updated: 07/20/23 0201     hs TnI 2hr 10 ng/L      Delta 2hr hsTnI 0 ng/L     Urine Microscopic [[id number]]  (Abnormal) Collected: 07/20/23 0127    Lab Status: Final result Specimen: Urine, Clean Catch Updated: 07/20/23 0142     RBC, UA 30-50 /hpf      WBC, UA 4-10 /hpf      Epithelial Cells Occasional /hpf      Bacteria, UA Occasional /hpf      MUCUS THREADS Occasional    UA w Reflex to Microscopic w Reflex to Culture [[id number]]  (Abnormal) Collected: 07/20/23 0127    Lab Status: Final result Specimen: Urine, Clean Catch Updated: 07/20/23 0137     Color, UA Yellow     Clarity, UA Clear     Specific Gravity, UA 1.025     pH, UA 5.5     Leukocytes, UA Negative     Nitrite, UA Negative     Protein, UA 70 (1+) mg/dl      Glucose, UA Negative mg/dl      Ketones, UA Trace mg/dl      Urobilinogen, UA 4.0 mg/dl      Bilirubin, UA Negative     Occult Blood, UA Large    HS Troponin 0hr (reflex protocol) [[id number]]  (Normal) Collected: 07/19/23 2218    Lab Status: Final result Specimen: Blood from Arm, Right Updated: 07/19/23 2247     hs TnI 0hr 10 ng/L     D-dimer, quantitative [[id number]]  (Abnormal) Collected: 07/19/23 2153    Lab Status: Final result Specimen: Blood from Arm, Right Updated: 07/19/23 2242     D-Dimer, Quant 3.11 ug/ml FEU     Narrative: In the evaluation for possible pulmonary embolism, in the appropriate (Well's Score of 4 or less) patient, the age adjusted d-dimer cutoff for this patient can be calculated as:    Age x 0.01 (in ug/mL) for Age-adjusted D-dimer exclusion threshold for a patient over 50 years. Procalcitonin [[id number]]  (Abnormal) Collected: 07/19/23 2153    Lab Status: Final result Specimen: Blood from Arm, Right Updated: 07/19/23 2228     Procalcitonin 0.92 ng/ml     Protime-INR [[id number]]  (Normal) Collected: 07/19/23 2153    Lab Status: Final result Specimen: Blood from Arm, Right Updated: 07/19/23 2221     Protime 13.9 seconds      INR 1.09    APTT [[id number]]  (Normal) Collected: 07/19/23 2153    Lab Status: Final result Specimen: Blood from Arm, Right Updated: 07/19/23 2221     PTT 33 seconds     Lactic acid [[id number]]  (Normal) Collected: 07/19/23 2153    Lab Status: Final result Specimen: Blood from Arm, Right Updated: 07/19/23 2221     LACTIC ACID 1.0 mmol/L     Narrative:      Result may be elevated if tourniquet was used during collection.     Comprehensive metabolic panel [[id number]]  (Abnormal) Collected: 07/19/23 2153    Lab Status: Final result Specimen: Blood from Arm, Right Updated: 07/19/23 2220     Sodium 134 mmol/L      Potassium 4.0 mmol/L      Chloride 97 mmol/L      CO2 26 mmol/L      ANION GAP 11 mmol/L      BUN 22 mg/dL      Creatinine 1.38 mg/dL      Glucose 110 mg/dL      Calcium 10.7 mg/dL      AST 23 U/L      ALT 23 U/L      Alkaline Phosphatase 121 U/L      Total Protein 9.2 g/dL      Albumin 4.3 g/dL      Total Bilirubin 0.67 mg/dL      eGFR 49 ml/min/1.73sq m     Narrative:      [place] guidelines for Chronic Kidney Disease (CKD):   •  Stage 1 with normal or high GFR (GFR > 90 mL/min/1.73 square meters)  •  Stage 2 Mild CKD (GFR = 60-89 mL/min/1.73 square meters)  •  Stage 3A Moderate CKD (GFR = 45-59 mL/min/1.73 square meters)  •  Stage 3B Moderate CKD (GFR = 30-44 mL/min/1.73 square meters)  •  Stage 4 Severe CKD (GFR = 15-29 mL/min/1.73 square meters)  •  Stage 5 End Stage CKD (GFR <15 mL/min/1.73 square meters)  Note: GFR calculation is accurate only with a steady state creatinine    CBC and differential [[id number]]  (Abnormal) Collected: 07/19/23 2153    Lab Status: Final result Specimen: Blood from Arm, Right Updated: 07/19/23 2159     WBC 12.73 Thousand/uL      RBC 5.52 Million/uL      Hemoglobin 15.7 g/dL      Hematocrit 47.2 %      MCV 86 fL      MCH 28.4 pg      MCHC 33.3 g/dL      RDW 12.9 %      MPV 8.1 fL      Platelets 490 Thousands/uL      nRBC 0 /100 WBCs      Neutrophils Relative 82 %      Immat GRANS % 2 %      Lymphocytes Relative 9 %      Monocytes Relative 6 %      Eosinophils Relative 0 %      Basophils Relative 1 %      Neutrophils Absolute 10.52 Thousands/µL      Immature Grans Absolute 0.20 Thousand/uL      Lymphocytes Absolute 1.15 Thousands/µL      Monocytes Absolute 0.76 Thousand/µL      Eosinophils Absolute 0.04 Thousand/µL      Basophils Absolute 0.06 Thousands/µL                  FL barium swallow video w speech   Final Result by Guillermo Denton, IZNA (07/27 1018)      CTA chest pe study   Final Result by Amelia Heredia MD (07/25 0820)      No acute pulmonary embolus. Improving bilateral aspiration. Persistent small right effusion. Persistent mild atelectasis in both lower lobes, greater on the right. Large hiatal hernia.             Workstation performed: [id number]         XR chest pa & lateral   Final Result by Loida Siddiqui MD (07/24 1404)      Moderate right and trace left effusion with bibasilar opacity, at least in part due to atelectasis. Pneumonia not excluded in the appropriate clinical setting. Workstation performed: [id number]         NM lung ventilation / perfusion   Final Result by Sondra Blount MD (07/24 1301)      The probability for pulmonary embolus is intermediate. The study was marked in [facility] for immediate notification. Workstation performed: [id number]         CT chest wo contrast   Final Result by Gabriel Panda DO (07/20 0146)      Patchy groundglass opacities in the periphery of the right upper lobe. Scattered reticulonodular opacities in the bilateral lungs. Groundglass and alveolar opacities as well as focal consolidation with air bronchograms in the right lower lobe. Findings    taken together suspicious for multifocal infection in the appropriate clinical setting. Small dependent pleural effusions. Tiny pericardial effusion. Coronary atherosclerosis, hypoplastic thyroid gland, large hiatal hernia, left-sided nephrolithiasis, and other findings as above. Workstation performed: [id number]                    Procedures  Procedures         ED Course                               SBIRT 22yo+    Flowsheet Row Most Recent Value   Initial Alcohol Screen: US AUDIT-C     1. How often do you have a drink containing alcohol? 0 Filed at: 07/19/2023 2353   2. How many drinks containing alcohol do you have on a typical day you are drinking? 0 Filed at: 07/19/2023 2353   3b. FEMALE Any Age, or MALE 65+: How often do you have 4 or more drinks on one occassion? 0 Filed at: 07/19/2023 2353   Audit-C Score 0 Filed at: 07/19/2023 2353   LAURA: How many times in the past year have you. .. Used an illegal drug or used a prescription medication for non-medical reasons?  Never Filed at: 07/19/2023 2353                    MDM    Disposition  Final diagnoses: Pneumonia   Aspiration pneumonia ([address])   Dysphagia     Time reflects when diagnosis was documented in both MDM as applicable and the Disposition within this note     Time User Action Codes Description Comment    7/20/2023  3:08 AM Sunitha Benny Add [J18.9] Pneumonia     7/20/2023  3:09 AM Sunitha Benny Add [J69.0] Aspiration pneumonia ([address])     7/20/2023  3:09 AM Sunitha Benny Add [R13.10] Dysphagia     7/23/2023  9:27 AM Raad Polo Add [E43] Severe protein-calorie malnutrition ([address])     7/26/2023  5:47 AM [facility] Add [I48.91] Atrial fibrillation with rapid ventricular response [facility])       ED Disposition     ED Disposition   Admit    Condition   Stable    Date/Time   Thu Jul 20, 2023  3:08 AM    Comment   Case was discussed with MAYCOL and the patient's admission status was agreed to be Admission Status: inpatient status to the service of Dr. Bola Zaidi .            Follow-up Information     Follow up With Specialties Details Why [address] [place] [address] Services Follow up  [address] Luis Espinoza    [address] [address] [phone]          Current Discharge Medication List      CONTINUE these medications which have NOT CHANGED    Details   aspirin 81 MG tablet 1 cap(s)      Coenzyme Q10 (CO Q 10 PO) Take by mouth      ferrous sulfate 324 (65 Fe) mg Take 1 tablet (324 mg total) by mouth daily before breakfast  Qty: 90 tablet, Refills: 1    Associated Diagnoses: Iron deficiency      Multiple Vitamins-Minerals (MULTIVITAMIN ADULTS 50+ PO) Take by mouth      pravastatin (PRAVACHOL) 20 mg tablet TAKE ONE TABLET BY MOUTH EVERY OTHER DAY  Qty: 30 tablet, Refills: 3    Associated Diagnoses: Hyperlipidemia      VITAMIN D, CHOLECALCIFEROL, PO Take by mouth      fluticasone (FLONASE) 50 mcg/act nasal spray 2 sprays into each nostril daily  Qty: 16 g, Refills: 3 Associated Diagnoses: Dysfunction of left eustachian tube         STOP taking these medications       amoxicillin-clavulanate (AUGMENTIN) 400-57 mg/5 mL suspension Comments:   Reason for Stopping:         azithromycin (ZITHROMAX) 200 mg/5 mL suspension Comments:   Reason for Stopping:               No discharge procedures on file.     PDMP Review     None          ED Provider  Electronically Signed by           Gauri Robledo MD  07/27/23 0655

## 2023-07-20 NOTE — PLAN OF CARE
Recommendations:   Diet: NPO   Meds: non-oral if possible   Feeding assistance: NA  Frequent Oral care: 2-4x/day  Aspiration precautions and compensatory swallowing strategies: upright posture and oral care, suction set up  Other Recommendations/ considerations: SLP will continue to follow to ensure adequate management of oral diet and adjust as clinically indicated x1-3

## 2023-07-21 PROBLEM — E43 SEVERE PROTEIN-CALORIE MALNUTRITION (HCC): Status: ACTIVE | Noted: 2023-07-21

## 2023-07-21 LAB
ANION GAP SERPL CALCULATED.3IONS-SCNC: 11 MMOL/L
BUN SERPL-MCNC: 23 MG/DL (ref 5–25)
CALCIUM SERPL-MCNC: 9.8 MG/DL (ref 8.4–10.2)
CHLORIDE SERPL-SCNC: 103 MMOL/L (ref 96–108)
CO2 SERPL-SCNC: 24 MMOL/L (ref 21–32)
CREAT SERPL-MCNC: 1.14 MG/DL (ref 0.6–1.3)
ERYTHROCYTE [DISTWIDTH] IN BLOOD BY AUTOMATED COUNT: 12.9 % (ref 11.6–15.1)
FERRITIN SERPL-MCNC: 116 NG/ML (ref 24–336)
GFR SERPL CREATININE-BSD FRML MDRD: 62 ML/MIN/1.73SQ M
GLUCOSE SERPL-MCNC: 101 MG/DL (ref 65–140)
GLUCOSE SERPL-MCNC: 110 MG/DL (ref 65–140)
GLUCOSE SERPL-MCNC: 89 MG/DL (ref 65–140)
GLUCOSE SERPL-MCNC: 99 MG/DL (ref 65–140)
HCT VFR BLD AUTO: 40.6 % (ref 36.5–49.3)
HGB BLD-MCNC: 13.6 G/DL (ref 12–17)
IRON SATN MFR SERPL: 7 % (ref 20–50)
IRON SERPL-MCNC: 18 UG/DL (ref 65–175)
MCH RBC QN AUTO: 28.6 PG (ref 26.8–34.3)
MCHC RBC AUTO-ENTMCNC: 33.5 G/DL (ref 31.4–37.4)
MCV RBC AUTO: 85 FL (ref 82–98)
PLATELET # BLD AUTO: 501 THOUSANDS/UL (ref 149–390)
PMV BLD AUTO: 8.3 FL (ref 8.9–12.7)
POTASSIUM SERPL-SCNC: 4.1 MMOL/L (ref 3.5–5.3)
PREALB SERPL-MCNC: 7.7 MG/DL (ref 18–40)
PROCALCITONIN SERPL-MCNC: 0.46 NG/ML
RBC # BLD AUTO: 4.76 MILLION/UL (ref 3.88–5.62)
SODIUM SERPL-SCNC: 138 MMOL/L (ref 135–147)
TIBC SERPL-MCNC: 250 UG/DL (ref 250–450)
TSH SERPL DL<=0.05 MIU/L-ACNC: 1.81 UIU/ML (ref 0.45–4.5)
WBC # BLD AUTO: 13.32 THOUSAND/UL (ref 4.31–10.16)

## 2023-07-21 PROCEDURE — 82948 REAGENT STRIP/BLOOD GLUCOSE: CPT

## 2023-07-21 PROCEDURE — 83540 ASSAY OF IRON: CPT | Performed by: INTERNAL MEDICINE

## 2023-07-21 PROCEDURE — 85027 COMPLETE CBC AUTOMATED: CPT | Performed by: PHYSICIAN ASSISTANT

## 2023-07-21 PROCEDURE — 84443 ASSAY THYROID STIM HORMONE: CPT | Performed by: INTERNAL MEDICINE

## 2023-07-21 PROCEDURE — 94664 DEMO&/EVAL PT USE INHALER: CPT

## 2023-07-21 PROCEDURE — 80048 BASIC METABOLIC PNL TOTAL CA: CPT | Performed by: PHYSICIAN ASSISTANT

## 2023-07-21 PROCEDURE — 99233 SBSQ HOSP IP/OBS HIGH 50: CPT | Performed by: FAMILY MEDICINE

## 2023-07-21 PROCEDURE — 99232 SBSQ HOSP IP/OBS MODERATE 35: CPT | Performed by: PHYSICIAN ASSISTANT

## 2023-07-21 PROCEDURE — 84134 ASSAY OF PREALBUMIN: CPT | Performed by: INTERNAL MEDICINE

## 2023-07-21 PROCEDURE — 84145 PROCALCITONIN (PCT): CPT | Performed by: FAMILY MEDICINE

## 2023-07-21 PROCEDURE — 83550 IRON BINDING TEST: CPT | Performed by: INTERNAL MEDICINE

## 2023-07-21 PROCEDURE — 82728 ASSAY OF FERRITIN: CPT | Performed by: INTERNAL MEDICINE

## 2023-07-21 RX ORDER — SODIUM CHLORIDE 9 MG/ML
75 INJECTION, SOLUTION INTRAVENOUS CONTINUOUS
Status: DISCONTINUED | OUTPATIENT
Start: 2023-07-21 | End: 2023-07-22

## 2023-07-21 RX ORDER — LIDOCAINE 50 MG/G
1 PATCH TOPICAL DAILY
Status: DISCONTINUED | OUTPATIENT
Start: 2023-07-21 | End: 2023-07-28 | Stop reason: HOSPADM

## 2023-07-21 RX ADMIN — HEPARIN SODIUM 5000 UNITS: 5000 INJECTION INTRAVENOUS; SUBCUTANEOUS at 05:29

## 2023-07-21 RX ADMIN — CEFTRIAXONE 1000 MG: 1 INJECTION, SOLUTION INTRAVENOUS at 05:29

## 2023-07-21 RX ADMIN — SODIUM CHLORIDE 75 ML/HR: 0.9 INJECTION, SOLUTION INTRAVENOUS at 15:16

## 2023-07-21 RX ADMIN — LIDOCAINE 5% 1 PATCH: 700 PATCH TOPICAL at 15:12

## 2023-07-21 RX ADMIN — HEPARIN SODIUM 5000 UNITS: 5000 INJECTION INTRAVENOUS; SUBCUTANEOUS at 22:13

## 2023-07-21 RX ADMIN — HEPARIN SODIUM 5000 UNITS: 5000 INJECTION INTRAVENOUS; SUBCUTANEOUS at 15:12

## 2023-07-21 NOTE — ASSESSMENT & PLAN NOTE
· Tachypnea, tachycardia.   White blood cells 13 actually improved compared to last week  · Trend end points  · Source: multifocal pneumonia from recurring aspiration  · IVF  · IV Rocephin  · Blood cultures negative so far

## 2023-07-21 NOTE — MALNUTRITION/BMI
This medical record reflects one or more clinical indicators suggestive of malnutrition. Malnutrition Findings:   Adult Malnutrition type: Chronic illness  Adult Degree of Malnutrition: Other severe protein calorie malnutrition  Malnutrition Characteristics: Fat loss, Muscle loss, Weight loss      360 Statement: Related to catabolic illness as evidenced by severe orbital fat loss, severe muscle wasting in clavicles and temples, and a weight loss of 6 lbs (4.3%) in < 1 month. Treated with pending diet advancement. BMI Findings: There is no height or weight on file to calculate BMI. See Nutrition note dated 7/21/2023 for additional details. Completed nutrition assessment is viewable in the nutrition documentation.

## 2023-07-21 NOTE — ASSESSMENT & PLAN NOTE
· IV Rocephin  · Aspiration precautions  · Respiratory protocol  · Symptom management  · Patient for EGD on Monday

## 2023-07-21 NOTE — SPEECH THERAPY NOTE
Speech Language/Pathology Missed Visit Note    Dysphagia follow up deferred at this time. Pt w/ baseline oropharyngeal & esophageal dysphagia w/ recurrent aspiration pneumonia; not able to manage PO as per BSE. Endoscopy completed in 2021 showed moderate to large hiatal hernia as well as a ring in the upper esophagus and a Schatzki's ring that required dilation. Pending EGD Monday once respiratory status improves. Will follow up for repeat bedside and/or MBS to determine safety of oral intake/pleasure feeds following GI workup. Pt NPO.   D/w nursing and MD.         Michelle Farley, [address] [facility], [address] [facility]  Speech-Language Pathologist  [place] #[id number]  [place] #[id number]

## 2023-07-21 NOTE — PLAN OF CARE
Problem: SAFETY ADULT  Goal: Patient will remain free of falls  Description: INTERVENTIONS:  - Educate patient/family on patient safety including physical limitations  - Instruct patient to call for assistance with activity   - Consult OT/PT to assist with strengthening/mobility   - Keep Call bell within reach  - Keep bed low and locked with side rails adjusted as appropriate  - Keep care items and personal belongings within reach  - Initiate and maintain comfort rounds  - Make Fall Risk Sign visible to staff  - Obtain necessary fall risk management equipment:   - Apply yellow socks and bracelet for high fall risk patients  - Consider moving patient to room near nurses station  Outcome: Progressing     Problem: Knowledge Deficit  Goal: Patient/family/caregiver demonstrates understanding of disease process, treatment plan, medications, and discharge instructions  Description: Complete learning assessment and assess knowledge base.   Interventions:  - Provide teaching at level of understanding  - Provide teaching via preferred learning methods  Outcome: Progressing

## 2023-07-21 NOTE — PROGRESS NOTES
[address]  Progress Note  Name: Ailyn Mckeon  MRN: [id number]  Unit/Bed#: -02 I Date of Admission: 7/19/2023   Date of Service: 7/21/2023 I Hospital Day: 1    Assessment/Plan   Hiatal hernia  Assessment & Plan  · PPI  · Likely contributing to aspiration  · Has been referred to bariatric surgery in the past    Esophageal dysphagia  Assessment & Plan  · From history of oropharyngeal cancer presently in remission  · Speech therapy consult appreciated  · Present diet: N.p.o.  · EGD on Monday    Generalized weakness  Assessment & Plan  · From sepsis, multifocal pneumonia, poor p.o. intake, dehydration, malnutrition  · PT/OT evals ordered    CKD (chronic kidney disease) stage 3, GFR 30-59 ml/min [facility])  Assessment & Plan  Lab Results   Component Value Date    EGFR 62 07/21/2023    EGFR 49 07/19/2023    EGFR 44 07/15/2023    CREATININE 1.14 07/21/2023    CREATININE 1.38 (H) 07/19/2023    CREATININE 1.49 (H) 07/15/2023     · Chronic and stable    Dehydration  Assessment & Plan  · Noted on physical exam and labs  · IVF    Sepsis ([address])  Assessment & Plan  · Tachypnea, tachycardia. White blood cells 13 actually improved compared to last week  · Trend end points  · Source: multifocal pneumonia from recurring aspiration  · IVF  · IV Rocephin  · Blood cultures negative so far    * Multifocal pneumonia from recurring aspiration  Assessment & Plan  · IV Rocephin  · Aspiration precautions  · Respiratory protocol  · Symptom management  · Patient for EGD on Monday             VTE Pharmacologic Prophylaxis:   Pharmacologic: Heparin  Mechanical VTE Prophylaxis in Place: Yes    Patient Centered Rounds: I have performed bedside rounds with nursing staff today. Education and Discussions with Family / Patient: Discussed with the wife over the phone    Time Spent for Care: 20 minutes. More than 50% of total time spent on counseling and coordination of care as described above.     Current Length of Stay: 1 day(s)    Current Patient Status: Inpatient   Certification Statement: The patient will continue to require additional inpatient hospital stay due to Being IV antibiotics for aspiration pneumonia. Patient also will require EGD with possible PEG tube placement on Monday    Discharge Plan: Patient will be here at least until Tuesday or Wednesday of next week    Code Status: Level 1 - Full Code      Subjective:   Patient seen and examined. No acute events reported overnight    Objective:     Vitals:   Temp (24hrs), Av.1 °F (36.7 °C), Min:97.9 °F (36.6 °C), Max:98.4 °F (36.9 °C)    Temp:  [97.9 °F (36.6 °C)-98.4 °F (36.9 °C)] 98.4 °F (36.9 °C)  HR:  [] 102  Resp:  [17-30] 19  BP: ()/(54-90) 107/66  SpO2:  [87 %-96 %] 96 %  There is no height or weight on file to calculate BMI. Input and Output Summary (last 24 hours):        Intake/Output Summary (Last 24 hours) at 2023 1041  Last data filed at 2023 0516  Gross per 24 hour   Intake --   Output 200 ml   Net -200 ml       Physical Exam:     Physical Exam  (   General Appearance:    Alert,cachectic                               Lungs:     Clear to auscultation bilaterally, respirations unlabored       Heart:    Regular rate and rhythm, S1 and S2 normal, no murmur, rub    or gallop   Abdomen:     Soft, non-tender, bowel sounds active all four quadrants,     no masses, no organomegaly           Extremities:   Extremities normal, atraumatic, no cyanosis or edema       Additional Data:     Labs:    Results from last 7 days   Lab Units 23  0509 23  2153   WBC Thousand/uL 13.32* 12.73*   HEMOGLOBIN g/dL 13.6 15.7   HEMATOCRIT % 40.6 47.2   PLATELETS Thousands/uL 501* 544*   NEUTROS PCT %  --  82*   LYMPHS PCT %  --  9*   MONOS PCT %  --  6   EOS PCT %  --  0     Results from last 7 days   Lab Units 23  0509 23  2153   SODIUM mmol/L 138 134*   POTASSIUM mmol/L 4.1 4.0   CHLORIDE mmol/L 103 97   CO2 mmol/L 24 26 BUN mg/dL 23 22   CREATININE mg/dL 1.14 1.38*   ANION GAP mmol/L 11 11   CALCIUM mg/dL 9.8 10.7*   ALBUMIN g/dL  --  4.3   TOTAL BILIRUBIN mg/dL  --  0.67   ALK PHOS U/L  --  121*   ALT U/L  --  23   AST U/L  --  23   GLUCOSE RANDOM mg/dL 99 110     Results from last 7 days   Lab Units 07/19/23  2153   INR  1.09     Results from last 7 days   Lab Units 07/21/23  0507 07/20/23  2353   POC GLUCOSE mg/dl 89 110         Results from last 7 days   Lab Units 07/21/23  0509 07/19/23  2153   LACTIC ACID mmol/L  --  1.0   PROCALCITONIN ng/ml 0.46* 0.92*           * I Have Reviewed All Lab Data Listed Above. * Additional Pertinent Lab Tests Reviewed: [address] Admission Reviewed        Recent Cultures (last 7 days):     Results from last 7 days   Lab Units 07/19/23  2153   BLOOD CULTURE  No Growth at 24 hrs. No Growth at 24 hrs. Last 24 Hours Medication List:   Current Facility-Administered Medications   Medication Dose Route Frequency Provider Last Rate   • acetaminophen  650 mg Oral Q6H PRN Sonia Hurley PA-C     • aspirin  81 mg Oral Daily Sonia Hurley PA-C     • cefTRIAXone  1,000 mg Intravenous Q24H Sonia Hurley PA-C 1,000 mg (07/21/23 0529)   • ferrous sulfate  325 mg Oral Daily With Breakfast Sonia Hurley PA-C     • heparin (porcine)  5,000 Units Subcutaneous [facility] Sonia Hurley PA-C     • ondansetron  4 mg Intravenous Q6H PRN Sonia Hurley PA-C     • polyethylene glycol  17 g Oral Daily PRN Sonia Hurley PA-C     • pravastatin  20 mg Oral Every Other Day Sonia Hurley PA-C          Today, Patient Was Seen By: Fabio Goodrich MD    ** Please Note: Dictation voice to text software may have been used in the creation of this document.  **

## 2023-07-21 NOTE — PLAN OF CARE
Problem: PAIN - ADULT  Goal: Verbalizes/displays adequate comfort level or baseline comfort level  Description: Interventions:  - Encourage patient to monitor pain and request assistance  - Assess pain using appropriate pain scale  - Administer analgesics based on type and severity of pain and evaluate response  - Implement non-pharmacological measures as appropriate and evaluate response  - Consider cultural and social influences on pain and pain management  - Notify physician/advanced practitioner if interventions unsuccessful or patient reports new pain  Outcome: Progressing     Problem: INFECTION - ADULT  Goal: Absence or prevention of progression during hospitalization  Description: INTERVENTIONS:  - Assess and monitor for signs and symptoms of infection  - Monitor lab/diagnostic results  - Monitor all insertion sites, i.e. indwelling lines, tubes, and drains  - Monitor endotracheal if appropriate and nasal secretions for changes in amount and color  - [place] appropriate cooling/warming therapies per order  - Administer medications as ordered  - Instruct and encourage patient and family to use good hand hygiene technique  - Identify and instruct in appropriate isolation precautions for identified infection/condition  Outcome: Progressing  Goal: Absence of fever/infection during neutropenic period  Description: INTERVENTIONS:  - Monitor WBC    Outcome: Progressing

## 2023-07-21 NOTE — PROGRESS NOTES
GI Progress Note - Yolande Chilel 70 y.o. male MRN: [id number]    Unit/Bed#: -02 Encounter: [id number]    Subjective: No changes or new complaints since yesterday. Still requiring O2, between 2-3 L NC. Objective:     Vitals: Blood pressure 107/66, pulse 102, temperature 98.4 °F (36.9 °C), resp. rate 19, SpO2 96 %. ,There is no height or weight on file to calculate BMI. Intake/Output Summary (Last 24 hours) at 7/21/2023 1400  Last data filed at 7/21/2023 0516  Gross per 24 hour   Intake --   Output 200 ml   Net -200 ml       Physical Exam:     General Appearance: Alert, oriented x3, cachectic and chronically ill appearing  Lungs: (+) Rhonchi, no wheezing, no respiratory distress  Heart: Regular rate and rhythm, S1, S2 normal, no murmur, click, rub or gallop  Abdomen: Non-distended, soft, BS active, NTTP  Extremities: No cyanosis, edema    Invasive Devices     Peripheral Intravenous Line  Duration           Peripheral IV 07/19/23 Right Antecubital 1 day                Lab Results:  Results from last 7 days   Lab Units 07/21/23  0509 07/19/23 2153   WBC Thousand/uL 13.32* 12.73*   HEMOGLOBIN g/dL 13.6 15.7   HEMATOCRIT % 40.6 47.2   PLATELETS Thousands/uL 501* 544*   NEUTROS PCT %  --  82*   LYMPHS PCT %  --  9*   MONOS PCT %  --  6   EOS PCT %  --  0     Results from last 7 days   Lab Units 07/21/23  0509 07/19/23 2153   POTASSIUM mmol/L 4.1 4.0   CHLORIDE mmol/L 103 97   CO2 mmol/L 24 26   BUN mg/dL 23 22   CREATININE mg/dL 1.14 1.38*   CALCIUM mg/dL 9.8 10.7*   ALK PHOS U/L  --  121*   ALT U/L  --  23   AST U/L  --  23     Results from last 7 days   Lab Units 07/19/23 2153   INR  1.09           Imaging Studies: I have personally reviewed pertinent imaging studies. CT chest wo contrast  Result Date: 7/20/2023  Impression: Patchy groundglass opacities in the periphery of the right upper lobe. Scattered reticulonodular opacities in the bilateral lungs.  Groundglass and alveolar opacities as well as focal consolidation with air bronchograms in the right lower lobe. Findings taken together suspicious for multifocal infection in the appropriate clinical setting. Small dependent pleural effusions. Tiny pericardial effusion. Coronary atherosclerosis, hypoplastic thyroid gland, large hiatal hernia, left-sided nephrolithiasis, and other findings as above.  Workstation performed: [id number]       Assessment and Plan:     Pharyngeal Dysphagia  Malnutrition  Aspiration Pneumonia  - He has a history of pharyngeal dysphagia 2/2 prior radical neck dissection and radiation for tongue cancer with a 40 lb weight loss over the past several years and noticeable worsening dysphagia/increased secretions in the past several months here with aspiration pneumonia  - Will tentatively plan for EGD with PEG tube placement on Monday given his pneumonia and respiratory status  - NPO per speech   - Will start IVF for hydration while NPO  - Protonix 40 mg IV BID  - Treatment of pneumonia per SLIM  - Nutrition consult for tube feeding recommendations    The patient will be seen by Dr. Wallace Michaels.

## 2023-07-21 NOTE — ASSESSMENT & PLAN NOTE
Lab Results   Component Value Date    EGFR 62 07/21/2023    EGFR 49 07/19/2023    EGFR 44 07/15/2023    CREATININE 1.14 07/21/2023    CREATININE 1.38 (H) 07/19/2023    CREATININE 1.49 (H) 07/15/2023     · Chronic and stable

## 2023-07-21 NOTE — CASE MANAGEMENT
Case Management Discharge Planning Note    Patient name Ken Zapata  Location /-84 MRN [id number]  : 1945 Date 2023       Current Admission Date: 2023  Current Admission Diagnosis:Multifocal pneumonia from recurring aspiration   Patient Active Problem List    Diagnosis Date Noted   • Pneumonia of left lower lobe due to infectious organism 2023   • Slurred speech 2023   • Articular cartilage disorder of shoulder region 2023   • Cervical spondylosis 2023   • Disorder of bursae of shoulder region 2023   • Impingement syndrome of shoulder region 2023   • Hiatal hernia 12/15/2022   • Bilateral carotid artery stenosis 2022   • Abdominal aortic aneurysm (AAA) without rupture ([address]) 2022   • Chronic kidney disease-mineral and bone disorder 2022   • Multifocal pneumonia from recurring aspiration 2021   • Esophageal dysphagia 2021   • Generalized weakness 2021   • Osteoarthritis of left hip 2021   • Synovial cyst of lumbar facet joint 2021   • Myofascial pain on left side 2020   • Lumbar radiculopathy 2020   • CKD (chronic kidney disease) stage 3, GFR 30-59 ml/min ([address]) 2020   • Fatigue 2020   • Benign localized prostatic hyperplasia with lower urinary tract symptoms (LUTS) 10/15/2019   • Overactive bladder 10/09/2019   • Dehydration 2019   • History of head and neck radiation 2019   • Excessive salivation 01/10/2019   • Sepsis ([address]) 01/10/2019   • History of carcinoma of tongue 2018   • Microscopic hematuria 2018   • Monoclonal gammopathy of unknown significance 2016      LOS (days): 1  Geometric Mean LOS (GMLOS) (days): 5.00  Days to GMLOS:3.5     OBJECTIVE:  Risk of Unplanned Readmission Score: 12.05         Current admission status: Inpatient   Preferred Pharmacy:   [facility]/pharmacy #[id number]- [place], [place] - [address]  [address]  [place] [address]  Phone: [phone] Fax: [address], [address] [address]  Phone: [phone] Fax: [phone]    Primary Care Provider: Adra Paget, MD    Primary Insurance: MEDICARE  Secondary Insurance: BLUE CROSS    DISCHARGE DETAILS:                                          Other Referral/Resources/Interventions Provided:  Referral Comments: Seen by PT/OT and home health recommended. Referrals made in [address] to [address] agencies;  awaiting responses. Pt d/c anticipated for Mon or Tues of next week.          Treatment Team Recommendation: Home with [address]   Discharge Destination Plan[de-identified] Home with [address]

## 2023-07-21 NOTE — ASSESSMENT & PLAN NOTE
· From history of oropharyngeal cancer presently in remission  · Speech therapy consult appreciated  · Present diet: N.p.o.  · EGD on Monday

## 2023-07-21 NOTE — RESPIRATORY THERAPY NOTE
RT Protocol Note  Ike Gillis 70 y.o. male MRN: [id number]  Unit/Bed#: -02 Encounter: [id number]    Assessment    Principal Problem:    Multifocal pneumonia from recurring aspiration  Active Problems:    Sepsis ([address])    Dehydration    CKD (chronic kidney disease) stage 3, GFR 30-59 ml/min ([facility])    Generalized weakness    Esophageal dysphagia    Hiatal hernia      Home Pulmonary Medications:     07/21/23 1127   Respiratory Protocol   Protocol Initiated? No   Protocol Selection Respiratory   Language Barrier? No   Medical & Social History Reviewed? Yes   Diagnostic Studies Reviewed? Yes   Physical Assessment Performed?  Yes   Respiratory Plan Discontinue Protocol   Respiratory Assessment   Assessment Type Assess only   Respiratory Pattern Normal   Resp Comments patient takes no resp meds at home, no COPD, respirations even and non labored, and is not ordered any resp meds during this admission, discontinue protocol   O2 Device nc            Past Medical History:   Diagnosis Date    Acute renal failure superimposed on chronic kidney disease ([address]) 12/31/2021    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[address]) 03/1999    SCCA left tongue base - [id number]- treated at [facility] - had surgery and XRT    Chronic kidney disease     COVID-19 with hypoxic respiratory failure 12/31/2021    Essential hypertension 5/6/2019    Hematuria 10/15/2019    Hyperlipidemia     Hypertension     Left anterior fascicular block     Stage 3 chronic kidney disease ([address])     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[address]) 2/14/2019     Social History     Socioeconomic History    Marital status: /Civil Union     Spouse name: None    Number of children: None    Years of education: None    Highest education level: None   Occupational History    None   Tobacco Use    Smoking status: Former     Passive exposure: Current    Smokeless tobacco: Former    Tobacco comments:     quit 20 years ago   Vaping Use    Vaping Use: Never used   Substance and Sexual Activity    Alcohol use: Yes     Comment: occasional    Drug use: Never    Sexual activity: Not Currently     Partners: Female   Other Topics Concern    None   Social History Narrative    None     Social Determinants of Health     Financial Resource Strain: Not on file   Food Insecurity: No Food Insecurity (7/20/2023)    Hunger Vital Sign     Worried About Running Out of Food in the Last Year: Never true     Ran Out of Food in the Last Year: Never true   Transportation Needs: No Transportation Needs (7/20/2023)    PRAPARE - Transportation     Lack of Transportation (Medical): No     Lack of Transportation (Non-Medical): No   Physical Activity: Not on file   Stress: Not on file   Social Connections: Not on file   Intimate Partner Violence: Not on file   Housing Stability: Low Risk  (7/20/2023)    Housing Stability Vital Sign     Unable to Pay for Housing in the Last Year: No     Number of Places Lived in the Last Year: 1     Unstable Housing in the Last Year: No       Subjective         Objective    Physical Exam:   Assessment Type: Assess only  Respiratory Pattern: Normal  O2 Device: nc    Vitals:  Blood pressure 107/66, pulse 102, temperature 98.4 °F (36.9 °C), resp. rate 19, SpO2 96 %. Imaging and other studies: I have personally reviewed pertinent reports.       O2 Device: nc     Plan    Respiratory Plan: Discontinue Protocol        Resp Comments: patient takes no resp meds at home, no COPD, respirations even and non labored, and is not ordered any resp meds during this admission, discontinue protocol

## 2023-07-22 LAB
ANION GAP SERPL CALCULATED.3IONS-SCNC: 11 MMOL/L
BUN SERPL-MCNC: 29 MG/DL (ref 5–25)
CALCIUM SERPL-MCNC: 9.7 MG/DL (ref 8.4–10.2)
CHLORIDE SERPL-SCNC: 106 MMOL/L (ref 96–108)
CO2 SERPL-SCNC: 23 MMOL/L (ref 21–32)
CREAT SERPL-MCNC: 1.13 MG/DL (ref 0.6–1.3)
ERYTHROCYTE [DISTWIDTH] IN BLOOD BY AUTOMATED COUNT: 13.2 % (ref 11.6–15.1)
GFR SERPL CREATININE-BSD FRML MDRD: 62 ML/MIN/1.73SQ M
GLUCOSE SERPL-MCNC: 115 MG/DL (ref 65–140)
GLUCOSE SERPL-MCNC: 93 MG/DL (ref 65–140)
GLUCOSE SERPL-MCNC: 93 MG/DL (ref 65–140)
HCT VFR BLD AUTO: 40.9 % (ref 36.5–49.3)
HGB BLD-MCNC: 13.4 G/DL (ref 12–17)
MCH RBC QN AUTO: 28.5 PG (ref 26.8–34.3)
MCHC RBC AUTO-ENTMCNC: 32.8 G/DL (ref 31.4–37.4)
MCV RBC AUTO: 87 FL (ref 82–98)
PLATELET # BLD AUTO: 514 THOUSANDS/UL (ref 149–390)
PMV BLD AUTO: 8.1 FL (ref 8.9–12.7)
POTASSIUM SERPL-SCNC: 3.8 MMOL/L (ref 3.5–5.3)
RBC # BLD AUTO: 4.71 MILLION/UL (ref 3.88–5.62)
SODIUM SERPL-SCNC: 140 MMOL/L (ref 135–147)
WBC # BLD AUTO: 10.41 THOUSAND/UL (ref 4.31–10.16)

## 2023-07-22 PROCEDURE — 82948 REAGENT STRIP/BLOOD GLUCOSE: CPT

## 2023-07-22 PROCEDURE — 99232 SBSQ HOSP IP/OBS MODERATE 35: CPT | Performed by: FAMILY MEDICINE

## 2023-07-22 PROCEDURE — 99232 SBSQ HOSP IP/OBS MODERATE 35: CPT | Performed by: PHYSICIAN ASSISTANT

## 2023-07-22 PROCEDURE — 80048 BASIC METABOLIC PNL TOTAL CA: CPT | Performed by: FAMILY MEDICINE

## 2023-07-22 PROCEDURE — 85027 COMPLETE CBC AUTOMATED: CPT | Performed by: FAMILY MEDICINE

## 2023-07-22 RX ORDER — DEXTROSE AND SODIUM CHLORIDE 5; .9 G/100ML; G/100ML
50 INJECTION, SOLUTION INTRAVENOUS CONTINUOUS
Status: DISCONTINUED | OUTPATIENT
Start: 2023-07-22 | End: 2023-07-28

## 2023-07-22 RX ADMIN — HEPARIN SODIUM 5000 UNITS: 5000 INJECTION INTRAVENOUS; SUBCUTANEOUS at 21:11

## 2023-07-22 RX ADMIN — DEXTROSE AND SODIUM CHLORIDE 50 ML/HR: 5; .9 INJECTION, SOLUTION INTRAVENOUS at 10:05

## 2023-07-22 RX ADMIN — LIDOCAINE 5% 1 PATCH: 700 PATCH TOPICAL at 08:34

## 2023-07-22 RX ADMIN — HEPARIN SODIUM 5000 UNITS: 5000 INJECTION INTRAVENOUS; SUBCUTANEOUS at 14:50

## 2023-07-22 RX ADMIN — CEFTRIAXONE 1000 MG: 1 INJECTION, SOLUTION INTRAVENOUS at 05:12

## 2023-07-22 RX ADMIN — HEPARIN SODIUM 5000 UNITS: 5000 INJECTION INTRAVENOUS; SUBCUTANEOUS at 05:12

## 2023-07-22 NOTE — PROGRESS NOTES
Progress Note - Eugena Alpha 70 y.o. male MRN: [id number]    Unit/Bed#: -02 Encounter: [id number]    Subjective:     Patient reports fatigue and shortness of breath. He feels his breathing has improved since admission. He remains n.p.o. as advised by speech. Objective:     Vitals: Blood pressure 116/72, pulse 92, temperature 97.6 °F (36.4 °C), resp. rate 19, SpO2 94 %. ,There is no height or weight on file to calculate BMI. Intake/Output Summary (Last 24 hours) at 7/22/2023 0953  Last data filed at 7/22/2023 0520  Gross per 24 hour   Intake 0 ml   Output 220 ml   Net -220 ml       Physical Exam:     General Appearance: Alert, cachectic  Lungs: (+) Rhonci,(+)  use of accessory muscles  Heart: Regular rate and rhythm, S1, S2 normal, no murmur, click, rub or gallop  Abdomen: Soft, non-tender, non-distended; bowel sounds normal; no masses or no organomegaly  Extremities: No cyanosis, edema    Invasive Devices     Peripheral Intravenous Line  Duration           Peripheral IV 07/21/23 Proximal;Right;Ventral (anterior) Forearm <1 day                Lab Results:  Results from last 7 days   Lab Units 07/22/23  0534 07/21/23  0509 07/19/23  2153   WBC Thousand/uL 10.41*   < > 12.73*   HEMOGLOBIN g/dL 13.4   < > 15.7   HEMATOCRIT % 40.9   < > 47.2   PLATELETS Thousands/uL 514*   < > 544*   NEUTROS PCT %  --   --  82*   LYMPHS PCT %  --   --  9*   MONOS PCT %  --   --  6   EOS PCT %  --   --  0    < > = values in this interval not displayed. Results from last 7 days   Lab Units 07/22/23  0534 07/21/23  0509 07/19/23  2153   POTASSIUM mmol/L 3.8   < > 4.0   CHLORIDE mmol/L 106   < > 97   CO2 mmol/L 23   < > 26   BUN mg/dL 29*   < > 22   CREATININE mg/dL 1.13   < > 1.38*   CALCIUM mg/dL 9.7   < > 10.7*   ALK PHOS U/L  --   --  121*   ALT U/L  --   --  23   AST U/L  --   --  23    < > = values in this interval not displayed.      Results from last 7 days   Lab Units 07/19/23  2153   INR  1.09 Imaging Studies: I have personally reviewed pertinent imaging studies. CT chest wo contrast    Result Date: 7/20/2023  Impression: Patchy groundglass opacities in the periphery of the right upper lobe. Scattered reticulonodular opacities in the bilateral lungs. Groundglass and alveolar opacities as well as focal consolidation with air bronchograms in the right lower lobe. Findings taken together suspicious for multifocal infection in the appropriate clinical setting. Small dependent pleural effusions. Tiny pericardial effusion. Coronary atherosclerosis, hypoplastic thyroid gland, large hiatal hernia, left-sided nephrolithiasis, and other findings as above. Workstation performed: [id number]       Assessment and Plan:     Pharyngeal Dysphagia  Malnutrition  Recurrent Aspiration Pneumonia  - He has a history of pharyngeal dysphagia 2/2 prior radical neck dissection and radiation for tongue cancer with a 40 lb weight loss over the past several years and noticeable worsening dysphagia/increased secretions in the past several months here with aspiration pneumonia  - Will tentatively plan for EGD with PEG tube placement on Monday given his pneumonia and respiratory status  - NPO per speech   - Will start IVF for hydration while NPO  - Protonix 40 mg IV BID  - Treatment of pneumonia per SLIM  - Nutrition consult for tube feeding recommendations     The patient will be seen by Dr. Elsie Valdovinos.

## 2023-07-22 NOTE — ASSESSMENT & PLAN NOTE
· Noted on physical exam and labs  · IVF  · Continue with gentle hydration but monitor for signs of volume overload

## 2023-07-22 NOTE — ASSESSMENT & PLAN NOTE
Lab Results   Component Value Date    EGFR 62 07/22/2023    EGFR 62 07/21/2023    EGFR 49 07/19/2023    CREATININE 1.13 07/22/2023    CREATININE 1.14 07/21/2023    CREATININE 1.38 (H) 07/19/2023     · Chronic and stable

## 2023-07-22 NOTE — ASSESSMENT & PLAN NOTE
· Tachypnea, tachycardia. White blood cells 13 actually improved compared to last week  · Trend end points  · Source: multifocal pneumonia from recurring aspiration  · IVF  · IV Rocephin  · Blood cultures negative so far  · Stable.   Patient states he feels better

## 2023-07-22 NOTE — PROGRESS NOTES
[address]  Progress Note  Name: Evie Perales  MRN: [id number]  Unit/Bed#: -02 I Date of Admission: 7/19/2023   Date of Service: 7/22/2023 I Hospital Day: 2    Assessment/Plan   Severe protein-calorie malnutrition ([address])  Assessment & Plan  Malnutrition Findings:   Adult Malnutrition type: Chronic illness  Adult Degree of Malnutrition: Other severe protein calorie malnutrition  Malnutrition Characteristics: Fat loss, Muscle loss, Weight loss                  360 Statement: Related to catabolic illness as evidenced by severe orbital fat loss, severe muscle wasting in clavicles and temples, and a weight loss of 6 lbs (4.3%) in < 1 month. Treated with pending diet advancement. BMI Findings: There is no height or weight on file to calculate BMI. Hiatal hernia  Assessment & Plan  · PPI  · Likely contributing to aspiration  · Has been referred to bariatric surgery in the past    Esophageal dysphagia  Assessment & Plan  · From history of oropharyngeal cancer presently in remission  · Speech therapy consult appreciated  · Present diet: N.p.o.  · EGD on Monday  · GI consultation appreciated as well. Patient does have a history of strictures    Generalized weakness  Assessment & Plan  · From sepsis, multifocal pneumonia, poor p.o. intake, dehydration, malnutrition  · PT/OT evals ordered and appreciated    CKD (chronic kidney disease) stage 3, GFR 30-59 ml/min [facility])  Assessment & Plan  Lab Results   Component Value Date    EGFR 62 07/22/2023    EGFR 62 07/21/2023    EGFR 49 07/19/2023    CREATININE 1.13 07/22/2023    CREATININE 1.14 07/21/2023    CREATININE 1.38 (H) 07/19/2023     · Chronic and stable    Dehydration  Assessment & Plan  · Noted on physical exam and labs  · IVF  · Continue with gentle hydration but monitor for signs of volume overload    Sepsis ([address])  Assessment & Plan  · Tachypnea, tachycardia.   White blood cells 13 actually improved compared to last week  · Trend end points  · Source: multifocal pneumonia from recurring aspiration  · IVF  · IV Rocephin  · Blood cultures negative so far  · Stable. Patient states he feels better    * Multifocal pneumonia from recurring aspiration  Assessment & Plan  · IV Rocephin  · Aspiration precautions  · Respiratory protocol  · Symptom management  · Patient for EGD on Monday  · Currently on supplemental oxygen wean as able. Patient does not appear to be volume overloaded right now. Monitor while on IV fluids           VTE Pharmacologic Prophylaxis:   Pharmacologic: Heparin  Mechanical VTE Prophylaxis in Place: Yes    Patient Centered Rounds: I have performed bedside rounds with nursing staff today. Education and Discussions with Family / Patient: I will update the wife    Time Spent for Care: 20 minutes. More than 50% of total time spent on counseling and coordination of care as described above. Current Length of Stay: 2 day(s)    Current Patient Status: Inpatient   Certification Statement: The patient will continue to require additional inpatient hospital stay due to Needing an EGD on Monday with a feeding tube    Discharge Plan: Discharge likely Tuesday or Wednesday    Code Status: Level 1 - Full Code      Subjective:   Patient seen and examined. States he feels better than yesterday    Objective:     Vitals:   Temp (24hrs), Av °F (36.7 °C), Min:97.8 °F (36.6 °C), Max:98.4 °F (36.9 °C)    Temp:  [97.8 °F (36.6 °C)-98.4 °F (36.9 °C)] 97.9 °F (36.6 °C)  HR:  [102-109] 103  BP: (107-113)/(66-75) 110/74  SpO2:  [92 %-96 %] 94 %  There is no height or weight on file to calculate BMI. Input and Output Summary (last 24 hours):        Intake/Output Summary (Last 24 hours) at 2023 0713  Last data filed at 2023 0520  Gross per 24 hour   Intake 0 ml   Output 220 ml   Net -220 ml       Physical Exam:     Physical Exam  (   General Appearance:    Alert, cachectic                               Lungs: Clear to auscultation bilaterally, respirations unlabored       Heart:    Regular rate and rhythm, S1 and S2 normal, no murmur, rub    or gallop   Abdomen:     Soft, non-tender, bowel sounds active all four quadrants,     no masses, no organomegaly           Extremities:   Extremities normal, atraumatic, no cyanosis or edema       Additional Data:     Labs:    Results from last 7 days   Lab Units 07/22/23  0534 07/21/23  0509 07/19/23 2153   WBC Thousand/uL 10.41*   < > 12.73*   HEMOGLOBIN g/dL 13.4   < > 15.7   HEMATOCRIT % 40.9   < > 47.2   PLATELETS Thousands/uL 514*   < > 544*   NEUTROS PCT %  --   --  82*   LYMPHS PCT %  --   --  9*   MONOS PCT %  --   --  6   EOS PCT %  --   --  0    < > = values in this interval not displayed. Results from last 7 days   Lab Units 07/22/23  0534 07/21/23  0509 07/19/23 2153   SODIUM mmol/L 140   < > 134*   POTASSIUM mmol/L 3.8   < > 4.0   CHLORIDE mmol/L 106   < > 97   CO2 mmol/L 23   < > 26   BUN mg/dL 29*   < > 22   CREATININE mg/dL 1.13   < > 1.38*   ANION GAP mmol/L 11   < > 11   CALCIUM mg/dL 9.7   < > 10.7*   ALBUMIN g/dL  --   --  4.3   TOTAL BILIRUBIN mg/dL  --   --  0.67   ALK PHOS U/L  --   --  121*   ALT U/L  --   --  23   AST U/L  --   --  23   GLUCOSE RANDOM mg/dL 93   < > 110    < > = values in this interval not displayed. Results from last 7 days   Lab Units 07/19/23  2153   INR  1.09     Results from last 7 days   Lab Units 07/21/23  1936 07/21/23  0507 07/20/23  2353   POC GLUCOSE mg/dl 101 89 110         Results from last 7 days   Lab Units 07/21/23  0509 07/19/23 2153   LACTIC ACID mmol/L  --  1.0   PROCALCITONIN ng/ml 0.46* 0.92*           * I Have Reviewed All Lab Data Listed Above. * Additional Pertinent Lab Tests Reviewed: [facility] Admission Reviewed        Recent Cultures (last 7 days):     Results from last 7 days   Lab Units 07/19/23  7547   BLOOD CULTURE  No Growth at 24 hrs. No Growth at 24 hrs.        Last 24 Hours Medication List:   Current Facility-Administered Medications   Medication Dose Route Frequency Provider Last Rate   • acetaminophen  650 mg Oral Q6H PRN Sonia Hurley PA-C     • aspirin  81 mg Oral Daily Sonia Hurley PA-C     • cefTRIAXone  1,000 mg Intravenous Q24H Sonia Hurley PA-C 1,000 mg (07/22/23 1583)   • heparin (porcine)  5,000 Units Subcutaneous [facility] Sonia Hurley PA-C     • lidocaine  1 patch Topical Daily Latesha Mack PA-C     • ondansetron  4 mg Intravenous Q6H PRN Sonia Hurley PA-C     • polyethylene glycol  17 g Oral Daily PRN Sonia Hurley PA-C     • pravastatin  20 mg Oral Every Other Day Sonia Hurley PA-C     • sodium chloride  75 mL/hr Intravenous Continuous Meron CORWIN Lim 75 mL/hr (07/21/23 1516)        Today, Patient Was Seen By: Violetta Romero MD    ** Please Note: Dictation voice to text software may have been used in the creation of this document.  **

## 2023-07-22 NOTE — PLAN OF CARE
Problem: MOBILITY - ADULT  Goal: Maintain or return to baseline ADL function  Description: INTERVENTIONS:  -  Assess patient's ability to carry out ADLs; assess patient's baseline for ADL function and identify physical deficits which impact ability to perform ADLs (bathing, care of mouth/teeth, toileting, grooming, dressing, etc.)  - Assess/evaluate cause of self-care deficits   - Assess range of motion  - Assess patient's mobility; develop plan if impaired  - Assess patient's need for assistive devices and provide as appropriate  - Encourage maximum independence but intervene and supervise when necessary  - Involve family in performance of ADLs  - Assess for home care needs following discharge   - Consider OT consult to assist with ADL evaluation and planning for discharge  - Provide patient education as appropriate  Outcome: Progressing  Goal: Maintains/Returns to pre admission functional level  Description: INTERVENTIONS:  - Perform BMAT or MOVE assessment daily.   - Set and communicate daily mobility goal to care team and patient/family/caregiver.    - Collaborate with rehabilitation services on mobility goals if consulted  - Out of bed for toileting  - Record patient progress and toleration of activity level   Outcome: Progressing     Problem: PAIN - ADULT  Goal: Verbalizes/displays adequate comfort level or baseline comfort level  Description: Interventions:  - Encourage patient to monitor pain and request assistance  - Assess pain using appropriate pain scale  - Administer analgesics based on type and severity of pain and evaluate response  - Implement non-pharmacological measures as appropriate and evaluate response  - Consider cultural and social influences on pain and pain management  - Notify physician/advanced practitioner if interventions unsuccessful or patient reports new pain  Outcome: Progressing     Problem: INFECTION - ADULT  Goal: Absence or prevention of progression during hospitalization  Description: INTERVENTIONS:  - Assess and monitor for signs and symptoms of infection  - Monitor lab/diagnostic results  - Monitor all insertion sites, i.e. indwelling lines, tubes, and drains  - Monitor endotracheal if appropriate and nasal secretions for changes in amount and color  - [place] appropriate cooling/warming therapies per order  - Administer medications as ordered  - Instruct and encourage patient and family to use good hand hygiene technique  - Identify and instruct in appropriate isolation precautions for identified infection/condition  Outcome: Progressing  Goal: Absence of fever/infection during neutropenic period  Description: INTERVENTIONS:  - Monitor WBC    Outcome: Progressing     Problem: SAFETY ADULT  Goal: Maintain or return to baseline ADL function  Description: INTERVENTIONS:  -  Assess patient's ability to carry out ADLs; assess patient's baseline for ADL function and identify physical deficits which impact ability to perform ADLs (bathing, care of mouth/teeth, toileting, grooming, dressing, etc.)  - Assess/evaluate cause of self-care deficits   - Assess range of motion  - Assess patient's mobility; develop plan if impaired  - Assess patient's need for assistive devices and provide as appropriate  - Encourage maximum independence but intervene and supervise when necessary  - Involve family in performance of ADLs  - Assess for home care needs following discharge   - Consider OT consult to assist with ADL evaluation and planning for discharge  - Provide patient education as appropriate  Outcome: Progressing  Goal: Maintains/Returns to pre admission functional level  Description: INTERVENTIONS:  - Perform BMAT or MOVE assessment daily.   - Set and communicate daily mobility goal to care team and patient/family/caregiver.    - Collaborate with rehabilitation services on mobility goals if consulted  - Out of bed for toileting  - Record patient progress and toleration of activity level   Outcome: Progressing  Goal: Patient will remain free of falls  Description: INTERVENTIONS:  - Educate patient/family on patient safety including physical limitations  - Instruct patient to call for assistance with activity   - Consult OT/PT to assist with strengthening/mobility   - Keep Call bell within reach  - Keep bed low and locked with side rails adjusted as appropriate  - Keep care items and personal belongings within reach  - Initiate and maintain comfort rounds  - Apply yellow socks and bracelet for high fall risk patients  - Consider moving patient to room near nurses station  Outcome: Progressing     Problem: DISCHARGE PLANNING  Goal: Discharge to home or other facility with appropriate resources  Description: INTERVENTIONS:  - Identify barriers to discharge w/patient and caregiver  - Arrange for needed discharge resources and transportation as appropriate  - Identify discharge learning needs (meds, wound care, etc.)  - Arrange for interpretive services to assist at discharge as needed  - Refer to Case Management Department for coordinating discharge planning if the patient needs post-hospital services based on physician/advanced practitioner order or complex needs related to functional status, cognitive ability, or social support system  Outcome: Progressing     Problem: Knowledge Deficit  Goal: Patient/family/caregiver demonstrates understanding of disease process, treatment plan, medications, and discharge instructions  Description: Complete learning assessment and assess knowledge base. Interventions:  - Provide teaching at level of understanding  - Provide teaching via preferred learning methods  Outcome: Progressing     Problem: Nutrition/Hydration-ADULT  Goal: Nutrient/Hydration intake appropriate for improving, restoring or maintaining nutritional needs  Description: Monitor and assess patient's nutrition/hydration status for malnutrition.  Collaborate with interdisciplinary team and initiate plan and interventions as ordered. Monitor patient's weight and dietary intake as ordered or per policy. Utilize nutrition screening tool and intervene as necessary. Determine patient's food preferences and provide high-protein, high-caloric foods as appropriate.      INTERVENTIONS:  - Monitor oral intake, urinary output, labs, and treatment plans  - Assess nutrition and hydration status and recommend course of action  - Evaluate amount of meals eaten  - Assist patient with eating if necessary   - Allow adequate time for meals  - Recommend/ encourage appropriate diets, oral nutritional supplements, and vitamin/mineral supplements  - Order, calculate, and assess calorie counts as needed  - Recommend, monitor, and adjust tube feedings and TPN/PPN based on assessed needs  - Assess need for intravenous fluids  - Provide specific nutrition/hydration education as appropriate  - Include patient/family/caregiver in decisions related to nutrition  Outcome: Progressing

## 2023-07-22 NOTE — NURSING NOTE
Will hold PO morning scheduled medications per slim. Patient currently NPO due to aspiration precautions. Patient states his saliva is so thick that if he were to take medications they would just get stuck and he will cough them back up. Patient states he has had nothing to eat since Wednesday and "he could care less". Not eating associated with fear of choking. Suction set up at bedside and used by patient for saliva buildup. Pink aspiration sign hung up above bed. Sugar check 93 this morning. Will recommend D5 for sugar control due to NPO status. Will continue plan of care.

## 2023-07-22 NOTE — ASSESSMENT & PLAN NOTE
· From history of oropharyngeal cancer presently in remission  · Speech therapy consult appreciated  · Present diet: N.p.o.  · EGD on Monday  · GI consultation appreciated as well.   Patient does have a history of strictures

## 2023-07-22 NOTE — ASSESSMENT & PLAN NOTE
· IV Rocephin  · Aspiration precautions  · Respiratory protocol  · Symptom management  · Patient for EGD on Monday  · Currently on supplemental oxygen wean as able. Patient does not appear to be volume overloaded right now.   Monitor while on IV fluids

## 2023-07-22 NOTE — ASSESSMENT & PLAN NOTE
· From sepsis, multifocal pneumonia, poor p.o. intake, dehydration, malnutrition  · PT/OT evals ordered and appreciated

## 2023-07-22 NOTE — ASSESSMENT & PLAN NOTE
Malnutrition Findings:   Adult Malnutrition type: Chronic illness  Adult Degree of Malnutrition: Other severe protein calorie malnutrition  Malnutrition Characteristics: Fat loss, Muscle loss, Weight loss                  360 Statement: Related to catabolic illness as evidenced by severe orbital fat loss, severe muscle wasting in clavicles and temples, and a weight loss of 6 lbs (4.3%) in < 1 month. Treated with pending diet advancement. BMI Findings: There is no height or weight on file to calculate BMI.

## 2023-07-23 LAB
GLUCOSE SERPL-MCNC: 100 MG/DL (ref 65–140)
GLUCOSE SERPL-MCNC: 103 MG/DL (ref 65–140)
GLUCOSE SERPL-MCNC: 104 MG/DL (ref 65–140)
GLUCOSE SERPL-MCNC: 109 MG/DL (ref 65–140)

## 2023-07-23 PROCEDURE — 82948 REAGENT STRIP/BLOOD GLUCOSE: CPT

## 2023-07-23 PROCEDURE — 99232 SBSQ HOSP IP/OBS MODERATE 35: CPT | Performed by: FAMILY MEDICINE

## 2023-07-23 PROCEDURE — 99232 SBSQ HOSP IP/OBS MODERATE 35: CPT | Performed by: INTERNAL MEDICINE

## 2023-07-23 RX ADMIN — LIDOCAINE 5% 1 PATCH: 700 PATCH TOPICAL at 09:03

## 2023-07-23 RX ADMIN — HEPARIN SODIUM 5000 UNITS: 5000 INJECTION INTRAVENOUS; SUBCUTANEOUS at 05:31

## 2023-07-23 RX ADMIN — HEPARIN SODIUM 5000 UNITS: 5000 INJECTION INTRAVENOUS; SUBCUTANEOUS at 14:48

## 2023-07-23 RX ADMIN — DEXTROSE AND SODIUM CHLORIDE 50 ML/HR: 5; .9 INJECTION, SOLUTION INTRAVENOUS at 16:40

## 2023-07-23 RX ADMIN — CEFTRIAXONE 1000 MG: 1 INJECTION, SOLUTION INTRAVENOUS at 05:32

## 2023-07-23 RX ADMIN — HEPARIN SODIUM 5000 UNITS: 5000 INJECTION INTRAVENOUS; SUBCUTANEOUS at 22:11

## 2023-07-23 NOTE — PLAN OF CARE
Problem: PAIN - ADULT  Goal: Verbalizes/displays adequate comfort level or baseline comfort level  Description: Interventions:  - Encourage patient to monitor pain and request assistance  - Assess pain using appropriate pain scale  - Administer analgesics based on type and severity of pain and evaluate response  - Implement non-pharmacological measures as appropriate and evaluate response  - Consider cultural and social influences on pain and pain management  - Notify physician/advanced practitioner if interventions unsuccessful or patient reports new pain  Outcome: Progressing     Problem: SAFETY ADULT  Goal: Patient will remain free of falls  Description: INTERVENTIONS:  - Educate patient/family on patient safety including physical limitations  - Instruct patient to call for assistance with activity   - Consult OT/PT to assist with strengthening/mobility   - Keep Call bell within reach  - Keep bed low and locked with side rails adjusted as appropriate  - Keep care items and personal belongings within reach  - Initiate and maintain comfort rounds  - Make Fall Risk Sign visible to staff  - Offer Toileting every 2 Hours, in advance of need  - Obtain necessary fall risk management equipment:  - Apply yellow socks and bracelet for high fall risk patients  - Consider moving patient to room near nurses station  Outcome: Progressing

## 2023-07-23 NOTE — PROGRESS NOTES
[address]  Progress Note  Name: Weston Godinez  MRN: [id number]  Unit/Bed#: -02 I Date of Admission: 7/19/2023   Date of Service: 7/23/2023 I Hospital Day: 3    Assessment/Plan   Severe protein-calorie malnutrition ([address])  Assessment & Plan  Malnutrition Findings:   Adult Malnutrition type: Chronic illness  Adult Degree of Malnutrition: Other severe protein calorie malnutrition  Malnutrition Characteristics: Fat loss, Muscle loss, Weight loss                  360 Statement: Related to catabolic illness as evidenced by severe orbital fat loss, severe muscle wasting in clavicles and temples, and a weight loss of 6 lbs (4.3%) in < 1 month. Treated with pending diet advancement. BMI Findings: There is no height or weight on file to calculate BMI. Hiatal hernia  Assessment & Plan  · PPI  · Likely contributing to aspiration  · Has been referred to bariatric surgery in the past    Esophageal dysphagia  Assessment & Plan  · From history of oropharyngeal cancer presently in remission  · Speech therapy consult appreciated  · Present diet: N.p.o.  · EGD on Monday  · GI consultation appreciated as well. Patient does have a history of strictures    Generalized weakness  Assessment & Plan  · From sepsis, multifocal pneumonia, poor p.o. intake, dehydration, malnutrition  · PT/OT evals ordered and appreciated    CKD (chronic kidney disease) stage 3, GFR 30-59 ml/min [facility])  Assessment & Plan  Lab Results   Component Value Date    EGFR 62 07/22/2023    EGFR 62 07/21/2023    EGFR 49 07/19/2023    CREATININE 1.13 07/22/2023    CREATININE 1.14 07/21/2023    CREATININE 1.38 (H) 07/19/2023     · Chronic and stable    Dehydration  Assessment & Plan  · Noted on physical exam and labs  · IVF  · Continue with gentle hydration but monitor for signs of volume overload    Sepsis ([address])  Assessment & Plan  · Tachypnea, tachycardia.   White blood cells 13 actually improved compared to last week  · Trend end points  · Source: multifocal pneumonia from recurring aspiration  · IVF  · IV Rocephin  · Blood cultures negative so far  · Stable. Patient states he feels better    * Multifocal pneumonia from recurring aspiration  Assessment & Plan  · IV Rocephin  · Aspiration precautions  · Respiratory protocol  · Symptom management  · Patient for EGD on Monday  · Currently on supplemental oxygen wean as able. Patient does not appear to be volume overloaded right now. Monitor while on IV fluids         VTE Pharmacologic Prophylaxis:   Pharmacologic: Heparin  Mechanical VTE Prophylaxis in Place: Yes    Patient Centered Rounds: I have performed bedside rounds with nursing staff today. Education and Discussions with Family / Patient: We will update the wife    Time Spent for Care: 20 minutes. More than 50% of total time spent on counseling and coordination of care as described above. Current Length of Stay: 3 day(s)    Current Patient Status: Inpatient   Certification Statement: The patient will continue to require additional inpatient hospital stay due to needing egd    Discharge Plan: hopeful dc in 48 hours    Code Status: Level 1 - Full Code      Subjective:   Patient seen and examined. No acute events reported    Objective:     Vitals:   Temp (24hrs), Av.2 °F (36.2 °C), Min:97.1 °F (36.2 °C), Max:97.4 °F (36.3 °C)    Temp:  [97.1 °F (36.2 °C)-97.4 °F (36.3 °C)] 97.2 °F (36.2 °C)  HR:  [82-96] 82  Resp:  [22] 22  BP: (120-132)/(71-73) 132/71  SpO2:  [96 %] 96 %  There is no height or weight on file to calculate BMI. Input and Output Summary (last 24 hours):        Intake/Output Summary (Last 24 hours) at 2023 1019  Last data filed at 2023 0532  Gross per 24 hour   Intake 280 ml   Output 620 ml   Net -340 ml       Physical Exam:     Physical Exam  (   General Appearance:    Alert, cooperative, no distress, appears stated age                               Lungs:     Clear to auscultation bilaterally, respirations unlabored       Heart:    Regular rate and rhythm, S1 and S2 normal, no murmur, rub    or gallop   Abdomen:     Soft, non-tender, bowel sounds active all four quadrants,     no masses, no organomegaly           Extremities:   Extremities normal, atraumatic, no cyanosis or edema       Additional Data:     Labs:    Results from last 7 days   Lab Units 07/22/23  0534 07/21/23  0509 07/19/23  2153   WBC Thousand/uL 10.41*   < > 12.73*   HEMOGLOBIN g/dL 13.4   < > 15.7   HEMATOCRIT % 40.9   < > 47.2   PLATELETS Thousands/uL 514*   < > 544*   NEUTROS PCT %  --   --  82*   LYMPHS PCT %  --   --  9*   MONOS PCT %  --   --  6   EOS PCT %  --   --  0    < > = values in this interval not displayed. Results from last 7 days   Lab Units 07/22/23  0534 07/21/23  0509 07/19/23  2153   SODIUM mmol/L 140   < > 134*   POTASSIUM mmol/L 3.8   < > 4.0   CHLORIDE mmol/L 106   < > 97   CO2 mmol/L 23   < > 26   BUN mg/dL 29*   < > 22   CREATININE mg/dL 1.13   < > 1.38*   ANION GAP mmol/L 11   < > 11   CALCIUM mg/dL 9.7   < > 10.7*   ALBUMIN g/dL  --   --  4.3   TOTAL BILIRUBIN mg/dL  --   --  0.67   ALK PHOS U/L  --   --  121*   ALT U/L  --   --  23   AST U/L  --   --  23   GLUCOSE RANDOM mg/dL 93   < > 110    < > = values in this interval not displayed. Results from last 7 days   Lab Units 07/19/23  2153   INR  1.09     Results from last 7 days   Lab Units 07/23/23  0537 07/23/23  0012 07/22/23  1212 07/22/23  0721 07/21/23  1936 07/21/23  0507 07/20/23  2353   POC GLUCOSE mg/dl 104 103 115 93 101 89 110         Results from last 7 days   Lab Units 07/21/23  0509 07/19/23  2153   LACTIC ACID mmol/L  --  1.0   PROCALCITONIN ng/ml 0.46* 0.92*           * I Have Reviewed All Lab Data Listed Above. * Additional Pertinent Lab Tests Reviewed:  All [facility] Admission Reviewed        Recent Cultures (last 7 days):     Results from last 7 days   Lab Units 07/19/23  2220   BLOOD CULTURE  No Growth at 72 hrs. No Growth at 72 hrs. Last 24 Hours Medication List:   Current Facility-Administered Medications   Medication Dose Route Frequency Provider Last Rate   • acetaminophen  650 mg Oral Q6H PRN Sonia Hurley PA-C     • aspirin  81 mg Oral Daily Sonia Hurley PA-C     • cefTRIAXone  1,000 mg Intravenous Q24H Sonia Hurley PA-C 1,000 mg (07/23/23 0532)   • dextrose 5 % and sodium chloride 0.9 %  50 mL/hr Intravenous Continuous Sarmad Varela MD 50 mL/hr (07/22/23 1005)   • heparin (porcine)  5,000 Units Subcutaneous [facility] Sonia Hurley PA-C     • lidocaine  1 patch Topical Daily Latesha Mack PA-C     • ondansetron  4 mg Intravenous Q6H PRN Sonia Hurley PA-C     • polyethylene glycol  17 g Oral Daily PRN Sonia Hurley PA-C     • pravastatin  20 mg Oral Every Other Day Sonia Hurley PA-C          Today, Patient Was Seen By: Edgar José MD    ** Please Note: Dictation voice to text software may have been used in the creation of this document.  **

## 2023-07-24 ENCOUNTER — APPOINTMENT (INPATIENT)
Dept: RADIOLOGY | Facility: HOSPITAL | Age: 78
DRG: 871 | End: 2023-07-24
Payer: MEDICARE

## 2023-07-24 ENCOUNTER — APPOINTMENT (OUTPATIENT)
Dept: NUCLEAR MEDICINE | Facility: HOSPITAL | Age: 78
DRG: 871 | End: 2023-07-24
Payer: MEDICARE

## 2023-07-24 PROBLEM — R79.89 ELEVATED D-DIMER: Status: ACTIVE | Noted: 2023-07-24

## 2023-07-24 LAB
ALBUMIN SERPL BCP-MCNC: 3.6 G/DL (ref 3.5–5)
ALP SERPL-CCNC: 71 U/L (ref 34–104)
ALT SERPL W P-5'-P-CCNC: 10 U/L (ref 7–52)
ANION GAP SERPL CALCULATED.3IONS-SCNC: 7 MMOL/L
AST SERPL W P-5'-P-CCNC: 10 U/L (ref 13–39)
BASOPHILS # BLD AUTO: 0.09 THOUSANDS/ÂΜL (ref 0–0.1)
BASOPHILS NFR BLD AUTO: 2 % (ref 0–1)
BILIRUB SERPL-MCNC: 0.37 MG/DL (ref 0.2–1)
BUN SERPL-MCNC: 21 MG/DL (ref 5–25)
CALCIUM SERPL-MCNC: 10.2 MG/DL (ref 8.4–10.2)
CHLORIDE SERPL-SCNC: 109 MMOL/L (ref 96–108)
CO2 SERPL-SCNC: 28 MMOL/L (ref 21–32)
CREAT SERPL-MCNC: 1.04 MG/DL (ref 0.6–1.3)
D DIMER PPP FEU-MCNC: 2.65 UG/ML FEU
EOSINOPHIL # BLD AUTO: 0.7 THOUSAND/ÂΜL (ref 0–0.61)
EOSINOPHIL NFR BLD AUTO: 12 % (ref 0–6)
ERYTHROCYTE [DISTWIDTH] IN BLOOD BY AUTOMATED COUNT: 12.8 % (ref 11.6–15.1)
GFR SERPL CREATININE-BSD FRML MDRD: 69 ML/MIN/1.73SQ M
GLUCOSE SERPL-MCNC: 102 MG/DL (ref 65–140)
GLUCOSE SERPL-MCNC: 110 MG/DL (ref 65–140)
GLUCOSE SERPL-MCNC: 114 MG/DL (ref 65–140)
GLUCOSE SERPL-MCNC: 97 MG/DL (ref 65–140)
GLUCOSE SERPL-MCNC: 99 MG/DL (ref 65–140)
HCT VFR BLD AUTO: 44.6 % (ref 36.5–49.3)
HGB BLD-MCNC: 15 G/DL (ref 12–17)
IMM GRANULOCYTES # BLD AUTO: 0.11 THOUSAND/UL (ref 0–0.2)
IMM GRANULOCYTES NFR BLD AUTO: 2 % (ref 0–2)
INR PPP: 1.31 (ref 0.84–1.19)
LYMPHOCYTES # BLD AUTO: 1.29 THOUSANDS/ÂΜL (ref 0.6–4.47)
LYMPHOCYTES NFR BLD AUTO: 22 % (ref 14–44)
MCH RBC QN AUTO: 29 PG (ref 26.8–34.3)
MCHC RBC AUTO-ENTMCNC: 33.6 G/DL (ref 31.4–37.4)
MCV RBC AUTO: 86 FL (ref 82–98)
MONOCYTES # BLD AUTO: 0.42 THOUSAND/ÂΜL (ref 0.17–1.22)
MONOCYTES NFR BLD AUTO: 7 % (ref 4–12)
NEUTROPHILS # BLD AUTO: 3.35 THOUSANDS/ÂΜL (ref 1.85–7.62)
NEUTS SEG NFR BLD AUTO: 55 % (ref 43–75)
NRBC BLD AUTO-RTO: 0 /100 WBCS
PLATELET # BLD AUTO: 622 THOUSANDS/UL (ref 149–390)
PMV BLD AUTO: 8 FL (ref 8.9–12.7)
POTASSIUM SERPL-SCNC: 3.5 MMOL/L (ref 3.5–5.3)
PROT SERPL-MCNC: 7.8 G/DL (ref 6.4–8.4)
PROTHROMBIN TIME: 16.1 SECONDS (ref 11.6–14.5)
RBC # BLD AUTO: 5.17 MILLION/UL (ref 3.88–5.62)
SODIUM SERPL-SCNC: 144 MMOL/L (ref 135–147)
WBC # BLD AUTO: 5.96 THOUSAND/UL (ref 4.31–10.16)

## 2023-07-24 PROCEDURE — 85025 COMPLETE CBC W/AUTO DIFF WBC: CPT | Performed by: PHYSICIAN ASSISTANT

## 2023-07-24 PROCEDURE — 85379 FIBRIN DEGRADATION QUANT: CPT | Performed by: FAMILY MEDICINE

## 2023-07-24 PROCEDURE — 80053 COMPREHEN METABOLIC PANEL: CPT | Performed by: PHYSICIAN ASSISTANT

## 2023-07-24 PROCEDURE — 82948 REAGENT STRIP/BLOOD GLUCOSE: CPT

## 2023-07-24 PROCEDURE — 71046 X-RAY EXAM CHEST 2 VIEWS: CPT

## 2023-07-24 PROCEDURE — A9558 XE133 XENON 10MCI: HCPCS

## 2023-07-24 PROCEDURE — A9540 TC99M MAA: HCPCS

## 2023-07-24 PROCEDURE — 99232 SBSQ HOSP IP/OBS MODERATE 35: CPT | Performed by: PHYSICIAN ASSISTANT

## 2023-07-24 PROCEDURE — G1004 CDSM NDSC: HCPCS

## 2023-07-24 PROCEDURE — 99233 SBSQ HOSP IP/OBS HIGH 50: CPT | Performed by: FAMILY MEDICINE

## 2023-07-24 PROCEDURE — 85610 PROTHROMBIN TIME: CPT | Performed by: PHYSICIAN ASSISTANT

## 2023-07-24 PROCEDURE — 78582 LUNG VENTILAT&PERFUS IMAGING: CPT

## 2023-07-24 RX ORDER — METHYLPREDNISOLONE 16 MG/1
32 TABLET ORAL
Status: DISCONTINUED | OUTPATIENT
Start: 2023-07-24 | End: 2023-07-24

## 2023-07-24 RX ORDER — DIPHENHYDRAMINE HYDROCHLORIDE 50 MG/ML
50 INJECTION INTRAMUSCULAR; INTRAVENOUS
Status: DISCONTINUED | OUTPATIENT
Start: 2023-07-25 | End: 2023-07-28 | Stop reason: HOSPADM

## 2023-07-24 RX ORDER — DIPHENHYDRAMINE HCL 25 MG
50 TABLET ORAL
Status: DISCONTINUED | OUTPATIENT
Start: 2023-07-24 | End: 2023-07-24

## 2023-07-24 RX ADMIN — HEPARIN SODIUM 5000 UNITS: 5000 INJECTION INTRAVENOUS; SUBCUTANEOUS at 05:44

## 2023-07-24 RX ADMIN — HEPARIN SODIUM 5000 UNITS: 5000 INJECTION INTRAVENOUS; SUBCUTANEOUS at 15:32

## 2023-07-24 RX ADMIN — HYDROCORTISONE SODIUM SUCCINATE 200 MG: 100 INJECTION, POWDER, FOR SOLUTION INTRAMUSCULAR; INTRAVENOUS at 15:32

## 2023-07-24 RX ADMIN — CEFTRIAXONE 1000 MG: 1 INJECTION, SOLUTION INTRAVENOUS at 05:42

## 2023-07-24 RX ADMIN — HEPARIN SODIUM 5000 UNITS: 5000 INJECTION INTRAVENOUS; SUBCUTANEOUS at 21:32

## 2023-07-24 NOTE — ASSESSMENT & PLAN NOTE
· From history of oropharyngeal cancer presently in remission  · Speech therapy consult appreciated  · Present diet: N.p.o.  · EGD on Tuesday  · GI consultation appreciated as well.   Patient does have a history of strictures

## 2023-07-24 NOTE — QUICK NOTE
A I updated the wife over the phone.   I also told the patient my plans of getting a CT scan with prepping him with some IV steroids

## 2023-07-24 NOTE — CASE MANAGEMENT
Case Management Discharge Planning Note    Patient name Niesha Moss  Location /-22 MRN [id number]  : 1945 Date 2023       Current Admission Date: 2023  Current Admission Diagnosis:Multifocal pneumonia from recurring aspiration   Patient Active Problem List    Diagnosis Date Noted   • Severe protein-calorie malnutrition ([address]) 2023   • Pneumonia of left lower lobe due to infectious organism 2023   • Slurred speech 2023   • Articular cartilage disorder of shoulder region 2023   • Cervical spondylosis 2023   • Disorder of bursae of shoulder region 2023   • Impingement syndrome of shoulder region 2023   • Hiatal hernia 12/15/2022   • Bilateral carotid artery stenosis 2022   • Abdominal aortic aneurysm (AAA) without rupture ([address]) 2022   • Chronic kidney disease-mineral and bone disorder 2022   • Multifocal pneumonia from recurring aspiration 2021   • Esophageal dysphagia 2021   • Generalized weakness 2021   • Osteoarthritis of left hip 2021   • Synovial cyst of lumbar facet joint 2021   • Myofascial pain on left side 2020   • Lumbar radiculopathy 2020   • CKD (chronic kidney disease) stage 3, GFR 30-59 ml/min ([address]) 2020   • Fatigue 2020   • Benign localized prostatic hyperplasia with lower urinary tract symptoms (LUTS) 10/15/2019   • Overactive bladder 10/09/2019   • Dehydration 2019   • History of head and neck radiation 2019   • Excessive salivation 01/10/2019   • Sepsis ([address]) 01/10/2019   • History of carcinoma of tongue 2018   • Microscopic hematuria 2018   • Monoclonal gammopathy of unknown significance 2016      LOS (days): 4  Geometric Mean LOS (GMLOS) (days): 5.00  Days to GMLOS:0.6     OBJECTIVE:  Risk of Unplanned Readmission Score: 14.15         Current admission status: Inpatient   Preferred Pharmacy:   [facility]/pharm[facility] #[id number] - [place] [address] [facility] [address]  Phone: [phone] Fax: [address] [place], [address] [facility]ly [facility] [address] [facility]  [address] [facility]  [place] [address]  Phone: [phone] Fax: [phone]    Primary Care Provider: Sera Roberts MD    Primary Insurance: MEDICARE  Secondary Insurance: BLUE CROSS    DISCHARGE DETAILS:    Discharge planning discussed with[de-identified] patient  Freedom of Choice: Yes  Comments - Freedom of Choice: Pt refusing STR, but will accept VNA services. [facility]) accepted and reserved. IMM reviewed and signed by pt                     Requested [address] [facility]         Is the patient interested in [address] at discharge?: Yes  [facility] requested[de-identified] [facility] Name[de-identified] [address] Provider[de-identified] PCP  Home Health Services Needed[de-identified] Evaluate Functional Status and Safety  Homebound Criteria Met[de-identified] Requires the Assistance of Another Person for Safe Ambulation or to Leave the Home  Supporting Clincal Findings[de-identified] Fatigues Easliy in Short Distances    DME Referral Provided  Referral made for DME?: No    Other Referral/Resources/Interventions Provided:  Interventions: C  Referral Comments: [facility]) accepted and reserved in [place]. Pt refusing STR. Pt is a tentative d/c in 48 hrs per SLIM.   VQ scan is being done today    Would you like to participate in our [address] [facility] service program?  : No - Declined    Treatment Team Recommendation: Home with [facility]  Discharge Destination Plan[de-identified] Home with [address] at Discharge : Family                             IMM Given (Date):: 07/24/23  IMM Given to[de-identified] Patient

## 2023-07-24 NOTE — ASSESSMENT & PLAN NOTE
Malnutrition Findings:   Adult Malnutrition type: Chronic illness  Adult Degree of Malnutrition: Other severe protein calorie malnutrition  Malnutrition Characteristics: Fat loss, Muscle loss, Weight loss                  360 Statement: Related to catabolic illness as evidenced by severe orbital fat loss, severe muscle wasting in clavicles and temples, and a weight loss of 6 lbs (4.3%) in < 1 month. Treated with pending diet advancement. BMI Findings: Body mass index is 19.37 kg/m².

## 2023-07-24 NOTE — PROGRESS NOTES
[address]  Progress Note  Name: Mariana Barrientos  MRN: [id number]  Unit/Bed#: -02 I Date of Admission: 7/19/2023   Date of Service: 7/24/2023 I Hospital Day: 4    Assessment/Plan   Elevated d-dimer  Assessment & Plan  · Patient an elevated D-dimer which is nonspecific however although suspicion is low we did get a VQ scan based on patient's allergies which was intermediate probability  · Check CT scan with steroid prep. · Patient has no pleuritic chest pain. No tachypnea. Oxygen levels have been stable    Severe protein-calorie malnutrition (HCC)  Assessment & Plan  Malnutrition Findings:   Adult Malnutrition type: Chronic illness  Adult Degree of Malnutrition: Other severe protein calorie malnutrition  Malnutrition Characteristics: Fat loss, Muscle loss, Weight loss                  360 Statement: Related to catabolic illness as evidenced by severe orbital fat loss, severe muscle wasting in clavicles and temples, and a weight loss of 6 lbs (4.3%) in < 1 month. Treated with pending diet advancement. BMI Findings: Body mass index is 19.37 kg/m². Hiatal hernia  Assessment & Plan  · PPI  · Likely contributing to aspiration  · Has been referred to bariatric surgery in the past    Esophageal dysphagia  Assessment & Plan  · From history of oropharyngeal cancer presently in remission  · Speech therapy consult appreciated  · Present diet: N.p.o.  · EGD on Tuesday  · GI consultation appreciated as well.   Patient does have a history of strictures    Generalized weakness  Assessment & Plan  · From sepsis, multifocal pneumonia, poor p.o. intake, dehydration, malnutrition  · PT/OT evals ordered and appreciated    CKD (chronic kidney disease) stage 3, GFR 30-59 ml/min [facility])  Assessment & Plan  Lab Results   Component Value Date    EGFR 69 07/24/2023    EGFR 62 07/22/2023    EGFR 62 07/21/2023    CREATININE 1.04 07/24/2023    CREATININE 1.13 07/22/2023    CREATININE 1.14 07/21/2023     · Chronic and stable    Dehydration  Assessment & Plan  · Noted on physical exam and labs  · IVF  · Continue with gentle hydration but monitor for signs of volume overload    Sepsis ([address])  Assessment & Plan  · Tachypnea, tachycardia. White blood cells 13 actually improved compared to last week  · Trend end points  · Source: multifocal pneumonia from recurring aspiration  · IVF  · IV Rocephin  · Blood cultures negative so far  · Stable. Patient states he feels better    * Multifocal pneumonia from recurring aspiration  Assessment & Plan  · IV Rocephin  · Aspiration precautions  · Respiratory protocol  · Symptom management  · Patient was supposed to have the EGD today however looking back at the labs the patient had a D-dimer drawn in the ER which was positive so I did get a VQ scan given the patient's allergy which showed intermediate probability so now I am going to prep the patient and get a CT scan of the chest  · Suspicion for PE is low however given the elevated D-dimer needs to be worked up although it is nonspecific and could be related to his aspiration           VTE Pharmacologic Prophylaxis:   Pharmacologic: Heparin  Mechanical VTE Prophylaxis in Place: Yes    Patient Centered Rounds: I have performed bedside rounds with nursing staff today. Discussions with Specialists or Other Care Team Provider: Discussed with gastroenterology    Education and Discussions with Family / Patient: Discussed with the wife over the phone    Time Spent for Care: 30 minutes. More than 50% of total time spent on counseling and coordination of care as described above.     Current Length of Stay: 4 day(s)    Current Patient Status: Inpatient   Certification Statement: The patient is getting need additional hospitalization secondary to needing a CT scan to rule out a PE as well as still requiring a PEG tube    Discharge Plan: Patient will be here at least another 48 hours    Code Status: Level 1 - Full Code      Subjective:   Patient seen and examined. He states he was feeling okay this morning. Objective:     Vitals:   Temp (24hrs), Av °F (36.1 °C), Min:96.6 °F (35.9 °C), Max:97.3 °F (36.3 °C)    Temp:  [96.6 °F (35.9 °C)-97.3 °F (36.3 °C)] 97.3 °F (36.3 °C)  HR:  [91-99] 94  Resp:  [20] 20  BP: (131-154)/(77-92) 154/89  SpO2:  [95 %-96 %] 95 %  Body mass index is 19.37 kg/m². Input and Output Summary (last 24 hours):        Intake/Output Summary (Last 24 hours) at 2023 1332  Last data filed at 2023 1148  Gross per 24 hour   Intake 1529.17 ml   Output 250 ml   Net 1279.17 ml       Physical Exam:     Physical Exam  (   General Appearance:    Alert, cooperative, no distress, appears stated age                               Lungs:     Clear to auscultation bilaterally, respirations unlabored       Heart:    Regular rate and rhythm, S1 and S2 normal, no murmur, rub    or gallop   Abdomen:     Soft, non-tender, bowel sounds active all four quadrants,     no masses, no organomegaly           Extremities:   Extremities normal, atraumatic, no cyanosis or edema       Additional Data:     Labs:    Results from last 7 days   Lab Units 23  0925   WBC Thousand/uL 5.96   HEMOGLOBIN g/dL 15.0   HEMATOCRIT % 44.6   PLATELETS Thousands/uL 622*   NEUTROS PCT % 55   LYMPHS PCT % 22   MONOS PCT % 7   EOS PCT % 12*     Results from last 7 days   Lab Units 23  0925   SODIUM mmol/L 144   POTASSIUM mmol/L 3.5   CHLORIDE mmol/L 109*   CO2 mmol/L 28   BUN mg/dL 21   CREATININE mg/dL 1.04   ANION GAP mmol/L 7   CALCIUM mg/dL 10.2   ALBUMIN g/dL 3.6   TOTAL BILIRUBIN mg/dL 0.37   ALK PHOS U/L 71   ALT U/L 10   AST U/L 10*   GLUCOSE RANDOM mg/dL 114     Results from last 7 days   Lab Units 23  0925   INR  1.31*     Results from last 7 days   Lab Units 23  1145 23  0602 23  0003 23  1755 23  1154 23  0537 23  0012 23  1212 23  0721 23  1936 07/21/23  0507 07/20/23  2353   POC GLUCOSE mg/dl 97 110 102 100 109 104 103 115 93 101 89 110         Results from last 7 days   Lab Units 07/21/23  0509 07/19/23  2153   LACTIC ACID mmol/L  --  1.0   PROCALCITONIN ng/ml 0.46* 0.92*           * I Have Reviewed All Lab Data Listed Above. * Additional Pertinent Lab Tests Reviewed: All [facility] Admission Reviewed        Recent Cultures (last 7 days):     Results from last 7 days   Lab Units 07/19/23  2153   BLOOD CULTURE  No Growth After 4 Days. No Growth After 4 Days. Last 24 Hours Medication List:   Current Facility-Administered Medications   Medication Dose Route Frequency Provider Last Rate   • acetaminophen  650 mg Oral Q6H PRN Sonia Hurley PA-C     • aspirin  81 mg Oral Daily Sonia Hurley PA-C     • cefTRIAXone  1,000 mg Intravenous Q24H Sonia Hurley PA-C 1,000 mg (07/24/23 0542)   • dextrose 5 % and sodium chloride 0.9 %  50 mL/hr Intravenous Continuous Sarmad Dillon MD 50 mL/hr (07/23/23 1640)   • diphenhydrAMINE  50 mg Oral 60 Min Pre-Op Sarmad Dillon MD     • heparin (porcine)  5,000 Units Subcutaneous [facility] Sonia Hurley PA-C     • lidocaine  1 patch Topical Daily Latesha Mack PA-C     • methylPREDNISolone  32 mg Oral Q10H Sarmad Dillon MD     • ondansetron  4 mg Intravenous Q6H PRN Sonia Hurley PA-C     • polyethylene glycol  17 g Oral Daily PRN Sonia Hurley PA-C     • pravastatin  20 mg Oral Every Other Day Sonia Hurley PA-C          Today, Patient Was Seen By: Juan Garcias MD    ** Please Note: Dictation voice to text software may have been used in the creation of this document.  **

## 2023-07-24 NOTE — MALNUTRITION/BMI
This medical record reflects one or more clinical indicators suggestive of malnutrition. Malnutrition Findings:   Adult Malnutrition type: Chronic illness  Adult Degree of Malnutrition: Other severe protein calorie malnutrition  Malnutrition Characteristics: Fat loss, Muscle loss      360 Statement: Related to catabolic illness as evidenced by severe orbital fat loss, severe muscle wasting in clavicles and temples, and a weight loss of 6 lbs (4.3%) in < 1 month. Treated with pending diet advancement. BMI Findings: Body mass index is 19.37 kg/m². See Nutrition note dated 7/24/2023 for additional details. Completed nutrition assessment is viewable in the nutrition documentation.

## 2023-07-24 NOTE — ASSESSMENT & PLAN NOTE
Lab Results   Component Value Date    EGFR 69 07/24/2023    EGFR 62 07/22/2023    EGFR 62 07/21/2023    CREATININE 1.04 07/24/2023    CREATININE 1.13 07/22/2023    CREATININE 1.14 07/21/2023     · Chronic and stable

## 2023-07-24 NOTE — NURSING NOTE
Patient asked for water to swish and spit this morning. Patient brought to nurses attention the amount and thickness of sputum that he had coughed up this morning. It was a very large amount and the consistency of thick pudding. Patient states this was stuck in his throat and he usually coughs this up in the mornings. Section changed and placed at bedside. Will continue plan of care.

## 2023-07-24 NOTE — PROGRESS NOTES
Progress Note - Raechel Meckel 70 y.o. male MRN: [id number]    Unit/Bed#: -02 Encounter: [id number]        Subjective: We were originally planning for PEG tube placement today, however patient had an elevated D-dimer on admission. Being followed up today by internal medicine, Dr. Yolanda Cortez. Patient has no new complaints otherwise. He is very concerned about his weight loss. ROS: As noted in the HPI, otherwise all others negative. Objective:     Vitals: Blood pressure 154/89, pulse 94, temperature (!) 97.3 °F (36.3 °C), resp. rate 20, height 5' 10" (1.778 m), weight 61.2 kg (135 lb), SpO2 95 %. ,Body mass index is 19.37 kg/m². Intake/Output Summary (Last 24 hours) at 7/24/2023 1311  Last data filed at 7/24/2023 1148  Gross per 24 hour   Intake 1529.17 ml   Output 250 ml   Net 1279.17 ml       Physical Exam:     General Appearance: Alert and oriented x 3. In no respiratory distress. Cachectic appearing.   Lungs: Clear to auscultation bilaterally, no rales or rhonchi  Heart: Regular rate and rhythm, S1, S2 normal, no murmur, click, rub or gallop  Abdomen: Soft, non-tender, non-distended; bowel sounds normal; no masses or no organomegaly  Extremities: No cyanosis, edema    Invasive Devices     Peripheral Intravenous Line  Duration           Peripheral IV 07/24/23 Left;Ventral (anterior) Forearm <1 day                Lab Results:  Results from last 7 days   Lab Units 07/24/23  0925   WBC Thousand/uL 5.96   HEMOGLOBIN g/dL 15.0   HEMATOCRIT % 44.6   PLATELETS Thousands/uL 622*   NEUTROS PCT % 55   LYMPHS PCT % 22   MONOS PCT % 7   EOS PCT % 12*     Results from last 7 days   Lab Units 07/24/23  0925   POTASSIUM mmol/L 3.5   CHLORIDE mmol/L 109*   CO2 mmol/L 28   BUN mg/dL 21   CREATININE mg/dL 1.04   CALCIUM mg/dL 10.2   ALK PHOS U/L 71   ALT U/L 10   AST U/L 10*     Results from last 7 days   Lab Units 07/24/23  0925   INR  1.31*           Imaging Studies: I have personally reviewed pertinent imaging studies. NM lung ventilation / perfusion    Result Date: 7/24/2023  Impression: The probability for pulmonary embolus is intermediate. The study was marked in [facility] for immediate notification. Workstation performed: [id number]     CT chest wo contrast    Result Date: 7/20/2023  Impression: Patchy groundglass opacities in the periphery of the right upper lobe. Scattered reticulonodular opacities in the bilateral lungs. Groundglass and alveolar opacities as well as focal consolidation with air bronchograms in the right lower lobe. Findings taken together suspicious for multifocal infection in the appropriate clinical setting. Small dependent pleural effusions. Tiny pericardial effusion. Coronary atherosclerosis, hypoplastic thyroid gland, large hiatal hernia, left-sided nephrolithiasis, and other findings as above. Workstation performed: [id number]         Assessment and Plan:     1) Pharyngeal dysphagia, protein calorie malnutrition, and recurrent aspiration pneumonia in the setting of prior tongue cancer status post radical neck dissection with radiation in the 1990s - Patient was originally scheduled to have EGD with PEG tube placement today, however is on hold secondary to elevated D-dimer. VQ scan is intermediate, potential plans for CT with IV contrast medication since the patient had reported a contrast allergy. Fortunately, the patient is hemodynamically stable and has no complaints of chest pain or shortness of breath at this time. If patient needs to be anticoagulation, heparin drip would be advisable as we would be able to hold it for 6 hours prior to potential PEG tube placement. We will continue to discuss with internal medicine regarding timing of PEG tube placement. Portions of the record may have been created with voice recognition software. Occasional wrong word or "sound a like" substitutions may have occurred due to the inherent limitations of voice recognition software.   Read the chart carefully and recognize, using context, where substitutions have occurred.

## 2023-07-24 NOTE — ASSESSMENT & PLAN NOTE
· IV Rocephin  · Aspiration precautions  · Respiratory protocol  · Symptom management  · Patient was supposed to have the EGD today however looking back at the labs the patient had a D-dimer drawn in the ER which was positive so I did get a VQ scan given the patient's allergy which showed intermediate probability so now I am going to prep the patient and get a CT scan of the chest  · Suspicion for PE is low however given the elevated D-dimer needs to be worked up although it is nonspecific and could be related to his aspiration

## 2023-07-24 NOTE — PLAN OF CARE
Problem: MOBILITY - ADULT  Goal: Maintain or return to baseline ADL function  Description: INTERVENTIONS:  -  Assess patient's ability to carry out ADLs; assess patient's baseline for ADL function and identify physical deficits which impact ability to perform ADLs (bathing, care of mouth/teeth, toileting, grooming, dressing, etc.)  - Assess/evaluate cause of self-care deficits   - Assess range of motion  - Assess patient's mobility; develop plan if impaired  - Assess patient's need for assistive devices and provide as appropriate  - Encourage maximum independence but intervene and supervise when necessary  - Involve family in performance of ADLs  - Assess for home care needs following discharge   - Consider OT consult to assist with ADL evaluation and planning for discharge  - Provide patient education as appropriate  Outcome: Progressing  Goal: Maintains/Returns to pre admission functional level  Description: INTERVENTIONS:  - Perform BMAT or MOVE assessment daily.   - Set and communicate daily mobility goal to care team and patient/family/caregiver.    - Collaborate with rehabilitation services on mobility goals if consulted  - Out of bed for toileting  - Record patient progress and toleration of activity level   Outcome: Progressing     Problem: PAIN - ADULT  Goal: Verbalizes/displays adequate comfort level or baseline comfort level  Description: Interventions:  - Encourage patient to monitor pain and request assistance  - Assess pain using appropriate pain scale  - Administer analgesics based on type and severity of pain and evaluate response  - Implement non-pharmacological measures as appropriate and evaluate response  - Consider cultural and social influences on pain and pain management  - Notify physician/advanced practitioner if interventions unsuccessful or patient reports new pain  Outcome: Progressing     Problem: INFECTION - ADULT  Goal: Absence or prevention of progression during hospitalization  Description: INTERVENTIONS:  - Assess and monitor for signs and symptoms of infection  - Monitor lab/diagnostic results  - Monitor all insertion sites, i.e. indwelling lines, tubes, and drains  - Monitor endotracheal if appropriate and nasal secretions for changes in amount and color  - [place] appropriate cooling/warming therapies per order  - Administer medications as ordered  - Instruct and encourage patient and family to use good hand hygiene technique  - Identify and instruct in appropriate isolation precautions for identified infection/condition  Outcome: Progressing  Goal: Absence of fever/infection during neutropenic period  Description: INTERVENTIONS:  - Monitor WBC    Outcome: Progressing     Problem: SAFETY ADULT  Goal: Maintain or return to baseline ADL function  Description: INTERVENTIONS:  -  Assess patient's ability to carry out ADLs; assess patient's baseline for ADL function and identify physical deficits which impact ability to perform ADLs (bathing, care of mouth/teeth, toileting, grooming, dressing, etc.)  - Assess/evaluate cause of self-care deficits   - Assess range of motion  - Assess patient's mobility; develop plan if impaired  - Assess patient's need for assistive devices and provide as appropriate  - Encourage maximum independence but intervene and supervise when necessary  - Involve family in performance of ADLs  - Assess for home care needs following discharge   - Consider OT consult to assist with ADL evaluation and planning for discharge  - Provide patient education as appropriate  Outcome: Progressing  Goal: Maintains/Returns to pre admission functional level  Description: INTERVENTIONS:  - Perform BMAT or MOVE assessment daily.   - Set and communicate daily mobility goal to care team and patient/family/caregiver.    - Collaborate with rehabilitation services on mobility goals if consulted  - Out of bed for toileting  - Record patient progress and toleration of activity level   Outcome: Progressing  Goal: Patient will remain free of falls  Description: INTERVENTIONS:  - Educate patient/family on patient safety including physical limitations  - Instruct patient to call for assistance with activity   - Consult OT/PT to assist with strengthening/mobility   - Keep Call bell within reach  - Keep bed low and locked with side rails adjusted as appropriate  - Keep care items and personal belongings within reach  - Initiate and maintain comfort rounds  - Apply yellow socks and bracelet for high fall risk patients  - Consider moving patient to room near nurses station  Outcome: Progressing     Problem: DISCHARGE PLANNING  Goal: Discharge to home or other facility with appropriate resources  Description: INTERVENTIONS:  - Identify barriers to discharge w/patient and caregiver  - Arrange for needed discharge resources and transportation as appropriate  - Identify discharge learning needs (meds, wound care, etc.)  - Arrange for interpretive services to assist at discharge as needed  - Refer to Case Management Department for coordinating discharge planning if the patient needs post-hospital services based on physician/advanced practitioner order or complex needs related to functional status, cognitive ability, or social support system  Outcome: Progressing     Problem: Knowledge Deficit  Goal: Patient/family/caregiver demonstrates understanding of disease process, treatment plan, medications, and discharge instructions  Description: Complete learning assessment and assess knowledge base. Interventions:  - Provide teaching at level of understanding  - Provide teaching via preferred learning methods  Outcome: Progressing     Problem: Nutrition/Hydration-ADULT  Goal: Nutrient/Hydration intake appropriate for improving, restoring or maintaining nutritional needs  Description: Monitor and assess patient's nutrition/hydration status for malnutrition.  Collaborate with interdisciplinary team and initiate plan and interventions as ordered. Monitor patient's weight and dietary intake as ordered or per policy. Utilize nutrition screening tool and intervene as necessary. Determine patient's food preferences and provide high-protein, high-caloric foods as appropriate.      INTERVENTIONS:  - Monitor oral intake, urinary output, labs, and treatment plans  - Assess nutrition and hydration status and recommend course of action  - Evaluate amount of meals eaten  - Assist patient with eating if necessary   - Allow adequate time for meals  - Recommend/ encourage appropriate diets, oral nutritional supplements, and vitamin/mineral supplements  - Order, calculate, and assess calorie counts as needed  - Recommend, monitor, and adjust tube feedings and TPN/PPN based on assessed needs  - Assess need for intravenous fluids  - Provide specific nutrition/hydration education as appropriate  - Include patient/family/caregiver in decisions related to nutrition  Outcome: Progressing     Problem: Prexisting or High Potential for Compromised Skin Integrity  Goal: Skin integrity is maintained or improved  Description: INTERVENTIONS:  - Identify patients at risk for skin breakdown  - Assess and monitor skin integrity  - Assess and monitor nutrition and hydration status  - Monitor labs   - Assess for incontinence   - Turn and reposition patient  - Assist with mobility/ambulation  - Relieve pressure over bony prominences  - Avoid friction and shearing  - Provide appropriate hygiene as needed including keeping skin clean and dry  - Evaluate need for skin moisturizer/barrier cream  - Collaborate with interdisciplinary team   - Patient/family teaching  - Consider wound care consult   Outcome: Progressing

## 2023-07-24 NOTE — ASSESSMENT & PLAN NOTE
· Patient an elevated D-dimer which is nonspecific however although suspicion is low we did get a VQ scan based on patient's allergies which was intermediate probability  · Check CT scan with steroid prep. · Patient has no pleuritic chest pain. No tachypnea.   Oxygen levels have been stable

## 2023-07-24 NOTE — NURSING NOTE
Patient scheduled for CT with contrast. Given allergies to contrast.  Patient instructed to take: MEDROL and BENADRYL    UPDATE: Patient currently NPO and unable to swallow. MEDROL was not given. Will reach out to doctor    Patient will receive IV hydrocortisone at 1530 pm on 7/24 with his second dose scheduled for 0230 am on 7/25. Benadryl will be scheduled for 0330 am on 7/25.  And CT is to be scheduled at 0430 am on 7/25

## 2023-07-25 ENCOUNTER — ANESTHESIA (INPATIENT)
Dept: GASTROENTEROLOGY | Facility: HOSPITAL | Age: 78
DRG: 871 | End: 2023-07-25
Payer: MEDICARE

## 2023-07-25 ENCOUNTER — APPOINTMENT (INPATIENT)
Dept: CT IMAGING | Facility: HOSPITAL | Age: 78
DRG: 871 | End: 2023-07-25
Payer: MEDICARE

## 2023-07-25 ENCOUNTER — ANESTHESIA EVENT (INPATIENT)
Dept: GASTROENTEROLOGY | Facility: HOSPITAL | Age: 78
DRG: 871 | End: 2023-07-25
Payer: MEDICARE

## 2023-07-25 ENCOUNTER — APPOINTMENT (INPATIENT)
Dept: GASTROENTEROLOGY | Facility: HOSPITAL | Age: 78
DRG: 871 | End: 2023-07-25
Payer: MEDICARE

## 2023-07-25 LAB
BACTERIA BLD CULT: NORMAL
BACTERIA BLD CULT: NORMAL
GLUCOSE SERPL-MCNC: 128 MG/DL (ref 65–140)
GLUCOSE SERPL-MCNC: 141 MG/DL (ref 65–140)
GLUCOSE SERPL-MCNC: 92 MG/DL (ref 65–140)
GLUCOSE SERPL-MCNC: 97 MG/DL (ref 65–140)

## 2023-07-25 PROCEDURE — C1726 CATH, BAL DIL, NON-VASCULAR: HCPCS

## 2023-07-25 PROCEDURE — 0DH63UZ INSERTION OF FEEDING DEVICE INTO STOMACH, PERCUTANEOUS APPROACH: ICD-10-PCS | Performed by: INTERNAL MEDICINE

## 2023-07-25 PROCEDURE — 71275 CT ANGIOGRAPHY CHEST: CPT

## 2023-07-25 PROCEDURE — 99232 SBSQ HOSP IP/OBS MODERATE 35: CPT | Performed by: INTERNAL MEDICINE

## 2023-07-25 PROCEDURE — G1004 CDSM NDSC: HCPCS

## 2023-07-25 PROCEDURE — 0D748ZZ DILATION OF ESOPHAGOGASTRIC JUNCTION, VIA NATURAL OR ARTIFICIAL OPENING ENDOSCOPIC: ICD-10-PCS | Performed by: INTERNAL MEDICINE

## 2023-07-25 PROCEDURE — 82948 REAGENT STRIP/BLOOD GLUCOSE: CPT

## 2023-07-25 RX ORDER — PROPOFOL 10 MG/ML
INJECTION, EMULSION INTRAVENOUS AS NEEDED
Status: DISCONTINUED | OUTPATIENT
Start: 2023-07-25 | End: 2023-07-25

## 2023-07-25 RX ORDER — VANCOMYCIN HYDROCHLORIDE 1 G/200ML
15 INJECTION, SOLUTION INTRAVENOUS ONCE
Status: COMPLETED | OUTPATIENT
Start: 2023-07-25 | End: 2023-07-25

## 2023-07-25 RX ORDER — LIDOCAINE HYDROCHLORIDE 10 MG/ML
INJECTION, SOLUTION EPIDURAL; INFILTRATION; INTRACAUDAL; PERINEURAL AS NEEDED
Status: DISCONTINUED | OUTPATIENT
Start: 2023-07-25 | End: 2023-07-25

## 2023-07-25 RX ORDER — CEFAZOLIN SODIUM 2 G/50ML
2000 SOLUTION INTRAVENOUS ONCE AS NEEDED
Status: DISCONTINUED | OUTPATIENT
Start: 2023-07-25 | End: 2023-07-28 | Stop reason: HOSPADM

## 2023-07-25 RX ORDER — SODIUM CHLORIDE, SODIUM LACTATE, POTASSIUM CHLORIDE, CALCIUM CHLORIDE 600; 310; 30; 20 MG/100ML; MG/100ML; MG/100ML; MG/100ML
INJECTION, SOLUTION INTRAVENOUS CONTINUOUS PRN
Status: DISCONTINUED | OUTPATIENT
Start: 2023-07-25 | End: 2023-07-25

## 2023-07-25 RX ORDER — PHENYLEPHRINE HCL IN 0.9% NACL 1 MG/10 ML
SYRINGE (ML) INTRAVENOUS AS NEEDED
Status: DISCONTINUED | OUTPATIENT
Start: 2023-07-25 | End: 2023-07-25

## 2023-07-25 RX ORDER — HYDROMORPHONE HCL IN WATER/PF 6 MG/30 ML
0.2 PATIENT CONTROLLED ANALGESIA SYRINGE INTRAVENOUS ONCE
Status: COMPLETED | OUTPATIENT
Start: 2023-07-25 | End: 2023-07-25

## 2023-07-25 RX ADMIN — Medication 100 MCG: at 14:50

## 2023-07-25 RX ADMIN — IOHEXOL 85 ML: 350 INJECTION, SOLUTION INTRAVENOUS at 04:37

## 2023-07-25 RX ADMIN — HYDROMORPHONE HYDROCHLORIDE 0.2 MG: 0.2 INJECTION, SOLUTION INTRAMUSCULAR; INTRAVENOUS; SUBCUTANEOUS at 17:38

## 2023-07-25 RX ADMIN — PROPOFOL 30 MG: 10 INJECTION, EMULSION INTRAVENOUS at 14:48

## 2023-07-25 RX ADMIN — Medication 100 MCG: at 14:40

## 2023-07-25 RX ADMIN — PROPOFOL 50 MG: 10 INJECTION, EMULSION INTRAVENOUS at 14:19

## 2023-07-25 RX ADMIN — PROPOFOL 50 MG: 10 INJECTION, EMULSION INTRAVENOUS at 14:23

## 2023-07-25 RX ADMIN — PROPOFOL 20 MG: 10 INJECTION, EMULSION INTRAVENOUS at 14:53

## 2023-07-25 RX ADMIN — HYDROMORPHONE HYDROCHLORIDE 0.2 MG: 0.2 INJECTION, SOLUTION INTRAMUSCULAR; INTRAVENOUS; SUBCUTANEOUS at 23:15

## 2023-07-25 RX ADMIN — LIDOCAINE HYDROCHLORIDE 30 MG: 10 INJECTION, SOLUTION EPIDURAL; INFILTRATION; INTRACAUDAL; PERINEURAL at 14:15

## 2023-07-25 RX ADMIN — HEPARIN SODIUM 5000 UNITS: 5000 INJECTION INTRAVENOUS; SUBCUTANEOUS at 21:50

## 2023-07-25 RX ADMIN — Medication 100 MCG: at 14:58

## 2023-07-25 RX ADMIN — SODIUM CHLORIDE, SODIUM LACTATE, POTASSIUM CHLORIDE, AND CALCIUM CHLORIDE: .6; .31; .03; .02 INJECTION, SOLUTION INTRAVENOUS at 14:01

## 2023-07-25 RX ADMIN — DIPHENHYDRAMINE HYDROCHLORIDE 50 MG: 50 INJECTION, SOLUTION INTRAMUSCULAR; INTRAVENOUS at 03:09

## 2023-07-25 RX ADMIN — CEFTRIAXONE 1000 MG: 1 INJECTION, SOLUTION INTRAVENOUS at 04:59

## 2023-07-25 RX ADMIN — PROPOFOL 50 MG: 10 INJECTION, EMULSION INTRAVENOUS at 14:38

## 2023-07-25 RX ADMIN — PROPOFOL 50 MG: 10 INJECTION, EMULSION INTRAVENOUS at 14:30

## 2023-07-25 RX ADMIN — PROPOFOL 50 MG: 10 INJECTION, EMULSION INTRAVENOUS at 14:44

## 2023-07-25 RX ADMIN — DEXTROSE AND SODIUM CHLORIDE 50 ML/HR: 5; .9 INJECTION, SOLUTION INTRAVENOUS at 12:47

## 2023-07-25 RX ADMIN — PROPOFOL 100 MG: 10 INJECTION, EMULSION INTRAVENOUS at 14:15

## 2023-07-25 RX ADMIN — PROPOFOL 50 MG: 10 INJECTION, EMULSION INTRAVENOUS at 14:34

## 2023-07-25 RX ADMIN — VANCOMYCIN HYDROCHLORIDE 1000 MG: 1 INJECTION, SOLUTION INTRAVENOUS at 13:18

## 2023-07-25 RX ADMIN — HYDROCORTISONE SODIUM SUCCINATE 200 MG: 100 INJECTION, POWDER, FOR SOLUTION INTRAMUSCULAR; INTRAVENOUS at 01:50

## 2023-07-25 RX ADMIN — Medication 100 MCG: at 14:54

## 2023-07-25 RX ADMIN — Medication 100 MCG: at 14:44

## 2023-07-25 NOTE — ASSESSMENT & PLAN NOTE
Malnutrition Findings:   Adult Malnutrition type: Chronic illness  Adult Degree of Malnutrition: Other severe protein calorie malnutrition  Malnutrition Characteristics: Fat loss, Muscle loss                  360 Statement: Related to catabolic illness as evidenced by severe orbital fat loss, severe muscle wasting in clavicles and temples, and a weight loss of 6 lbs (4.3%) in < 1 month. Treated with pending diet advancement. BMI Findings: Body mass index is 19.37 kg/m².

## 2023-07-25 NOTE — ANESTHESIA POSTPROCEDURE EVALUATION
Post-Op Assessment Note    CV Status:  Stable  Pain Score: 0    Pain management: adequate     Mental Status:  Sleepy   Hydration Status:  Stable   PONV Controlled:  None   Airway Patency:  Patent      Post Op Vitals Reviewed: Yes      Staff: CRNA         No notable events documented.     /74 (07/25/23 1508)    Temp (!) 96.7 °F (35.9 °C) (07/25/23 1508)    Pulse 69 (07/25/23 1508)   Resp 18 (07/25/23 1508)    SpO2 100 % (07/25/23 1508)

## 2023-07-25 NOTE — ASSESSMENT & PLAN NOTE
· From history of oropharyngeal cancer presently in remission  · Speech therapy consult appreciated  · Present diet: N.p.o.  · Plan for PEG tube placement today

## 2023-07-25 NOTE — PROGRESS NOTES
[address]  Progress Note  Name: [place] Durham  MRN: [id number]  Unit/Bed#: -02 I Date of Admission: 7/19/2023   Date of Service: 7/25/2023 I Hospital Day: 5    Assessment/Plan   * Multifocal pneumonia from recurring aspiration  Assessment & Plan  · Presented with shortness of breath and cough likely from dysphagia causing recurrent aspiration  · Clinically improving with IV antibiotics   ·  we will continue  · Plan for PEG tube placement today    Elevated d-dimer  Assessment & Plan  · Patient an elevated D-dimer which is nonspecific however although suspicion is low we did get a VQ scan based on patient's allergies which was intermediate probability  · CTA ruled out PE    Severe protein-calorie malnutrition (HCC)  Assessment & Plan  Malnutrition Findings:   Adult Malnutrition type: Chronic illness  Adult Degree of Malnutrition: Other severe protein calorie malnutrition  Malnutrition Characteristics: Fat loss, Muscle loss                  360 Statement: Related to catabolic illness as evidenced by severe orbital fat loss, severe muscle wasting in clavicles and temples, and a weight loss of 6 lbs (4.3%) in < 1 month. Treated with pending diet advancement. BMI Findings: Body mass index is 19.37 kg/m². Esophageal dysphagia  Assessment & Plan  · From history of oropharyngeal cancer presently in remission  · Speech therapy consult appreciated  · Present diet: N.p.o.  · Plan for PEG tube placement today    Generalized weakness  Assessment & Plan  · From sepsis, multifocal pneumonia, poor p.o. intake, dehydration, malnutrition  · Follow-up PT/OT eval    CKD (chronic kidney disease) stage 3, GFR 30-59 ml/min ([facility])  Assessment & Plan  · Creatinine at baseline  · Continue to monitor    Sepsis ([address])  Assessment & Plan  · Tachypnea, tachycardia.   White blood cells 13 actually improved compared to last week  · Trend end points  · Source: multifocal pneumonia from recurring aspiration  · Blood cultures remain negative  · Since resolved with IV antibiotics       . VTE Pharmacologic Prophylaxis:   Pharmacologic: Heparin  Mechanical VTE Prophylaxis in Place: Yes    Patient Centered Rounds: I have performed bedside rounds with nursing staff today. Discussions with Specialists or Other Care Team Provider: cm, nursing    Education and Discussions with Family / Patient: pt    Time Spent for Care: 30 minutes. More than 50% of total time spent on counseling and coordination of care as described above. Current Length of Stay: 5 day(s)    Current Patient Status: Inpatient   Certification Statement: The patient will continue to require additional inpatient hospital stay due to see below    Discharge Plan: Pending PEG tube. anticipate approximate 48 hours medical awaiting PT eval    Code Status: Level 1 - Full Code      Subjective:   Denies fevers, chills, cough, shortness of breath    Objective:     Vitals:   Temp (24hrs), Av.3 °F (35.7 °C), Min:96 °F (35.6 °C), Max:96.5 °F (35.8 °C)    Temp:  [96 °F (35.6 °C)-96.5 °F (35.8 °C)] 96.3 °F (35.7 °C)  HR:  [] 82  Resp:  [18-20] 18  BP: (115-130)/(72-82) 129/73  SpO2:  [95 %-99 %] 98 %  Body mass index is 19.37 kg/m². Input and Output Summary (last 24 hours): Intake/Output Summary (Last 24 hours) at 2023 0856  Last data filed at 2023 0749  Gross per 24 hour   Intake 1967 ml   Output 700 ml   Net 1267 ml       Physical Exam:     Physical Exam  Constitutional:       General: He is not in acute distress. Appearance: He is well-developed. He is not diaphoretic. HENT:      Head: Normocephalic and atraumatic. Nose: Nose normal.      Mouth/Throat:      Pharynx: No oropharyngeal exudate. Eyes:      General: No scleral icterus. Conjunctiva/sclera: Conjunctivae normal.   Cardiovascular:      Rate and Rhythm: Normal rate and regular rhythm. Heart sounds: Normal heart sounds. No murmur heard.      No friction rub. No gallop. Pulmonary:      Effort: Pulmonary effort is normal. No respiratory distress. Breath sounds: Normal breath sounds. No wheezing or rales. Chest:      Chest wall: No tenderness. Abdominal:      General: Bowel sounds are normal. There is no distension. Palpations: Abdomen is soft. Tenderness: There is no abdominal tenderness. There is no guarding. Musculoskeletal:         General: No tenderness or deformity. Normal range of motion. Cervical back: Normal range of motion and neck supple. Skin:     General: Skin is warm and dry. Findings: No erythema. Neurological:      Mental Status: He is alert. Mental status is at baseline. Additional Data:     Labs:    Results from last 7 days   Lab Units 07/24/23  0925   WBC Thousand/uL 5.96   HEMOGLOBIN g/dL 15.0   HEMATOCRIT % 44.6   PLATELETS Thousands/uL 622*   NEUTROS PCT % 55   LYMPHS PCT % 22   MONOS PCT % 7   EOS PCT % 12*     Results from last 7 days   Lab Units 07/24/23  0925   SODIUM mmol/L 144   POTASSIUM mmol/L 3.5   CHLORIDE mmol/L 109*   CO2 mmol/L 28   BUN mg/dL 21   CREATININE mg/dL 1.04   ANION GAP mmol/L 7   CALCIUM mg/dL 10.2   ALBUMIN g/dL 3.6   TOTAL BILIRUBIN mg/dL 0.37   ALK PHOS U/L 71   ALT U/L 10   AST U/L 10*   GLUCOSE RANDOM mg/dL 114     Results from last 7 days   Lab Units 07/24/23  0925   INR  1.31*     Results from last 7 days   Lab Units 07/25/23  0046 07/24/23  1620 07/24/23  1145 07/24/23  0602 07/24/23  0003 07/23/23  1755 07/23/23  1154 07/23/23  0537 07/23/23  0012 07/22/23  1212 07/22/23  0721 07/21/23  1936   POC GLUCOSE mg/dl 141* 99 97 110 102 100 109 104 103 115 93 101         Results from last 7 days   Lab Units 07/21/23  0509 07/19/23  2153   LACTIC ACID mmol/L  --  1.0   PROCALCITONIN ng/ml 0.46* 0.92*           * I Have Reviewed All Lab Data Listed Above. * Additional Pertinent Lab Tests Reviewed:  All Labs Within Last 24 Hours Reviewed    Imaging:    Imaging Reports Reviewed Today Include: na  Imaging Personally Reviewed by Myself Includes:  na    Recent Cultures (last 7 days):     Results from last 7 days   Lab Units 07/19/23  9583   BLOOD CULTURE  No Growth After 5 Days. No Growth After 5 Days. Last 24 Hours Medication List:   Current Facility-Administered Medications   Medication Dose Route Frequency Provider Last Rate   • acetaminophen  650 mg Oral Q6H PRN Sonia Hurley PA-C     • aspirin  81 mg Oral Daily Sonia Hurley PA-C     • cefTRIAXone  1,000 mg Intravenous Q24H Sonia Hurley PA-C 1,000 mg (07/25/23 0459)   • dextrose 5 % and sodium chloride 0.9 %  50 mL/hr Intravenous Continuous Sarmad Perez MD 50 mL/hr (07/23/23 1640)   • diphenhydrAMINE  50 mg Intravenous 60 Min Pre-Op Sarmad Perez MD     • heparin (porcine)  5,000 Units Subcutaneous [facility] Sonia Hurley PA-C     • lidocaine  1 patch Topical Daily Latesha Mack PA-C     • ondansetron  4 mg Intravenous Q6H PRN Sonia Hurley PA-C     • polyethylene glycol  17 g Oral Daily PRN Sonia Hurley PA-C     • pravastatin  20 mg Oral Every Other Day Yuly Palomares PA-C          Today, Patient Was Seen By: Cierra Angeles MD    ** Please Note: Dictation voice to text software may have been used in the creation of this document.  **

## 2023-07-25 NOTE — ASSESSMENT & PLAN NOTE
· From sepsis, multifocal pneumonia, poor p.o. intake, dehydration, malnutrition  · Follow-up PT/OT eval

## 2023-07-25 NOTE — ASSESSMENT & PLAN NOTE
· Patient an elevated D-dimer which is nonspecific however although suspicion is low we did get a VQ scan based on patient's allergies which was intermediate probability  · CTA ruled out PE

## 2023-07-25 NOTE — ASSESSMENT & PLAN NOTE
· Tachypnea, tachycardia.   White blood cells 13 actually improved compared to last week  · Trend end points  · Source: multifocal pneumonia from recurring aspiration  · Blood cultures remain negative  · Since resolved with IV antibiotics

## 2023-07-25 NOTE — SPEECH THERAPY NOTE
Speech Language/Pathology Missed Visit Note    Dysphagia follow-up attempted, however pt remains NPO x possible procedure (PEG not yet placed) and showing significant difficulties w/ secretion mgmt. PO trials deferred. Will re-attempt as patient status and scheduling permits. Nursing aware.           Ami Caceres, [id number] [facility], [address] [place] [address]  Speech-Language Pathologist  [place] #[id number]  [place] #[id number]

## 2023-07-25 NOTE — NUTRITION
07/25/23 1544   Recommendations/Interventions   Summary Patient remains NPO x6 days secondary to high aspiration risk. Patient scheduled for EGD with PEG placement today. Interventions/Recommendations Tube feeding recs provided;Monitor labs for refeeding syndrome   Recommendations to Provider Once able to use PEG tube suggest initiate 12 hr (4L-9P) cyclic EN of Jevity 1.2 kcal @ 30 ml/hr. Patient is a high risk for refeeding syndrome, monitor electrolytes, phosphorus and replete accordingly. Once electrolytes stable suggest increase EN to provide 50% of nutritional needs; Jevity 1.2 kcal @ 60 ml/hr. Eventual goal rate to provide 100% of nutritional needs: Jevity 1.2 kcal @ 90 ml/hr x 16 hours (7a-11pm) with 125 ml free water flush every 4 hours. EN will provide 1728 kcal, 79 gms pro, 1162 ml + 750 ml free water flush= 1912 ml tv. Once patient tolerating goal rate of EN, consider bolus feeding schedule.

## 2023-07-25 NOTE — ASSESSMENT & PLAN NOTE
· Presented with shortness of breath and cough likely from dysphagia causing recurrent aspiration  · Clinically improving with IV antibiotics   ·  we will continue  · Plan for PEG tube placement today

## 2023-07-25 NOTE — ANESTHESIA PREPROCEDURE EVALUATION
Procedure:  EGD    Relevant Problems   ANESTHESIA (within normal limits)   (-) History of anesthesia complications      CARDIO   (+) Abdominal aortic aneurysm (AAA) without rupture (HCC)      ENDO (within normal limits)      GI/HEPATIC  NPO confirmed  BMI 19.4   (+) Esophageal dysphagia   (+) Hiatal hernia      /RENAL   (+) Benign localized prostatic hyperplasia with lower urinary tract symptoms (LUTS)   (+) CKD (chronic kidney disease) stage 3, GFR 30-59 ml/min (HCC)   (+) Chronic kidney disease-mineral and bone disorder      HEMATOLOGY (within normal limits)  SCCA of base of tongue, s/p surgical resection and XRT      MUSCULOSKELETAL   (+) Cervical spondylosis   (+) Hiatal hernia   (+) Osteoarthritis of left hip      PULMONARY   (+) Multifocal pneumonia from recurring aspiration   (+) Pneumonia of left lower lobe due to infectious organism   (-) Smoking (former smoker, quit 20+ years ago)   (-) URI (upper respiratory infection)      Other   (+) Severe protein-calorie malnutrition ([address])      Allergies   Allergen Reactions   • Iodinated Contrast Media Other (See Comments)   • Iodine - Food Allergy      Social History     Tobacco Use   • Smoking status: Former     Passive exposure: Current   • Smokeless tobacco: Former   • Tobacco comments:     quit 20 years ago   Vaping Use   • Vaping Use: Never used   Substance Use Topics   • Alcohol use: Yes     Comment: occasional   • Drug use: Never     Current Outpatient Medications   Medication Instructions   • amoxicillin-clavulanate (AUGMENTIN) 400-57 mg/5 mL suspension 875 mg, Oral, 2 times daily   • aspirin 81 MG tablet 1 cap(s)   • Coenzyme Q10 (CO Q 10 PO) Oral   • ferrous sulfate 324 mg, Oral, Daily before breakfast   • fluticasone (FLONASE) 50 mcg/act nasal spray 2 sprays, Nasal, Daily   • Multiple Vitamins-Minerals (MULTIVITAMIN ADULTS 50+ PO) Oral   • pravastatin (PRAVACHOL) 20 mg tablet TAKE ONE TABLET BY MOUTH EVERY OTHER DAY   • VITAMIN D, CHOLECALCIFEROL, PO Oral     Lab Results   Component Value Date    WBC 5.96 07/24/2023    HGB 15.0 07/24/2023    HCT 44.6 07/24/2023     (H) 07/24/2023    SODIUM 144 07/24/2023    K 3.5 07/24/2023     (H) 07/24/2023    CO2 28 07/24/2023    BUN 21 07/24/2023    CREATININE 1.04 07/24/2023    GLUC 114 07/24/2023    AST 10 (L) 07/24/2023    ALT 10 07/24/2023    ALKPHOS 71 07/24/2023    TBILI 0.37 07/24/2023    ALB 3.6 07/24/2023    PROTIME 16.1 (H) 07/24/2023    PTT 33 07/19/2023    INR 1.31 (H) 07/24/2023     Vitals:    07/25/23 1318   BP: 167/89   Pulse: 97   Resp: 16   Temp:    SpO2: 99%     EKG 7/19/23  Sinus tachycardia  Anteroseptal infarct (cited on or before 15-JUL-2023)  Abnormal ECG     Confirmed by Hanna Rangel ([id number]) on 7/20/2023 11:09:33 AM    Echo 2/8/22  •  Left Ventricle: Left ventricular cavity size is normal. Systolic function is normal. Wall motion is normal. There is borderline concentric hypertrophy. •  Aortic Valve: There is mild regurgitation. •  Mitral Valve: There is mild regurgitation. •  Left Atrium: The atrium is dilated. Physical Exam    Airway    Mallampati score: I  TM Distance: >3 FB  Neck ROM: full     Dental   Comment: S/p tongue resection, multiple missing teeth, denies loose teeth,     Cardiovascular  Rhythm: regular, Rate: normal, Cardiovascular exam normal    Pulmonary  Pulmonary exam normal     Other Findings        Anesthesia Plan  ASA Score- 3     Anesthesia Type- IV sedation with anesthesia with ASA Monitors. Additional Monitors:   Airway Plan:     Comment: O2 mask, natural airway, EtCO2 monitor. Risks discussed including awareness, aspiration, drug reactions and conversion to GA. Domnick Grit Plan Factors-Exercise tolerance (METS): >4 METS. Chart reviewed. EKG reviewed. Existing labs reviewed. Patient summary reviewed. Patient is not a current smoker. Induction- intravenous. Postoperative Plan- Plan for postoperative opioid use.      Informed Consent- Anesthetic plan and risks discussed with patient. I personally reviewed this patient with the CRNA. Discussed and agreed on the Anesthesia Plan with the CRNA. .        Medication Administration - last 24 hours from 07/24/2023 1343 to 07/25/2023 1343       Date/Time Order Dose Route Action Action by     07/25/2023 0842 EDT aspirin (ECOTRIN LOW STRENGTH) EC tablet 81 mg 81 mg Oral Not Given Donna Rao RN     07/25/2023 0459 EDT cefTRIAXone (ROCEPHIN) IVPB (premix in dextrose) 1,000 mg 50 mL 1,000 mg Intravenous [place], [place]     07/25/2023 2656 EDT heparin (porcine) subcutaneous injection 5,000 Units 5,000 Units Subcutaneous Not Given Ruy Garrett RN     07/24/2023 2132 EDT heparin (porcine) subcutaneous injection 5,000 Units 5,000 Units Subcutaneous Given Ruy Garrett RN     07/24/2023 1532 EDT heparin (porcine) subcutaneous injection 5,000 Units 5,000 Units Subcutaneous Given Maciel Ponce RN     07/25/2023 1106 EDT lidocaine (LIDODERM) 5 % patch 1 patch 1 patch Topical Not Given Donna Rao RN     07/25/2023 1247 EDT dextrose 5 % and sodium chloride 0.9 % infusion 50 mL/hr Intravenous [address] Donna Rao RN     07/24/2023 1453 EDT methylPREDNISolone (MEDROL) tablet 32 mg 32 mg Oral Not Given Maciel Ponce RN     07/25/2023 0150 EDT hydrocortisone (Solu-CORTEF) injection 200 mg 200 mg Intravenous Given Ruy Garrett RN     07/24/2023 1532 EDT hydrocortisone (Solu-CORTEF) injection 200 mg 200 mg Intravenous Given Maciel Ponce RN     07/25/2023 0309 EDT diphenhydrAMINE (BENADRYL) injection 50 mg 50 mg Intravenous Given Ruy Garrett RN     07/25/2023 6106 EDT iohexol (OMNIPAQUE) 350 MG/ML injection (SINGLE-DOSE) 85 mL 85 mL Intravenous Given Eduardo Maher     07/25/2023 1318 EDT vancomycin (VANCOCIN) IVPB (premix in dextrose) 1,000 mg 200 mL 1,000 mg Intravenous Andre Roy RN

## 2023-07-26 ENCOUNTER — RA CDI HCC (OUTPATIENT)
Dept: OTHER | Facility: HOSPITAL | Age: 78
End: 2023-07-26

## 2023-07-26 LAB
ANION GAP SERPL CALCULATED.3IONS-SCNC: 5 MMOL/L
BASOPHILS # BLD AUTO: 0.07 THOUSANDS/ÂΜL (ref 0–0.1)
BASOPHILS NFR BLD AUTO: 1 % (ref 0–1)
BUN SERPL-MCNC: 22 MG/DL (ref 5–25)
CALCIUM SERPL-MCNC: 9.4 MG/DL (ref 8.4–10.2)
CHLORIDE SERPL-SCNC: 113 MMOL/L (ref 96–108)
CO2 SERPL-SCNC: 27 MMOL/L (ref 21–32)
CREAT SERPL-MCNC: 0.97 MG/DL (ref 0.6–1.3)
EOSINOPHIL # BLD AUTO: 0.43 THOUSAND/ÂΜL (ref 0–0.61)
EOSINOPHIL NFR BLD AUTO: 6 % (ref 0–6)
ERYTHROCYTE [DISTWIDTH] IN BLOOD BY AUTOMATED COUNT: 13.1 % (ref 11.6–15.1)
GFR SERPL CREATININE-BSD FRML MDRD: 75 ML/MIN/1.73SQ M
GLUCOSE SERPL-MCNC: 219 MG/DL (ref 65–140)
HCT VFR BLD AUTO: 38.3 % (ref 36.5–49.3)
HGB BLD-MCNC: 13 G/DL (ref 12–17)
IMM GRANULOCYTES # BLD AUTO: 0.17 THOUSAND/UL (ref 0–0.2)
IMM GRANULOCYTES NFR BLD AUTO: 2 % (ref 0–2)
LYMPHOCYTES # BLD AUTO: 1.34 THOUSANDS/ÂΜL (ref 0.6–4.47)
LYMPHOCYTES NFR BLD AUTO: 18 % (ref 14–44)
MCH RBC QN AUTO: 29 PG (ref 26.8–34.3)
MCHC RBC AUTO-ENTMCNC: 33.9 G/DL (ref 31.4–37.4)
MCV RBC AUTO: 85 FL (ref 82–98)
MONOCYTES # BLD AUTO: 0.44 THOUSAND/ÂΜL (ref 0.17–1.22)
MONOCYTES NFR BLD AUTO: 6 % (ref 4–12)
NEUTROPHILS # BLD AUTO: 4.95 THOUSANDS/ÂΜL (ref 1.85–7.62)
NEUTS SEG NFR BLD AUTO: 67 % (ref 43–75)
NRBC BLD AUTO-RTO: 0 /100 WBCS
PLATELET # BLD AUTO: 600 THOUSANDS/UL (ref 149–390)
PMV BLD AUTO: 8 FL (ref 8.9–12.7)
POTASSIUM SERPL-SCNC: 3.1 MMOL/L (ref 3.5–5.3)
RBC # BLD AUTO: 4.49 MILLION/UL (ref 3.88–5.62)
SODIUM SERPL-SCNC: 145 MMOL/L (ref 135–147)
WBC # BLD AUTO: 7.4 THOUSAND/UL (ref 4.31–10.16)

## 2023-07-26 PROCEDURE — 80048 BASIC METABOLIC PNL TOTAL CA: CPT | Performed by: INTERNAL MEDICINE

## 2023-07-26 PROCEDURE — 85025 COMPLETE CBC W/AUTO DIFF WBC: CPT | Performed by: INTERNAL MEDICINE

## 2023-07-26 PROCEDURE — 99232 SBSQ HOSP IP/OBS MODERATE 35: CPT | Performed by: INTERNAL MEDICINE

## 2023-07-26 PROCEDURE — 3E0G76Z INTRODUCTION OF NUTRITIONAL SUBSTANCE INTO UPPER GI, VIA NATURAL OR ARTIFICIAL OPENING: ICD-10-PCS | Performed by: INTERNAL MEDICINE

## 2023-07-26 RX ORDER — MAGNESIUM SULFATE HEPTAHYDRATE 40 MG/ML
2 INJECTION, SOLUTION INTRAVENOUS ONCE
Status: DISCONTINUED | OUTPATIENT
Start: 2023-07-26 | End: 2023-07-26

## 2023-07-26 RX ORDER — METOPROLOL TARTRATE 5 MG/5ML
5 INJECTION INTRAVENOUS ONCE
Status: DISCONTINUED | OUTPATIENT
Start: 2023-07-26 | End: 2023-07-28 | Stop reason: HOSPADM

## 2023-07-26 RX ORDER — HEPARIN SODIUM 10000 [USP'U]/100ML
3-20 INJECTION, SOLUTION INTRAVENOUS
Status: DISCONTINUED | OUTPATIENT
Start: 2023-07-26 | End: 2023-07-26

## 2023-07-26 RX ORDER — HEPARIN SODIUM 5000 [USP'U]/ML
5000 INJECTION, SOLUTION INTRAVENOUS; SUBCUTANEOUS EVERY 8 HOURS SCHEDULED
Status: DISCONTINUED | OUTPATIENT
Start: 2023-07-26 | End: 2023-07-26

## 2023-07-26 RX ORDER — HEPARIN SODIUM 5000 [USP'U]/ML
5000 INJECTION, SOLUTION INTRAVENOUS; SUBCUTANEOUS EVERY 8 HOURS SCHEDULED
Status: DISCONTINUED | OUTPATIENT
Start: 2023-07-26 | End: 2023-07-28 | Stop reason: HOSPADM

## 2023-07-26 RX ORDER — POTASSIUM CHLORIDE 14.9 MG/ML
20 INJECTION INTRAVENOUS ONCE
Status: COMPLETED | OUTPATIENT
Start: 2023-07-26 | End: 2023-07-27

## 2023-07-26 RX ORDER — POTASSIUM CHLORIDE 14.9 MG/ML
20 INJECTION INTRAVENOUS
Status: DISCONTINUED | OUTPATIENT
Start: 2023-07-26 | End: 2023-07-26

## 2023-07-26 RX ADMIN — HEPARIN SODIUM 5000 UNITS: 5000 INJECTION INTRAVENOUS; SUBCUTANEOUS at 21:34

## 2023-07-26 RX ADMIN — ASPIRIN 81 MG: 81 TABLET, COATED ORAL at 10:36

## 2023-07-26 RX ADMIN — POTASSIUM CHLORIDE 20 MEQ: 14.9 INJECTION, SOLUTION INTRAVENOUS at 10:36

## 2023-07-26 RX ADMIN — CEFTRIAXONE 1000 MG: 1 INJECTION, SOLUTION INTRAVENOUS at 05:11

## 2023-07-26 RX ADMIN — HEPARIN SODIUM 5000 UNITS: 5000 INJECTION INTRAVENOUS; SUBCUTANEOUS at 05:11

## 2023-07-26 RX ADMIN — PRAVASTATIN SODIUM 20 MG: 20 TABLET ORAL at 10:36

## 2023-07-26 RX ADMIN — DEXTROSE AND SODIUM CHLORIDE 50 ML/HR: 5; .9 INJECTION, SOLUTION INTRAVENOUS at 14:13

## 2023-07-26 NOTE — ASSESSMENT & PLAN NOTE
· From sepsis, multifocal pneumonia, poor p.o. intake, dehydration, malnutrition  · PT/OT recommending home therapy

## 2023-07-26 NOTE — ASSESSMENT & PLAN NOTE
Malnutrition Findings:   Adult Malnutrition type: Chronic illness  Adult Degree of Malnutrition: Other severe protein calorie malnutrition  Malnutrition Characteristics: Fat loss, Muscle loss                  360 Statement: Related to catabolic illness as evidenced by severe orbital fat loss, severe muscle wasting in clavicles and temples, and a weight loss of 6 lbs (4.3%) in < 1 month. Treated with pending diet advancement. BMI Findings: Body mass index is 19.36 kg/m².

## 2023-07-26 NOTE — ASSESSMENT & PLAN NOTE
· Presented with shortness of breath and cough likely from dysphagia causing recurrent aspiration  · Clinically improving with IV antibiotics   · will complete IV antibiotics tomorrow

## 2023-07-26 NOTE — ASSESSMENT & PLAN NOTE
· Tachypnea, tachycardia.   White blood cells 13 actually improved compared to last week  · Source: multifocal pneumonia from recurring aspiration  · Blood cultures remain negative  · Has since resolved with IV antibiotics

## 2023-07-26 NOTE — PLAN OF CARE
Problem: SAFETY ADULT  Goal: Maintain or return to baseline ADL function  Description: INTERVENTIONS:  -  Assess patient's ability to carry out ADLs; assess patient's baseline for ADL function and identify physical deficits which impact ability to perform ADLs (bathing, care of mouth/teeth, toileting, grooming, dressing, etc.)  - Assess/evaluate cause of self-care deficits   - Assess range of motion  - Assess patient's mobility; develop plan if impaired  - Assess patient's need for assistive devices and provide as appropriate  - Encourage maximum independence but intervene and supervise when necessary  - Involve family in performance of ADLs  - Assess for home care needs following discharge   - Consider OT consult to assist with ADL evaluation and planning for discharge  - Provide patient education as appropriate  Outcome: Progressing

## 2023-07-26 NOTE — NURSING NOTE
Patient not able to tolerate full dose of IV potassium due to complaints of pain in the vein. Potassium was run at 20ml/hr in conjunction with normal saline at 100ml/hr for dilution, but patient still would not tolerate it and requested it be removed at this time.

## 2023-07-26 NOTE — CASE MANAGEMENT
Case Management Discharge Planning Note    Patient name Edna Musa  Location /-64 MRN [id number]  : 1945 Date 2023       Current Admission Date: 2023  Current Admission Diagnosis:Multifocal pneumonia from recurring aspiration   Patient Active Problem List    Diagnosis Date Noted   • Elevated d-dimer 2023   • Severe protein-calorie malnutrition ([address]) 2023   • Pneumonia of left lower lobe due to infectious organism 2023   • Slurred speech 2023   • Articular cartilage disorder of shoulder region 2023   • Cervical spondylosis 2023   • Disorder of bursae of shoulder region 2023   • Impingement syndrome of shoulder region 2023   • Hiatal hernia 12/15/2022   • Bilateral carotid artery stenosis 2022   • Abdominal aortic aneurysm (AAA) without rupture ([address]) 2022   • Chronic kidney disease-mineral and bone disorder 2022   • Multifocal pneumonia from recurring aspiration 2021   • Esophageal dysphagia 2021   • Generalized weakness 2021   • Osteoarthritis of left hip 2021   • Synovial cyst of lumbar facet joint 2021   • Myofascial pain on left side 2020   • Lumbar radiculopathy 2020   • CKD (chronic kidney disease) stage 3, GFR 30-59 ml/min ([address]) 2020   • Fatigue 2020   • Benign localized prostatic hyperplasia with lower urinary tract symptoms (LUTS) 10/15/2019   • Overactive bladder 10/09/2019   • Dehydration 2019   • History of head and neck radiation 2019   • Excessive salivation 01/10/2019   • Sepsis ([address]) 01/10/2019   • History of carcinoma of tongue 2018   • Microscopic hematuria 2018   • Monoclonal gammopathy of unknown significance 2016      LOS (days): 6  Geometric Mean LOS (GMLOS) (days): 5.00  Days to GMLOS:-1.3     OBJECTIVE:  Risk of Unplanned Readmission Score: 15.5         Current admission status: Inpatient   Preferred Pharmacy:   [facility]/pharmacy #[id number]- [place], [place] - [address] [place] [address]  Phone: [phone] Fax: [phone]    [facility] [address], [address] [place] [facility] [address]  Phone: [phone] Fax: [phone]    Primary Care Provider: Mallory Cruz MD    Primary Insurance: MEDICARE  Secondary Insurance: BLUE CROSS    DISCHARGE DETAILS:                                          Other Referral/Resources/Interventions Provided:  Referral Comments:  IMM explained and signed                                             IMM Given (Date):: 07/26/23  IMM Given to[de-identified] Patient

## 2023-07-26 NOTE — NURSING NOTE
Patient tube feeding rate increased to 60ml at 1920. Next increase will be at 0700. Patient tolerating feed at this time well. Will continue to monitor.

## 2023-07-26 NOTE — ASSESSMENT & PLAN NOTE
· From history of oropharyngeal cancer presently in remission  · Speech therapy consult appreciated  · Status post PEG tube placement on 7/25  · Initiated on tube feeds we will continue to increase rate as tolerated

## 2023-07-26 NOTE — PROGRESS NOTES
[address]  Progress Note  Name: Kishore Nice  MRN: [id number]  Unit/Bed#: -02 I Date of Admission: 7/19/2023   Date of Service: 7/26/2023 I Hospital Day: 6    Assessment/Plan   * Multifocal pneumonia from recurring aspiration  Assessment & Plan  · Presented with shortness of breath and cough likely from dysphagia causing recurrent aspiration  · Clinically improving with IV antibiotics   · will complete IV antibiotics tomorrow    Elevated d-dimer  Assessment & Plan  · Patient an elevated D-dimer which is nonspecific however although suspicion is low we did get a VQ scan based on patient's allergies which was intermediate probability  · CTA ruled out PE    Severe protein-calorie malnutrition ([address])  Assessment & Plan  Malnutrition Findings:   Adult Malnutrition type: Chronic illness  Adult Degree of Malnutrition: Other severe protein calorie malnutrition  Malnutrition Characteristics: Fat loss, Muscle loss                  360 Statement: Related to catabolic illness as evidenced by severe orbital fat loss, severe muscle wasting in clavicles and temples, and a weight loss of 6 lbs (4.3%) in < 1 month. Treated with pending diet advancement. BMI Findings: Body mass index is 19.36 kg/m². Esophageal dysphagia  Assessment & Plan  · From history of oropharyngeal cancer presently in remission  · Speech therapy consult appreciated  · Status post PEG tube placement on 7/25  · Initiated on tube feeds we will continue to increase rate as tolerated    Generalized weakness  Assessment & Plan  · From sepsis, multifocal pneumonia, poor p.o. intake, dehydration, malnutrition  · PT/OT recommending home therapy    CKD (chronic kidney disease) stage 3, GFR 30-59 ml/min ([facility])  Assessment & Plan  · Creatinine at baseline  · Continue to monitor    Sepsis ([address])  Assessment & Plan  · Tachypnea, tachycardia.   White blood cells 13 actually improved compared to last week  · Source: multifocal pneumonia from recurring aspiration  · Blood cultures remain negative  · Has since resolved with IV antibiotics           VTE Pharmacologic Prophylaxis:   Pharmacologic: Heparin  Mechanical VTE Prophylaxis in Place: Yes    Patient Centered Rounds: I have performed bedside rounds with nursing staff today. Discussions with Specialists or Other Care Team Provider: eunice smith    Education and Discussions with Family / Patient: pt    Time Spent for Care: 30 minutes. More than 50% of total time spent on counseling and coordination of care as described above. Current Length of Stay: 6 day(s)    Current Patient Status: Inpatient   Certification Statement: The patient will continue to require additional inpatient hospital stay due to see below    Discharge Plan: anticipate dc once tolerates tube feeds and tube feeds set up    Code Status: Level 1 - Full Code      Subjective:   Denies cp, sob, cough, fevers    Objective:     Vitals:   Temp (24hrs), Av.8 °F (36 °C), Min:96.5 °F (35.8 °C), Max:97.2 °F (36.2 °C)    Temp:  [96.5 °F (35.8 °C)-97.2 °F (36.2 °C)] 96.5 °F (35.8 °C)  HR:  [] 76  Resp:  [16-18] 16  BP: (129-202)/(74-98) 135/79  SpO2:  [94 %-100 %] 94 %  Body mass index is 19.36 kg/m². Input and Output Summary (last 24 hours): Intake/Output Summary (Last 24 hours) at 2023 0910  Last data filed at 2023 0511  Gross per 24 hour   Intake 200 ml   Output 825 ml   Net -625 ml       Physical Exam:     Physical Exam  Constitutional:       General: He is not in acute distress. Appearance: He is well-developed. He is not diaphoretic. HENT:      Head: Normocephalic and atraumatic. Nose: Nose normal.      Mouth/Throat:      Pharynx: No oropharyngeal exudate. Eyes:      General: No scleral icterus. Conjunctiva/sclera: Conjunctivae normal.   Cardiovascular:      Rate and Rhythm: Normal rate and regular rhythm. Heart sounds: Normal heart sounds. No murmur heard. No friction rub. No gallop. Pulmonary:      [place]: Pulmonary effort is normal. No respiratory distress. Breath sounds: Normal breath sounds. No wheezing or rales. Chest:      Chest wall: No tenderness. Abdominal:      General: Bowel sounds are normal. There is no distension. Palpations: Abdomen is soft. Tenderness: There is no abdominal tenderness. There is no guarding. Musculoskeletal:         General: No tenderness or deformity. Normal range of motion. Cervical back: Normal range of motion and neck supple. Skin:     General: Skin is warm and dry. Findings: No erythema. Neurological:      Mental Status: He is alert. Additional Data:     Labs:    Results from last 7 days   Lab Units 07/26/23  0507   WBC Thousand/uL 7.40   HEMOGLOBIN g/dL 13.0   HEMATOCRIT % 38.3   PLATELETS Thousands/uL 600*   NEUTROS PCT % 67   LYMPHS PCT % 18   MONOS PCT % 6   EOS PCT % 6     Results from last 7 days   Lab Units 07/26/23  0507 07/24/23  0925   SODIUM mmol/L 145 144   POTASSIUM mmol/L 3.1* 3.5   CHLORIDE mmol/L 113* 109*   CO2 mmol/L 27 28   BUN mg/dL 22 21   CREATININE mg/dL 0.97 1.04   ANION GAP mmol/L 5 7   CALCIUM mg/dL 9.4 10.2   ALBUMIN g/dL  --  3.6   TOTAL BILIRUBIN mg/dL  --  0.37   ALK PHOS U/L  --  71   ALT U/L  --  10   AST U/L  --  10*   GLUCOSE RANDOM mg/dL 219* 114     Results from last 7 days   Lab Units 07/24/23  0925   INR  1.31*     Results from last 7 days   Lab Units 07/25/23  2327 07/25/23  1749 07/25/23  1125 07/25/23  0046 07/24/23  1620 07/24/23  1145 07/24/23  0602 07/24/23  0003 07/23/23  1755 07/23/23  1154 07/23/23  0537 07/23/23  0012   POC GLUCOSE mg/dl 92 97 128 141* 99 97 110 102 100 109 104 103         Results from last 7 days   Lab Units 07/21/23  0509 07/19/23  2153   LACTIC ACID mmol/L  --  1.0   PROCALCITONIN ng/ml 0.46* 0.92*           * I Have Reviewed All Lab Data Listed Above. * Additional Pertinent Lab Tests Reviewed:  All Labs Within Last 24 Hours Reviewed    Imaging:    Imaging Reports Reviewed Today Include: na  Imaging Personally Reviewed by Myself Includes:  na    Recent Cultures (last 7 days):     Results from last 7 days   Lab Units 07/19/23  3773   BLOOD CULTURE  No Growth After 5 Days. No Growth After 5 Days. Last 24 Hours Medication List:   Current Facility-Administered Medications   Medication Dose Route Frequency Provider Last Rate   • acetaminophen  650 mg Oral Q6H PRN Sonia Hurley PA-C     • aspirin  81 mg Oral Daily Sonia Hurley PA-C     • cefazolin  2,000 mg Intravenous Once PRN Jevon Chopra MD     • cefTRIAXone  1,000 mg Intravenous Q24H Sonia Hurley PA-C 1,000 mg (07/26/23 0511)   • dextrose 5 % and sodium chloride 0.9 %  50 mL/hr Intravenous Continuous Sarmad Obregon MD 50 mL/hr (07/25/23 1247)   • diphenhydrAMINE  50 mg Intravenous 60 Min Pre-Op Sarmad Obregon MD     • heparin (porcine)  5,000 Units Subcutaneous [place], [place]     • lidocaine  1 patch Topical Daily Latesha Mack PA-C     • metoprolol  5 mg Intravenous Once Diana Roy PA-C     • ondansetron  4 mg Intravenous Q6H PRN Sonia Hurley PA-C     • polyethylene glycol  17 g Oral Daily PRN Sonia Hurley PA-C     • potassium chloride  20 mEq Intravenous Once Munir Chopra MD     • pravastatin  20 mg Oral Every Other Day Erin Yu PA-C          Today, Patient Was Seen By: Lea Martinez MD    ** Please Note: Dictation voice to text software may have been used in the creation of this document.  **

## 2023-07-26 NOTE — PROGRESS NOTES
Progress Note - Hussein De La Garza 70 y.o. male MRN: [id number]    Unit/Bed#: -02 Encounter: [id number]        Subjective:     Patient reports mild tenderness at the PEG tube site. Otherwise has no new complaints this morning. Patient afebrile overnight. ROS: As noted in the HPI, otherwise all others negative. Objective:     Vitals: Blood pressure 144/88, pulse 96, temperature (!) 97.2 °F (36.2 °C), resp. rate 16, height 5' 10" (1.778 m), weight 61.2 kg (134 lb 14.7 oz), SpO2 94 %. ,Body mass index is 19.36 kg/m². Intake/Output Summary (Last 24 hours) at 7/26/2023 1348  Last data filed at 7/26/2023 1052  Gross per 24 hour   Intake 200 ml   Output 675 ml   Net -475 ml       Physical Exam:     General Appearance: Alert and oriented x 3. In no respiratory distress  Lungs: Clear to auscultation bilaterally, no rales or rhonchi  Heart: Regular rate and rhythm, S1, S2 normal, no murmur, click, rub or gallop  Abdomen: Soft, non-tender, non-distended; bowel sounds normal; no masses or no organomegaly. Dried blood surrounding the PEG tube site. Was able to flush tubing without difficulty.   Extremities: No cyanosis, edema    Invasive Devices     Peripheral Intravenous Line  Duration           Peripheral IV 07/24/23 Left;Ventral (anterior) Forearm 2 days    Peripheral IV 07/25/23 Right Antecubital 1 day          Drain  Duration           Gastrostomy/Enterostomy Percutaneous Endoscopic Gastrostomy (PEG) 20 Fr. LUQ <1 day                Lab Results:  Results from last 7 days   Lab Units 07/26/23  0507   WBC Thousand/uL 7.40   HEMOGLOBIN g/dL 13.0   HEMATOCRIT % 38.3   PLATELETS Thousands/uL 600*   NEUTROS PCT % 67   LYMPHS PCT % 18   MONOS PCT % 6   EOS PCT % 6     Results from last 7 days   Lab Units 07/26/23  0507 07/24/23  0925   POTASSIUM mmol/L 3.1* 3.5   CHLORIDE mmol/L 113* 109*   CO2 mmol/L 27 28   BUN mg/dL 22 21   CREATININE mg/dL 0.97 1.04   CALCIUM mg/dL 9.4 10.2   ALK PHOS U/L  --  71   ALT U/L --  10   AST U/L  --  10*     Results from last 7 days   Lab Units 07/24/23  0925   INR  1.31*           Imaging Studies: I have personally reviewed pertinent imaging studies. XR chest pa & lateral    Result Date: 7/24/2023  Impression: Moderate right and trace left effusion with bibasilar opacity, at least in part due to atelectasis. Pneumonia not excluded in the appropriate clinical setting. Workstation performed: [id number]     NM lung ventilation / perfusion    Result Date: 7/24/2023  Impression: The probability for pulmonary embolus is intermediate. The study was marked in [facility] for immediate notification. Workstation performed: [id number]     CT chest wo contrast    Result Date: 7/20/2023  Impression: Patchy groundglass opacities in the periphery of the right upper lobe. Scattered reticulonodular opacities in the bilateral lungs. Groundglass and alveolar opacities as well as focal consolidation with air bronchograms in the right lower lobe. Findings taken together suspicious for multifocal infection in the appropriate clinical setting. Small dependent pleural effusions. Tiny pericardial effusion. Coronary atherosclerosis, hypoplastic thyroid gland, large hiatal hernia, left-sided nephrolithiasis, and other findings as above. Workstation performed: [id number]         Assessment and Plan:     1) Pharyngeal dysphagia, protein calorie malnutrition, and recurrent aspiration pneumonia in the setting of prior tongue cancer status post radical neck dissection with radiation in the 1990s - Patient is status post PEG tube placement yesterday was uneventful. Did not loosen bolster since the patient has small bright red blood surrounding the site with old blood. Non tender to light palpation and hemoglobin at baseline. Flushed well. Continue PEG tube care, start tube feeds today, OK to use for medication. Continue to monitor for fever, leukocytosis or worsening pain; please notify GI if any of these were to occur. Portions of the record may have been created with voice recognition software. Occasional wrong word or "sound a like" substitutions may have occurred due to the inherent limitations of voice recognition software. Read the chart carefully and recognize, using context, where substitutions have occurred.

## 2023-07-26 NOTE — PLAN OF CARE
Problem: PAIN - ADULT  Goal: Verbalizes/displays adequate comfort level or baseline comfort level  Description: Interventions:  - Encourage patient to monitor pain and request assistance  - Assess pain using appropriate pain scale  - Administer analgesics based on type and severity of pain and evaluate response  - Implement non-pharmacological measures as appropriate and evaluate response  - Consider cultural and social influences on pain and pain management  - Notify physician/advanced practitioner if interventions unsuccessful or patient reports new pain  Outcome: Progressing     Problem: Nutrition/Hydration-ADULT  Goal: Nutrient/Hydration intake appropriate for improving, restoring or maintaining nutritional needs  Description: Monitor and assess patient's nutrition/hydration status for malnutrition. Collaborate with interdisciplinary team and initiate plan and interventions as ordered. Monitor patient's weight and dietary intake as ordered or per policy. Utilize nutrition screening tool and intervene as necessary. Determine patient's food preferences and provide high-protein, high-caloric foods as appropriate.      INTERVENTIONS:  - Monitor oral intake, urinary output, labs, and treatment plans  - Assess nutrition and hydration status and recommend course of action  - Evaluate amount of meals eaten  - Assist patient with eating if necessary   - Allow adequate time for meals  - Recommend/ encourage appropriate diets, oral nutritional supplements, and vitamin/mineral supplements  - Order, calculate, and assess calorie counts as needed  - Recommend, monitor, and adjust tube feedings and TPN/PPN based on assessed needs  - Assess need for intravenous fluids  - Provide specific nutrition/hydration education as appropriate  - Include patient/family/caregiver in decisions related to nutrition  Outcome: Progressing

## 2023-07-26 NOTE — CASE MANAGEMENT
Case Management Discharge Planning Note    Patient name Jocelyn Dovelle  Location /-21 MRN [id number]  : 1945 Date 2023       Current Admission Date: 2023  Current Admission Diagnosis:Multifocal pneumonia from recurring aspiration   Patient Active Problem List    Diagnosis Date Noted   • Elevated d-dimer 2023   • Severe protein-calorie malnutrition ([address]) 2023   • Pneumonia of left lower lobe due to infectious organism 2023   • Slurred speech 2023   • Articular cartilage disorder of shoulder region 2023   • Cervical spondylosis 2023   • Disorder of bursae of shoulder region 2023   • Impingement syndrome of shoulder region 2023   • Hiatal hernia 12/15/2022   • Bilateral carotid artery stenosis 2022   • Abdominal aortic aneurysm (AAA) without rupture ([address]) 2022   • Chronic kidney disease-mineral and bone disorder 2022   • Multifocal pneumonia from recurring aspiration 2021   • Esophageal dysphagia 2021   • Generalized weakness 2021   • Osteoarthritis of left hip 2021   • Synovial cyst of lumbar facet joint 2021   • Myofascial pain on left side 2020   • Lumbar radiculopathy 2020   • CKD (chronic kidney disease) stage 3, GFR 30-59 ml/min ([address]) 2020   • Fatigue 2020   • Benign localized prostatic hyperplasia with lower urinary tract symptoms (LUTS) 10/15/2019   • Overactive bladder 10/09/2019   • Dehydration 2019   • History of head and neck radiation 2019   • Excessive salivation 01/10/2019   • Sepsis ([address]) 01/10/2019   • History of carcinoma of tongue 2018   • Microscopic hematuria 2018   • Monoclonal gammopathy of unknown significance 2016      LOS (days): 6  Geometric Mean LOS (GMLOS) (days): 5.00  Days to GMLOS:-1.5     OBJECTIVE:  Risk of Unplanned Readmission Score: 15.71         Current admission status: Inpatient   Preferred Pharmacy:   [id number] [place] - [address] [place] [address]  Phone: [phone] Fax: [address] Pema Dodson, [address] [facility] [address] [facility]  [address] [place] [address] [address]  Phone: [phone] Fax: [phone]    Primary Care Provider: Ava Arellano MD    Primary Insurance: MEDICARE  Secondary Insurance: BLUE CROSS    DISCHARGE DETAILS:      Other Referral/Resources/Interventions Provided:  Referral Comments: According to Jaziel, pt is not yet ready for goals to order the feedings.

## 2023-07-27 ENCOUNTER — APPOINTMENT (INPATIENT)
Dept: RADIOLOGY | Facility: HOSPITAL | Age: 78
DRG: 871 | End: 2023-07-27
Payer: MEDICARE

## 2023-07-27 LAB
ANION GAP SERPL CALCULATED.3IONS-SCNC: 5 MMOL/L
BASOPHILS # BLD AUTO: 0.07 THOUSANDS/ÂΜL (ref 0–0.1)
BASOPHILS NFR BLD AUTO: 1 % (ref 0–1)
BUN SERPL-MCNC: 23 MG/DL (ref 5–25)
CALCIUM SERPL-MCNC: 9.8 MG/DL (ref 8.4–10.2)
CHLORIDE SERPL-SCNC: 111 MMOL/L (ref 96–108)
CO2 SERPL-SCNC: 29 MMOL/L (ref 21–32)
CREAT SERPL-MCNC: 1.05 MG/DL (ref 0.6–1.3)
EOSINOPHIL # BLD AUTO: 0.55 THOUSAND/ÂΜL (ref 0–0.61)
EOSINOPHIL NFR BLD AUTO: 8 % (ref 0–6)
ERYTHROCYTE [DISTWIDTH] IN BLOOD BY AUTOMATED COUNT: 13.2 % (ref 11.6–15.1)
GFR SERPL CREATININE-BSD FRML MDRD: 68 ML/MIN/1.73SQ M
GLUCOSE SERPL-MCNC: 106 MG/DL (ref 65–140)
GLUCOSE SERPL-MCNC: 92 MG/DL (ref 65–140)
GLUCOSE SERPL-MCNC: 98 MG/DL (ref 65–140)
HCT VFR BLD AUTO: 41.4 % (ref 36.5–49.3)
HGB BLD-MCNC: 13.7 G/DL (ref 12–17)
IMM GRANULOCYTES # BLD AUTO: 0.1 THOUSAND/UL (ref 0–0.2)
IMM GRANULOCYTES NFR BLD AUTO: 2 % (ref 0–2)
LYMPHOCYTES # BLD AUTO: 1.08 THOUSANDS/ÂΜL (ref 0.6–4.47)
LYMPHOCYTES NFR BLD AUTO: 16 % (ref 14–44)
MCH RBC QN AUTO: 28.4 PG (ref 26.8–34.3)
MCHC RBC AUTO-ENTMCNC: 33.1 G/DL (ref 31.4–37.4)
MCV RBC AUTO: 86 FL (ref 82–98)
MONOCYTES # BLD AUTO: 0.4 THOUSAND/ÂΜL (ref 0.17–1.22)
MONOCYTES NFR BLD AUTO: 6 % (ref 4–12)
NEUTROPHILS # BLD AUTO: 4.39 THOUSANDS/ÂΜL (ref 1.85–7.62)
NEUTS SEG NFR BLD AUTO: 67 % (ref 43–75)
NRBC BLD AUTO-RTO: 0 /100 WBCS
PLATELET # BLD AUTO: 663 THOUSANDS/UL (ref 149–390)
PMV BLD AUTO: 8.1 FL (ref 8.9–12.7)
POTASSIUM SERPL-SCNC: 3.5 MMOL/L (ref 3.5–5.3)
RBC # BLD AUTO: 4.83 MILLION/UL (ref 3.88–5.62)
SODIUM SERPL-SCNC: 145 MMOL/L (ref 135–147)
WBC # BLD AUTO: 6.59 THOUSAND/UL (ref 4.31–10.16)

## 2023-07-27 PROCEDURE — 74230 X-RAY XM SWLNG FUNCJ C+: CPT

## 2023-07-27 PROCEDURE — 92526 ORAL FUNCTION THERAPY: CPT

## 2023-07-27 PROCEDURE — 80048 BASIC METABOLIC PNL TOTAL CA: CPT | Performed by: INTERNAL MEDICINE

## 2023-07-27 PROCEDURE — 99232 SBSQ HOSP IP/OBS MODERATE 35: CPT | Performed by: INTERNAL MEDICINE

## 2023-07-27 PROCEDURE — 85025 COMPLETE CBC W/AUTO DIFF WBC: CPT | Performed by: INTERNAL MEDICINE

## 2023-07-27 PROCEDURE — 92611 MOTION FLUOROSCOPY/SWALLOW: CPT

## 2023-07-27 PROCEDURE — 82948 REAGENT STRIP/BLOOD GLUCOSE: CPT

## 2023-07-27 RX ADMIN — ASPIRIN 81 MG: 81 TABLET, COATED ORAL at 09:15

## 2023-07-27 RX ADMIN — HEPARIN SODIUM 5000 UNITS: 5000 INJECTION INTRAVENOUS; SUBCUTANEOUS at 21:50

## 2023-07-27 RX ADMIN — CEFTRIAXONE 1000 MG: 1 INJECTION, SOLUTION INTRAVENOUS at 05:47

## 2023-07-27 RX ADMIN — HEPARIN SODIUM 5000 UNITS: 5000 INJECTION INTRAVENOUS; SUBCUTANEOUS at 14:12

## 2023-07-27 RX ADMIN — DEXTROSE AND SODIUM CHLORIDE 50 ML/HR: 5; .9 INJECTION, SOLUTION INTRAVENOUS at 09:14

## 2023-07-27 RX ADMIN — HEPARIN SODIUM 5000 UNITS: 5000 INJECTION INTRAVENOUS; SUBCUTANEOUS at 05:47

## 2023-07-27 NOTE — CASE MANAGEMENT
Case Management Progress Note    Patient name Yeni Gee  Location /-69 MRN [id number]  : 1945 Date 2023       LOS (days): 7  Geometric Mean LOS (GMLOS) (days): 5.00  Days to GMLOS:-2.7        OBJECTIVE:        Current admission status: Inpatient  Preferred Pharmacy:   [id number] [place], [place] -  [facility] [address]  Phone: [phone] Fax: [address] Gaurie Shady, [address] [place] [address]  Phone: [phone] Fax: [phone]    Primary Care Provider: Emily Rasheed MD    Primary Insurance: MEDICARE  Secondary Insurance: BLUE CROSS    PROGRESS NOTE:    CM left a voice ail for wife to return call to discuss setup for delivery of tube feedings-recommended Jevity per team note. [facility] reserved in [address] CM met with patient who states he will also tell wife to reach out to CM to coordinate d/c plan for home support of tube feeding instructuion and coordinate delivery of supplies.

## 2023-07-27 NOTE — NUTRITION
Current TF order for Jevity 1.2 @ 60 ml/hr x 12 hours is adequate to meet 50% of estimated calorie/protein needs. Pt remains at high risk for refeeding syndrome. Recommend to obtain phosphorus, magnesium and continue to closely monitor K and replete as needed. Once electrolytes are stable, recommend slow advancement to goal with gradual increases in TF rate such as 5-10 ml every 8 hours until goal rate is reached. Eventual goal TF rate to meet 100% recommendations: Jevity 1.2 @ 90 ml/hr x 16 hours (7a-11pm) with 125 ml free water flush every 4 hours. Consider reduction in IVF rate or removal of dextrose component as TF approaches goal rate. Once pt is tolerating goal cyclic TF rate and electrolytes are stable, may consider transitioning to bolus feeding schedule.

## 2023-07-27 NOTE — SPEECH THERAPY NOTE
Speech Pathology - Modified Barium Swallow Study    Patient Name: Joni Talavera    LZQWI'X Date: 7/27/2023     Problem List  Principal Problem:    Multifocal pneumonia from recurring aspiration  Active Problems:    Sepsis ([address])    Dehydration    CKD (chronic kidney disease) stage 3, GFR 30-59 ml/min (HCC)    Generalized weakness    Esophageal dysphagia    Hiatal hernia    Severe protein-calorie malnutrition (HCC)    Elevated d-dimer      Past Medical History  Past Medical History:   Diagnosis Date   • Acute renal failure superimposed on chronic kidney disease ([address]) 12/31/2021   • 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[address]) 03/1999    SCCA left tongue base - T2N1M0- treated at [facility] - had surgery and XRT   • Chronic kidney disease    • COVID-19 with hypoxic respiratory failure 12/31/2021   • Essential hypertension 5/6/2019   • Hematuria 10/15/2019   • Hyperlipidemia    • Hypertension    • Left anterior fascicular block    • Stage 3 chronic kidney disease (HCC)    • 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[address]) 2/14/2019       Past Surgical History  Past Surgical History:   Procedure Laterality Date   • CYSTOSCOPY  08/30/2021   • EYELID CLOSURE Left     to prevent eyelashes from rubbing cornea   • HERNIA REPAIR Left    • MODIFIED RADICAL NECK DISSECTION Left 03/03/1999    [facility] - with mandibulotomy and resection SCCA left tongue base - Dr. Cassie Castañeda   • MYRINGOTOMY W/ TUBES Left 06/07/2023    office procedure - Dr. Kim Ocasio   • TONGUE SURGERY     • TONSILLECTOMY         Assessment Summary:    Pt presents with severe and profound oropharyngeal dysphagia characterized by reduced tongue control and bolus transfer. Delayed swallow initiation w/ posterior spill. Reduced BOT retraction and posterior wave; material unable to be advanced through the pharynx and UES. Airway incomplete with deep laryngeal penetration of retenetion to the folds. Material coats the folds and is not consistently ejected despite cued cough.   Pt eventually expectorates, removing majority of the material from the oropharynx. Further trials d/c for safety. Note: Images are available for review in PACS as desired. Recommendations:   Recommended Diet: NPO with tube feeds   Recommended Form of Medications: non-oral if possible   Aspiration precautions and compensatory swallowing strategies: upright posture and aspiration precautions   Consider referral to: Outpatient SLP  SLP Dysphagia therapy recommended: Outpatient therapy; CP relaxation? Results Reviewed with: patient, RN and MD   Pt/Family Education: initiated. Pt would benefit from continued education re: reflux/safe positioning w/ tube feeds. Patient Stated Goal: "Sip coffee"           General Information;  Pt is a 70 y.o. male with a PMH remarkable for lingual carcinoma s/p XRT and radical neck dissection ~25 years prior. Esophageal dysphagia, EGD w/ PEG completed yesterday. Pt now maintained on 12 hr (1Q-1R) cyclic EN of Jevity 1.2 kcal @ 30 ml/hr. Presently tolerating well. Current concerns for dysphagia include s/s pharyngeal dysphagia across all PO trials; swallow study recommended to determine possibility of pleasure feeds. MBS was recommended to assess oropharyngeal stage swallowing skills at this time. Prior MBS: None on file     Pt was viewed sitting upright in the lateral and AP positions. Due to concerns for patient safety / patient refusal, trials provided deviated from the MBSImP Validated Protocol. Pt was given 5-mL thin liquid x2, 5-mL nectar thick and 5-mL pudding. Pt was also given thin liquids by straw, as well as a barium tablet with (thin liquid/pudding).      Initial view observations/comments: Clear view of the upper airway      8-Point Penetration-Aspiration Scale   Thin liquid 5 - Material enters the airway, contacts the vocal folds, and is not ejected from the airway     Nectar thick liquid 5 - Material enters the airway, contacts the vocal folds, and is not ejected from the airway     Honey thick liquid NA   Puree (pudding) 5 - Material enters the airway, contacts the vocal folds, and is not ejected from the airway     Solid NA     Strategies and Efficacy: Multiple swallows - not effective     Aspiration Response and Efficacy:  NA, cued cough to eject material.  Collection remains. MBS IMP Rating    ORAL Impairment  Compinent 1--Lip Closure  Judged at any point during the swallow. 2 - Escape from interlabial space or lateral juncture; no extension beyond vermilion border    Component 2--Tongue Control During Bolus Hold  Judged on held liquid boluses only and prior to productive tongue movement. 3 - Posterior escape of greater than half of bolus    Component 3--Bolus Preparation/Mastication  Judged only during presentation of 1/2 shortbread cookie coated in pudding. Not assessed for safety    Component 4--Bolus Transport/Lingual Motion  Judged after first productive tongue movement for oral bolus transport. 3 - Repetitive/disorganized tongue motion    Component 5--Oral Residue  Judged after first swallow or after the last swallow of the sequential swallow task. 2 - Residue collection on oral structures   Location   B - Palate and C - Tongue    Component 6--Initiation of Pharyngeal Swallow  Judged at first movement of the brisk superior-anterior hyoid trajectory. 3 - Bolus head in pyriforms      PHARYNGEAL Impairment  Component 7--Soft Palate Elevation  Judged during maximum displacement of soft palate. 1 - Trace column of contrast or air between the SP and PW    Component 8--Laryngeal Elevation  Judged when epiglottis is in its most horizontal position. 2 - Minimal superior movement of thyroid cartilage with minimal approximation of arytenoids to epiglottic petiole    Component 9--Anterior Hyoid Excursion  Judged at height of swallow/maximal anterior hyoid displacement.   1 - Partial anterior movement    Component 10--Epiglottic Movement  Judged at height of swallow/maximal anterior hyoid displacement. 1 - Partial inversion    Component 11--Laryngeal Vestibular Closure  Judged at height of swallow/maximal anterior hyoid displacement. 2 - None; wide column air/contrast in laryngeal vestibule    Component 12--Pharyngeal Stripping Wave  Judged during the full duration of the pharyngeal swallow. 1 - Present - diminished    Component 13--Pharyngeal Contraction  Judged in AP view at rest and throughout maximum movement of structures. Unable to assess     Component 14--Pharyngoesophageal Segment Opening  Judged during maximum distension of PES and throughout opening and closure. 3 - No distension with total obstruction of flow    Component 15--Tongue Base (TB) Retraction  Judged during maximum retraction of the tongue base. 4 - No visible posterior motion of TB    Component 16--Pharyngeal Residue  Judged after first swallow or after the last swallow of the sequential swallow task.   4 - Minimal to no pharyngeal clearance   Location   B - Valleculae, C - Pharyngeal wall, E - Pyriform sinuses and F - Diffuse (>3 areas)      ESOPHAGEAL Impairment  Component 17--Esophageal Clearance Upright Position  Judged in AP view during bolus transit through the oral cavity to the LES  Unable to assess       Blanka Banks, [address] [facility], [address] [facility]  Speech-Language Pathologist  [place] #[id number]  [place] #[id number]

## 2023-07-27 NOTE — PLAN OF CARE
Problem: PAIN - ADULT  Goal: Verbalizes/displays adequate comfort level or baseline comfort level  Description: Interventions:  - Encourage patient to monitor pain and request assistance  - Assess pain using appropriate pain scale  - Administer analgesics based on type and severity of pain and evaluate response  - Implement non-pharmacological measures as appropriate and evaluate response  - Consider cultural and social influences on pain and pain management  - Notify physician/advanced practitioner if interventions unsuccessful or patient reports new pain  Outcome: Progressing     Problem: INFECTION - ADULT  Goal: Absence or prevention of progression during hospitalization  Description: INTERVENTIONS:  - Assess and monitor for signs and symptoms of infection  - Monitor lab/diagnostic results  - Monitor all insertion sites, i.e. indwelling lines, tubes, and drains  - Monitor endotracheal if appropriate and nasal secretions for changes in amount and color  - [place] appropriate cooling/warming therapies per order  - Administer medications as ordered  - Instruct and encourage patient and family to use good hand hygiene technique  - Identify and instruct in appropriate isolation precautions for identified infection/condition  Outcome: Progressing  Goal: Absence of fever/infection during neutropenic period  Description: INTERVENTIONS:  - Monitor WBC    Outcome: Progressing     Problem: Prexisting or High Potential for Compromised Skin Integrity  Goal: Skin integrity is maintained or improved  Description: INTERVENTIONS:  - Identify patients at risk for skin breakdown  - Assess and monitor skin integrity  - Assess and monitor nutrition and hydration status  - Monitor labs   - Assess for incontinence   - Turn and reposition patient  - Assist with mobility/ambulation  - Relieve pressure over bony prominences  - Avoid friction and shearing  - Provide appropriate hygiene as needed including keeping skin clean and dry  - Evaluate need for skin moisturizer/barrier cream  - Collaborate with interdisciplinary team   - Patient/family teaching  - Consider wound care consult   Outcome: Progressing

## 2023-07-27 NOTE — PROGRESS NOTES
[address]  Progress Note  Name: Agueda Harrell  MRN: [id number]  Unit/Bed#: -02 I Date of Admission: 7/19/2023   Date of Service: 7/27/2023 I Hospital Day: 7    Assessment/Plan   * Multifocal pneumonia from recurring aspiration  Assessment & Plan  · Presented with shortness of breath and cough likely from dysphagia causing recurrent aspiration  · Clinically improving with IV antibiotics   · Completed IV antibiotic therapy    Elevated d-dimer  Assessment & Plan  · Patient an elevated D-dimer which is nonspecific however although suspicion is low we did get a VQ scan based on patient's allergies which was intermediate probability  · CTA ruled out PE    Severe protein-calorie malnutrition ([address])  Assessment & Plan  Malnutrition Findings:   Adult Malnutrition type: Chronic illness  Adult Degree of Malnutrition: Other severe protein calorie malnutrition  Malnutrition Characteristics: Fat loss, Muscle loss                  360 Statement: Related to catabolic illness as evidenced by severe orbital fat loss, severe muscle wasting in clavicles and temples, and a weight loss of 6 lbs (4.3%) in < 1 month. Treated with pending diet advancement. BMI Findings: Body mass index is 19.36 kg/m². Esophageal dysphagia  Assessment & Plan  · From history of oropharyngeal cancer presently in remission  · Speech therapy consult appreciated  · Status post PEG tube placement on 7/25  · Initiated on tube feeds  · We will continue to increase rate     Generalized weakness  Assessment & Plan  · From sepsis, multifocal pneumonia, poor p.o. intake, dehydration, malnutrition  · PT/OT recommending home therapy    CKD (chronic kidney disease) stage 3, GFR 30-59 ml/min ([facility])  Assessment & Plan  ·  creatinine at baseline  · Continue to monitor      Sepsis ([address])  Assessment & Plan  · Tachypnea, tachycardia.   White blood cells 13 actually improved compared to last week  · Source: multifocal pneumonia from recurring aspiration  · Blood cultures remain negative  · Since resolved with IV antibiotics           VTE Pharmacologic Prophylaxis:   Pharmacologic: Heparin  Mechanical VTE Prophylaxis in Place: Yes    Patient Centered Rounds: I have performed bedside rounds with nursing staff today. Discussions with Specialists or Other Care Team Provider: cm, nursing    Education and Discussions with Family / Patient: pt    Time Spent for Care: 30 minutes. More than 50% of total time spent on counseling and coordination of care as described above. Current Length of Stay: 7 day(s)    Current Patient Status: Inpatient   Certification Statement: The patient will continue to require additional inpatient hospital stay due to see below    Discharge Plan: Pending tolerance of tube feeds. Anticipate medically clear in 24 hours    Code Status: Level 1 - Full Code      Subjective:   Denies chest pain, shortness of breath, cough, fevers    Objective:     Vitals:   Temp (24hrs), Av.2 °F (36.2 °C), Min:97.2 °F (36.2 °C), Max:97.3 °F (36.3 °C)    Temp:  [97.2 °F (36.2 °C)-97.3 °F (36.3 °C)] 97.3 °F (36.3 °C)  HR:  [81-96] 81  Resp:  [16-20] 20  BP: (130-144)/(81-88) 144/86  SpO2:  [94 %-95 %] 94 %  Body mass index is 19.36 kg/m². Input and Output Summary (last 24 hours): Intake/Output Summary (Last 24 hours) at 2023 5118  Last data filed at 2023 2246  Gross per 24 hour   Intake 1971 ml   Output 425 ml   Net 1546 ml       Physical Exam:     Physical Exam  Constitutional:       General: He is not in acute distress. Appearance: He is well-developed. He is not diaphoretic. HENT:      Head: Normocephalic and atraumatic. Nose: Nose normal.      Mouth/Throat:      Pharynx: No oropharyngeal exudate. Eyes:      General: No scleral icterus. Conjunctiva/sclera: Conjunctivae normal.   Cardiovascular:      Rate and Rhythm: Normal rate and regular rhythm.       Heart sounds: Normal heart sounds. No murmur heard. No friction rub. No gallop. Pulmonary:      [place]: Pulmonary effort is normal. No respiratory distress. Breath sounds: Normal breath sounds. No wheezing or rales. Chest:      Chest wall: No tenderness. Abdominal:      General: Bowel sounds are normal. There is no distension. Palpations: Abdomen is soft. Tenderness: There is no abdominal tenderness. There is no guarding. Musculoskeletal:         General: No tenderness or deformity. Normal range of motion. Cervical back: Normal range of motion and neck supple. Skin:     General: Skin is warm and dry. Findings: No erythema. Neurological:      Mental Status: He is alert. Mental status is at baseline. ( )    Additional Data:     Labs:    Results from last 7 days   Lab Units 07/27/23  0635   WBC Thousand/uL 6.59   HEMOGLOBIN g/dL 13.7   HEMATOCRIT % 41.4   PLATELETS Thousands/uL 663*   NEUTROS PCT % 67   LYMPHS PCT % 16   MONOS PCT % 6   EOS PCT % 8*     Results from last 7 days   Lab Units 07/27/23  0635 07/26/23  0507 07/24/23  0925   SODIUM mmol/L 145   < > 144   POTASSIUM mmol/L 3.5   < > 3.5   CHLORIDE mmol/L 111*   < > 109*   CO2 mmol/L 29   < > 28   BUN mg/dL 23   < > 21   CREATININE mg/dL 1.05   < > 1.04   ANION GAP mmol/L 5   < > 7   CALCIUM mg/dL 9.8   < > 10.2   ALBUMIN g/dL  --   --  3.6   TOTAL BILIRUBIN mg/dL  --   --  0.37   ALK PHOS U/L  --   --  71   ALT U/L  --   --  10   AST U/L  --   --  10*   GLUCOSE RANDOM mg/dL 106   < > 114    < > = values in this interval not displayed.      Results from last 7 days   Lab Units 07/24/23  0925   INR  1.31*     Results from last 7 days   Lab Units 07/27/23  0721 07/25/23  2327 07/25/23  1749 07/25/23  1125 07/25/23  0046 07/24/23  1620 07/24/23  1145 07/24/23  0602 07/24/23  0003 07/23/23  1755 07/23/23  1154 07/23/23  0537   POC GLUCOSE mg/dl 98 92 97 128 141* 99 97 110 102 100 109 104         Results from last 7 days   Lab Units 07/21/23  3249 PROCALCITONIN ng/ml 0.46*           * I Have Reviewed All Lab Data Listed Above. * Additional Pertinent Lab Tests Reviewed: All Labs Within Last 24 Hours Reviewed    Imaging:    Imaging Reports Reviewed Today Include: na  Imaging Personally Reviewed by Myself Includes:  na    Recent Cultures (last 7 days):           Last 24 Hours Medication List:   Current Facility-Administered Medications   Medication Dose Route Frequency Provider Last Rate   • acetaminophen  650 mg Oral Q6H PRN Sonia Hurley PA-C     • aspirin  81 mg Oral Daily Sonia Hurley PA-C     • cefazolin  2,000 mg Intravenous Once PRN Jevon Chopra MD     • dextrose 5 % and sodium chloride 0.9 %  50 mL/hr Intravenous Continuous Sarmad Martinez MD 50 mL/hr (07/26/23 1413)   • diphenhydrAMINE  50 mg Intravenous 60 Min Pre-Op Sarmad Martinez MD     • heparin (porcine)  5,000 Units Subcutaneous [facility] Britni Gupta PA-C     • lidocaine  1 patch Topical Daily Latesha Mack PA-C     • metoprolol  5 mg Intravenous Once Kathi Feliz PA-C     • ondansetron  4 mg Intravenous Q6H PRN Sonia Hurley PA-C     • polyethylene glycol  17 g Oral Daily PRN Sonia Hurley PA-C     • pravastatin  20 mg Oral Every Other Day Brian Marie PA-C          Today, Patient Was Seen By: Marissa Ibrahim MD    ** Please Note: Dictation voice to text software may have been used in the creation of this document.  **

## 2023-07-27 NOTE — ASSESSMENT & PLAN NOTE
· Tachypnea, tachycardia.   White blood cells 13 actually improved compared to last week  · Source: multifocal pneumonia from recurring aspiration  · Blood cultures remain negative  · Since resolved with IV antibiotics

## 2023-07-27 NOTE — ASSESSMENT & PLAN NOTE
· From history of oropharyngeal cancer presently in remission  · Speech therapy consult appreciated  · Status post PEG tube placement on 7/25  · Initiated on tube feeds  · We will continue to increase rate

## 2023-07-27 NOTE — SPEECH THERAPY NOTE
Speech Language/Pathology     Speech/Language Pathology Progress Note     Patient Name: Taya Mendez    WMEHM'O Date: 7/27/2023     Problem List  Principal Problem:    Multifocal pneumonia from recurring aspiration  Active Problems:    Sepsis ([address])    Dehydration    CKD (chronic kidney disease) stage 3, GFR 30-59 ml/min (HCC)    Generalized weakness    Esophageal dysphagia    Hiatal hernia    Severe protein-calorie malnutrition (HCC)    Elevated d-dimer       Subjective:  Patient received awake and alert, seated in bedside chair. Tube feeds running. Previous/current diet: NPO x tube feeds     Objective:  MBS results reviewed in detail. Pt edu re: oral care, safe positioning, aspiration risk despite NPO status (secretions, retrograde flow/tube feeds). Strategies w/ corresponding handouts (ie aspiration, reflux precaution, oral care). Discussed therapy following discharge however patient reports he is not interested as he has been dealing with this problem and has no desire to take anything by mouth. Assessment:  See MBS report. Patient verbalizes understanding of all information presented today. Plan:  No further ST intervention warranted at this level of care. Please re-consult as indicated.         Caitlin Mckinley, [address] [facility], [address] [facility]  Speech-Language Pathologist  [place] #[id number]  [place] #[id number]

## 2023-07-27 NOTE — ASSESSMENT & PLAN NOTE
· Presented with shortness of breath and cough likely from dysphagia causing recurrent aspiration  · Clinically improving with IV antibiotics   · Completed IV antibiotic therapy

## 2023-07-27 NOTE — PLAN OF CARE
NPO w/ PEG  Aspiration precautions  Reflux precautions  Aggressive oral care  No further ST follow up at this level of care

## 2023-07-27 NOTE — PLAN OF CARE
Problem: MOBILITY - ADULT  Goal: Maintain or return to baseline ADL function  Description: INTERVENTIONS:  -  Assess patient's ability to carry out ADLs; assess patient's baseline for ADL function and identify physical deficits which impact ability to perform ADLs (bathing, care of mouth/teeth, toileting, grooming, dressing, etc.)  - Assess/evaluate cause of self-care deficits   - Assess range of motion  - Assess patient's mobility; develop plan if impaired  - Assess patient's need for assistive devices and provide as appropriate  - Encourage maximum independence but intervene and supervise when necessary  - Involve family in performance of ADLs  - Assess for home care needs following discharge   - Consider OT consult to assist with ADL evaluation and planning for discharge  - Provide patient education as appropriate  Outcome: Progressing     Problem: DISCHARGE PLANNING  Goal: Discharge to home or other facility with appropriate resources  Description: INTERVENTIONS:  - Identify barriers to discharge w/patient and caregiver  - Arrange for needed discharge resources and transportation as appropriate  - Identify discharge learning needs (meds, wound care, etc.)  - Arrange for interpretive services to assist at discharge as needed  - Refer to Case Management Department for coordinating discharge planning if the patient needs post-hospital services based on physician/advanced practitioner order or complex needs related to functional status, cognitive ability, or social support system  Outcome: Progressing     Problem: Knowledge Deficit  Goal: Patient/family/caregiver demonstrates understanding of disease process, treatment plan, medications, and discharge instructions  Description: Complete learning assessment and assess knowledge base.   Interventions:  - Provide teaching at level of understanding  - Provide teaching via preferred learning methods  Outcome: Progressing

## 2023-07-28 VITALS
HEIGHT: 70 IN | RESPIRATION RATE: 16 BRPM | HEART RATE: 86 BPM | TEMPERATURE: 97.8 F | BODY MASS INDEX: 19.32 KG/M2 | DIASTOLIC BLOOD PRESSURE: 82 MMHG | WEIGHT: 134.92 LBS | OXYGEN SATURATION: 98 % | SYSTOLIC BLOOD PRESSURE: 129 MMHG

## 2023-07-28 LAB
ANION GAP SERPL CALCULATED.3IONS-SCNC: 6 MMOL/L
BASOPHILS # BLD AUTO: 0.09 THOUSANDS/ÂΜL (ref 0–0.1)
BASOPHILS NFR BLD AUTO: 1 % (ref 0–1)
BUN SERPL-MCNC: 20 MG/DL (ref 5–25)
CALCIUM SERPL-MCNC: 9.7 MG/DL (ref 8.4–10.2)
CHLORIDE SERPL-SCNC: 108 MMOL/L (ref 96–108)
CO2 SERPL-SCNC: 28 MMOL/L (ref 21–32)
CREAT SERPL-MCNC: 0.96 MG/DL (ref 0.6–1.3)
DME PARACHUTE DELIVERY DATE ACTUAL: NORMAL
DME PARACHUTE DELIVERY DATE EXPECTED: NORMAL
DME PARACHUTE DELIVERY DATE REQUESTED: NORMAL
DME PARACHUTE ITEM DESCRIPTION: NORMAL
DME PARACHUTE ORDER STATUS: NORMAL
DME PARACHUTE SUPPLIER NAME: NORMAL
DME PARACHUTE SUPPLIER PHONE: NORMAL
EOSINOPHIL # BLD AUTO: 0.51 THOUSAND/ÂΜL (ref 0–0.61)
EOSINOPHIL NFR BLD AUTO: 7 % (ref 0–6)
ERYTHROCYTE [DISTWIDTH] IN BLOOD BY AUTOMATED COUNT: 13.2 % (ref 11.6–15.1)
GFR SERPL CREATININE-BSD FRML MDRD: 76 ML/MIN/1.73SQ M
GLUCOSE SERPL-MCNC: 97 MG/DL (ref 65–140)
HCT VFR BLD AUTO: 39.8 % (ref 36.5–49.3)
HGB BLD-MCNC: 13.3 G/DL (ref 12–17)
IMM GRANULOCYTES # BLD AUTO: 0.1 THOUSAND/UL (ref 0–0.2)
IMM GRANULOCYTES NFR BLD AUTO: 1 % (ref 0–2)
LYMPHOCYTES # BLD AUTO: 1.43 THOUSANDS/ÂΜL (ref 0.6–4.47)
LYMPHOCYTES NFR BLD AUTO: 20 % (ref 14–44)
MCH RBC QN AUTO: 28.5 PG (ref 26.8–34.3)
MCHC RBC AUTO-ENTMCNC: 33.4 G/DL (ref 31.4–37.4)
MCV RBC AUTO: 85 FL (ref 82–98)
MONOCYTES # BLD AUTO: 0.58 THOUSAND/ÂΜL (ref 0.17–1.22)
MONOCYTES NFR BLD AUTO: 8 % (ref 4–12)
NEUTROPHILS # BLD AUTO: 4.38 THOUSANDS/ÂΜL (ref 1.85–7.62)
NEUTS SEG NFR BLD AUTO: 63 % (ref 43–75)
NRBC BLD AUTO-RTO: 0 /100 WBCS
PLATELET # BLD AUTO: 604 THOUSANDS/UL (ref 149–390)
PMV BLD AUTO: 8.2 FL (ref 8.9–12.7)
POTASSIUM SERPL-SCNC: 3.3 MMOL/L (ref 3.5–5.3)
RBC # BLD AUTO: 4.67 MILLION/UL (ref 3.88–5.62)
SODIUM SERPL-SCNC: 142 MMOL/L (ref 135–147)
WBC # BLD AUTO: 7.09 THOUSAND/UL (ref 4.31–10.16)

## 2023-07-28 PROCEDURE — 99232 SBSQ HOSP IP/OBS MODERATE 35: CPT | Performed by: INTERNAL MEDICINE

## 2023-07-28 PROCEDURE — 85025 COMPLETE CBC W/AUTO DIFF WBC: CPT | Performed by: INTERNAL MEDICINE

## 2023-07-28 PROCEDURE — 80048 BASIC METABOLIC PNL TOTAL CA: CPT | Performed by: INTERNAL MEDICINE

## 2023-07-28 RX ORDER — LIDOCAINE 50 MG/G
1 PATCH TOPICAL DAILY
Qty: 15 PATCH | Refills: 0 | Status: SHIPPED | OUTPATIENT
Start: 2023-07-29

## 2023-07-28 RX ORDER — AMOXICILLIN 250 MG
1 CAPSULE ORAL 2 TIMES DAILY
Status: DISCONTINUED | OUTPATIENT
Start: 2023-07-28 | End: 2023-07-28 | Stop reason: HOSPADM

## 2023-07-28 RX ORDER — POTASSIUM CHLORIDE 14.9 MG/ML
20 INJECTION INTRAVENOUS ONCE
Status: COMPLETED | OUTPATIENT
Start: 2023-07-28 | End: 2023-07-28

## 2023-07-28 RX ORDER — AMOXICILLIN 250 MG
1 CAPSULE ORAL 2 TIMES DAILY
Qty: 30 TABLET | Refills: 0 | Status: SHIPPED | OUTPATIENT
Start: 2023-07-28

## 2023-07-28 RX ADMIN — ASPIRIN 81 MG: 81 TABLET, COATED ORAL at 11:24

## 2023-07-28 RX ADMIN — PRAVASTATIN SODIUM 20 MG: 20 TABLET ORAL at 11:24

## 2023-07-28 RX ADMIN — SENNOSIDES AND DOCUSATE SODIUM 1 TABLET: 50; 8.6 TABLET ORAL at 11:27

## 2023-07-28 RX ADMIN — POTASSIUM CHLORIDE 20 MEQ: 14.9 INJECTION, SOLUTION INTRAVENOUS at 11:20

## 2023-07-28 RX ADMIN — HEPARIN SODIUM 5000 UNITS: 5000 INJECTION INTRAVENOUS; SUBCUTANEOUS at 15:21

## 2023-07-28 RX ADMIN — HEPARIN SODIUM 5000 UNITS: 5000 INJECTION INTRAVENOUS; SUBCUTANEOUS at 05:32

## 2023-07-28 NOTE — ASSESSMENT & PLAN NOTE
· Presented with shortness of breath and cough likely from dysphagia causing recurrent aspiration  · Clinically improving with IV antibiotics   · Has since completed IV antibiotic therapy

## 2023-07-28 NOTE — CASE MANAGEMENT
Case Management Assessment & Discharge Planning Note    Patient name March [facility] /-99 MRN [id number]  : 1945 Date 2023       Current Admission Date: 2023  Current Admission Diagnosis:Multifocal pneumonia from recurring aspiration   Patient Active Problem List    Diagnosis Date Noted   • Elevated d-dimer 2023   • Severe protein-calorie malnutrition ([address]) 2023   • Pneumonia of left lower lobe due to infectious organism 2023   • Slurred speech 2023   • Articular cartilage disorder of shoulder region 2023   • Cervical spondylosis 2023   • Disorder of bursae of shoulder region 2023   • Impingement syndrome of shoulder region 2023   • Hiatal hernia 12/15/2022   • Bilateral carotid artery stenosis 2022   • Abdominal aortic aneurysm (AAA) without rupture ([address]) 2022   • Chronic kidney disease-mineral and bone disorder 2022   • Multifocal pneumonia from recurring aspiration 2021   • Esophageal dysphagia 2021   • Generalized weakness 2021   • Osteoarthritis of left hip 2021   • Synovial cyst of lumbar facet joint 2021   • Myofascial pain on left side 2020   • Lumbar radiculopathy 2020   • CKD (chronic kidney disease) stage 3, GFR 30-59 ml/min ([address]) 2020   • Fatigue 2020   • Benign localized prostatic hyperplasia with lower urinary tract symptoms (LUTS) 10/15/2019   • Overactive bladder 10/09/2019   • Dehydration 2019   • History of head and neck radiation 2019   • Excessive salivation 01/10/2019   • Sepsis ([address]) 01/10/2019   • History of carcinoma of tongue 2018   • Microscopic hematuria 2018   • Monoclonal gammopathy of unknown significance 2016      LOS (days): 8  Geometric Mean LOS (GMLOS) (days): 5.00  Days to GMLOS:-3.6     OBJECTIVE:    Risk of Unplanned Readmission Score: 13.89         Current admission status: Inpatient Preferred Pharmacy:   [facility]/pharmacy #[id number]- [place], [place] - [address] [facility] [address]  Phone: [phone] Fax: [address] Taj [place], [address] [facility] [address]  [address]  [place] [address]  Phone: [phone] Fax: [phone]    Primary Care Provider: Sera Roberts MD    Primary Insurance: MEDICARE  Secondary Insurance: BLUE CROSS    ASSESSMENT:  [address] [facility], [address] - Spouse   Primary Phone: [phone] [facility])  Home Phone: [phone]                 DISCHARGE DETAILS:          Comments - Freedom of Choice: CM met with patient and spoke with wife several times today to coordinate designated plan of being d/c home today with enteral pump set up from [employer] and follow up [facility] from [facility] for patient's new Peg Tube . CM contacted family/caregiver?: Yes     Did patient/caregiver verbalize understanding of patient care needs?: Yes  Were patient/caregiver advised of the risks associated with not following Treatment Team discharge recommendations?: Yes    Contacts  Patient Contacts: Wander Lopez (Spouse)   CINTHIA Schrader [id number]      [phone] (H)   [phone] (M)  Relationship to Patient[de-identified] Family  Contact Method: In Person  Reason/Outcome: Continuity of [address]         Is the patient interested in [facility] at discharge?: Yes    DME Referral Provided  Referral made for DME?: Yes  DME referral completed for the following items[de-identified]  (Pump Enteral Package from [employer] via [facility])  DME Supplier Name[de-identified] [employer]    Other Referral/Resources/Interventions Provided:  Interventions: Other (Specify) (Enteral Pump setup for home delivery)  Referral Comments: CM coordianted for Pump Enteral package to be delivered to patient's residence today.  CM coordinated for patient to take home 6 bottles of Jevity from hospital as UPS most likely will not deliver hs Jevity supply intil Mon 7/31. [facility] [address] [facility] was alerted to d/c today and updated info attached in [address] [facility] to see patient today. Wife transporting home.     Would you like to participate in our [address] service program?  : No - Declined    Treatment Team Recommendation: Home with [address]  Discharge Destination Plan[de-identified] Home with [address] [facility] [address] at Discharge : Family           Accompanied by: Family member

## 2023-07-28 NOTE — ASSESSMENT & PLAN NOTE
· Tachypnea, tachycardia.   White blood cells 13 actually improved compared to last week  · Source: multifocal pneumonia from recurring aspiration  · Blood cultures negative  · Has since resolved with IV antibiotics

## 2023-07-28 NOTE — PROGRESS NOTES
[address]  Progress Note  Name: Weston Godinez  MRN: [id number]  Unit/Bed#: -02 I Date of Admission: 7/19/2023   Date of Service: 7/28/2023 I Hospital Day: 8    Assessment/Plan   * Multifocal pneumonia from recurring aspiration  Assessment & Plan  · Presented with shortness of breath and cough likely from dysphagia causing recurrent aspiration  · Clinically improving with IV antibiotics   · Has since completed IV antibiotic therapy    Elevated d-dimer  Assessment & Plan  · Patient an elevated D-dimer which is nonspecific however although suspicion is low we did get a VQ scan based on patient's allergies which was intermediate probability  · CTA ruled out PE    Severe protein-calorie malnutrition ([address])  Assessment & Plan  Malnutrition Findings:   Adult Malnutrition type: Chronic illness  Adult Degree of Malnutrition: Other severe protein calorie malnutrition  Malnutrition Characteristics: Fat loss, Muscle loss                  360 Statement: Related to catabolic illness as evidenced by severe orbital fat loss, severe muscle wasting in clavicles and temples, and a weight loss of 6 lbs (4.3%) in < 1 month. Treated with pending diet advancement. BMI Findings: Body mass index is 19.36 kg/m². Esophageal dysphagia  Assessment & Plan  · From history of oropharyngeal cancer presently in remission  · Speech therapy consult appreciated  · Status post PEG tube placement on 7/25  · Tolerating tube feeds  · Awaiting home  tube feeds set up  · Follow-up with case management    Generalized weakness  Assessment & Plan  · From sepsis, multifocal pneumonia, poor p.o. intake, dehydration, malnutrition  · PT/OT recommending home therapy    CKD (chronic kidney disease) stage 3, GFR 30-59 ml/min ([facility])  Assessment & Plan  ·  creatinine at baseline  · Continue to monitor      Sepsis ([address])  Assessment & Plan  · Tachypnea, tachycardia.   White blood cells 13 actually improved compared to last week  · Source: multifocal pneumonia from recurring aspiration  · Blood cultures negative  · Has since resolved with IV antibiotics           VTE Pharmacologic Prophylaxis:   Pharmacologic: Heparin  Mechanical VTE Prophylaxis in Place: Yes    Patient Centered Rounds: I have performed bedside rounds with nursing staff today. Discussions with Specialists or Other Care Team Provider: cm, nursing    Education and Discussions with Family / Patient: pt    Time Spent for Care: 30 minutes. More than 50% of total time spent on counseling and coordination of care as described above. Current Length of Stay: 8 day(s)    Current Patient Status: Inpatient   Certification Statement: The patient will continue to require additional inpatient hospital stay due to see below    Discharge Plan: Medically clear awaiting tube feed set up up    Code Status: Level 1 - Full Code      Subjective:   Denies fevers, chills, cough, shortness of breath    Objective:     Vitals:   Temp (24hrs), Av.6 °F (35.9 °C), Min:96.3 °F (35.7 °C), Max:97.1 °F (36.2 °C)    Temp:  [96.3 °F (35.7 °C)-97.1 °F (36.2 °C)] 97.1 °F (36.2 °C)  HR:  [] 91  Resp:  [16-18] 16  BP: (120-167)/(78-99) 127/85  SpO2:  [93 %-96 %] 93 %  Body mass index is 19.36 kg/m². Input and Output Summary (last 24 hours): Intake/Output Summary (Last 24 hours) at 2023 0836  Last data filed at 2023 0601  Gross per 24 hour   Intake 358.33 ml   Output 950 ml   Net -591.67 ml       Physical Exam:     Physical Exam  Constitutional:       General: He is not in acute distress. Appearance: He is well-developed. He is not diaphoretic. HENT:      Head: Normocephalic and atraumatic. Nose: Nose normal.      Mouth/Throat:      Pharynx: No oropharyngeal exudate. Eyes:      General: No scleral icterus. Conjunctiva/sclera: Conjunctivae normal.   Cardiovascular:      Rate and Rhythm: Normal rate and regular rhythm.       Heart sounds: Normal heart sounds. No murmur heard. No friction rub. No gallop. Pulmonary:      [place]: Pulmonary effort is normal. No respiratory distress. Breath sounds: Normal breath sounds. No wheezing or rales. Chest:      Chest wall: No tenderness. Abdominal:      General: Bowel sounds are normal. There is no distension. Palpations: Abdomen is soft. Tenderness: There is no abdominal tenderness. There is no guarding. Musculoskeletal:         General: No tenderness or deformity. Normal range of motion. Cervical back: Normal range of motion and neck supple. Skin:     General: Skin is warm and dry. Findings: No erythema. Neurological:      Mental Status: He is alert. Mental status is at baseline.     )    Additional Data:     Labs:    Results from last 7 days   Lab Units 07/28/23  0510   WBC Thousand/uL 7.09   HEMOGLOBIN g/dL 13.3   HEMATOCRIT % 39.8   PLATELETS Thousands/uL 604*   NEUTROS PCT % 63   LYMPHS PCT % 20   MONOS PCT % 8   EOS PCT % 7*     Results from last 7 days   Lab Units 07/28/23  0510 07/26/23  0507 07/24/23  0925   SODIUM mmol/L 142   < > 144   POTASSIUM mmol/L 3.3*   < > 3.5   CHLORIDE mmol/L 108   < > 109*   CO2 mmol/L 28   < > 28   BUN mg/dL 20   < > 21   CREATININE mg/dL 0.96   < > 1.04   ANION GAP mmol/L 6   < > 7   CALCIUM mg/dL 9.7   < > 10.2   ALBUMIN g/dL  --   --  3.6   TOTAL BILIRUBIN mg/dL  --   --  0.37   ALK PHOS U/L  --   --  71   ALT U/L  --   --  10   AST U/L  --   --  10*   GLUCOSE RANDOM mg/dL 97   < > 114    < > = values in this interval not displayed. Results from last 7 days   Lab Units 07/24/23  0925   INR  1.31*     Results from last 7 days   Lab Units 07/27/23  1107 07/27/23  0721 07/25/23  2327 07/25/23  1749 07/25/23  1125 07/25/23  0046 07/24/23  1620 07/24/23  1145 07/24/23  0602 07/24/23  0003 07/23/23  1755 07/23/23  1154   POC GLUCOSE mg/dl 92 98 92 97 128 141* 99 97 110 102 100 109                   * I Have Reviewed All Lab Data Listed Above.   * Additional Pertinent Lab Tests Reviewed: All Labs Within Last 24 Hours Reviewed    Imaging:    Imaging Reports Reviewed Today Include: na  Imaging Personally Reviewed by Myself Includes:  na    Recent Cultures (last 7 days):           Last 24 Hours Medication List:   Current Facility-Administered Medications   Medication Dose Route Frequency Provider Last Rate   • acetaminophen  650 mg Oral Q6H PRN Sonia Hurley PA-C     • aspirin  81 mg Oral Daily Sonia Hurley PA-C     • cefazolin  2,000 mg Intravenous Once PRN Jevon Chopra MD     • diphenhydrAMINE  50 mg Intravenous 60 Min Pre-Op Sarmad Varela MD     • heparin (porcine)  5,000 Units Subcutaneous [place], [place]     • lidocaine  1 patch Topical Daily Latesha Mack PA-C     • metoprolol  5 mg Intravenous Once Wallace Nice PA-C     • ondansetron  4 mg Intravenous Q6H PRN Sonia Hurley PA-C     • polyethylene glycol  17 g Oral Daily PRN Sonia Hurley PA-C     • potassium chloride  20 mEq Intravenous Once Munir Chopra MD     • pravastatin  20 mg Oral Every Other Day Sunday CORWIN Stuart          Today, Patient Was Seen By: Haleigh Sharp MD    ** Please Note: Dictation voice to text software may have been used in the creation of this document.  **

## 2023-07-28 NOTE — OCCUPATIONAL THERAPY NOTE
Occupational Therapy Contact Note        Patient Name: Jocelyn WHITLEY Date: 7/28/2023 07/28/23 1626   OT Last Visit   OT Visit Date 07/28/23   Note Type   Note Type Cancelled Session   Cancel Reasons   Followed up for OT treatment this date. Pt was observed ambulating independently in the hallway. Pt reports no acute OT needs at this time, and states he does not need therapy while inpatient. He also states he is doing self care indep in the room. Pt with no acute OT needs at this time. Will sign off. Please re-consult if the pt has a change in functional status.           Igor Dozier, OTR/L

## 2023-07-28 NOTE — ASSESSMENT & PLAN NOTE
· From history of oropharyngeal cancer presently in remission  · Speech therapy consult appreciated  · Status post PEG tube placement on 7/25  · Tolerating tube feeds  · Awaiting home  tube feeds set up  · Follow-up with case management

## 2023-07-31 ENCOUNTER — TRANSITIONAL CARE MANAGEMENT (OUTPATIENT)
Dept: FAMILY MEDICINE CLINIC | Facility: CLINIC | Age: 78
End: 2023-07-31

## 2023-08-01 ENCOUNTER — TELEPHONE (OUTPATIENT)
Dept: FAMILY MEDICINE CLINIC | Facility: CLINIC | Age: 78
End: 2023-08-01

## 2023-08-01 ENCOUNTER — OFFICE VISIT (OUTPATIENT)
Dept: FAMILY MEDICINE CLINIC | Facility: CLINIC | Age: 78
End: 2023-08-01
Payer: MEDICARE

## 2023-08-01 VITALS
BODY MASS INDEX: 18.47 KG/M2 | OXYGEN SATURATION: 99 % | SYSTOLIC BLOOD PRESSURE: 124 MMHG | DIASTOLIC BLOOD PRESSURE: 76 MMHG | WEIGHT: 129 LBS | HEART RATE: 63 BPM | TEMPERATURE: 97.9 F | HEIGHT: 70 IN

## 2023-08-01 DIAGNOSIS — R13.19 ESOPHAGEAL DYSPHAGIA: Primary | ICD-10-CM

## 2023-08-01 DIAGNOSIS — E43 SEVERE PROTEIN-CALORIE MALNUTRITION (HCC): ICD-10-CM

## 2023-08-01 DIAGNOSIS — A40.3: ICD-10-CM

## 2023-08-01 PROCEDURE — 99496 TRANSJ CARE MGMT HIGH F2F 7D: CPT | Performed by: FAMILY MEDICINE

## 2023-08-01 NOTE — PROGRESS NOTES
Name: Alin José      : 1945      MRN: [id number]  Encounter Provider: Flory Luu MD  Encounter Date: 2023   Encounter department: [address] [place] [address]     1. Esophageal dysphagia  PEG tube in place  Currently on 1200 calorie diet    2. Severe protein-calorie malnutrition ([address])  Currently on 1200 calorie diet  Will consult gastro and see if this can be increased    3. Sepsis due to Streptococcus pneumoniae, unspecified whether acute organ dysfunction present [facility])  Resolved    Follow up in 1 month         Subjective     TCM  to  at [address] [place] [address] Payal is a 70 y.o. male with a PMH of oropharyngeal cancer in remission, with history of dysphagia, recurring aspiration pneumonia, abdominal aortic aneurysm without rupture, CKD, hiatal hernia who presents with the complaint of "I did not get better.  "  Patient was seen in this emergency room 7/15/2023 for complaint dizziness, cold sweats, shortness of breath and found to have pneumonia on the left lower lobe. Prescribed Augmentin and discharged home however he did have trouble getting the antibiotics for 1 day. He followed up with his primary care provider and he was still having generalized weakness and shortness of breath and slurred speech. He was sent to the emergency room and wife drove him here. Patient reports continual cough which is unchanged from his baseline, denies chest pain, back pain, trouble breathing presently, choking, fever, chills at home. His main complaint is that he has low energy and generally weak. He does endorse very low p.o. intake of food and drink. Esophageal dysphagia  Assessment & Plan  ? From history of oropharyngeal cancer presently in remission  ? Speech therapy consult appreciated  ? Status post PEG tube placement on   ? Tolerating tube feeds- currently on 1200 calorie diet  ? Awaiting home  tube feeds set up  ?  Follow-up with case management     Generalized weakness  Assessment & Plan  ? From sepsis, multifocal pneumonia, poor p.o. intake, dehydration, malnutrition  ? PT/OT recommending home therapy     CKD (chronic kidney disease) stage 3, GFR 30-59 ml/min ([facility])  Assessment & Plan  ?  creatinine at baseline  ? Continue to monitor        Sepsis [facility])  Assessment & Plan  ? Tachypnea, tachycardia. White blood cells 13 actually improved compared to last week  ? Source: multifocal pneumonia from recurring aspiration  ? Blood cultures negative  ? Has since resolved with IV antibiotics    He has lost 6 lbs since his last appt 2 weeks ago. Review of Systems   Constitutional: Negative for activity change, appetite change, fatigue and fever. HENT: Negative for congestion and ear discharge. Respiratory: Negative for cough and shortness of breath. Cardiovascular: Negative for chest pain and palpitations. Gastrointestinal: Negative for diarrhea and nausea. Musculoskeletal: Negative for arthralgias and back pain. Skin: Negative for color change and rash. Neurological: Negative for dizziness and headaches. Psychiatric/Behavioral: Negative for agitation and behavioral problems.        Past Medical History:   Diagnosis Date   • Acute renal failure superimposed on chronic kidney disease ([address]) 12/31/2021   • 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[address]) 03/1999    SCCA left tongue base - T2N1M0- treated at [facility] - had surgery and XRT   • Chronic kidney disease    • COVID-19 with hypoxic respiratory failure 12/31/2021   • Essential hypertension 5/6/2019   • Hematuria 10/15/2019   • Hyperlipidemia    • Hypertension    • Left anterior fascicular block    • Stage 3 chronic kidney disease ([address])    • 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[address]) 2/14/2019     Past Surgical History:   Procedure Laterality Date   • CYSTOSCOPY  08/30/2021   • EYELID CLOSURE Left     to prevent eyelashes from rubbing cornea   • HERNIA REPAIR Left    • MODIFIED RADICAL NECK DISSECTION Left 03/03/1999    [address] - with mandibulotomy and resection SCCA left tongue base - Dr. Mare Salmeron   • MYRINGOTOMY W/ TUBES Left 06/07/2023    office procedure - Dr. Herb Sexton   • TONGUE SURGERY     • TONSILLECTOMY       Family History   Problem Relation Age of Onset   • Hypertension Mother    • Stroke Mother      Social History     Socioeconomic History   • Marital status: /Civil Union     Spouse name: None   • Number of children: None   • Years of education: None   • Highest education level: None   Occupational History   • None   Tobacco Use   • Smoking status: Former     Passive exposure: Current   • Smokeless tobacco: Former   • Tobacco comments:     quit 20 years ago   Vaping Use   • Vaping Use: Never used   Substance and Sexual Activity   • Alcohol use: Yes     Comment: occasional   • Drug use: Never   • Sexual activity: Not Currently     Partners: Female   Other Topics Concern   • None   Social History Narrative   • None     Social Determinants of Health     Financial Resource Strain: Not on file   Food Insecurity: No Food Insecurity (7/20/2023)    Hunger Vital Sign    • Worried About Running Out of Food in the Last Year: Never true    • Ran Out of Food in the Last Year: Never true   Transportation Needs: No Transportation Needs (7/20/2023)    PRAPARE - Transportation    • Lack of Transportation (Medical): No    • Lack of Transportation (Non-Medical):  No   Physical Activity: Not on file   Stress: Not on file   Social Connections: Not on file   Intimate Partner Violence: Not on file   Housing Stability: Low Risk  (7/20/2023)    Housing Stability Vital Sign    • Unable to Pay for Housing in the Last Year: No    • Number of Places Lived in the Last Year: 1    • Unstable Housing in the Last Year: No     Current Outpatient Medications on File Prior to Visit   Medication Sig   • aspirin 81 MG tablet 1 cap(s) (Patient not taking: Reported on 8/1/2023)   • Coenzyme Q10 (CO Q 10 PO) Take by mouth (Patient not taking: Reported on 8/1/2023)   • ferrous sulfate 324 (65 Fe) mg Take 1 tablet (324 mg total) by mouth daily before breakfast (Patient not taking: Reported on 8/1/2023)   • lidocaine (LIDODERM) 5 % Apply 1 patch topically over 12 hours daily Remove & Discard patch within 12 hours or as directed by MD Do not start before July 29, 2023. (Patient not taking: Reported on 8/1/2023)   • Multiple Vitamins-Minerals (MULTIVITAMIN ADULTS 50+ PO) Take by mouth (Patient not taking: Reported on 8/1/2023)   • pravastatin (PRAVACHOL) 20 mg tablet TAKE ONE TABLET BY MOUTH EVERY OTHER DAY (Patient not taking: Reported on 8/1/2023)   • senna-docusate sodium (SENOKOT S) 8.6-50 mg per tablet Take 1 tablet by mouth 2 (two) times a day (Patient not taking: Reported on 8/1/2023)   • VITAMIN D, CHOLECALCIFEROL, PO Take by mouth (Patient not taking: Reported on 8/1/2023)     Allergies   Allergen Reactions   • Iodinated Contrast Media Other (See Comments)   • Iodine - Food Allergy      Immunization History   Administered Date(s) Administered   • COVID-19 PFIZER VACCINE 0.3 ML IM 04/13/2021, 05/04/2021   • INFLUENZA 02/13/2018, 10/17/2018, 10/14/2019   • Influenza, high dose seasonal 0.7 mL 09/15/2020   • influenza, trivalent, adjuvanted 09/15/2020       Objective     /76 (BP Location: Left arm, Patient Position: Sitting)   Pulse 63   Temp 97.9 °F (36.6 °C) (Temporal)   Ht 5' 10" (1.778 m)   Wt 58.5 kg (129 lb)   SpO2 99%   BMI 18.51 kg/m²     Physical Exam  Constitutional:       General: He is not in acute distress. Appearance: He is well-developed. He is not diaphoretic. Eyes:      General: No scleral icterus. Pupils: Pupils are equal, round, and reactive to light. Cardiovascular:      Rate and Rhythm: Normal rate and regular rhythm. Heart sounds: Normal heart sounds. No murmur heard. Pulmonary:      Effort: Pulmonary effort is normal. No respiratory distress.       Breath sounds: Normal breath sounds. No wheezing. Abdominal:      General: Bowel sounds are normal. There is no distension. Palpations: Abdomen is soft. Tenderness: There is no abdominal tenderness. Comments: PEG tube in place, clean, dry and intact   Skin:     General: Skin is warm and dry. Findings: No rash. Neurological:      Mental Status: He is alert and oriented to person, place, and time.        Abigail Hector MD

## 2023-08-01 NOTE — TELEPHONE ENCOUNTER
Hi, this is Carlyn Painter calling from [employer], [phone]. Letting Doctor Kevin Delgadillo know that we are providing services for Eileen Abbott and that is for a new PEG 2 placement and teaching how to administer the two feeding also with some speech therapy. Patient have a pressure ulcer stage 1-2 with impact from urine, wetness, stool and frequent infections. Infection is cleared but to keep clean, padded, and off-load pressure.    Hold Westcort. No inflammation.  Hold gentamycin. No inflection  Continue AD Ointment daily as skin protectorant  Continue Benzoyl Peroxide treatment to every bath  Continue Mepilex dressing.    We have applied a Duoderm because we dontt have mepilex in the clinic. It can get wet and remove just with the next bath.

## 2023-08-03 ENCOUNTER — NURSE TRIAGE (OUTPATIENT)
Age: 78
End: 2023-08-03

## 2023-08-03 DIAGNOSIS — E43 SEVERE PROTEIN-CALORIE MALNUTRITION (HCC): Primary | ICD-10-CM

## 2023-08-03 NOTE — TELEPHONE ENCOUNTER
Bharat Taylor from [facility] requesting orders for PEG tube feeding- advised it looks like from telephone encounter 8/1 Dr. Leonard Lafleur office was working on the order for the increase in caloric intake. She will call their office.

## 2023-08-03 NOTE — TELEPHONE ENCOUNTER
Jevity 1.5 Marcos is the name of the tube feeding - [facility] health

## 2023-08-03 NOTE — TELEPHONE ENCOUNTER
Nneka Soares from [address] called back, she needs you to place the order for the increase in caloric intake.   Please enter in so we can fax to [phone] [address] can be reached at [phone]

## 2023-08-03 NOTE — DISCHARGE SUMMARY
[address]  Discharge- Michelle Ball 1945, 70 y.o. male MRN: [id number]  Unit/Bed#: [facility]02 Encounter: [id number]  Primary Care Provider: Vlad Tinajero MD   Date and time admitted to hospital: 7/19/2023  9:26 PM    Discharging Physician / Practitioner: Gustave Prader, MD  PCP: Vlad Tinajero MD  Admission Date:   Admission Orders (From admission, onward)     Ordered        07/20/23 0309  INPATIENT ADMISSION  Once                      Discharge Date: 07/28/23    Medical Problems     Resolved Problems  Date Reviewed: 7/28/2023   None         Consultations During Hospital Stay:  · none    Procedures Performed:   · none    Significant Findings / Test Results:   XR chest pa & lateral    Result Date: 7/24/2023  Impression: Moderate right and trace left effusion with bibasilar opacity, at least in part due to atelectasis. Pneumonia not excluded in the appropriate clinical setting. Workstation performed: [id number]     NM lung ventilation / perfusion    Result Date: 7/24/2023  Impression: The probability for pulmonary embolus is intermediate. The study was marked in [facility] for immediate notification. Workstation performed: [id number]     CT chest wo contrast    Result Date: 7/20/2023  · Impression: Patchy groundglass opacities in the periphery of the right upper lobe. Scattered reticulonodular opacities in the bilateral lungs. Groundglass and alveolar opacities as well as focal consolidation with air bronchograms in the right lower lobe. Findings taken together suspicious for multifocal infection in the appropriate clinical setting. Small dependent pleural effusions. Tiny pericardial effusion. Coronary atherosclerosis, hypoplastic thyroid gland, large hiatal hernia, left-sided nephrolithiasis, and other findings as above.  Workstation performed: [id number]     Incidental Findings:   · none     Test Results Pending at Discharge (will require follow up):   · none     Outpatient Tests Requested:  · none    Complications:  none    Reason for Admission: Multifocal pneumonia    Hospital Course:     Jamar Paige is a 70 y.o. male patient who originally presented to the hospital on 7/19/2023 due to multifocal pneumonia was treated with IV antibiotics likely secondary to aspiration with resolution did not require oxygen prior to discharge will complete oral antibiotics  Follow-up with primary care doctor in approximately 1 week      Please see above list of diagnoses and related plan for additional information. Condition at Discharge: stable     Discharge Day Visit / Exam:     * Please refer to separate progress note for these details *    Discussion with Family:   tp    Discharge instructions/Information to patient and family:   See after visit summary for information provided to patient and family. Provisions for Follow-Up Care:  See after visit summary for information related to follow-up care and any pertinent home health orders. Disposition:     Home    For Discharges to [facility]:   · Not Applicable to this Patient - Not Applicable to this Patient    Planned Readmission: none     Discharge Statement:  I spent 45 minutes discharging the patient. This time was spent on the day of discharge. I had direct contact with the patient on the day of discharge. Greater than 50% of the total time was spent examining patient, answering all patient questions, arranging and discussing plan of care with patient as well as directly providing post-discharge instructions. Additional time then spent on discharge activities. Discharge Medications:  See after visit summary for reconciled discharge medications provided to patient and family.       ** Please Note: This note has been constructed using a voice recognition system **

## 2023-08-06 LAB
DME PARACHUTE DELIVERY DATE ACTUAL: NORMAL
DME PARACHUTE DELIVERY DATE REQUESTED: NORMAL
DME PARACHUTE ITEM DESCRIPTION: NORMAL
DME PARACHUTE ORDER STATUS: NORMAL
DME PARACHUTE SUPPLIER NAME: NORMAL
DME PARACHUTE SUPPLIER PHONE: NORMAL

## 2023-08-07 ENCOUNTER — TELEPHONE (OUTPATIENT)
Dept: VASCULAR SURGERY | Facility: CLINIC | Age: 78
End: 2023-08-07

## 2023-08-07 ENCOUNTER — TELEMEDICINE (OUTPATIENT)
Dept: VASCULAR SURGERY | Facility: CLINIC | Age: 78
End: 2023-08-07
Payer: MEDICARE

## 2023-08-07 DIAGNOSIS — I65.23 BILATERAL CAROTID ARTERY STENOSIS: Primary | ICD-10-CM

## 2023-08-07 DIAGNOSIS — I71.43 INFRARENAL ABDOMINAL AORTIC ANEURYSM (AAA) WITHOUT RUPTURE (HCC): ICD-10-CM

## 2023-08-07 DIAGNOSIS — I77.9 CAROTID ARTERY DISEASE, UNSPECIFIED LATERALITY, UNSPECIFIED TYPE (HCC): ICD-10-CM

## 2023-08-07 PROCEDURE — 99442 PR PHYS/QHP TELEPHONE EVALUATION 11-20 MIN: CPT | Performed by: NURSE PRACTITIONER

## 2023-08-07 NOTE — ASSESSMENT & PLAN NOTE
- asymptomatic  - Patient reports BP well controlled, documented 124/76 at last PCP visit  - AOIL 6/20/23  There is an proximal infrarenal fusiform abdominal aortic aneurysm measuring  2.8 x 3.2 cm, Prior 2.6 x 3.3 cm  The common iliac arteries are patent and normal in caliber bilaterally. The celiac and superior mesenteric arteries are patent. The proximal renal arteries are patent.     Plan:   - AOIL in 1 year  - continue ASA  - BP control

## 2023-08-07 NOTE — ASSESSMENT & PLAN NOTE
30-year-old male, former smoker with HLD, h/o tongue ca s/p resection and radiation >20yrs ago w/ excessive salivation, asymptomatic bilateral carotid artery stenosis recent hospitalization with pneumonia and feeding tube placement presents via virtual telephone visit to review imaging secondary to recent hospitalization and procedure. - patient without complaints.  He denies TIA or stroke-like symptoms.    - baseline difficulty swallowing, speech disturbance and excessive salivation 2/2 h/o base of tongue ca with resection  - recent PEG tube placed  - Carotid Duplex 6/20/23:  < 50% ICA stenosis bilaterally    Plan:  - Continue ASA  - Continue statin   - carotid duplex in 1 year with return office visit  - call 911 or go to ED with TIA or stroke-like symptoms

## 2023-08-07 NOTE — PATIENT INSTRUCTIONS
Carotid duplex and abdominal iliac duplex in 1 year with return office visit  Continue aspirin  Continue statin

## 2023-08-07 NOTE — PROGRESS NOTES
Virtual Brief Visit    This Visit is being completed by telephone. The Patient is located at Home and in the following state in which I hold an active license [place]    The patient was identified by name and date of birth. Alin José was informed that this is a telemedicine visit and that the visit is being conducted through Telephone. My office door was closed. No one else was in the room. He acknowledged consent and understanding of privacy and security of the video platform. The patient has agreed to participate and understands they can discontinue the visit at any time. Patient is aware this is a billable service. Assessment/Plan:    Problem List Items Addressed This Visit        Cardiovascular and Mediastinum    Bilateral carotid artery stenosis - Primary     72-year-old male, former smoker with HLD, h/o tongue ca s/p resection and radiation >20yrs ago w/ excessive salivation, asymptomatic bilateral carotid artery stenosis recent hospitalization with pneumonia and feeding tube placement presents via virtual telephone visit to review imaging secondary to recent hospitalization and procedure. - patient without complaints.  He denies TIA or stroke-like symptoms.    - baseline difficulty swallowing, speech disturbance and excessive salivation 2/2 h/o base of tongue ca with resection  - recent PEG tube placed  - Carotid Duplex 6/20/23:  < 50% ICA stenosis bilaterally    Plan:  - Continue ASA  - Continue statin   - carotid duplex in 1 year with return office visit  - call 911 or go to ED with TIA or stroke-like symptoms         Relevant Orders    VAS carotid complete study    Abdominal aortic aneurysm (AAA) without rupture ([address])     - asymptomatic  - Patient reports BP well controlled, documented 124/76 at last PCP visit  - AOIL 6/20/23  There is an proximal infrarenal fusiform abdominal aortic aneurysm measuring  2.8 x 3.2 cm, Prior 2.6 x 3.3 cm  The common iliac arteries are patent and normal in caliber bilaterally. The celiac and superior mesenteric arteries are patent. The proximal renal arteries are patent. Plan:   - AOIL in 1 year  - continue ASA  - BP control         Relevant Orders    VAS abdominal aorta/iliacs; complete study   Other Visit Diagnoses     Carotid artery disease, unspecified laterality, unspecified type [facility])              Recent Visits  Date Type Provider Dept   08/01/23 Office Visit Barbra Garcia MD [address] [facility] recent visits within past 7 days and meeting all other requirements  Today's Visits  Date Type Provider Dept   08/07/23 240 Meeting [address] today's visits and meeting all other requirements  Future Appointments  No visits were found meeting these conditions.   Showing future appointments within next 150 days and meeting all other requirements         Visit Time  Total Visit Duration: 16

## 2023-08-07 NOTE — TELEPHONE ENCOUNTER
This is a reminder; patient is due for test and ov . Please call patient and schedule the following by the dates provided.     TESTING AND OV DUE AUGUST 2024    Dopplers  [x] Abdominal Aorta Iliac (AOIL)  [x] Carotid (CV)   [] Celiac and/or Mesenteric  [] Endovascular Aortic Repair (EVAR)   [] Hemodialysis Access (HD)   [] Lower Limb Arterial (JUNIOR)  [] Lower Limb Venous (LEV)  [] Lower Limb Venous Duplex with Reflux (LEVDR)  [] Renal Artery  [] Upper Limb Arterial (UEA)    [] Upper Limb Venous (UEV)              [] WING and Waveform analysis     Advanced Imaging   [] CTA head/neck    [] CTA abdomen    [] CTA abdomen & pelvis    [] CT abdomen with/ without contrast  [] CT abdomen with contrast  [] CT abdomen without contrast    [] CT abdomen & pelvis with/ without contrast  [] CT abdomen & pelvis with contrast  [] CT abdomen & pelvis without contrast    Office Visit   [] New patient, patient last seen over 3 years ago  [] Risk factor modification (RFM)   [x] Follow up   [] Lost to follow up (LTFU)

## 2023-08-11 ENCOUNTER — OFFICE VISIT (OUTPATIENT)
Dept: GASTROENTEROLOGY | Facility: CLINIC | Age: 78
End: 2023-08-11
Payer: MEDICARE

## 2023-08-11 ENCOUNTER — TELEPHONE (OUTPATIENT)
Dept: FAMILY MEDICINE CLINIC | Facility: CLINIC | Age: 78
End: 2023-08-11

## 2023-08-11 VITALS — HEART RATE: 80 BPM | DIASTOLIC BLOOD PRESSURE: 81 MMHG | OXYGEN SATURATION: 97 % | SYSTOLIC BLOOD PRESSURE: 108 MMHG

## 2023-08-11 DIAGNOSIS — R13.19 ESOPHAGEAL DYSPHAGIA: ICD-10-CM

## 2023-08-11 DIAGNOSIS — E43 SEVERE PROTEIN-CALORIE MALNUTRITION (HCC): Primary | ICD-10-CM

## 2023-08-11 DIAGNOSIS — R13.19 ESOPHAGEAL DYSPHAGIA: Primary | ICD-10-CM

## 2023-08-11 PROCEDURE — 99213 OFFICE O/P EST LOW 20 MIN: CPT | Performed by: INTERNAL MEDICINE

## 2023-08-11 NOTE — TELEPHONE ENCOUNTER
You had increased his caloric intake to 1600, can you addend your note from 8/1 to include this change?   I am trying to get this through [employer]To [employer] for the patient

## 2023-08-11 NOTE — PROGRESS NOTES
[facility]      Chief Complaint: Post PEG    HPI:  Ike Gillis is a 70 y.o.  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 Patient had cancer of the base of the tongue 25 years ago. He had progressive dysphagia. He was admitted and a PEG tube was placed. He has been doing fairly well. His weight had decreased significantly but is apparently stabilized. He has no other complaints at the present time. His real issue is that he does not want to take the overnight feeding but would rather do it as a bolus. He had questions about the feeding machine which would best be directed to nutritional support services. .      Review of Systems:   Constitutional: No fever or chills, feels well, no tiredness, significant weight loss. HENT: Chronic dysphonia, excessive salivation  Eyes: No complaints of eye pain, no red eyes, no discharge from eyes, no itchy eyes. Cardiovascular: No complaints of slow heart rate, no fast heart rate, no chest pain, no palpitations, no leg claudication, no lower extremity edema. Respiratory: No complaints of shortness of breath, no wheezing, no cough, no SOB on exertion, no orthopnea. Gastrointestinal: As noted in HPI  Genitourinary: No complaints of dysuria, no incontinence, no hesitancy, no nocturia. Musculoskeletal: No complaints of arthralgia, no myalgias, no joint swelling or stiffness, no limb pain or swelling. Neurological: No complaints of headache, no confusion, no convulsions, no numbness or tingling, no dizziness or fainting, no limb weakness, no difficulty walking. Skin: No complaints of skin rash or skin lesions, no itching, no skin wound, no dry skin. Hematological/Lymphatic: No complaints of swollen glands, does not bleed easy. Allergic/Immunologic: No immunocompromised state. Endocrine:  No complaints of polyuria, no polydipsia.    Psychiatric/Behavioral: is not suicidal, no sleep disturbances, no anxiety or depression, no change in personality, no emotional problems. Historical Information   Past Medical History:   Diagnosis Date   • Acute renal failure superimposed on chronic kidney disease ([address]) 12/31/2021   • 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[address]) 03/1999    SCCA left tongue base - T2N1M0- treated at [facility] [place] - had surgery and XRT   • Chronic kidney disease    • COVID-19 with hypoxic respiratory failure 12/31/2021   • Essential hypertension 5/6/2019   • Hematuria 10/15/2019   • Hyperlipidemia    • Hypertension    • Left anterior fascicular block    • Stage 3 chronic kidney disease ([address])    • 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[address]) 2/14/2019     Past Surgical History:   Procedure Laterality Date   • CYSTOSCOPY  08/30/2021   • EYELID CLOSURE Left     to prevent eyelashes from rubbing cornea   • HERNIA REPAIR Left    • MODIFIED RADICAL NECK DISSECTION Left 03/03/1999    [facility] - with mandibulotomy and resection SCCA left tongue base - Dr. Royanne Gitelman   • MYRINGOTOMY W/ TUBES Left 06/07/2023    office procedure - Dr. Amador Jones   • TONGUE SURGERY     • TONSILLECTOMY       Social History   Social History     Substance and Sexual Activity   Alcohol Use Yes    Comment: occasional     Social History     Substance and Sexual Activity   Drug Use Never     Social History     Tobacco Use   Smoking Status Former   • Passive exposure: Current   Smokeless Tobacco Former   Tobacco Comments    quit 20 years ago     Family History   Problem Relation Age of Onset   • Hypertension Mother    • Stroke Mother          Current Medications: has a current medication list which includes the following prescription(s): aspirin, lidocaine, multiple vitamins-minerals, jevity 1.5 xander/fiber, pravastatin, coenzyme q10, ferrous sulfate, senna-docusate sodium, and cholecalciferol.         Vital Signs: /81   Pulse 80   SpO2 97%       Physical Exam:   Constitutional  General Appearance: No acute distress, well appearing and very poorly nourished  Head  Normocephalic  Eyes  Conjunctivae and lids: No swelling, erythema, or discharge. Pupils and irises: Equal, round and reactive to light. Ears, Nose, Mouth, and Throat  External inspection of ears and nose: Normal  Nasal mucosa, septum and turbinates: Normal without edema or erythema/   Oropharynx: Normal with no erythema, edema, exudate or lesions. Neck  Normal range of motion. Neck supple. Cardiovascular  Auscultation of the heart: Normal rate and rhythm, normal S1 and S2 without murmurs. Examination of the extremities for edema and/or varicosities: Normal  Pulmonary/Chest  Respiratory effort: No increased work of breathing or signs of respiratory distress. Auscultation of lungs: Clear to auscultation, equal breath sounds bilaterally, no wheezes, rales, no rhonchi. Abdomen  Abdomen: Non-tender, no masses. PEG site completely normal  Liver and spleen: No hepatomegaly or splenomegaly. Musculoskeletal  Gait and station: normal.  Digits and Nails: normal without clubbing or cyanosis. Inspection/palpation of joints, bones, and muscles: Diffuse mild musculoskeletal wasting  Neurological  No nystagmus or asterixis. Skin  Skin and subcutaneous tissue: Normal without rashes or lesions. Lymphatic  Palpation of the lymph nodes in neck: No lymphadenopathy.    Psychiatric  Orientation to person, place and time: Normal.  Mood and affect: Normal.         Labs:  Lab Results   Component Value Date    ALT 10 07/24/2023    AST 10 (L) 07/24/2023    BUN 20 07/28/2023    CALCIUM 9.7 07/28/2023     07/28/2023    CO2 28 07/28/2023    CREATININE 0.96 07/28/2023    HDL 47 02/16/2022    HCT 39.8 07/28/2023    HGB 13.3 07/28/2023    MG 2.3 07/15/2023    PHOS 3.1 06/29/2023     (H) 07/28/2023    K 3.3 (L) 07/28/2023    PSA 1.7 02/16/2022    TRIG 89 02/16/2022    WBC 7.09 07/28/2023         X-Rays & Procedures:   No orders to display ______________________________________________________________________      Assessment & Plan:     Diagnoses and all orders for this visit:    Esophageal dysphagia      PEG site looks normal.  Patient's main issue is that he wants to take the Jevity as a bolus and not as an overnight trip. He will communicate with nutrition support services for this information.   He may stay on the same dose

## 2023-08-14 LAB

## 2023-08-15 ENCOUNTER — TELEPHONE (OUTPATIENT)
Dept: FAMILY MEDICINE CLINIC | Facility: CLINIC | Age: 78
End: 2023-08-15

## 2023-08-16 LAB
DME PARACHUTE DELIVERY DATE REQUESTED: NORMAL
DME PARACHUTE ITEM DESCRIPTION: NORMAL
DME PARACHUTE ORDER STATUS: NORMAL
DME PARACHUTE SUPPLIER NAME: NORMAL
DME PARACHUTE SUPPLIER PHONE: NORMAL

## 2023-08-17 LAB

## 2023-09-08 ENCOUNTER — TELEPHONE (OUTPATIENT)
Dept: FAMILY MEDICINE CLINIC | Facility: CLINIC | Age: 78
End: 2023-09-08

## 2023-09-08 DIAGNOSIS — E43 SEVERE PROTEIN-CALORIE MALNUTRITION (HCC): ICD-10-CM

## 2023-09-16 PROBLEM — J18.9 PNEUMONIA OF LEFT LOWER LOBE DUE TO INFECTIOUS ORGANISM: Status: RESOLVED | Noted: 2023-07-18 | Resolved: 2023-09-16

## 2023-09-18 PROBLEM — A41.9 SEPSIS (HCC): Status: RESOLVED | Noted: 2019-01-10 | Resolved: 2023-09-18

## 2023-09-18 PROBLEM — E86.0 DEHYDRATION: Status: RESOLVED | Noted: 2019-07-31 | Resolved: 2023-09-18

## 2023-09-23 PROBLEM — J18.9 MULTIFOCAL PNEUMONIA: Status: RESOLVED | Noted: 2021-12-31 | Resolved: 2023-09-23

## 2023-10-16 ENCOUNTER — APPOINTMENT (OUTPATIENT)
Dept: LAB | Facility: CLINIC | Age: 78
End: 2023-10-16
Payer: MEDICARE

## 2023-10-16 DIAGNOSIS — D47.2 MONOCLONAL PARAPROTEINEMIA: ICD-10-CM

## 2023-10-16 LAB
ALBUMIN SERPL BCP-MCNC: 4 G/DL (ref 3.5–5)
ALP SERPL-CCNC: 57 U/L (ref 34–104)
ALT SERPL W P-5'-P-CCNC: 14 U/L (ref 7–52)
ANION GAP SERPL CALCULATED.3IONS-SCNC: 6 MMOL/L
AST SERPL W P-5'-P-CCNC: 17 U/L (ref 13–39)
BASOPHILS # BLD AUTO: 0.06 THOUSANDS/ÂΜL (ref 0–0.1)
BASOPHILS NFR BLD AUTO: 1 % (ref 0–1)
BILIRUB SERPL-MCNC: 0.35 MG/DL (ref 0.2–1)
BUN SERPL-MCNC: 32 MG/DL (ref 5–25)
CALCIUM SERPL-MCNC: 9.7 MG/DL (ref 8.4–10.2)
CHLORIDE SERPL-SCNC: 103 MMOL/L (ref 96–108)
CO2 SERPL-SCNC: 32 MMOL/L (ref 21–32)
CREAT SERPL-MCNC: 1.08 MG/DL (ref 0.6–1.3)
EOSINOPHIL # BLD AUTO: 0.3 THOUSAND/ÂΜL (ref 0–0.61)
EOSINOPHIL NFR BLD AUTO: 5 % (ref 0–6)
ERYTHROCYTE [DISTWIDTH] IN BLOOD BY AUTOMATED COUNT: 14.5 % (ref 11.6–15.1)
FERRITIN SERPL-MCNC: 30 NG/ML (ref 24–336)
GFR SERPL CREATININE-BSD FRML MDRD: 66 ML/MIN/1.73SQ M
GLUCOSE P FAST SERPL-MCNC: 77 MG/DL (ref 65–99)
HCT VFR BLD AUTO: 44 % (ref 36.5–49.3)
HGB BLD-MCNC: 14 G/DL (ref 12–17)
IMM GRANULOCYTES # BLD AUTO: 0.01 THOUSAND/UL (ref 0–0.2)
IMM GRANULOCYTES NFR BLD AUTO: 0 % (ref 0–2)
LYMPHOCYTES # BLD AUTO: 1.35 THOUSANDS/ÂΜL (ref 0.6–4.47)
LYMPHOCYTES NFR BLD AUTO: 23 % (ref 14–44)
MCH RBC QN AUTO: 29.5 PG (ref 26.8–34.3)
MCHC RBC AUTO-ENTMCNC: 31.8 G/DL (ref 31.4–37.4)
MCV RBC AUTO: 93 FL (ref 82–98)
MONOCYTES # BLD AUTO: 0.55 THOUSAND/ÂΜL (ref 0.17–1.22)
MONOCYTES NFR BLD AUTO: 9 % (ref 4–12)
NEUTROPHILS # BLD AUTO: 3.69 THOUSANDS/ÂΜL (ref 1.85–7.62)
NEUTS SEG NFR BLD AUTO: 62 % (ref 43–75)
NRBC BLD AUTO-RTO: 0 /100 WBCS
PLATELET # BLD AUTO: 459 THOUSANDS/UL (ref 149–390)
PMV BLD AUTO: 10.4 FL (ref 8.9–12.7)
POTASSIUM SERPL-SCNC: 5 MMOL/L (ref 3.5–5.3)
PROT SERPL-MCNC: 7 G/DL (ref 6.4–8.4)
RBC # BLD AUTO: 4.75 MILLION/UL (ref 3.88–5.62)
SODIUM SERPL-SCNC: 141 MMOL/L (ref 135–147)
WBC # BLD AUTO: 5.96 THOUSAND/UL (ref 4.31–10.16)

## 2023-10-16 PROCEDURE — 83521 IG LIGHT CHAINS FREE EACH: CPT

## 2023-10-16 PROCEDURE — 85025 COMPLETE CBC W/AUTO DIFF WBC: CPT

## 2023-10-16 PROCEDURE — 36415 COLL VENOUS BLD VENIPUNCTURE: CPT

## 2023-10-16 PROCEDURE — 82728 ASSAY OF FERRITIN: CPT

## 2023-10-16 PROCEDURE — 80053 COMPREHEN METABOLIC PANEL: CPT

## 2023-10-18 LAB
KAPPA LC FREE SER-MCNC: 30.8 MG/L (ref 3.3–19.4)
KAPPA LC FREE/LAMBDA FREE SER: 0.55 {RATIO} (ref 0.26–1.65)
LAMBDA LC FREE SERPL-MCNC: 55.6 MG/L (ref 5.7–26.3)

## 2023-12-05 ENCOUNTER — APPOINTMENT (OUTPATIENT)
Dept: LAB | Facility: CLINIC | Age: 78
End: 2023-12-05
Payer: MEDICARE

## 2023-12-05 DIAGNOSIS — N18.30 STAGE 3 CHRONIC KIDNEY DISEASE, UNSPECIFIED WHETHER STAGE 3A OR 3B CKD (HCC): ICD-10-CM

## 2023-12-05 DIAGNOSIS — M89.9 CHRONIC KIDNEY DISEASE-MINERAL AND BONE DISORDER: ICD-10-CM

## 2023-12-05 DIAGNOSIS — N18.9 CHRONIC KIDNEY DISEASE-MINERAL AND BONE DISORDER: ICD-10-CM

## 2023-12-05 DIAGNOSIS — E83.9 CHRONIC KIDNEY DISEASE-MINERAL AND BONE DISORDER: ICD-10-CM

## 2023-12-05 LAB
25(OH)D3 SERPL-MCNC: 64.7 NG/ML (ref 30–100)
ANION GAP SERPL CALCULATED.3IONS-SCNC: 7 MMOL/L
BACTERIA UR QL AUTO: ABNORMAL /HPF
BASOPHILS # BLD AUTO: 0.06 THOUSANDS/ÂΜL (ref 0–0.1)
BASOPHILS NFR BLD AUTO: 1 % (ref 0–1)
BILIRUB UR QL STRIP: NEGATIVE
BUN SERPL-MCNC: 32 MG/DL (ref 5–25)
CALCIUM SERPL-MCNC: 9.6 MG/DL (ref 8.4–10.2)
CHLORIDE SERPL-SCNC: 105 MMOL/L (ref 96–108)
CLARITY UR: CLEAR
CO2 SERPL-SCNC: 31 MMOL/L (ref 21–32)
COLOR UR: YELLOW
CREAT SERPL-MCNC: 1.3 MG/DL (ref 0.6–1.3)
CREAT UR-MCNC: 144.6 MG/DL
EOSINOPHIL # BLD AUTO: 0.58 THOUSAND/ÂΜL (ref 0–0.61)
EOSINOPHIL NFR BLD AUTO: 7 % (ref 0–6)
ERYTHROCYTE [DISTWIDTH] IN BLOOD BY AUTOMATED COUNT: 13.2 % (ref 11.6–15.1)
GFR SERPL CREATININE-BSD FRML MDRD: 53 ML/MIN/1.73SQ M
GLUCOSE P FAST SERPL-MCNC: 81 MG/DL (ref 65–99)
GLUCOSE UR STRIP-MCNC: NEGATIVE MG/DL
HCT VFR BLD AUTO: 44 % (ref 36.5–49.3)
HGB BLD-MCNC: 13.9 G/DL (ref 12–17)
HGB UR QL STRIP.AUTO: NEGATIVE
IMM GRANULOCYTES # BLD AUTO: 0.02 THOUSAND/UL (ref 0–0.2)
IMM GRANULOCYTES NFR BLD AUTO: 0 % (ref 0–2)
KETONES UR STRIP-MCNC: NEGATIVE MG/DL
LEUKOCYTE ESTERASE UR QL STRIP: ABNORMAL
LYMPHOCYTES # BLD AUTO: 1.16 THOUSANDS/ÂΜL (ref 0.6–4.47)
LYMPHOCYTES NFR BLD AUTO: 15 % (ref 14–44)
MCH RBC QN AUTO: 29 PG (ref 26.8–34.3)
MCHC RBC AUTO-ENTMCNC: 31.6 G/DL (ref 31.4–37.4)
MCV RBC AUTO: 92 FL (ref 82–98)
MONOCYTES # BLD AUTO: 0.62 THOUSAND/ÂΜL (ref 0.17–1.22)
MONOCYTES NFR BLD AUTO: 8 % (ref 4–12)
MUCOUS THREADS UR QL AUTO: ABNORMAL
NEUTROPHILS # BLD AUTO: 5.52 THOUSANDS/ÂΜL (ref 1.85–7.62)
NEUTS SEG NFR BLD AUTO: 69 % (ref 43–75)
NITRITE UR QL STRIP: NEGATIVE
NON-SQ EPI CELLS URNS QL MICRO: ABNORMAL /HPF
NRBC BLD AUTO-RTO: 0 /100 WBCS
PH UR STRIP.AUTO: 7 [PH]
PHOSPHATE SERPL-MCNC: 3.8 MG/DL (ref 2.3–4.1)
PLATELET # BLD AUTO: 381 THOUSANDS/UL (ref 149–390)
PMV BLD AUTO: 10.1 FL (ref 8.9–12.7)
POTASSIUM SERPL-SCNC: 4.7 MMOL/L (ref 3.5–5.3)
PROT UR STRIP-MCNC: ABNORMAL MG/DL
PROT UR-MCNC: 21 MG/DL
PROT/CREAT UR: 0.15 MG/G{CREAT} (ref 0–0.1)
PTH-INTACT SERPL-MCNC: 72.4 PG/ML (ref 12–88)
RBC # BLD AUTO: 4.8 MILLION/UL (ref 3.88–5.62)
RBC #/AREA URNS AUTO: ABNORMAL /HPF
SODIUM SERPL-SCNC: 143 MMOL/L (ref 135–147)
SP GR UR STRIP.AUTO: 1.02 (ref 1–1.03)
UROBILINOGEN UR STRIP-ACNC: <2 MG/DL
WBC # BLD AUTO: 7.96 THOUSAND/UL (ref 4.31–10.16)
WBC #/AREA URNS AUTO: ABNORMAL /HPF

## 2023-12-05 PROCEDURE — 36415 COLL VENOUS BLD VENIPUNCTURE: CPT

## 2023-12-05 PROCEDURE — 84156 ASSAY OF PROTEIN URINE: CPT

## 2023-12-05 PROCEDURE — 82306 VITAMIN D 25 HYDROXY: CPT

## 2023-12-05 PROCEDURE — 80048 BASIC METABOLIC PNL TOTAL CA: CPT

## 2023-12-05 PROCEDURE — 82570 ASSAY OF URINE CREATININE: CPT

## 2023-12-05 PROCEDURE — 84100 ASSAY OF PHOSPHORUS: CPT

## 2023-12-05 PROCEDURE — 83970 ASSAY OF PARATHORMONE: CPT

## 2023-12-05 PROCEDURE — 81001 URINALYSIS AUTO W/SCOPE: CPT

## 2023-12-05 PROCEDURE — 85025 COMPLETE CBC W/AUTO DIFF WBC: CPT

## 2023-12-29 DIAGNOSIS — E78.5 HYPERLIPIDEMIA: ICD-10-CM

## 2023-12-29 RX ORDER — PRAVASTATIN SODIUM 20 MG
20 TABLET ORAL EVERY OTHER DAY
Qty: 90 TABLET | Refills: 1 | Status: SHIPPED | OUTPATIENT
Start: 2023-12-29 | End: 2023-12-29 | Stop reason: SDUPTHER

## 2023-12-29 RX ORDER — PRAVASTATIN SODIUM 20 MG
20 TABLET ORAL EVERY OTHER DAY
Qty: 90 TABLET | Refills: 1 | Status: SHIPPED | OUTPATIENT
Start: 2023-12-29

## 2024-01-02 ENCOUNTER — TELEPHONE (OUTPATIENT)
Dept: NEPHROLOGY | Facility: CLINIC | Age: 79
End: 2024-01-02

## 2024-01-09 ENCOUNTER — OFFICE VISIT (OUTPATIENT)
Dept: FAMILY MEDICINE CLINIC | Facility: CLINIC | Age: 79
End: 2024-01-09
Payer: MEDICARE

## 2024-01-09 VITALS
DIASTOLIC BLOOD PRESSURE: 72 MMHG | HEART RATE: 68 BPM | WEIGHT: 144.8 LBS | TEMPERATURE: 98 F | SYSTOLIC BLOOD PRESSURE: 118 MMHG | OXYGEN SATURATION: 98 % | BODY MASS INDEX: 20.73 KG/M2 | HEIGHT: 70 IN

## 2024-01-09 DIAGNOSIS — Z00.00 MEDICARE ANNUAL WELLNESS VISIT, SUBSEQUENT: ICD-10-CM

## 2024-01-09 DIAGNOSIS — K11.7 EXCESSIVE SALIVATION: Primary | ICD-10-CM

## 2024-01-09 DIAGNOSIS — Z85.810: ICD-10-CM

## 2024-01-09 PROBLEM — R53.83 FATIGUE: Status: RESOLVED | Noted: 2020-05-26 | Resolved: 2024-01-09

## 2024-01-09 PROBLEM — R53.1 GENERALIZED WEAKNESS: Status: RESOLVED | Noted: 2021-08-30 | Resolved: 2024-01-09

## 2024-01-09 PROCEDURE — 99213 OFFICE O/P EST LOW 20 MIN: CPT | Performed by: FAMILY MEDICINE

## 2024-01-09 PROCEDURE — G0439 PPPS, SUBSEQ VISIT: HCPCS | Performed by: FAMILY MEDICINE

## 2024-01-09 RX ORDER — GLYCOPYRROLATE 1 MG/1
1 TABLET ORAL 3 TIMES DAILY
Qty: 60 TABLET | Refills: 2 | Status: SHIPPED | OUTPATIENT
Start: 2024-01-09 | End: 2024-01-16 | Stop reason: ALTCHOICE

## 2024-01-09 NOTE — PATIENT INSTRUCTIONS
Medicare Preventive Visit Patient Instructions  Thank you for completing your Welcome to Medicare Visit or Medicare Annual Wellness Visit today. Your next wellness visit will be due in one year (1/9/2025).  The screening/preventive services that you may require over the next 5-10 years are detailed below. Some tests may not apply to you based off risk factors and/or age. Screening tests ordered at today's visit but not completed yet may show as past due. Also, please note that scanned in results may not display below.  Preventive Screenings:  Service Recommendations Previous Testing/Comments   Colorectal Cancer Screening  Colonoscopy    Fecal Occult Blood Test (FOBT)/Fecal Immunochemical Test (FIT)  Fecal DNA/Cologuard Test  Flexible Sigmoidoscopy Age: 45-75 years old   Colonoscopy: every 10 years (May be performed more frequently if at higher risk)  OR  FOBT/FIT: every 1 year  OR  Cologuard: every 3 years  OR  Sigmoidoscopy: every 5 years  Screening may be recommended earlier than age 45 if at higher risk for colorectal cancer. Also, an individualized decision between you and your healthcare provider will decide whether screening between the ages of 76-85 would be appropriate. Colonoscopy: 01/28/2019  FOBT/FIT: Not on file  Cologuard: Not on file  Sigmoidoscopy: Not on file          Prostate Cancer Screening Individualized decision between patient and health care provider in men between ages of 55-69   Medicare will cover every 12 months beginning on the day after your 50th birthday PSA: 1.7 ng/mL     Screening Not Indicated     Hepatitis C Screening Once for adults born between 1945 and 1965  More frequently in patients at high risk for Hepatitis C Hep C Antibody: 04/16/2019    Screening Current   Diabetes Screening 1-2 times per year if you're at risk for diabetes or have pre-diabetes Fasting glucose: 81 mg/dL (12/5/2023)  A1C: No results in last 5 years (No results in last 5 years)  Screening Current    Cholesterol Screening Once every 5 years if you don't have a lipid disorder. May order more often based on risk factors. Lipid panel: 02/16/2022  Screening Not Indicated  History Lipid Disorder      Other Preventive Screenings Covered by Medicare:  Abdominal Aortic Aneurysm (AAA) Screening: covered once if your at risk. You're considered to be at risk if you have a family history of AAA or a male between the age of 65-75 who smoking at least 100 cigarettes in your lifetime.  Lung Cancer Screening: covers low dose CT scan once per year if you meet all of the following conditions: (1) Age 55-77; (2) No signs or symptoms of lung cancer; (3) Current smoker or have quit smoking within the last 15 years; (4) You have a tobacco smoking history of at least 20 pack years (packs per day x number of years you smoked); (5) You get a written order from a healthcare provider.  Glaucoma Screening: covered annually if you're considered high risk: (1) You have diabetes OR (2) Family history of glaucoma OR (3)  aged 50 and older OR (4)  American aged 65 and older  Osteoporosis Screening: covered every 2 years if you meet one of the following conditions: (1) Have a vertebral abnormality; (2) On glucocorticoid therapy for more than 3 months; (3) Have primary hyperparathyroidism; (4) On osteoporosis medications and need to assess response to drug therapy.  HIV Screening: covered annually if you're between the age of 15-65. Also covered annually if you are younger than 15 and older than 65 with risk factors for HIV infection. For pregnant patients, it is covered up to 3 times per pregnancy.    Immunizations:  Immunization Recommendations   Influenza Vaccine Annual influenza vaccination during flu season is recommended for all persons aged >= 6 months who do not have contraindications   Pneumococcal Vaccine   * Pneumococcal conjugate vaccine = PCV13 (Prevnar 13), PCV15 (Vaxneuvance), PCV20 (Prevnar 20)  *  Pneumococcal polysaccharide vaccine = PPSV23 (Pneumovax) Adults 19-63 yo with certain risk factors or if 65+ yo  If never received any pneumonia vaccine: recommend Prevnar 20 (PCV20)  Give PCV20 if previously received 1 dose of PCV13 or PPSV23   Hepatitis B Vaccine 3 dose series if at intermediate or high risk (ex: diabetes, end stage renal disease, liver disease)   Respiratory syncytial virus (RSV) Vaccine - COVERED BY MEDICARE PART D  * RSVPreF3 (Arexvy) CDC recommends that adults 60 years of age and older may receive a single dose of RSV vaccine using shared clinical decision-making (SCDM)   Tetanus (Td) Vaccine - COST NOT COVERED BY MEDICARE PART B Following completion of primary series, a booster dose should be given every 10 years to maintain immunity against tetanus. Td may also be given as tetanus wound prophylaxis.   Tdap Vaccine - COST NOT COVERED BY MEDICARE PART B Recommended at least once for all adults. For pregnant patients, recommended with each pregnancy.   Shingles Vaccine (Shingrix) - COST NOT COVERED BY MEDICARE PART B  2 shot series recommended in those 19 years and older who have or will have weakened immune systems or those 50 years and older     Health Maintenance Due:      Topic Date Due   • Hepatitis C Screening  Completed   • Colorectal Cancer Screening  Discontinued     Immunizations Due:      Topic Date Due   • Influenza Vaccine (1) 09/01/2023   • COVID-19 Vaccine (3 - 2023-24 season) 09/01/2023     Advance Directives   What are advance directives?  Advance directives are legal documents that state your wishes and plans for medical care. These plans are made ahead of time in case you lose your ability to make decisions for yourself. Advance directives can apply to any medical decision, such as the treatments you want, and if you want to donate organs.   What are the types of advance directives?  There are many types of advance directives, and each state has rules about how to use them.  You may choose a combination of any of the following:  Living will:  This is a written record of the treatment you want. You can also choose which treatments you do not want, which to limit, and which to stop at a certain time. This includes surgery, medicine, IV fluid, and tube feedings.   Durable power of  for healthcare (DPAHC):  This is a written record that states who you want to make healthcare choices for you when you are unable to make them for yourself. This person, called a proxy, is usually a family member or a friend. You may choose more than 1 proxy.  Do not resuscitate (DNR) order:  A DNR order is used in case your heart stops beating or you stop breathing. It is a request not to have certain forms of treatment, such as CPR. A DNR order may be included in other types of advance directives.  Medical directive:  This covers the care that you want if you are in a coma, near death, or unable to make decisions for yourself. You can list the treatments you want for each condition. Treatment may include pain medicine, surgery, blood transfusions, dialysis, IV or tube feedings, and a ventilator (breathing machine).  Values history:  This document has questions about your views, beliefs, and how you feel and think about life. This information can help others choose the care that you would choose.  Why are advance directives important?  An advance directive helps you control your care. Although spoken wishes may be used, it is better to have your wishes written down. Spoken wishes can be misunderstood, or not followed. Treatments may be given even if you do not want them. An advance directive may make it easier for your family to make difficult choices about your care.       © Copyright [employer] 2018 Information is for End User's use only and may not be sold, redistributed or otherwise used for commercial purposes. All illustrations and images included in CareNotes® are the copyrighted property of  [employer] or [employer]

## 2024-01-09 NOTE — PROGRESS NOTES
Assessment and Plan:     Problem List Items Addressed This Visit    None       Preventive health issues were discussed with patient, and age appropriate screening tests were ordered as noted in patient's After Visit Summary.  Personalized health advice and appropriate referrals for health education or preventive services given if needed, as noted in patient's After Visit Summary.     History of Present Illness:     Patient presents for a Medicare Wellness Visit    HPI   Patient Care Team:  Salomón Liu MD as PCP - General (Family Medicine)     Review of Systems:     Review of Systems     Problem List:     Patient Active Problem List   Diagnosis    Excessive salivation    History of head and neck radiation    Overactive bladder    Benign localized prostatic hyperplasia with lower urinary tract symptoms (LUTS)    CKD (chronic kidney disease) stage 3, GFR 30-59 ml/min ([facility])    Fatigue    Generalized weakness    Esophageal dysphagia    Chronic kidney disease-mineral and bone disorder    Bilateral carotid artery stenosis    Abdominal aortic aneurysm (AAA) without rupture ([facility])    Hiatal hernia    Articular cartilage disorder of shoulder region    Cervical spondylosis    Disorder of bursae of shoulder region    Impingement syndrome of shoulder region    History of carcinoma of tongue    Microscopic hematuria    Monoclonal gammopathy of unknown significance    Myofascial pain on left side    Osteoarthritis of left hip    Lumbar radiculopathy    Synovial cyst of lumbar facet joint    Slurred speech    Severe protein-calorie malnutrition (HCC)    Elevated d-dimer      Past Medical and Surgical History:     Past Medical History:   Diagnosis Date    Acute renal failure superimposed on chronic kidney disease  12/31/2021    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HCC) 03/1999    SCCA left tongue base - T2N1M0- treated at [facility] surgery and XRT    Chronic kidney disease     COVID-19 with hypoxic respiratory failure 12/31/2021     Essential hypertension 5/6/2019    Hematuria 10/15/2019    Hyperlipidemia     Hypertension     Left anterior fascicular block     Stage 3 chronic kidney disease (HCC)     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HCC) 2/14/2019     Past Surgical History:   Procedure Laterality Date    CYSTOSCOPY  08/30/2021    EYELID CLOSURE Left     to prevent eyelashes from rubbing cornea    HERNIA REPAIR Left     MODIFIED RADICAL NECK DISSECTION Left 03/03/1999    [facility] - with mandibulotomy and resection SCCA left tongue base - Dr. Artur Balderas    MYRINGOTOMY W/ TUBES Left 06/07/2023    office procedure - Dr. Wilkerson    TONGUE SURGERY      TONSILLECTOMY        Family History:     Family History   Problem Relation Age of Onset    Hypertension Mother     Stroke Mother       Social History:     Social History     Socioeconomic History    Marital status: /Civil Union     Spouse name: None    Number of children: None    Years of education: None    Highest education level: None   Occupational History    None   Tobacco Use    Smoking status: Former     Passive exposure: Current    Smokeless tobacco: Former    Tobacco comments:     quit 20 years ago   Vaping Use    Vaping status: Never Used   Substance and Sexual Activity    Alcohol use: Yes     Comment: occasional    Drug use: Never    Sexual activity: Not Currently     Partners: Female   Other Topics Concern    None   Social History Narrative    None     Social Determinants of Health     Financial Resource Strain: Not on file   Food Insecurity: No Food Insecurity (7/20/2023)    Hunger Vital Sign     Worried About Running Out of Food in the Last Year: Never true     Ran Out of Food in the Last Year: Never true   Transportation Needs: No Transportation Needs (7/20/2023)    PRAPARE - Transportation     Lack of Transportation (Medical): No     Lack of Transportation (Non-Medical): No   Physical Activity: Not on file   Stress: Not on file   Social Connections: Not on file   Intimate  Partner Violence: Not on file   Housing Stability: Low Risk  (7/20/2023)    Housing Stability Vital Sign     Unable to Pay for Housing in the Last Year: No     Number of Places Lived in the Last Year: 1     Unstable Housing in the Last Year: No      Medications and Allergies:     Current Outpatient Medications   Medication Sig Dispense Refill    aspirin 81 MG tablet       Multiple Vitamins-Minerals (MULTIVITAMIN ADULTS 50+ PO) Take by mouth      Nutritional Supplements (Jevity 1.5 Marcos/Fiber) LIQD Take 1600 calories daily 9000 mL 3    pravastatin (PRAVACHOL) 20 mg tablet Take 1 tablet (20 mg total) by mouth every other day 90 tablet 1    Coenzyme Q10 (CO Q 10 PO) Take by mouth (Patient not taking: Reported on 8/1/2023)      ferrous sulfate 324 (65 Fe) mg Take 1 tablet (324 mg total) by mouth daily before breakfast (Patient not taking: Reported on 8/1/2023) 90 tablet 1    lidocaine (LIDODERM) 5 % Apply 1 patch topically over 12 hours daily Remove & Discard patch within 12 hours or as directed by MD Do not start before July 29, 2023. (Patient not taking: Reported on 1/9/2024) 15 patch 0    senna-docusate sodium (SENOKOT S) 8.6-50 mg per tablet Take 1 tablet by mouth 2 (two) times a day (Patient not taking: Reported on 8/1/2023) 30 tablet 0    VITAMIN D, CHOLECALCIFEROL, PO Take by mouth (Patient not taking: Reported on 8/1/2023)       No current facility-administered medications for this visit.     Allergies   Allergen Reactions    Iodinated Contrast Media Other (See Comments)    Iodine - Food Allergy       Immunizations:     Immunization History   Administered Date(s) Administered    COVID-19 PFIZER VACCINE 0.3 ML IM 04/13/2021, 05/04/2021    INFLUENZA 02/13/2018, 10/17/2018, 10/14/2019    Influenza, high dose seasonal 0.7 mL 09/15/2020    influenza, trivalent, adjuvanted 09/15/2020      Health Maintenance:         Topic Date Due    Hepatitis C Screening  Completed    Colorectal Cancer Screening  Discontinued          Topic Date Due    Influenza Vaccine (1) 09/01/2023    COVID-19 Vaccine (3 - 2023-24 season) 09/01/2023      Medicare Screening Tests and Risk Assessments:     Sven is here for his Subsequent Wellness visit.     Health Risk Assessment:   Patient rates overall health as fair. Patient feels that their physical health rating is same. Patient is very satisfied with their life. Eyesight was rated as same. Hearing was rated as same. Patient feels that their emotional and mental health rating is same. Patients states they are never, rarely angry. Patient states they are sometimes unusually tired/fatigued. Pain experienced in the last 7 days has been none. Patient states that he has experienced no weight loss or gain in last 6 months.     Depression Screening:   PHQ-2 Score: 0      Fall Risk Screening:   In the past year, patient has experienced: no history of falling in past year      Home Safety:  Patient does not have trouble with stairs inside or outside of their home. Patient has working smoke alarms and has working carbon monoxide detector. Home safety hazards include: none.     Medications:   Patient is not currently taking any over-the-counter supplements. Patient is able to manage medications.     Activities of Daily Living (ADLs)/Instrumental Activities of Daily Living (IADLs):   Walk and transfer into and out of bed and chair?: Yes  Dress and groom yourself?: Yes    Bathe or shower yourself?: Yes    Feed yourself? Yes  Do your laundry/housekeeping?: Yes  Manage your money, pay your bills and track your expenses?: Yes  Make your own meals?: Yes    Do your own shopping?: Yes    Previous Hospitalizations:   Any hospitalizations or ED visits within the last 12 months?: Yes    How many hospitalizations have you had in the last year?: 1-2    PREVENTIVE SCREENINGS      Cardiovascular Screening:    General: History Lipid Disorder    Due for: Lipid Panel      Diabetes Screening:     General: Screening Current       "Prostate Cancer Screening:    General: Screening Not Indicated      Abdominal Aortic Aneurysm (AAA) Screening:    Risk factors include: tobacco use        General: Screening Not Indicated and History AAA      Lung Cancer Screening:     General: Screening Not Indicated      Hepatitis C Screening:    General: Screening Current    Screening, Brief Intervention, and Referral to Treatment (SBIRT)    Screening  Typical number of drinks in a day: 0    Single Item Drug Screening:  How often have you used an illegal drug (including marijuana) or a prescription medication for non-medical reasons in the past year? never    Single Item Drug Screen Score: 0  Interpretation: Negative screen for possible drug use disorder    No results found.     Physical Exam:     /72 (BP Location: Left arm)   Pulse 68   Temp 98 °F (36.7 °C) (Temporal)   Ht 5' 10\" (1.778 m)   Wt 65.7 kg (144 lb 12.8 oz)   SpO2 98%   BMI 20.78 kg/m²     Physical Exam     Salomón Liu MD  "

## 2024-01-09 NOTE — PROGRESS NOTES
Name: Sven Swain      : 1945      MRN: [id number]  Encounter Provider: Salomón Liu MD  Encounter Date: 2024   Encounter department: [facility]    Assessment & Plan     1. Excessive salivation  -     glycopyrrolate (ROBINUL) 1 mg tablet; Take 1 tablet (1 mg total) by mouth 3 (three) times a day    2. History of carcinoma of tongue    3. Medicare annual wellness visit, subsequent  See Medicare wellness note    Follow up in 3 months or as needed         Subjective     Patient is here due to excessive salivation, has a hx of cancer of the tongue. He tried glycopyrrolate in the past which helped unsure why he stopped taking it. He has been getting his feedings through a PEG tube. Denies any shortness of breath, cough or fevers at this time.      Review of Systems   Constitutional:  Negative for activity change, appetite change, fatigue and fever.   HENT:  Positive for trouble swallowing. Negative for congestion and ear discharge.    Respiratory:  Negative for cough and shortness of breath.    Cardiovascular:  Negative for chest pain and palpitations.   Gastrointestinal:  Negative for diarrhea and nausea.   Musculoskeletal:  Negative for arthralgias and back pain.   Skin:  Negative for color change and rash.   Neurological:  Negative for dizziness and headaches.   Psychiatric/Behavioral:  Negative for agitation and behavioral problems.        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[facility] surgery and XRT    Chronic kidney disease     COVID-19 with hypoxic respiratory failure 2021    Essential hypertension 2019    Hematuria 10/15/2019    Hyperlipidemia     Hypertension     Left anterior fascicular block     Stage 3 chronic kidney disease (HCC)     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HCC) 2019     Past Surgical  History:   Procedure Laterality Date    CYSTOSCOPY  08/30/2021    EYELID CLOSURE Left     to prevent eyelashes from rubbing cornea    HERNIA REPAIR Left     MODIFIED RADICAL NECK DISSECTION Left 03/03/1999    [facility] - with mandibulotomy and resection SCCA left tongue base - Dr. Artur Balderas    MYRINGOTOMY W/ TUBES Left 06/07/2023    office procedure - Dr. Wilkerson    TONGUE SURGERY      TONSILLECTOMY       Family History   Problem Relation Age of Onset    Hypertension Mother     Stroke Mother      Social History     Socioeconomic History    Marital status: /Civil Union     Spouse name: None    Number of children: None    Years of education: None    Highest education level: None   Occupational History    None   Tobacco Use    Smoking status: Former     Passive exposure: Current    Smokeless tobacco: Former    Tobacco comments:     quit 20 years ago   Vaping Use    Vaping status: Never Used   Substance and Sexual Activity    Alcohol use: Yes     Comment: occasional    Drug use: Never    Sexual activity: Not Currently     Partners: Female   Other Topics Concern    None   Social History Narrative    None     Social Determinants of Health     Financial Resource Strain: Patient Declined (1/9/2024)    Overall Financial Resource Strain (CARDIA)     Difficulty of Paying Living Expenses: Patient declined   Food Insecurity: No Food Insecurity (7/20/2023)    Hunger Vital Sign     Worried About Running Out of Food in the Last Year: Never true     Ran Out of Food in the Last Year: Never true   Transportation Needs: No Transportation Needs (1/9/2024)    PRAPARE - Transportation     Lack of Transportation (Medical): No     Lack of Transportation (Non-Medical): No   Physical Activity: Not on file   Stress: Not on file   Social Connections: Not on file   Intimate Partner Violence: Not on file   Housing Stability: Low Risk  (7/20/2023)    Housing Stability Vital Sign     Unable to Pay for Housing in the Last  "Year: No     Number of Places Lived in the Last Year: 1     Unstable Housing in the Last Year: No     Current Outpatient Medications on File Prior to Visit   Medication Sig    aspirin 81 MG tablet     Multiple Vitamins-Minerals (MULTIVITAMIN ADULTS 50+ PO) Take by mouth    Nutritional Supplements (Jevity 1.5 Marcos/Fiber) LIQD Take 1600 calories daily    pravastatin (PRAVACHOL) 20 mg tablet Take 1 tablet (20 mg total) by mouth every other day    Coenzyme Q10 (CO Q 10 PO) Take by mouth (Patient not taking: Reported on 8/1/2023)    ferrous sulfate 324 (65 Fe) mg Take 1 tablet (324 mg total) by mouth daily before breakfast (Patient not taking: Reported on 8/1/2023)    lidocaine (LIDODERM) 5 % Apply 1 patch topically over 12 hours daily Remove & Discard patch within 12 hours or as directed by MD Do not start before July 29, 2023. (Patient not taking: Reported on 1/9/2024)    senna-docusate sodium (SENOKOT S) 8.6-50 mg per tablet Take 1 tablet by mouth 2 (two) times a day (Patient not taking: Reported on 8/1/2023)    VITAMIN D, CHOLECALCIFEROL, PO Take by mouth (Patient not taking: Reported on 8/1/2023)     Allergies   Allergen Reactions    Iodinated Contrast Media Other (See Comments)    Iodine - Food Allergy      Immunization History   Administered Date(s) Administered    COVID-19 PFIZER VACCINE 0.3 ML IM 04/13/2021, 05/04/2021    INFLUENZA 02/13/2018, 10/17/2018, 10/14/2019    Influenza, high dose seasonal 0.7 mL 09/15/2020    influenza, trivalent, adjuvanted 09/15/2020       Objective     /72 (BP Location: Left arm)   Pulse 68   Temp 98 °F (36.7 °C) (Temporal)   Ht 5' 10\" (1.778 m)   Wt 65.7 kg (144 lb 12.8 oz)   SpO2 98%   BMI 20.78 kg/m²     Physical Exam  Constitutional:       General: He is not in acute distress.     Appearance: He is well-developed. He is not diaphoretic.   Eyes:      General: No scleral icterus.     Pupils: Pupils are equal, round, and reactive to light.   Cardiovascular:      Rate and " Rhythm: Normal rate and regular rhythm.      Heart sounds: Normal heart sounds. No murmur heard.  Pulmonary:      Effort: Pulmonary effort is normal. No respiratory distress.      Breath sounds: Normal breath sounds. No wheezing.   Abdominal:      General: Bowel sounds are normal. There is no distension.      Palpations: Abdomen is soft.      Tenderness: There is no abdominal tenderness.   Skin:     General: Skin is warm and dry.      Findings: No rash.   Neurological:      Mental Status: He is alert and oriented to person, place, and time.       Salomón Liu MD

## 2024-01-16 ENCOUNTER — OFFICE VISIT (OUTPATIENT)
Dept: CARDIOLOGY CLINIC | Facility: CLINIC | Age: 79
End: 2024-01-16
Payer: MEDICARE

## 2024-01-16 ENCOUNTER — TELEPHONE (OUTPATIENT)
Dept: NEPHROLOGY | Facility: CLINIC | Age: 79
End: 2024-01-16

## 2024-01-16 VITALS
DIASTOLIC BLOOD PRESSURE: 68 MMHG | SYSTOLIC BLOOD PRESSURE: 100 MMHG | WEIGHT: 149.4 LBS | BODY MASS INDEX: 21.39 KG/M2 | HEART RATE: 76 BPM | HEIGHT: 70 IN

## 2024-01-16 DIAGNOSIS — E78.2 MIXED HYPERLIPIDEMIA: Primary | ICD-10-CM

## 2024-01-16 DIAGNOSIS — N18.31 STAGE 3A CHRONIC KIDNEY DISEASE (HCC): ICD-10-CM

## 2024-01-16 DIAGNOSIS — I25.10 CORONARY ARTERY CALCIFICATION: ICD-10-CM

## 2024-01-16 DIAGNOSIS — I25.84 CORONARY ARTERY CALCIFICATION: ICD-10-CM

## 2024-01-16 DIAGNOSIS — I71.40 ABDOMINAL AORTIC ANEURYSM (AAA) WITHOUT RUPTURE, UNSPECIFIED PART (HCC): ICD-10-CM

## 2024-01-16 DIAGNOSIS — R13.19 ESOPHAGEAL DYSPHAGIA: ICD-10-CM

## 2024-01-16 DIAGNOSIS — I44.4 LEFT ANTERIOR FASCICULAR BLOCK: ICD-10-CM

## 2024-01-16 PROCEDURE — 99213 OFFICE O/P EST LOW 20 MIN: CPT | Performed by: INTERNAL MEDICINE

## 2024-01-16 NOTE — PROGRESS NOTES
[facility] [address] [address] [phone]                                              Cardiology Office Follow up  Sven Swain, 78 y.o. male  YOB: 1945  MRN: [id number] Encounter: [id number]      PCP - Salomón Liu MD    Assessment and Plan  Coronary artery calcifications  Hyperlipidemia  Hypertension  Stress echo - 7/2020 - 6m17s, 96% MPHR, stress ECG and echo normal. LVEF 65%  Abdominal aortic dilatation  2.6 x 3.3 cm --> 2.8 x 3.2 cm (6/2023)  Sees vascular  CKD, baseline Cr 1.4-1.6  Hiatal hernia, large  Dysphagia  S/p esophageal dilatation (9/8/21)  S/p PEG tube placement  H/O tongue carcinoma status post radical neck dissection, radiation  20+ yrs ago  Has some dysphagia, and recently had esophageal dilatation  Monoclonal gammopathy of unknown significance    Plan  Coronary artery calcifications, Hyperlipidemia    No recent chest pain or shortness of breath  10-yr ASCVD risk score 34.3% --> 23.5% (10/2022) --> 16.9% (1/2024)  CTA PE 7/2023 - shows moderate coronary artery calcifications  He remains free of chest pain or shortness of breath  Continue to optimize risk factors  Tolerating pravastatin 20 mg daily  No recent lipid panel, he is now on Jevity and  wt I sup 15 lbs --> check lipid panel and consider uptitration of therapy  Goal LDL < 70    Left anterior fascicular block  Stable, asymptomatic  Last ECG 7/2023 - sinus tachycardia , c/r/o old septal infarct  No dizziness or lightheadedness, near-syncope or syncope  Monitor clinically, annual follow up ECG    Orders Placed This Encounter   Procedures   • Lipid Panel with Direct LDL reflex       No results found for this visit on 01/16/24.    Return in about 1 year (around 1/16/2025), or if symptoms worsen or fail to improve.      History of Present Illness   78 y.o.  gentleman comes in as a new patient for evaluation of new onset shortness of breath over the past  2 weeks.  At baseline, he is very active for his age, and mows several acres of his land with a self-propelled push mower.  But over the last week, he noticed getting out of breath with lifting up a chainsaw and trying to cut a tree.  He feels he is still able to climb up a flight of stair, but shortness of breath with this level of exertion was unusual for him.  No complains of chest pain, palpitations, dizziness or lightheadedness.  No prior history of CAD.    He had tongue cancer back in 1999, and underwent radical neck dissection and radiation therapy at [facility].  He has remained in remission, and has been getting routine follow-up.    Interval history - 9/30/2020  He returns for follow-up, and has been doing well overall.  He does not report any active symptoms of chest pain or shortness of breath.  He has been fairly active, and takes care of his 4 acres, including cutting, trimming lawn mowing.    Interval history - 9/15/2021   he comes back for follow-up after 1 year.  He continues to do well in terms of which shortness of breath, and remains very active taking care of his for acres.  No current complains of chest pain, palpitations, dizziness or lightheadedness.  No recent history of near-syncope or syncope.   He has had dysphagia, and as a result recently underwent EGD with esophageal dilatation    Interval history - 10/19/2022  He comes back for follow-up after 1 year.  He has been doing very well overall from the cardiac standpoint and has not had any symptoms of chest pain shortness of breath, palpitations or dizziness or lightheadedness.  He continues to have issues with dysphagia even after his esophageal dilation.  As a result of not being able to eat much, he has lost another 10 lb and is now down 20+ lb over the last 2 years    Interval history - 1/16/2024  He returns for follow-up after about 15 months.  In the interim, he had PEG tube placement and is now using Jevity.  With this he has  ended up gaining about 15 pounds.  Denies any symptoms of orthostatic dizziness or lightheadedness, near-syncope or syncope.  No current complaints of chest pain, shortness of breath, palpitations or dizziness.  He is overall feeling better since being on nutrition through PEg tube.      Historical Information   Past Medical History:   Diagnosis Date   • Acute renal failure superimposed on chronic kidney disease  12/31/2021   • 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HCC) 03/1999    SCCA left tongue base - T2N1M0- treated at [facility] surgery and XRT   • Chronic kidney disease    • COVID-19 with hypoxic respiratory failure 12/31/2021   • Essential hypertension 05/06/2019   • Hematuria 10/15/2019   • Hyperlipidemia    • Left anterior fascicular block    • Stage 3 chronic kidney disease (HCC)    • 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HCC) 02/14/2019     Past Surgical History:   Procedure Laterality Date   • CYSTOSCOPY  08/30/2021   • EYELID CLOSURE Left     to prevent eyelashes from rubbing cornea   • HERNIA REPAIR Left    • MODIFIED RADICAL NECK DISSECTION Left 03/03/1999    [facility] - with mandibulotomy and resection SCCA left tongue base - Dr. Artur Balderas   • MYRINGOTOMY W/ TUBES Left 06/07/2023    office procedure - Dr. Wilkerson   • TONGUE SURGERY     • TONSILLECTOMY       Family History   Problem Relation Age of Onset   • Hypertension Mother    • Stroke Mother      Current Outpatient Medications on File Prior to Visit   Medication Sig Dispense Refill   • aspirin 81 MG tablet      • Multiple Vitamins-Minerals (MULTIVITAMIN ADULTS 50+ PO) Take by mouth     • Nutritional Supplements (Jevity 1.5 Marcos/Fiber) LIQD Take 1600 calories daily 9000 mL 3   • VITAMIN D, CHOLECALCIFEROL, PO Take by mouth     • [DISCONTINUED] glycopyrrolate (ROBINUL) 1 mg tablet Take 1 tablet (1 mg total) by mouth 3 (three) times a day (Patient taking differently: Take 1 mg by mouth daily) 60 tablet 2   • pravastatin (PRAVACHOL) 20 mg tablet Take 1  tablet (20 mg total) by mouth every other day 90 tablet 1   • [DISCONTINUED] Coenzyme Q10 (CO Q 10 PO) Take by mouth (Patient not taking: Reported on 8/1/2023)     • [DISCONTINUED] ferrous sulfate 324 (65 Fe) mg Take 1 tablet (324 mg total) by mouth daily before breakfast (Patient not taking: Reported on 8/1/2023) 90 tablet 1   • [DISCONTINUED] lidocaine (LIDODERM) 5 % Apply 1 patch topically over 12 hours daily Remove & Discard patch within 12 hours or as directed by MD Do not start before July 29, 2023. (Patient not taking: Reported on 1/9/2024) 15 patch 0   • [DISCONTINUED] senna-docusate sodium (SENOKOT S) 8.6-50 mg per tablet Take 1 tablet by mouth 2 (two) times a day (Patient not taking: Reported on 8/1/2023) 30 tablet 0     No current facility-administered medications on file prior to visit.     Allergies   Allergen Reactions   • Iodinated Contrast Media Other (See Comments)   • Iodine - Food Allergy      Social History     Socioeconomic History   • Marital status: /Civil Union     Spouse name: None   • Number of children: None   • Years of education: None   • Highest education level: None   Occupational History   • None   Tobacco Use   • Smoking status: Former     Passive exposure: Current   • Smokeless tobacco: Former   • Tobacco comments:     quit 20 years ago   Vaping Use   • Vaping status: Never Used   Substance and Sexual Activity   • Alcohol use: Yes     Comment: occasional   • Drug use: Never   • Sexual activity: Not Currently     Partners: Female   Other Topics Concern   • None   Social History Narrative   • None     Social Determinants of Health     Financial Resource Strain: Patient Declined (1/9/2024)    Overall Financial Resource Strain (CARDIA)    • Difficulty of Paying Living Expenses: Patient declined   Food Insecurity: No Food Insecurity (7/20/2023)    Hunger Vital Sign    • Worried About Running Out of Food in the Last Year: Never true    • Ran Out of Food in the Last Year: Never true  "  Transportation Needs: No Transportation Needs (1/9/2024)    PRAPARE - Transportation    • Lack of Transportation (Medical): No    • Lack of Transportation (Non-Medical): No   Physical Activity: Not on file   Stress: Not on file   Social Connections: Not on file   Intimate Partner Violence: Not on file   Housing Stability: Low Risk  (7/20/2023)    Housing Stability Vital Sign    • Unable to Pay for Housing in the Last Year: No    • Number of Places Lived in the Last Year: 1    • Unstable Housing in the Last Year: No        Review of Systems   HENT:  Positive for voice change.    All other systems reviewed and are negative.      Vitals:  Vitals:    01/16/24 1415   BP: 100/68   BP Location: Right arm   Pulse: 76   Weight: 67.8 kg (149 lb 6.4 oz)   Height: 5' 10\" (1.778 m)     BMI - Body mass index is 21.44 kg/m².  Wt Readings from Last 7 Encounters:   01/16/24 67.8 kg (149 lb 6.4 oz)   01/15/24 65.3 kg (144 lb)   01/09/24 65.7 kg (144 lb 12.8 oz)   08/01/23 58.5 kg (129 lb)   07/25/23 61.2 kg (134 lb 14.7 oz)   07/18/23 61.4 kg (135 lb 6.4 oz)   07/06/23 62.2 kg (137 lb 3.2 oz)       Physical Exam  Vitals and nursing note reviewed.   Constitutional:       General: He is not in acute distress.     Appearance: He is well-developed. He is not ill-appearing or diaphoretic.   HENT:      Head: Normocephalic and atraumatic.   Eyes:      General: No scleral icterus.     Conjunctiva/sclera: Conjunctivae normal.   Neck:      Vascular: No carotid bruit or JVD.   Cardiovascular:      Rate and Rhythm: Normal rate and regular rhythm.      Heart sounds: Normal heart sounds. No murmur heard.     No friction rub. No gallop.   Pulmonary:      Effort: Pulmonary effort is normal. No respiratory distress.      Breath sounds: Normal breath sounds. No wheezing or rales.   Chest:      Chest wall: No tenderness.   Abdominal:      General: There is no distension.      Palpations: Abdomen is soft.      Tenderness: There is no abdominal " "tenderness.      Comments: PEG tube noted in place   Musculoskeletal:         General: No deformity.   Skin:     General: Skin is warm.   Neurological:      General: No focal deficit present.      Mental Status: He is alert and oriented to person, place, and time. Mental status is at baseline.      Comments: hoarseness   Psychiatric:         Mood and Affect: Mood normal.         Behavior: Behavior normal.         Thought Content: Thought content normal.         Labs:  CBC:   Lab Results   Component Value Date    WBC 7.96 12/05/2023    RBC 4.80 12/05/2023    HGB 13.9 12/05/2023    HCT 44.0 12/05/2023    MCV 92 12/05/2023     12/05/2023    RDW 13.2 12/05/2023       CMP:   Lab Results   Component Value Date    K 4.7 12/05/2023     12/05/2023    CO2 31 12/05/2023    BUN 32 (H) 12/05/2023    CREATININE 1.30 12/05/2023    EGFR 53 12/05/2023    CALCIUM 9.6 12/05/2023    AST 17 10/16/2023    ALT 14 10/16/2023    ALKPHOS 57 10/16/2023       Magnesium:  Lab Results   Component Value Date    MG 2.3 07/15/2023       Lipid Profile:   Lab Results   Component Value Date    HDL 47 02/16/2022    TRIG 89 02/16/2022    LDLCALC 107 (H) 02/16/2022       Thyroid Studies:   Lab Results   Component Value Date    GHZ6QAKDHKZM 1.807 07/21/2023       No components found for: \"HGA1C\"    Lab Results   Component Value Date    INR 1.31 (H) 07/24/2023    INR 1.09 07/19/2023    INR 1.06 12/31/2021   5    Imaging: Xr Chest Pa & Lateral    Result Date: 6/17/2020  Narrative: CHEST INDICATION:   R06.02: Shortness of breath. COMPARISON:  6/25/2013 EXAM PERFORMED/VIEWS:  XR CHEST PA & LATERAL Images: 3 FINDINGS: Retrocardiac air-fluid level consistent with moderate to large hiatal hernia Strand-like opacities left lung base likely scarring, unchanged Cardiomediastinal silhouette appears unremarkable. The lungs are otherwise clear.  No pneumothorax or pleural effusion. Osseous structures appear within normal limits for patient age. "     Impression: Persistent left lung base linear strand-like scarring Increased conspicuity of moderate to large hiatal hernia with air-fluid level No acute cardiopulmonary disease. Workstation performed: [id number]       Cardiac testing:   No results found for this or any previous visit.  No results found for this or any previous visit.  No results found for this or any previous visit.  No results found for this or any previous visit.

## 2024-01-17 ENCOUNTER — OFFICE VISIT (OUTPATIENT)
Dept: NEPHROLOGY | Facility: CLINIC | Age: 79
End: 2024-01-17
Payer: MEDICARE

## 2024-01-17 VITALS
HEART RATE: 72 BPM | OXYGEN SATURATION: 95 % | BODY MASS INDEX: 21.22 KG/M2 | RESPIRATION RATE: 18 BRPM | DIASTOLIC BLOOD PRESSURE: 80 MMHG | HEIGHT: 70 IN | TEMPERATURE: 98 F | SYSTOLIC BLOOD PRESSURE: 120 MMHG | WEIGHT: 148.2 LBS

## 2024-01-17 DIAGNOSIS — Z85.810: ICD-10-CM

## 2024-01-17 DIAGNOSIS — N18.9 CHRONIC KIDNEY DISEASE-MINERAL AND BONE DISORDER: ICD-10-CM

## 2024-01-17 DIAGNOSIS — E83.9 CHRONIC KIDNEY DISEASE-MINERAL AND BONE DISORDER: ICD-10-CM

## 2024-01-17 DIAGNOSIS — M89.9 CHRONIC KIDNEY DISEASE-MINERAL AND BONE DISORDER: ICD-10-CM

## 2024-01-17 DIAGNOSIS — N18.30 STAGE 3 CHRONIC KIDNEY DISEASE, UNSPECIFIED WHETHER STAGE 3A OR 3B CKD (HCC): Primary | ICD-10-CM

## 2024-01-17 PROCEDURE — 99214 OFFICE O/P EST MOD 30 MIN: CPT | Performed by: INTERNAL MEDICINE

## 2024-01-17 NOTE — ASSESSMENT & PLAN NOTE
Lab Results   Component Value Date    EGFR 53 12/05/2023    EGFR 66 10/16/2023    EGFR 76 07/28/2023    CREATININE 1.30 12/05/2023    CREATININE 1.08 10/16/2023    CREATININE 0.96 07/28/2023   Renal function is getting worse.  I advised use more water to the feeding tube and will continue to monitor

## 2024-01-17 NOTE — PROGRESS NOTES
NEPHROLOGY OFFICE FOLLOW UP  Sven Swain 78 y.o. male MRN: [id number]    Encounter: [id number] 1/17/2024    REASON FOR VISIT: Sven Swain is a 78 y.o. male who is here on 1/17/2024 for Chronic Kidney Disease and Follow-up  .    HPI:    Sven came in today for follow-up of CKD.  78-year-old gentleman with history of tongue cancer    Patient now has a feeding tube as he was losing quite a bit of weight and could not eat because of cancer    He started gaining weight    Denies any complaint    No chest pain no palpitation or shortness of breath    No nausea no vomiting        REVIEW OF SYSTEMS:    Review of Systems   Constitutional:  Negative for fatigue.   HENT:  Negative for congestion.    Eyes:  Negative for photophobia and pain.   Respiratory:  Negative for chest tightness and shortness of breath.    Cardiovascular:  Negative for chest pain and palpitations.   Gastrointestinal:  Negative for abdominal distention, abdominal pain and blood in stool.   Endocrine: Negative for polydipsia.   Genitourinary:  Negative for difficulty urinating, dysuria, flank pain, hematuria and urgency.   Musculoskeletal:  Negative for arthralgias and back pain.   Skin:  Negative for rash.   Neurological:  Negative for dizziness, light-headedness and headaches.   Hematological:  Does not bruise/bleed easily.   Psychiatric/Behavioral:  Negative for behavioral problems. The patient is not nervous/anxious.          PAST MEDICAL HISTORY:  Past Medical History:   Diagnosis Date    Acute renal failure superimposed on chronic kidney disease  12/31/2021    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HCC) 03/1999    SCCA left tongue base - T2N1M0- treated at [facility] surgery and XRT    Chronic kidney disease     COVID-19 with hypoxic respiratory failure 12/31/2021    Essential hypertension 05/06/2019    Hematuria 10/15/2019    Hyperlipidemia     Left anterior fascicular block     Stage 3 chronic kidney disease (HCC)     Tongue carcinoma  "([facility]) 02/14/2019       PAST SURGICAL HISTORY:  Past Surgical History:   Procedure Laterality Date    CYSTOSCOPY  08/30/2021    EYELID CLOSURE Left     to prevent eyelashes from rubbing cornea    HERNIA REPAIR Left     MODIFIED RADICAL NECK DISSECTION Left 03/03/1999    [facility] - with mandibulotomy and resection SCCA left tongue base - Dr. Artur Balderas    MYRINGOTOMY W/ TUBES Left 06/07/2023    office procedure - Dr. Wilkerson    TONGUE SURGERY      TONSILLECTOMY         SOCIAL HISTORY:  Social History     Substance and Sexual Activity   Alcohol Use Yes    Comment: occasional     Social History     Substance and Sexual Activity   Drug Use Never     Social History     Tobacco Use   Smoking Status Former    Passive exposure: Current   Smokeless Tobacco Former   Tobacco Comments    quit 20 years ago       FAMILY HISTORY:  Family History   Problem Relation Age of Onset    Hypertension Mother     Stroke Mother        MEDICATIONS:    Current Outpatient Medications:     aspirin 81 MG tablet, , Disp: , Rfl:     Multiple Vitamins-Minerals (MULTIVITAMIN ADULTS 50+ PO), Take by mouth, Disp: , Rfl:     Nutritional Supplements (Jevity 1.5 Marcos/Fiber) LIQD, Take 1600 calories daily, Disp: 9000 mL, Rfl: 3    pravastatin (PRAVACHOL) 20 mg tablet, Take 1 tablet (20 mg total) by mouth every other day, Disp: 90 tablet, Rfl: 1    VITAMIN D, CHOLECALCIFEROL, PO, Take by mouth, Disp: , Rfl:     PHYSICAL EXAM:  Vitals:    01/17/24 1133   BP: 120/80   BP Location: Right arm   Patient Position: Sitting   Pulse: 72   Resp: 18   Temp: 98 °F (36.7 °C)   TempSrc: Temporal   SpO2: 95%   Weight: 67.2 kg (148 lb 3.2 oz)   Height: 5' 10\" (1.778 m)     Body mass index is 21.26 kg/m².    Physical Exam  Constitutional:       General: He is not in acute distress.     Appearance: He is well-developed.   HENT:      Head: Normocephalic.      Mouth/Throat:      Mouth: Mucous membranes are moist.   Eyes:      General: No scleral icterus.     " Conjunctiva/sclera: Conjunctivae normal.   Neck:      Vascular: No JVD.   Cardiovascular:      Rate and Rhythm: Normal rate.      Heart sounds: Normal heart sounds.   Pulmonary:      Effort: Pulmonary effort is normal.      Breath sounds: No wheezing.   Abdominal:      Palpations: Abdomen is soft.      Tenderness: There is no abdominal tenderness.   Musculoskeletal:         General: Normal range of motion.      Cervical back: Neck supple.   Skin:     General: Skin is warm.      Findings: No rash.   Neurological:      Mental Status: He is alert and oriented to person, place, and time.   Psychiatric:         Behavior: Behavior normal.         LAB RESULTS:  Results for orders placed or performed in visit on 12/05/23   Basic metabolic panel   Result Value Ref Range    Sodium 143 135 - 147 mmol/L    Potassium 4.7 3.5 - 5.3 mmol/L    Chloride 105 96 - 108 mmol/L    CO2 31 21 - 32 mmol/L    ANION GAP 7 mmol/L    BUN 32 (H) 5 - 25 mg/dL    Creatinine 1.30 0.60 - 1.30 mg/dL    Glucose, Fasting 81 65 - 99 mg/dL    Calcium 9.6 8.4 - 10.2 mg/dL    eGFR 53 ml/min/1.73sq m   CBC and differential   Result Value Ref Range    WBC 7.96 4.31 - 10.16 Thousand/uL    RBC 4.80 3.88 - 5.62 Million/uL    Hemoglobin 13.9 12.0 - 17.0 g/dL    Hematocrit 44.0 36.5 - 49.3 %    MCV 92 82 - 98 fL    MCH 29.0 26.8 - 34.3 pg    MCHC 31.6 31.4 - 37.4 g/dL    RDW 13.2 11.6 - 15.1 %    MPV 10.1 8.9 - 12.7 fL    Platelets 381 149 - 390 Thousands/uL    nRBC 0 /100 WBCs    Neutrophils Relative 69 43 - 75 %    Immat GRANS % 0 0 - 2 %    Lymphocytes Relative 15 14 - 44 %    Monocytes Relative 8 4 - 12 %    Eosinophils Relative 7 (H) 0 - 6 %    Basophils Relative 1 0 - 1 %    Neutrophils Absolute 5.52 1.85 - 7.62 Thousands/µL    Immature Grans Absolute 0.02 0.00 - 0.20 Thousand/uL    Lymphocytes Absolute 1.16 0.60 - 4.47 Thousands/µL    Monocytes Absolute 0.62 0.17 - 1.22 Thousand/µL    Eosinophils Absolute 0.58 0.00 - 0.61 Thousand/µL    Basophils Absolute  0.06 0.00 - 0.10 Thousands/µL   Phosphorus   Result Value Ref Range    Phosphorus 3.8 2.3 - 4.1 mg/dL   Protein / creatinine ratio, urine   Result Value Ref Range    Creatinine, Ur 144.6 Reference range not established. mg/dL    Protein Urine Random 21 Reference range not established. mg/dL    Prot/Creat Ratio, Ur 0.15 (H) 0.00 - 0.10   PTH, intact   Result Value Ref Range    PTH 72.4 12.0 - 88.0 pg/mL   UA (URINE) with reflex to Scope   Result Value Ref Range    Color, UA Yellow     Clarity, UA Clear     Specific Gravity, UA 1.023 1.003 - 1.030    pH, UA 7.0 4.5, 5.0, 5.5, 6.0, 6.5, 7.0, 7.5, 8.0    Leukocytes, UA Trace (A) Negative    Nitrite, UA Negative Negative    Protein, UA 30 (1+) (A) Negative mg/dl    Glucose, UA Negative Negative mg/dl    Ketones, UA Negative Negative mg/dl    Urobilinogen, UA <2.0 <2.0 mg/dl mg/dl    Bilirubin, UA Negative Negative    Occult Blood, UA Negative Negative   Vitamin D 25 hydroxy   Result Value Ref Range    Vit D, 25-Hydroxy 64.7 30.0 - 100.0 ng/mL   Urine Microscopic   Result Value Ref Range    RBC, UA 2-4 (A) None Seen, 1-2 /hpf    WBC, UA 4-10 (A) None Seen, 1-2 /hpf    Epithelial Cells Occasional None Seen, Occasional /hpf    Bacteria, UA None Seen None Seen, Occasional /hpf    MUCUS THREADS Occasional (A) None Seen       ASSESSMENT and PLAN:      CKD (chronic kidney disease) stage 3, GFR 30-59 ml/min ([facility])  Lab Results   Component Value Date    EGFR 53 12/05/2023    EGFR 66 10/16/2023    EGFR 76 07/28/2023    CREATININE 1.30 12/05/2023    CREATININE 1.08 10/16/2023    CREATININE 0.96 07/28/2023   Renal function is getting worse.  I advised use more water to the feeding tube and will continue to monitor    Chronic kidney disease-mineral and bone disorder  Lab Results   Component Value Date    EGFR 53 12/05/2023    EGFR 66 10/16/2023    EGFR 76 07/28/2023    CREATININE 1.30 12/05/2023    CREATININE 1.08 10/16/2023    CREATININE 0.96 07/28/2023   PTH and phosphorus along  "with vitamin D are within acceptable range and will continue to monitor    History of carcinoma of tongue  Stable for now.  Does a feeding tube because of swallowing problem      Advised to increase water intake.  I will see him back in 6 months and we will get blood and urine test before that visit        Portions of the record may have been created with voice recognition software. Occasional wrong word or \"sound a like\" substitutions may have occurred due to the inherent limitations of voice recognition software. Read the chart carefully and recognize, using context, where substitutions have occurred.If you have any questions, please contact the dictating provider.   "

## 2024-01-17 NOTE — ASSESSMENT & PLAN NOTE
Lab Results   Component Value Date    EGFR 53 12/05/2023    EGFR 66 10/16/2023    EGFR 76 07/28/2023    CREATININE 1.30 12/05/2023    CREATININE 1.08 10/16/2023    CREATININE 0.96 07/28/2023   PTH and phosphorus along with vitamin D are within acceptable range and will continue to monitor

## 2024-01-26 ENCOUNTER — OFFICE VISIT (OUTPATIENT)
Dept: FAMILY MEDICINE CLINIC | Facility: CLINIC | Age: 79
End: 2024-01-26
Payer: MEDICARE

## 2024-01-26 ENCOUNTER — APPOINTMENT (OUTPATIENT)
Dept: RADIOLOGY | Facility: CLINIC | Age: 79
End: 2024-01-26
Payer: MEDICARE

## 2024-01-26 VITALS
TEMPERATURE: 97.3 F | OXYGEN SATURATION: 100 % | BODY MASS INDEX: 22.05 KG/M2 | SYSTOLIC BLOOD PRESSURE: 133 MMHG | DIASTOLIC BLOOD PRESSURE: 89 MMHG | WEIGHT: 154 LBS | HEIGHT: 70 IN | HEART RATE: 89 BPM

## 2024-01-26 DIAGNOSIS — L81.9 DISCOLORATION OF SKIN OF FINGER: ICD-10-CM

## 2024-01-26 DIAGNOSIS — L81.9 DISCOLORATION OF SKIN OF FINGER: Primary | ICD-10-CM

## 2024-01-26 PROBLEM — R47.81 SLURRED SPEECH: Status: RESOLVED | Noted: 2023-07-18 | Resolved: 2024-01-26

## 2024-01-26 PROBLEM — E43 SEVERE PROTEIN-CALORIE MALNUTRITION (HCC): Status: RESOLVED | Noted: 2023-07-21 | Resolved: 2024-01-26

## 2024-01-26 PROCEDURE — 99213 OFFICE O/P EST LOW 20 MIN: CPT | Performed by: FAMILY MEDICINE

## 2024-01-26 PROCEDURE — 73140 X-RAY EXAM OF FINGER(S): CPT

## 2024-01-26 NOTE — PROGRESS NOTES
Assessment/Plan:         Problem List Items Addressed This Visit    None  Visit Diagnoses     Discoloration of skin of finger    -  Primary    Relevant Orders    Uric acid    CHASE Screen w/ Reflex to Titer/Pattern    Rheumatoid Arthritis Factor    XR finger left fourth digit-ring        Unclear etiology - will check labs and xray    Subjective:      Patient ID: Sven Swain is a 78 y.o. male.    Here for discoloration of the ring and pinky finger at the DIP joint, left hand.  No pain.  Just noticed a few days ago. No drainage. Did notice a white bump there initially which has resolved.         The following portions of the patient's history were reviewed and updated as appropriate:   Past Medical History:  He has a past medical history of Acute renal failure superimposed on chronic kidney disease (12/31/2021), 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HCC) (03/1999), Chronic kidney disease, COVID-19 with hypoxic respiratory failure (12/31/2021), Essential hypertension (05/06/2019), Hematuria (10/15/2019), Hyperlipidemia, Left anterior fascicular block, Stage 3 chronic kidney disease (HCC), and 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HCC) (02/14/2019).,  _______________________________________________________________________  Medical Problems:  does not have any pertinent problems on file.,  _______________________________________________________________________  Past Surgical History:   has a past surgical history that includes Hernia repair (Left); Eyelid closure (Left); Modified radical neck dissection (Left, 03/03/1999); Tonsillectomy; Cystoscopy (08/30/2021); Tongue surgery; and Myringotomy w/ tubes (Left, 06/07/2023).,  _______________________________________________________________________  Family History:  family history includes Hypertension in his mother; Stroke in his mother.,  _______________________________________________________________________  Social History:   reports that he has quit smoking. He has been exposed to tobacco smoke. He  "has quit using smokeless tobacco. He reports current alcohol use. He reports that he does not use drugs.,  _______________________________________________________________________  Allergies:  is allergic to iodinated contrast media and iodine - food allergy..  _______________________________________________________________________  Current Outpatient Medications   Medication Sig Dispense Refill   • aspirin 81 MG tablet      • Multiple Vitamins-Minerals (MULTIVITAMIN ADULTS 50+ PO) Take by mouth     • Nutritional Supplements (Jevity 1.5 Marcos/Fiber) LIQD Take 1600 calories daily 9000 mL 3   • pravastatin (PRAVACHOL) 20 mg tablet Take 1 tablet (20 mg total) by mouth every other day 90 tablet 1   • VITAMIN D, CHOLECALCIFEROL, PO Take by mouth       No current facility-administered medications for this visit.     _______________________________________________________________________  Review of Systems   Musculoskeletal:  Positive for joint swelling. Negative for arthralgias.   Skin:  Positive for color change.         Objective:  Vitals:    01/26/24 1555   BP: 133/89   Pulse: 89   Temp: (!) 97.3 °F (36.3 °C)   SpO2: 100%   Weight: 69.9 kg (154 lb)   Height: 5' 10\" (1.778 m)     Body mass index is 22.1 kg/m².     Physical Exam  Vitals and nursing note reviewed.   Constitutional:       Appearance: Normal appearance.   Cardiovascular:      Pulses:           Radial pulses are 2+ on the left side.   Musculoskeletal:      Left hand: Swelling present. No tenderness. Normal capillary refill.   Neurological:      Mental Status: He is alert.         "

## 2024-01-27 ENCOUNTER — APPOINTMENT (OUTPATIENT)
Dept: LAB | Facility: CLINIC | Age: 79
End: 2024-01-27
Payer: MEDICARE

## 2024-01-27 DIAGNOSIS — L81.9 DISCOLORATION OF SKIN OF FINGER: Primary | ICD-10-CM

## 2024-01-27 LAB — URATE SERPL-MCNC: 5.1 MG/DL (ref 3.5–8.5)

## 2024-01-27 PROCEDURE — 36415 COLL VENOUS BLD VENIPUNCTURE: CPT

## 2024-01-27 PROCEDURE — 84550 ASSAY OF BLOOD/URIC ACID: CPT

## 2024-01-27 PROCEDURE — 86430 RHEUMATOID FACTOR TEST QUAL: CPT

## 2024-01-27 PROCEDURE — 86038 ANTINUCLEAR ANTIBODIES: CPT

## 2024-01-28 LAB
ANA SER QL IA: NEGATIVE
RHEUMATOID FACT SER QL LA: NEGATIVE

## 2024-01-30 ENCOUNTER — OFFICE VISIT (OUTPATIENT)
Dept: FAMILY MEDICINE CLINIC | Facility: CLINIC | Age: 79
End: 2024-01-30
Payer: MEDICARE

## 2024-01-30 VITALS
WEIGHT: 145.8 LBS | BODY MASS INDEX: 20.92 KG/M2 | TEMPERATURE: 98.6 F | DIASTOLIC BLOOD PRESSURE: 84 MMHG | HEART RATE: 78 BPM | OXYGEN SATURATION: 100 % | SYSTOLIC BLOOD PRESSURE: 142 MMHG

## 2024-01-30 DIAGNOSIS — L81.9 DISCOLORATION OF SKIN OF FINGER: Primary | ICD-10-CM

## 2024-01-30 DIAGNOSIS — N18.30 STAGE 3 CHRONIC KIDNEY DISEASE, UNSPECIFIED WHETHER STAGE 3A OR 3B CKD (HCC): ICD-10-CM

## 2024-01-30 PROCEDURE — 99214 OFFICE O/P EST MOD 30 MIN: CPT | Performed by: PHYSICIAN ASSISTANT

## 2024-01-30 NOTE — PROGRESS NOTES
Name: Sven Swain      : 1945      MRN: [id number]  Encounter Provider: Marlon Barone PA-C  Encounter Date: 2024   Encounter department: [facility]    Assessment & Plan     1. Discoloration of skin of finger  -     VAS upper limb arterial duplex, unilateral/limited, graft; Future; Expected date: 2024  -     CBC and differential; Future  -     C-reactive protein; Future  -     Sedimentation rate, automated; Future    2. Stage 3 chronic kidney disease, unspecified whether stage 3a or 3b CKD (HCC)  -     CBC and differential; Future  -     Comprehensive metabolic panel; Future    Seek LUE arterial study to r/o vascular source. There has been no progression. No pain, numbness, swelling appreciated. Further labs as above. Reviewed original RF, CHASE, uric acid, XR with pt. ? Reynaud phenomenon? Keep hands warm       Subjective     Pt presents with distal L 4th/5th digit discoloration. He was seen for same 4 days ago and had normal XR, RF, CHASE, uric acid. No change. No pain, numbness. No fevers. No bruising/bleeding/rashes. No chest pain, SOB.       Review of Systems   Constitutional:  Negative for chills, fatigue and fever.   HENT:  Negative for congestion, ear pain, hearing loss, nosebleeds, postnasal drip, rhinorrhea, sinus pressure, sinus pain, sneezing and sore throat.    Eyes:  Negative for pain, discharge, itching and visual disturbance.   Respiratory:  Negative for cough, chest tightness, shortness of breath and wheezing.    Cardiovascular:  Negative for chest pain, palpitations and leg swelling.   Gastrointestinal:  Negative for abdominal pain, blood in stool, constipation, diarrhea, nausea and vomiting.   Genitourinary:  Negative for frequency and urgency.   Skin:  Positive for color change.   Neurological:  Negative for dizziness, light-headedness and numbness.       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    SCCA left tongue base - T2N1M0- treated at [facility] surgery and XRT   • Chronic kidney disease    • COVID-19 with hypoxic respiratory failure 12/31/2021   • Essential hypertension 05/06/2019   • Hematuria 10/15/2019   • Hyperlipidemia    • Left anterior fascicular block    • Stage 3 chronic kidney disease (HCC)    • 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HCC) 02/14/2019     Past Surgical History:   Procedure Laterality Date   • CYSTOSCOPY  08/30/2021   • EYELID CLOSURE Left     to prevent eyelashes from rubbing cornea   • HERNIA REPAIR Left    • MODIFIED RADICAL NECK DISSECTION Left 03/03/1999    [facility] - with mandibulotomy and resection SCCA left tongue base - Dr. Artur Balderas   • MYRINGOTOMY W/ TUBES Left 06/07/2023    office procedure - Dr. Wilkerson   • TONGUE SURGERY     • TONSILLECTOMY       Family History   Problem Relation Age of Onset   • Hypertension Mother    • Stroke Mother      Social History     Socioeconomic History   • Marital status: /Civil Union     Spouse name: None   • Number of children: None   • Years of education: None   • Highest education level: None   Occupational History   • None   Tobacco Use   • Smoking status: Former     Passive exposure: Current   • Smokeless tobacco: Former   • Tobacco comments:     quit 20 years ago   Vaping Use   • Vaping status: Never Used   Substance and Sexual Activity   • Alcohol use: Yes     Comment: occasional   • Drug use: Never   • Sexual activity: Not Currently     Partners: Female   Other Topics Concern   • None   Social History Narrative   • None     Social Determinants of Health     Financial Resource Strain: Patient Declined (1/9/2024)    Overall Financial Resource Strain (CARDIA)    • Difficulty of Paying Living Expenses: Patient declined   Food Insecurity: No Food Insecurity (7/20/2023)    Hunger Vital Sign    • Worried About Running Out of Food in the Last Year: Never true     • Ran Out of Food in the Last Year: Never true   Transportation Needs: No Transportation Needs (1/9/2024)    PRAPARE - Transportation    • Lack of Transportation (Medical): No    • Lack of Transportation (Non-Medical): No   Physical Activity: Not on file   Stress: Not on file   Social Connections: Not on file   Intimate Partner Violence: Not on file   Housing Stability: Low Risk  (7/20/2023)    Housing Stability Vital Sign    • Unable to Pay for Housing in the Last Year: No    • Number of Places Lived in the Last Year: 1    • Unstable Housing in the Last Year: No     Current Outpatient Medications on File Prior to Visit   Medication Sig   • aspirin 81 MG tablet    • Multiple Vitamins-Minerals (MULTIVITAMIN ADULTS 50+ PO) Take by mouth   • Nutritional Supplements (Jevity 1.5 Marcos/Fiber) LIQD Take 1600 calories daily   • pravastatin (PRAVACHOL) 20 mg tablet Take 1 tablet (20 mg total) by mouth every other day   • VITAMIN D, CHOLECALCIFEROL, PO Take by mouth     Allergies   Allergen Reactions   • Iodinated Contrast Media Other (See Comments)   • Iodine - Food Allergy      Immunization History   Administered Date(s) Administered   • COVID-19 PFIZER VACCINE 0.3 ML IM 04/13/2021, 05/04/2021   • INFLUENZA 02/13/2018, 10/17/2018, 10/14/2019, 11/09/2021, 11/17/2022, 10/24/2023   • Influenza, high dose seasonal 0.7 mL 09/15/2020   • Tdap 01/20/2022   • influenza, trivalent, adjuvanted 09/15/2020       Objective     /84   Pulse 78   Temp 98.6 °F (37 °C)   Wt 66.1 kg (145 lb 12.8 oz)   SpO2 100%   BMI 20.92 kg/m²     Physical Exam  Vitals and nursing note reviewed.   Constitutional:       General: He is not in acute distress.     Appearance: Normal appearance. He is not ill-appearing.   HENT:      Head: Normocephalic and atraumatic.   Cardiovascular:      Pulses: Normal pulses.      Heart sounds: Murmur heard.   Pulmonary:      Effort: Pulmonary effort is normal. No respiratory distress.   Musculoskeletal:       Cervical back: Normal range of motion.   Skin:     Capillary Refill: Capillary refill takes more than 3 seconds.          Neurological:      Mental Status: He is alert.      Sensory: No sensory deficit.      Motor: No weakness.       Marlon Barone PA-C

## 2024-02-02 ENCOUNTER — TELEPHONE (OUTPATIENT)
Dept: FAMILY MEDICINE CLINIC | Facility: CLINIC | Age: 79
End: 2024-02-02

## 2024-02-02 DIAGNOSIS — R13.19 ESOPHAGEAL DYSPHAGIA: Primary | ICD-10-CM

## 2024-02-02 DIAGNOSIS — E43 SEVERE PROTEIN-CALORIE MALNUTRITION (HCC): ICD-10-CM

## 2024-02-02 NOTE — TELEPHONE ENCOUNTER
Pts wife called and said when Sonido was here on 1/30 he forgot to mention he would like a referral for the Gastroenterologist who comes to our office sometimes. Can you please put that referral in for him. Thanks

## 2024-02-12 ENCOUNTER — APPOINTMENT (OUTPATIENT)
Dept: LAB | Facility: CLINIC | Age: 79
End: 2024-02-12
Payer: MEDICARE

## 2024-02-12 DIAGNOSIS — N18.30 STAGE 3 CHRONIC KIDNEY DISEASE, UNSPECIFIED WHETHER STAGE 3A OR 3B CKD (HCC): ICD-10-CM

## 2024-02-12 DIAGNOSIS — I25.84 CORONARY ARTERY CALCIFICATION: ICD-10-CM

## 2024-02-12 DIAGNOSIS — L81.9 DISCOLORATION OF SKIN OF FINGER: ICD-10-CM

## 2024-02-12 DIAGNOSIS — E78.2 MIXED HYPERLIPIDEMIA: ICD-10-CM

## 2024-02-12 DIAGNOSIS — I25.10 CORONARY ARTERY CALCIFICATION: ICD-10-CM

## 2024-02-12 LAB
ALBUMIN SERPL BCP-MCNC: 4.1 G/DL (ref 3.5–5)
ALP SERPL-CCNC: 65 U/L (ref 34–104)
ALT SERPL W P-5'-P-CCNC: 12 U/L (ref 7–52)
ANION GAP SERPL CALCULATED.3IONS-SCNC: 5 MMOL/L
AST SERPL W P-5'-P-CCNC: 18 U/L (ref 13–39)
BASOPHILS # BLD AUTO: 0.05 THOUSANDS/ÂΜL (ref 0–0.1)
BASOPHILS NFR BLD AUTO: 1 % (ref 0–1)
BILIRUB SERPL-MCNC: 0.48 MG/DL (ref 0.2–1)
BUN SERPL-MCNC: 29 MG/DL (ref 5–25)
CALCIUM SERPL-MCNC: 9.6 MG/DL (ref 8.4–10.2)
CHLORIDE SERPL-SCNC: 103 MMOL/L (ref 96–108)
CHOLEST SERPL-MCNC: 145 MG/DL
CO2 SERPL-SCNC: 33 MMOL/L (ref 21–32)
CREAT SERPL-MCNC: 1.22 MG/DL (ref 0.6–1.3)
CRP SERPL QL: 5.5 MG/L
EOSINOPHIL # BLD AUTO: 0.29 THOUSAND/ÂΜL (ref 0–0.61)
EOSINOPHIL NFR BLD AUTO: 5 % (ref 0–6)
ERYTHROCYTE [DISTWIDTH] IN BLOOD BY AUTOMATED COUNT: 13.2 % (ref 11.6–15.1)
ERYTHROCYTE [SEDIMENTATION RATE] IN BLOOD: 11 MM/HOUR (ref 0–19)
GFR SERPL CREATININE-BSD FRML MDRD: 57 ML/MIN/1.73SQ M
GLUCOSE P FAST SERPL-MCNC: 86 MG/DL (ref 65–99)
HCT VFR BLD AUTO: 44.6 % (ref 36.5–49.3)
HDLC SERPL-MCNC: 49 MG/DL
HGB BLD-MCNC: 14.5 G/DL (ref 12–17)
IMM GRANULOCYTES # BLD AUTO: 0.01 THOUSAND/UL (ref 0–0.2)
IMM GRANULOCYTES NFR BLD AUTO: 0 % (ref 0–2)
LDLC SERPL CALC-MCNC: 70 MG/DL (ref 0–100)
LYMPHOCYTES # BLD AUTO: 1.32 THOUSANDS/ÂΜL (ref 0.6–4.47)
LYMPHOCYTES NFR BLD AUTO: 21 % (ref 14–44)
MCH RBC QN AUTO: 29.5 PG (ref 26.8–34.3)
MCHC RBC AUTO-ENTMCNC: 32.5 G/DL (ref 31.4–37.4)
MCV RBC AUTO: 91 FL (ref 82–98)
MONOCYTES # BLD AUTO: 0.61 THOUSAND/ÂΜL (ref 0.17–1.22)
MONOCYTES NFR BLD AUTO: 10 % (ref 4–12)
NEUTROPHILS # BLD AUTO: 4.12 THOUSANDS/ÂΜL (ref 1.85–7.62)
NEUTS SEG NFR BLD AUTO: 63 % (ref 43–75)
NRBC BLD AUTO-RTO: 0 /100 WBCS
PLATELET # BLD AUTO: 298 THOUSANDS/UL (ref 149–390)
PMV BLD AUTO: 10.8 FL (ref 8.9–12.7)
POTASSIUM SERPL-SCNC: 4.3 MMOL/L (ref 3.5–5.3)
PROT SERPL-MCNC: 7.4 G/DL (ref 6.4–8.4)
RBC # BLD AUTO: 4.91 MILLION/UL (ref 3.88–5.62)
SODIUM SERPL-SCNC: 141 MMOL/L (ref 135–147)
TRIGL SERPL-MCNC: 129 MG/DL
WBC # BLD AUTO: 6.4 THOUSAND/UL (ref 4.31–10.16)

## 2024-02-12 PROCEDURE — 80061 LIPID PANEL: CPT

## 2024-02-12 PROCEDURE — 36415 COLL VENOUS BLD VENIPUNCTURE: CPT

## 2024-02-12 PROCEDURE — 85025 COMPLETE CBC W/AUTO DIFF WBC: CPT

## 2024-02-12 PROCEDURE — 80053 COMPREHEN METABOLIC PANEL: CPT

## 2024-02-12 PROCEDURE — 86140 C-REACTIVE PROTEIN: CPT

## 2024-02-12 PROCEDURE — 85652 RBC SED RATE AUTOMATED: CPT

## 2024-02-16 ENCOUNTER — HOSPITAL ENCOUNTER (OUTPATIENT)
Dept: NON INVASIVE DIAGNOSTICS | Facility: HOSPITAL | Age: 79
Discharge: HOME | End: 2024-02-16
Payer: MEDICARE

## 2024-02-16 ENCOUNTER — RA CDI HCC (OUTPATIENT)
Dept: OTHER | Facility: HOSPITAL | Age: 79
End: 2024-02-16

## 2024-02-16 DIAGNOSIS — L81.9 DISCOLORATION OF SKIN OF FINGER: ICD-10-CM

## 2024-02-16 PROCEDURE — 93931 UPPER EXTREMITY STUDY: CPT

## 2024-02-16 PROCEDURE — 93931 UPPER EXTREMITY STUDY: CPT | Performed by: SURGERY

## 2024-02-19 DIAGNOSIS — L81.9 DISCOLORATION OF SKIN OF FINGER: Primary | ICD-10-CM

## 2024-02-20 ENCOUNTER — TELEPHONE (OUTPATIENT)
Age: 79
End: 2024-02-20

## 2024-02-21 PROBLEM — Z00.00 MEDICARE ANNUAL WELLNESS VISIT, SUBSEQUENT: Status: RESOLVED | Noted: 2019-01-10 | Resolved: 2024-02-21

## 2024-02-23 ENCOUNTER — OFFICE VISIT (OUTPATIENT)
Dept: FAMILY MEDICINE CLINIC | Facility: CLINIC | Age: 79
End: 2024-02-23
Payer: MEDICARE

## 2024-02-23 VITALS
BODY MASS INDEX: 20.95 KG/M2 | TEMPERATURE: 97.4 F | DIASTOLIC BLOOD PRESSURE: 78 MMHG | SYSTOLIC BLOOD PRESSURE: 112 MMHG | WEIGHT: 146 LBS | HEART RATE: 68 BPM

## 2024-02-23 DIAGNOSIS — L98.9 SKIN LESION: Primary | ICD-10-CM

## 2024-02-23 DIAGNOSIS — E46 PROTEIN-CALORIE MALNUTRITION, UNSPECIFIED SEVERITY (HCC): ICD-10-CM

## 2024-02-23 PROCEDURE — 99214 OFFICE O/P EST MOD 30 MIN: CPT | Performed by: FAMILY MEDICINE

## 2024-02-23 RX ORDER — FLUCONAZOLE 150 MG/1
150 TABLET ORAL WEEKLY
Qty: 4 TABLET | Refills: 0 | Status: SHIPPED | OUTPATIENT
Start: 2024-02-23 | End: 2024-03-16

## 2024-02-23 NOTE — PROGRESS NOTES
Name: Sven Swain      : 1945      MRN: [id number]  Encounter Provider: Salomón Liu MD  Encounter Date: 2024   Encounter department: [facility]    Assessment & Plan     1. Skin lesion  Infectious vs rheumatological?  Recommend to keep hands warm.  -     fluconazole (DIFLUCAN) 150 mg tablet; Take 1 tablet (150 mg total) by mouth once a week for 4 doses  -     Ambulatory Referral to Dermatology; Future    2. Protein-calorie malnutrition, unspecified severity (HCC)  Continue nutrition through PEG tube.    Follow up with rheumatology and dermatology if symptoms continue           Subjective     Patient is here due to skin lesion on the tips of his fingers, red and inflamed. Denies any pain or swelling associated with it. Denies any injuries to his fingers.      Review of Systems   Constitutional:  Negative for activity change, appetite change, fatigue and fever.   HENT:  Negative for congestion and ear discharge.    Respiratory:  Negative for cough and shortness of breath.    Cardiovascular:  Negative for chest pain and palpitations.   Gastrointestinal:  Negative for diarrhea and nausea.   Musculoskeletal:  Negative for arthralgias and back pain.   Skin:  Positive for color change. Negative for rash.   Neurological:  Negative for dizziness and headaches.   Psychiatric/Behavioral:  Negative for agitation and behavioral problems.        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[facility] surgery and XRT    Chronic kidney disease     COVID-19 with hypoxic respiratory failure 2021    Essential hypertension 2019    Hematuria 10/15/2019    Hyperlipidemia     Left anterior fascicular block     Stage 3 chronic kidney disease (HCC)     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HCC) 2019     Past Surgical History:   Procedure  Laterality Date    CYSTOSCOPY  08/30/2021    EYELID CLOSURE Left     to prevent eyelashes from rubbing cornea    HERNIA REPAIR Left     MODIFIED RADICAL NECK DISSECTION Left 03/03/1999    [facility] - with mandibulotomy and resection SCCA left tongue base - Dr. Artur Balderas    MYRINGOTOMY W/ TUBES Left 06/07/2023    office procedure - Dr. Wilkerson    TONGUE SURGERY      TONSILLECTOMY       Family History   Problem Relation Age of Onset    Hypertension Mother     Stroke Mother      Social History     Socioeconomic History    Marital status: /Civil Union     Spouse name: None    Number of children: None    Years of education: None    Highest education level: None   Occupational History    None   Tobacco Use    Smoking status: Former     Passive exposure: Current    Smokeless tobacco: Former    Tobacco comments:     quit 20 years ago   Vaping Use    Vaping status: Never Used   Substance and Sexual Activity    Alcohol use: Yes     Comment: occasional    Drug use: Never    Sexual activity: Not Currently     Partners: Female   Other Topics Concern    None   Social History Narrative    None     Social Determinants of Health     Financial Resource Strain: Patient Declined (1/9/2024)    Overall Financial Resource Strain (CARDIA)     Difficulty of Paying Living Expenses: Patient declined   Food Insecurity: No Food Insecurity (7/20/2023)    Hunger Vital Sign     Worried About Running Out of Food in the Last Year: Never true     Ran Out of Food in the Last Year: Never true   Transportation Needs: No Transportation Needs (1/9/2024)    PRAPARE - Transportation     Lack of Transportation (Medical): No     Lack of Transportation (Non-Medical): No   Physical Activity: Not on file   Stress: Not on file   Social Connections: Not on file   Intimate Partner Violence: Not on file   Housing Stability: Low Risk  (7/20/2023)    Housing Stability Vital Sign     Unable to Pay for Housing in the Last Year: No     Number of  Places Lived in the Last Year: 1     Unstable Housing in the Last Year: No     Current Outpatient Medications on File Prior to Visit   Medication Sig    aspirin 81 MG tablet     Multiple Vitamins-Minerals (MULTIVITAMIN ADULTS 50+ PO) Take by mouth    Nutritional Supplements (Jevity 1.5 Marcos/Fiber) LIQD Take 1600 calories daily    pravastatin (PRAVACHOL) 20 mg tablet Take 1 tablet (20 mg total) by mouth every other day    VITAMIN D, CHOLECALCIFEROL, PO Take by mouth     Allergies   Allergen Reactions    Iodinated Contrast Media Other (See Comments)    Iodine - Food Allergy      Immunization History   Administered Date(s) Administered    COVID-19 PFIZER VACCINE 0.3 ML IM 04/13/2021, 05/04/2021    INFLUENZA 02/13/2018, 10/17/2018, 10/14/2019, 11/09/2021, 11/17/2022, 10/24/2023    Influenza, high dose seasonal 0.7 mL 09/15/2020    Tdap 01/20/2022    influenza, trivalent, adjuvanted 09/15/2020       Objective     /78   Pulse 68   Temp (!) 97.4 °F (36.3 °C)   Wt 66.2 kg (146 lb)   BMI 20.95 kg/m²     Physical Exam  Constitutional:       General: He is not in acute distress.     Appearance: He is well-developed. He is not diaphoretic.   Eyes:      General: No scleral icterus.     Pupils: Pupils are equal, round, and reactive to light.   Cardiovascular:      Rate and Rhythm: Normal rate and regular rhythm.      Heart sounds: Normal heart sounds. No murmur heard.  Pulmonary:      Effort: Pulmonary effort is normal. No respiratory distress.      Breath sounds: Normal breath sounds. No wheezing.   Abdominal:      General: Bowel sounds are normal. There is no distension.      Palpations: Abdomen is soft.      Tenderness: There is no abdominal tenderness.   Skin:     General: Skin is warm and dry.      Findings: No rash.      Comments: Left middle, ring and index finger distal tip red and swollen.   Neurological:      Mental Status: He is alert and oriented to person, place, and time.       Salomón Liu MD

## 2024-02-27 ENCOUNTER — OFFICE VISIT (OUTPATIENT)
Age: 79
End: 2024-02-27
Payer: MEDICARE

## 2024-02-27 VITALS
SYSTOLIC BLOOD PRESSURE: 124 MMHG | DIASTOLIC BLOOD PRESSURE: 70 MMHG | BODY MASS INDEX: 20.9 KG/M2 | HEIGHT: 70 IN | OXYGEN SATURATION: 97 % | WEIGHT: 146 LBS | HEART RATE: 65 BPM

## 2024-02-27 DIAGNOSIS — K11.7 EXCESSIVE SALIVATION: Primary | ICD-10-CM

## 2024-02-27 DIAGNOSIS — Z93.1 STATUS POST INSERTION OF PERCUTANEOUS ENDOSCOPIC GASTROSTOMY (PEG) TUBE (HCC): ICD-10-CM

## 2024-02-27 PROCEDURE — 99213 OFFICE O/P EST LOW 20 MIN: CPT | Performed by: INTERNAL MEDICINE

## 2024-02-27 NOTE — PROGRESS NOTES
[facility]      Chief Complaint: PEG check    HPI:  Sven Swain is a 79 y.o.  male who presents with history of percutaneous endoscopic gastrostomy.  Patient has a history of carcinoma of the base of the tongue with radiation.  He has almost complete dysphagia.  He has some trouble with salivation especially at night.  He has a percutaneous gastrostomy tube which was placed endoscopically.  Patient is feeding well through this.  It is functioning well.  No pain.  No drainage.  No blockage.  No complaints at the present time.  He has gained 20 pounds..  He is currently on 2000 xander a day given an separate boluses.  He does sit upright following the boluses.      Review of Systems:   Constitutional: No fever or chills, feels fair, no tiredness, recent weight gain  HENT: Scarring and deformity from lingual carcinoma  Eyes: No complaints of eye pain, no red eyes, no discharge from eyes, no itchy eyes.  Cardiovascular: No complaints of slow heart rate, no fast heart rate, no chest pain, no palpitations, no leg claudication, no lower extremity edema.   Respiratory: No complaints of shortness of breath, no wheezing, no cough, no SOB on exertion, no orthopnea.   Gastrointestinal: As noted in HPI  Genitourinary: No complaints of dysuria, no incontinence, no hesitancy, no nocturia.   Musculoskeletal: No complaints of arthralgia, no myalgias, no joint swelling or stiffness, no limb pain or swelling.   Neurological: No complaints of headache, no confusion, no convulsions, no numbness or tingling, no dizziness or fainting, no limb weakness, no difficulty walking.    Skin: No complaints of skin rash or skin lesions, no itching, no skin wound, no dry skin.    Hematological/Lymphatic: No complaints of swollen glands, does not bleed easy.   Allergic/Immunologic: No immunocompromised state.  Endocrine:  No complaints of polyuria, no polydipsia.   Psychiatric/Behavioral: is not suicidal, no sleep  "disturbances, no anxiety or depression, no change in personality, no emotional problems.       Historical Information   Past Medical History:   Diagnosis Date    Acute renal failure superimposed on chronic kidney disease  12/31/2021    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HCC) 03/1999    SCCA left tongue base - T2N1M0- treated at [facility] - had surgery and XRT    Chronic kidney disease     COVID-19 with hypoxic respiratory failure 12/31/2021    Essential hypertension 05/06/2019    Hematuria 10/15/2019    Hyperlipidemia     Left anterior fascicular block     Stage 3 chronic kidney disease (HCC)     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HCC) 02/14/2019     Past Surgical History:   Procedure Laterality Date    CYSTOSCOPY  08/30/2021    EYELID CLOSURE Left     to prevent eyelashes from rubbing cornea    HERNIA REPAIR Left     MODIFIED RADICAL NECK DISSECTION Left 03/03/1999    [facility] - with mandibulotomy and resection SCCA left tongue base - Dr. Artur Balderas    MYRINGOTOMY W/ TUBES Left 06/07/2023    office procedure - Dr. Wilkerson    TONGUE SURGERY      TONSILLECTOMY       Social History   Social History     Substance and Sexual Activity   Alcohol Use Yes    Comment: occasional     Social History     Substance and Sexual Activity   Drug Use Never     Social History     Tobacco Use   Smoking Status Former    Passive exposure: Current   Smokeless Tobacco Former   Tobacco Comments    quit 20 years ago     Family History   Problem Relation Age of Onset    Hypertension Mother     Stroke Mother          Current Medications: has a current medication list which includes the following prescription(s): aspirin, fluconazole, multiple vitamins-minerals, jevity 1.5 xander/fiber, pravastatin, and cholecalciferol.        Vital Signs: /70   Pulse 65   Ht 5' 10\" (1.778 m)   Wt 66.2 kg (146 lb)   SpO2 97%   BMI 20.95 kg/m²       Physical Exam:   Constitutional  General Appearance: No acute distress, well appearing and well " nourished  Head  Normocephalic  Eyes  Conjunctivae and lids: No swelling, erythema, or discharge.    Pupils and irises: Equal, round and reactive to light.   Ears, Nose, Mouth, and Throat  External inspection of ears and nose: Normal  Nasal mucosa, septum and turbinates: Normal without edema or erythema/   Oropharynx: Postsurgical changes  Neck  Normal range of motion. Neck supple.   Cardiovascular  Auscultation of the heart: Normal rate and rhythm, normal S1 and S2 without murmurs.  Examination of the extremities for edema and/or varicosities: Normal  Pulmonary/Chest  Respiratory effort: No increased work of breathing or signs of respiratory distress.   Auscultation of lungs: Clear to auscultation, equal breath sounds bilaterally, no wheezes, rales, no rhonchi.   Abdomen  Abdomen: Non-tender, no masses.  Completely normal PEG site  Liver and spleen: No hepatomegaly or splenomegaly.   Musculoskeletal  Gait and station: normal.  Digits and Nails: normal without clubbing or cyanosis.  Inspection/palpation of joints, bones, and muscles: Normal  Neurological  No nystagmus or asterixis.   Skin  Skin and subcutaneous tissue: Normal without rashes or lesions.   Lymphatic  Palpation of the lymph nodes in neck: No lymphadenopathy.   Psychiatric  Orientation to person, place and time: Normal.  Mood and affect: Normal.         Labs:  Lab Results   Component Value Date    ALT 12 02/12/2024    AST 18 02/12/2024    BUN 29 (H) 02/12/2024    CALCIUM 9.6 02/12/2024     02/12/2024    CO2 33 (H) 02/12/2024    CREATININE 1.22 02/12/2024    HDL 49 02/12/2024    HCT 44.6 02/12/2024    HGB 14.5 02/12/2024    MG 2.3 07/15/2023    PHOS 3.8 12/05/2023     02/12/2024    K 4.3 02/12/2024    PSA 1.7 02/16/2022    TRIG 129 02/12/2024    WBC 6.40 02/12/2024         X-Rays & Procedures:   No orders to display           ______________________________________________________________________      Assessment & Plan:     Diagnoses and all  orders for this visit:    Excessive salivation    Status post insertion of percutaneous endoscopic gastrostomy (PEG) tube (HCC)      PEG site looks excellent.  The PEG is functioning well.  The patient has gained over 20 pounds.  I cannot offer him anything for his salivation at this point.  He is already seen ENT.  He will see me in 6 months for a follow-up

## 2024-05-01 ENCOUNTER — TELEPHONE (OUTPATIENT)
Age: 79
End: 2024-05-01

## 2024-05-01 NOTE — TELEPHONE ENCOUNTER
Called patient and left a detailed message that Dr. CEDEÑO would like to switch the in-person office visit to virtual tomorrow. Asked patient to call back to confirm.

## 2024-05-02 ENCOUNTER — TELEPHONE (OUTPATIENT)
Dept: RHEUMATOLOGY | Facility: CLINIC | Age: 79
End: 2024-05-02

## 2024-05-06 ENCOUNTER — CONSULT (OUTPATIENT)
Dept: RHEUMATOLOGY | Facility: CLINIC | Age: 79
End: 2024-05-06
Payer: MEDICARE

## 2024-05-06 VITALS
OXYGEN SATURATION: 98 % | WEIGHT: 149 LBS | SYSTOLIC BLOOD PRESSURE: 148 MMHG | HEIGHT: 70 IN | HEART RATE: 63 BPM | BODY MASS INDEX: 21.33 KG/M2 | TEMPERATURE: 96.4 F | DIASTOLIC BLOOD PRESSURE: 80 MMHG

## 2024-05-06 DIAGNOSIS — L81.9 DISCOLORATION OF SKIN OF FINGER: Primary | ICD-10-CM

## 2024-05-06 PROCEDURE — 99204 OFFICE O/P NEW MOD 45 MIN: CPT | Performed by: STUDENT IN AN ORGANIZED HEALTH CARE EDUCATION/TRAINING PROGRAM

## 2024-05-06 NOTE — PATIENT INSTRUCTIONS
I am not sure what is causing the redness of your fingers but I am confident it is not a rheumatologic disease.

## 2024-05-06 NOTE — PROGRESS NOTES
"Rheumatology Outpatient Consult Note  5/6/2024       Marlon Barone PA-C  [address]  [place] [address]    Reason for referral: \"Discoloration of skin of finger\"    Assessment: 79 y.o. male referred for the above.    Finger symptoms not consistent with Raynaud's    Good cap refill on exam and normal vascular testing    Honestly I'm not sure what to make of this but I am confident that it is not related to a systemic rheumatologic illness as he has no symptoms concerning for a CTD or inflammatory arthritis    He does have an appointment with Dermatology already scheduled for the blister-like lesion on the finger    Encounter Diagnosis     ICD-10-CM    1. Discoloration of skin of finger  L81.9 Ambulatory Referral to Rheumatology          Plan:  -No Rheumatology follow-up needed for these symptoms    Trever Acuña DO, TC Acuña DO, [facility]     History: Sven Swain is a(n) 79 y.o. male who is referred for the above.    L 4th and 4th fingers turn bright red, sometimes gets bumps on the fingers.  One finger at a time. Has had BPs checked in the arm, no issues. No pain.    Not sure if temperature related.     Was given an antifungal, wasn't sure if it helped. This has been going on for about a month or two.    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[facility] surgery and XRT    Chronic kidney disease     COVID-19 with hypoxic respiratory failure 12/31/2021    Essential " "hypertension 05/06/2019    Hematuria 10/15/2019    Hyperlipidemia     Left anterior fascicular block     Stage 3 chronic kidney disease (HCC)     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HCC) 02/14/2019       Past Surgical History:   Procedure Laterality Date    CYSTOSCOPY  08/30/2021    EYELID CLOSURE Left     to prevent eyelashes from rubbing cornea    HERNIA REPAIR Left     MODIFIED RADICAL NECK DISSECTION Left 03/03/1999    [facility] - with mandibulotomy and resection SCCA left tongue base - Dr. Artur Balderas    MYRINGOTOMY W/ TUBES Left 06/07/2023    office procedure - Dr. Wilkerson    TONGUE SURGERY      TONSILLECTOMY         Outpatient Medications Marked as Taking for the 5/6/24 encounter (Consult) with Trever Acuña, DO   Medication    aspirin 81 MG tablet    Multiple Vitamins-Minerals (MULTIVITAMIN ADULTS 50+ PO)    Nutritional Supplements (Jevity 1.5 Marcos/Fiber) LIQD    pravastatin (PRAVACHOL) 20 mg tablet    VITAMIN D, CHOLECALCIFEROL, PO       Allergies as of 05/06/2024 - Reviewed 05/06/2024   Allergen Reaction Noted    Iodinated contrast media Other (See Comments) 02/14/2019    Iodine - food allergy  11/30/2016       Family History   Problem Relation Age of Onset    Hypertension Mother     Stroke Mother        Social History:  Social History     Tobacco Use    Smoking status: Former     Passive exposure: Current    Smokeless tobacco: Former    Tobacco comments:     quit 20 years ago   Vaping Use    Vaping status: Never Used   Substance Use Topics    Alcohol use: Yes     Comment: occasional    Drug use: Never       Review of Systems: Pertinent findings documented in HPI    ___________________________________    Physical Exam:    /80   Pulse 63   Temp (!) 96.4 °F (35.8 °C)   Ht 5' 10\" (1.778 m)   Wt 67.6 kg (149 lb)   SpO2 98%   BMI 21.38 kg/m²     General: Well appearing, in no distress.   Eyes: EOMI  HENT: No oral ulcers. MMM.   Extremities: Warm, well perfused, no edema. Good capillary refill " bilateral fingers  Neuro: Alert and oriented.  Skin: No rashes. Small indurated and darkened patch of skin medial L DIP  Musculoskeletal exam: no tenderness to palpation or synovitis any joint. Degen changes bilateral hands.   ____________________________    Lab Result Review: relevant labs reviewed   Latest Reference Range & Units 01/27/24 10:05 02/12/24 08:23   Sed Rate 0 - 19 mm/hour  11   CHASE SCREEN Negative  Negative    RHEUMATOID FACTOR Negative  Negative    C-REACTIVE PROTEIN <3.0 mg/L  5.5 (H)   (H): Data is abnormally high    Imaging Result Review: relevant images reviewed  I have independently reviewed the following images.  4th finger roentgenogram: no evidence of MCP involvement, juxtaarticular new bone formation, erosion to suggest inflammatory arthritis

## 2024-05-08 ENCOUNTER — TELEPHONE (OUTPATIENT)
Age: 79
End: 2024-05-08

## 2024-05-08 NOTE — TELEPHONE ENCOUNTER
Reason for call:   [x] Refill   [] Prior Auth  [x] Other: medication was Dx'd by another provider please advise.     Office:   [x] PCP/Provider - Noe MENDOZA   [] Specialty/Provider -     Medication: glycopyrrolate (ROBINUL)     Dose/Frequency: 1 mg tablet  - three times daily     Quantity: 60    Pharmacy: [facility]     Does the patient have enough for 3 days?   [] Yes   [x] No - Send as HP to POD

## 2024-05-09 ENCOUNTER — TELEPHONE (OUTPATIENT)
Age: 79
End: 2024-05-09

## 2024-05-09 DIAGNOSIS — K11.7 EXCESSIVE SALIVATION: Primary | ICD-10-CM

## 2024-05-09 RX ORDER — GLYCOPYRROLATE 1 MG/1
1 TABLET ORAL 3 TIMES DAILY
Qty: 90 TABLET | Refills: 3 | Status: SHIPPED | OUTPATIENT
Start: 2024-05-09

## 2024-05-09 NOTE — TELEPHONE ENCOUNTER
Patient needs a refill of his Glycopyrrolate that he takes three times daily. This medication can be sent to the [facility]  #[id number].  Thank you

## 2024-05-09 NOTE — TELEPHONE ENCOUNTER
Please see previous telephone encounter. Per Dr. Liu the patient was taken off of this medication by Dr. Fox his Cardiologist. He will need to reach out to them for this refill. This writer forwarded this to the Cardiology Clinical POD

## 2024-05-13 ENCOUNTER — TELEPHONE (OUTPATIENT)
Age: 79
End: 2024-05-13

## 2024-05-13 NOTE — TELEPHONE ENCOUNTER
Pt wife is calling to request that a new Rx be placed in system for intro feeding [employer] 1.5 Jevity Calories       Please fax to for insurance     FAX # [phone]

## 2024-05-13 NOTE — TELEPHONE ENCOUNTER
Pt wife is calling to request that a new Rx be placed in system for intro feeding [employer] 1.5 Jevity Calories     Please fax to for insurance    FAX # [phone]

## 2024-05-14 NOTE — TELEPHONE ENCOUNTER
Pts wife called in again wanting to make sure we have the correct fax for [employer] for the above items. Fax number [phone].

## 2024-05-14 NOTE — TELEPHONE ENCOUNTER
Caller: Patients spouse- Mi     Doctor: Dr Liu    Reason for call: Patients spouse  is calling in wanting to get a new script placed for the intro feeding to [employer] for the 1.5 Jevity calories as he gets six a day. He get enfit syringes, pads to go around the tubing and tape she is stating that all what is needed needs to be listed on the script in order for them to provide him with this all. Please fax this to the insurance company fax # [phone]. She is wanting us to contact the insurance since they are very specific with what they want so that way we can get this figured out and get it for him as Medicare is now wanting this updated every 6 months rather than every year.     Call back#: [phone]

## 2024-05-16 LAB

## 2024-05-31 ENCOUNTER — TELEPHONE (OUTPATIENT)
Age: 79
End: 2024-05-31

## 2024-05-31 DIAGNOSIS — E43 SEVERE PROTEIN-CALORIE MALNUTRITION (HCC): ICD-10-CM

## 2024-05-31 NOTE — TELEPHONE ENCOUNTER
Mi, wife of Sven called in stating that the script Dr. Liu sent for pt liquid nutrition is incorrect.     Script states it was for 169 Bottles. Wife stated that is should be: 180 Bottles of 1.5 Jevity Liquid Nutrition.     Please advise & call either Mi or Jybe for any further information or questions. Thank you!    [facility]: [phone]   Mi Swain ()   [phone] (Mobile)

## 2024-06-24 ENCOUNTER — TELEPHONE (OUTPATIENT)
Age: 79
End: 2024-06-24

## 2024-06-24 DIAGNOSIS — R09.81 NASAL CONGESTION: Primary | ICD-10-CM

## 2024-06-24 RX ORDER — FLUTICASONE PROPIONATE 50 MCG
1 SPRAY, SUSPENSION (ML) NASAL DAILY
Qty: 16 G | Refills: 1 | Status: SHIPPED | OUTPATIENT
Start: 2024-06-24

## 2024-06-24 NOTE — TELEPHONE ENCOUNTER
Patient called to request a refill for their   fluticasone (FLONASE) 50 mcg/act nasal spray  .  NO REFILLS REMAIN

## 2024-07-15 ENCOUNTER — TELEPHONE (OUTPATIENT)
Dept: NEPHROLOGY | Facility: CLINIC | Age: 79
End: 2024-07-15

## 2024-07-19 ENCOUNTER — APPOINTMENT (OUTPATIENT)
Dept: LAB | Facility: CLINIC | Age: 79
End: 2024-07-19
Payer: MEDICARE

## 2024-07-19 DIAGNOSIS — M89.9 CHRONIC KIDNEY DISEASE-MINERAL AND BONE DISORDER: ICD-10-CM

## 2024-07-19 DIAGNOSIS — N18.9 CHRONIC KIDNEY DISEASE-MINERAL AND BONE DISORDER: ICD-10-CM

## 2024-07-19 DIAGNOSIS — E83.9 CHRONIC KIDNEY DISEASE-MINERAL AND BONE DISORDER: ICD-10-CM

## 2024-07-19 DIAGNOSIS — N18.30 STAGE 3 CHRONIC KIDNEY DISEASE, UNSPECIFIED WHETHER STAGE 3A OR 3B CKD (HCC): ICD-10-CM

## 2024-07-19 LAB
25(OH)D3 SERPL-MCNC: 65.4 NG/ML (ref 30–100)
ANION GAP SERPL CALCULATED.3IONS-SCNC: 6 MMOL/L (ref 4–13)
BACTERIA UR QL AUTO: NORMAL /HPF
BASOPHILS # BLD AUTO: 0.05 THOUSANDS/ÂΜL (ref 0–0.1)
BASOPHILS NFR BLD AUTO: 1 % (ref 0–1)
BILIRUB UR QL STRIP: NEGATIVE
BUN SERPL-MCNC: 31 MG/DL (ref 5–25)
CALCIUM SERPL-MCNC: 10 MG/DL (ref 8.4–10.2)
CHLORIDE SERPL-SCNC: 103 MMOL/L (ref 96–108)
CLARITY UR: CLEAR
CO2 SERPL-SCNC: 33 MMOL/L (ref 21–32)
COLOR UR: YELLOW
CREAT SERPL-MCNC: 1.42 MG/DL (ref 0.6–1.3)
CREAT UR-MCNC: 119.8 MG/DL
EOSINOPHIL # BLD AUTO: 0.34 THOUSAND/ÂΜL (ref 0–0.61)
EOSINOPHIL NFR BLD AUTO: 5 % (ref 0–6)
ERYTHROCYTE [DISTWIDTH] IN BLOOD BY AUTOMATED COUNT: 13 % (ref 11.6–15.1)
GFR SERPL CREATININE-BSD FRML MDRD: 47 ML/MIN/1.73SQ M
GLUCOSE SERPL-MCNC: 56 MG/DL (ref 65–140)
GLUCOSE UR STRIP-MCNC: NEGATIVE MG/DL
HCT VFR BLD AUTO: 45.1 % (ref 36.5–49.3)
HGB BLD-MCNC: 14.9 G/DL (ref 12–17)
HGB UR QL STRIP.AUTO: NEGATIVE
IMM GRANULOCYTES # BLD AUTO: 0.02 THOUSAND/UL (ref 0–0.2)
IMM GRANULOCYTES NFR BLD AUTO: 0 % (ref 0–2)
KETONES UR STRIP-MCNC: NEGATIVE MG/DL
LEUKOCYTE ESTERASE UR QL STRIP: NEGATIVE
LYMPHOCYTES # BLD AUTO: 1.14 THOUSANDS/ÂΜL (ref 0.6–4.47)
LYMPHOCYTES NFR BLD AUTO: 17 % (ref 14–44)
MCH RBC QN AUTO: 30.7 PG (ref 26.8–34.3)
MCHC RBC AUTO-ENTMCNC: 33 G/DL (ref 31.4–37.4)
MCV RBC AUTO: 93 FL (ref 82–98)
MONOCYTES # BLD AUTO: 0.52 THOUSAND/ÂΜL (ref 0.17–1.22)
MONOCYTES NFR BLD AUTO: 8 % (ref 4–12)
NEUTROPHILS # BLD AUTO: 4.72 THOUSANDS/ÂΜL (ref 1.85–7.62)
NEUTS SEG NFR BLD AUTO: 69 % (ref 43–75)
NITRITE UR QL STRIP: NEGATIVE
NON-SQ EPI CELLS URNS QL MICRO: NORMAL /HPF
NRBC BLD AUTO-RTO: 0 /100 WBCS
PH UR STRIP.AUTO: 7 [PH]
PHOSPHATE SERPL-MCNC: 3.4 MG/DL (ref 2.3–4.1)
PLATELET # BLD AUTO: 270 THOUSANDS/UL (ref 149–390)
PMV BLD AUTO: 11 FL (ref 8.9–12.7)
POTASSIUM SERPL-SCNC: 4.9 MMOL/L (ref 3.5–5.3)
PROT UR STRIP-MCNC: ABNORMAL MG/DL
PROT UR-MCNC: 11 MG/DL
PROT/CREAT UR: 0.09 MG/G{CREAT} (ref 0–0.1)
PTH-INTACT SERPL-MCNC: 65.1 PG/ML (ref 12–88)
RBC # BLD AUTO: 4.86 MILLION/UL (ref 3.88–5.62)
RBC #/AREA URNS AUTO: NORMAL /HPF
SODIUM SERPL-SCNC: 142 MMOL/L (ref 135–147)
SP GR UR STRIP.AUTO: 1.02 (ref 1–1.03)
UROBILINOGEN UR STRIP-ACNC: <2 MG/DL
WBC # BLD AUTO: 6.79 THOUSAND/UL (ref 4.31–10.16)
WBC #/AREA URNS AUTO: NORMAL /HPF

## 2024-07-19 PROCEDURE — 84100 ASSAY OF PHOSPHORUS: CPT

## 2024-07-19 PROCEDURE — 84156 ASSAY OF PROTEIN URINE: CPT

## 2024-07-19 PROCEDURE — 36415 COLL VENOUS BLD VENIPUNCTURE: CPT

## 2024-07-19 PROCEDURE — 85025 COMPLETE CBC W/AUTO DIFF WBC: CPT

## 2024-07-19 PROCEDURE — 81001 URINALYSIS AUTO W/SCOPE: CPT

## 2024-07-19 PROCEDURE — 82306 VITAMIN D 25 HYDROXY: CPT

## 2024-07-19 PROCEDURE — 83970 ASSAY OF PARATHORMONE: CPT

## 2024-07-19 PROCEDURE — 80048 BASIC METABOLIC PNL TOTAL CA: CPT

## 2024-07-19 PROCEDURE — 82570 ASSAY OF URINE CREATININE: CPT

## 2024-07-26 ENCOUNTER — TELEPHONE (OUTPATIENT)
Dept: NEPHROLOGY | Facility: CLINIC | Age: 79
End: 2024-07-26

## 2024-07-29 ENCOUNTER — OFFICE VISIT (OUTPATIENT)
Dept: NEPHROLOGY | Facility: CLINIC | Age: 79
End: 2024-07-29
Payer: MEDICARE

## 2024-07-29 VITALS
OXYGEN SATURATION: 95 % | BODY MASS INDEX: 21.79 KG/M2 | HEART RATE: 78 BPM | DIASTOLIC BLOOD PRESSURE: 70 MMHG | WEIGHT: 152.2 LBS | RESPIRATION RATE: 18 BRPM | HEIGHT: 70 IN | SYSTOLIC BLOOD PRESSURE: 120 MMHG | TEMPERATURE: 97.6 F

## 2024-07-29 DIAGNOSIS — M89.9 CHRONIC KIDNEY DISEASE-MINERAL AND BONE DISORDER: ICD-10-CM

## 2024-07-29 DIAGNOSIS — M54.16 LUMBAR RADICULOPATHY: ICD-10-CM

## 2024-07-29 DIAGNOSIS — N18.30 STAGE 3 CHRONIC KIDNEY DISEASE, UNSPECIFIED WHETHER STAGE 3A OR 3B CKD (HCC): Primary | ICD-10-CM

## 2024-07-29 DIAGNOSIS — E83.9 CHRONIC KIDNEY DISEASE-MINERAL AND BONE DISORDER: ICD-10-CM

## 2024-07-29 DIAGNOSIS — N18.9 CHRONIC KIDNEY DISEASE-MINERAL AND BONE DISORDER: ICD-10-CM

## 2024-07-29 DIAGNOSIS — D47.2 MONOCLONAL GAMMOPATHY OF UNKNOWN SIGNIFICANCE: ICD-10-CM

## 2024-07-29 DIAGNOSIS — I71.40 ABDOMINAL AORTIC ANEURYSM (AAA) WITHOUT RUPTURE, UNSPECIFIED PART (HCC): ICD-10-CM

## 2024-07-29 DIAGNOSIS — Z85.810: ICD-10-CM

## 2024-07-29 PROCEDURE — 99214 OFFICE O/P EST MOD 30 MIN: CPT | Performed by: INTERNAL MEDICINE

## 2024-07-29 NOTE — PROGRESS NOTES
NEPHROLOGY OFFICE FOLLOW UP  Sven Swain 79 y.o. male MRN: [id number]    Encounter: [id number] 7/29/2024    REASON FOR VISIT: Sven Swain is a 79 y.o. male who is here on 7/29/2024 for Chronic Kidney Disease and Follow-up  .    HPI:    Sven came in today for follow-up of CKD.  79-year-old gentleman with history of tongue cancer requiring surgery and right now has a feeding tube    No other acute complaint    No chest pain no palpitation or shortness of breath    Patient does have excessive salivation and taking medication for that        REVIEW OF SYSTEMS:    Review of Systems   Constitutional:  Negative for fatigue.   HENT:  Negative for congestion.    Eyes:  Negative for photophobia and pain.   Respiratory:  Negative for chest tightness and shortness of breath.    Cardiovascular:  Negative for chest pain and palpitations.   Gastrointestinal:  Negative for abdominal distention, abdominal pain and blood in stool.   Endocrine: Negative for polydipsia.   Genitourinary:  Negative for difficulty urinating, dysuria, flank pain, hematuria and urgency.   Musculoskeletal:  Negative for arthralgias and back pain.   Skin:  Negative for rash.   Neurological:  Negative for dizziness, light-headedness and headaches.   Hematological:  Does not bruise/bleed easily.   Psychiatric/Behavioral:  Negative for behavioral problems. The patient is not nervous/anxious.          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 - with mandibulotomy and resection SCCA left tongue base - Dr. Artur Balderas    MYRINGOTOMY W/ TUBES Left 06/07/2023    office procedure - Dr. Wilkerson    TONGUE SURGERY      TONSILLECTOMY         SOCIAL HISTORY:  Social History     Substance and Sexual Activity   Alcohol Use Yes    Comment: occasional     Social History     Substance and Sexual Activity   Drug Use Never     Social History     Tobacco Use   Smoking Status Former    Passive exposure: Current   Smokeless Tobacco Former   Tobacco Comments    quit 20 years ago       FAMILY HISTORY:  Family History   Problem Relation Age of Onset    Hypertension Mother     Stroke Mother        MEDICATIONS:    Current Outpatient Medications:     aspirin 81 MG tablet, , Disp: , Rfl:     ciprofloxacin-dexamethasone (CIPRODEX) otic suspension, Administer 4 drops into the left ear 2 (two) times a day for 7 days, Disp: 3 mL, Rfl: 0    fluticasone (FLONASE) 50 mcg/act nasal spray, 1 spray into each nostril daily, Disp: 16 g, Rfl: 1    glycopyrrolate (ROBINUL) 1 mg tablet, Take 1 tablet (1 mg total) by mouth 3 (three) times a day, Disp: 90 tablet, Rfl: 3    Multiple Vitamins-Minerals (MULTIVITAMIN ADULTS 50+ PO), Take by mouth, Disp: , Rfl:     Nutritional Supplements (Jevity 1.5 Marcos/Fiber) LIQD, Take 1600 calories daily, Disp: 1500 mL, Rfl: 3    pravastatin (PRAVACHOL) 20 mg tablet, Take 1 tablet (20 mg total) by mouth every other day, Disp: 90 tablet, Rfl: 1    VITAMIN D, CHOLECALCIFEROL, PO, Take by mouth, Disp: , Rfl:     PHYSICAL EXAM:  Vitals:    07/29/24 1617   BP: 120/70   BP Location: Right arm   Patient Position: Sitting   Pulse: 78   Resp: 18   Temp: 97.6 °F (36.4 °C)   TempSrc: Temporal   SpO2: 95%   Weight:  "69 kg (152 lb 3.2 oz)   Height: 5' 10\" (1.778 m)     Body mass index is 21.84 kg/m².    Physical Exam  Constitutional:       General: He is not in acute distress.     Appearance: He is well-developed.   HENT:      Head: Normocephalic.      Mouth/Throat:      Mouth: Mucous membranes are moist.   Eyes:      General: No scleral icterus.     Conjunctiva/sclera: Conjunctivae normal.   Neck:      Vascular: No JVD.   Cardiovascular:      Rate and Rhythm: Normal rate.      Heart sounds: Normal heart sounds.   Pulmonary:      Effort: Pulmonary effort is normal.      Breath sounds: No wheezing.   Abdominal:      Palpations: Abdomen is soft.      Tenderness: There is no abdominal tenderness.   Musculoskeletal:         General: Normal range of motion.      Cervical back: Neck supple.   Skin:     General: Skin is warm.      Findings: No rash.   Neurological:      Mental Status: He is alert and oriented to person, place, and time.   Psychiatric:         Behavior: Behavior normal.         LAB RESULTS:  Results for orders placed or performed in visit on 07/19/24   Basic metabolic panel   Result Value Ref Range    Sodium 142 135 - 147 mmol/L    Potassium 4.9 3.5 - 5.3 mmol/L    Chloride 103 96 - 108 mmol/L    CO2 33 (H) 21 - 32 mmol/L    ANION GAP 6 4 - 13 mmol/L    BUN 31 (H) 5 - 25 mg/dL    Creatinine 1.42 (H) 0.60 - 1.30 mg/dL    Glucose 56 (L) 65 - 140 mg/dL    Calcium 10.0 8.4 - 10.2 mg/dL    eGFR 47 ml/min/1.73sq m   CBC and differential   Result Value Ref Range    WBC 6.79 4.31 - 10.16 Thousand/uL    RBC 4.86 3.88 - 5.62 Million/uL    Hemoglobin 14.9 12.0 - 17.0 g/dL    Hematocrit 45.1 36.5 - 49.3 %    MCV 93 82 - 98 fL    MCH 30.7 26.8 - 34.3 pg    MCHC 33.0 31.4 - 37.4 g/dL    RDW 13.0 11.6 - 15.1 %    MPV 11.0 8.9 - 12.7 fL    Platelets 270 149 - 390 Thousands/uL    nRBC 0 /100 WBCs    Segmented % 69 43 - 75 %    Immature Grans % 0 0 - 2 %    Lymphocytes % 17 14 - 44 %    Monocytes % 8 4 - 12 %    Eosinophils Relative 5 0 " - 6 %    Basophils Relative 1 0 - 1 %    Absolute Neutrophils 4.72 1.85 - 7.62 Thousands/µL    Absolute Immature Grans 0.02 0.00 - 0.20 Thousand/uL    Absolute Lymphocytes 1.14 0.60 - 4.47 Thousands/µL    Absolute Monocytes 0.52 0.17 - 1.22 Thousand/µL    Eosinophils Absolute 0.34 0.00 - 0.61 Thousand/µL    Basophils Absolute 0.05 0.00 - 0.10 Thousands/µL   Phosphorus   Result Value Ref Range    Phosphorus 3.4 2.3 - 4.1 mg/dL   Protein / creatinine ratio, urine   Result Value Ref Range    Creatinine, Ur 119.8 Reference range not established. mg/dL    Protein Urine Random 11 Reference range not established. mg/dL    Prot/Creat Ratio, Ur 0.09 0.00 - 0.10   PTH, intact   Result Value Ref Range    PTH 65.1 12.0 - 88.0 pg/mL   UA (URINE) with reflex to Scope   Result Value Ref Range    Color, UA Yellow     Clarity, UA Clear     Specific Gravity, UA 1.017 1.003 - 1.030    pH, UA 7.0 4.5, 5.0, 5.5, 6.0, 6.5, 7.0, 7.5, 8.0    Leukocytes, UA Negative Negative    Nitrite, UA Negative Negative    Protein, UA Trace (A) Negative mg/dl    Glucose, UA Negative Negative mg/dl    Ketones, UA Negative Negative mg/dl    Urobilinogen, UA <2.0 <2.0 mg/dl mg/dl    Bilirubin, UA Negative Negative    Occult Blood, UA Negative Negative   Vitamin D 25 hydroxy   Result Value Ref Range    Vit D, 25-Hydroxy 65.4 30.0 - 100.0 ng/mL   Urine Microscopic   Result Value Ref Range    RBC, UA 1-2 None Seen, 1-2 /hpf    WBC, UA 1-2 None Seen, 1-2 /hpf    Epithelial Cells None Seen None Seen, Occasional /hpf    Bacteria, UA None Seen None Seen, Occasional /hpf       ASSESSMENT and PLAN:      CKD (chronic kidney disease) stage 3, GFR 30-59 ml/min ([facility])  Lab Results   Component Value Date    EGFR 47 07/19/2024    EGFR 57 02/12/2024    EGFR 53 12/05/2023    CREATININE 1.42 (H) 07/19/2024    CREATININE 1.22 02/12/2024    CREATININE 1.30 12/05/2023   Renal function is fluctuating.  Advise hydration and avoiding nephrotoxic medications    Chronic kidney  "disease-mineral and bone disorder  Lab Results   Component Value Date    EGFR 47 07/19/2024    EGFR 57 02/12/2024    EGFR 53 12/05/2023    CREATININE 1.42 (H) 07/19/2024    CREATININE 1.22 02/12/2024    CREATININE 1.30 12/05/2023   PTH and phosphorus along with vitamin D are within acceptable range and will continue to monitor    Lumbar radiculopathy  Stable for now      Everything discussed with the patient at length.  I will see him back in 6 months.  Will get blood and urine test before that visit        Portions of the record may have been created with voice recognition software. Occasional wrong word or \"sound a like\" substitutions may have occurred due to the inherent limitations of voice recognition software. Read the chart carefully and recognize, using context, where substitutions have occurred.If you have any questions, please contact the dictating provider.   "

## 2024-07-29 NOTE — ASSESSMENT & PLAN NOTE
Lab Results   Component Value Date    EGFR 47 07/19/2024    EGFR 57 02/12/2024    EGFR 53 12/05/2023    CREATININE 1.42 (H) 07/19/2024    CREATININE 1.22 02/12/2024    CREATININE 1.30 12/05/2023   PTH and phosphorus along with vitamin D are within acceptable range and will continue to monitor

## 2024-07-29 NOTE — ASSESSMENT & PLAN NOTE
Lab Results   Component Value Date    EGFR 47 07/19/2024    EGFR 57 02/12/2024    EGFR 53 12/05/2023    CREATININE 1.42 (H) 07/19/2024    CREATININE 1.22 02/12/2024    CREATININE 1.30 12/05/2023   Renal function is fluctuating.  Advise hydration and avoiding nephrotoxic medications

## 2024-09-17 ENCOUNTER — HOSPITAL ENCOUNTER (OUTPATIENT)
Dept: VASCULAR ULTRASOUND | Facility: HOSPITAL | Age: 79
Discharge: HOME | End: 2024-09-17
Payer: MEDICARE

## 2024-09-17 ENCOUNTER — APPOINTMENT (OUTPATIENT)
Dept: VASCULAR ULTRASOUND | Facility: HOSPITAL | Age: 79
End: 2024-09-17
Payer: MEDICARE

## 2024-09-17 DIAGNOSIS — I71.43 INFRARENAL ABDOMINAL AORTIC ANEURYSM (AAA) WITHOUT RUPTURE (HCC): ICD-10-CM

## 2024-09-17 PROCEDURE — 93978 VASCULAR STUDY: CPT | Performed by: SURGERY

## 2024-09-17 PROCEDURE — 93978 VASCULAR STUDY: CPT

## 2024-09-18 ENCOUNTER — HOSPITAL ENCOUNTER (OUTPATIENT)
Dept: VASCULAR ULTRASOUND | Facility: HOSPITAL | Age: 79
Discharge: HOME | End: 2024-09-18
Payer: MEDICARE

## 2024-09-18 ENCOUNTER — TELEPHONE (OUTPATIENT)
Dept: VASCULAR SURGERY | Facility: CLINIC | Age: 79
End: 2024-09-18

## 2024-09-18 DIAGNOSIS — I65.23 BILATERAL CAROTID ARTERY STENOSIS: ICD-10-CM

## 2024-09-18 PROCEDURE — 93880 EXTRACRANIAL BILAT STUDY: CPT | Performed by: SURGERY

## 2024-09-18 PROCEDURE — 93880 EXTRACRANIAL BILAT STUDY: CPT

## 2024-09-18 NOTE — TELEPHONE ENCOUNTER
Attempted to contact patient to schedule appointment(s) listed below.  Requested patient call [phone] to schedule appointment(s).    Patient's appointment(s) are due now.    Dopplers  [] Abdominal Aorta Iliac (AOIL)  [] Carotid (CV)   [] Celiac and/or Mesenteric  [] Endovascular Aortic Repair (EVAR)   [] Hemodialysis Access (HD)   [] Lower Limb Arterial (JUNIOR)  [] Lower Limb Venous (LEV)  [] Lower Limb Venous Duplex with Reflux (LEVDR)  [] Renal Artery  [] Upper Limb Arterial (UEA)    [] Upper Limb Venous (UEV)              [] WING and Waveform analysis     Advanced Imaging   [] CTA head/neck    [] CTA abdomen    [] CTA abdomen & pelvis    [] CT abdomen with/ without contrast  [] CT abdomen with contrast  [] CT abdomen without contrast    [] CT abdomen & pelvis with/ without contrast  [] CT abdomen & pelvis with contrast  [] CT abdomen & pelvis without contrast    Office Visit   [] New patient, patient last seen over 3 years ago  [x] Risk factor modification (RFM)   [x] Follow up   [] Lost to follow up (LTFU)   Called patient & LMOM to schedule 1 yr rfm ov/RR AOIL 9/17/24

## 2024-09-21 DIAGNOSIS — K11.7 EXCESSIVE SALIVATION: ICD-10-CM

## 2024-09-21 RX ORDER — GLYCOPYRROLATE 1 MG/1
1 TABLET ORAL 3 TIMES DAILY
Qty: 90 TABLET | Refills: 1 | Status: SHIPPED | OUTPATIENT
Start: 2024-09-21

## 2024-09-25 ENCOUNTER — NURSE TRIAGE (OUTPATIENT)
Age: 79
End: 2024-09-25

## 2024-09-25 NOTE — TELEPHONE ENCOUNTER
"LOV: 2/27/24    PEG tube placement 7/25/23  IMPRESSION:  Schatzki ring in the GE junction; dilated to 18 mm end size. Dilation caused no change  Ring in the upper third of the esophagus  Mild edematous and erythematous mucosa in the stomach and duodenum  PEG tube placed in the stomach  The 1st part of the duodenum and 2nd part of the duodenum appeared normal.  Successful placement of PEG tube.     HX: Excessive salivation, S/P insertion of PEG tube    Pt's wife calling, transferred to nurse line.  Pt also on call.  Reports PEG tube is having small amts of bloody drainage and redness at tube insertion site x past 2 to 3 days.  Denies any fever, pus or other symptoms.  Denies any difficulty flushing PEG tube or administering medications/feedings.   Pt is applying Neosporin and gauze and wants to make sure this is okay to do.  Britni Jolly if sending script.    Pt agreeable to being seen at [facility] for sooner appt.  Urgent appt scheduled on 10/7/24 w/ Serena HOPSON at [facility]    Please advise.      Reason for Disposition   [1] G-tube (or PEG), J-tube, or GJ-tube SITE looks infected (e.g., spreading redness, pus) AND [2] getting worse (no fever)    Answer Assessment - Initial Assessment Questions  1. MAIN CONCERN OR SYMPTOM:  \"What is your main concern right now?\" (e.g., blocked tube, pain, redness, swelling) \"What question do you have?\"       Blood -small amt and redness around GT insertion site  2. ONSET: \"When did the symptom or problem start (or worsen)?\" (e.g., minutes, hours, days, weeks)      2 to 3 days ago  3. WHAT TYPE: \"What type of feeding tube is it?\" (e.g., NG tube, NJ tube, G tube, GJ tube, J tube, PEG).      PEG  4. WHEN INSERTED: \"When was the tube put in\", \"Has it been changed, if so when?\"1 yr ago        5. WHO INSERTED: \"Who put the tube in?\"      GI  6. TUBE FEEDINGS: \"Has the type, rate or concentration of the tube feedings changed?\"      Denies, no problems with feeds/meds or " "flushing  7. TUBE FLUSHING: \"Have you been able to flush the tube?\" \"How often do you flush the tube?\"      Yes, able to flush tube  8. MEDICINES:  \"Are you taking any medicines through your feeding tube?\"        Yes - ASA and salt and Pravastin  9. VOMITING and DIARRHEA: \"Is there new vomiting or diarrhea?\" (e.g., number of time; description)      Denies  10. OTHER SYMPTOMS: \"What other symptoms are you having?\" (e.g., breathing diffculty, fever)        Denies    Protocols used: Feeding Tube Symptoms and Questions-Adult-AH    "

## 2024-09-30 ENCOUNTER — TELEPHONE (OUTPATIENT)
Dept: FAMILY MEDICINE CLINIC | Facility: CLINIC | Age: 79
End: 2024-09-30

## 2024-09-30 ENCOUNTER — OFFICE VISIT (OUTPATIENT)
Dept: FAMILY MEDICINE CLINIC | Facility: CLINIC | Age: 79
End: 2024-09-30
Payer: MEDICARE

## 2024-09-30 VITALS
SYSTOLIC BLOOD PRESSURE: 132 MMHG | HEART RATE: 89 BPM | TEMPERATURE: 97.9 F | OXYGEN SATURATION: 96 % | BODY MASS INDEX: 22.33 KG/M2 | WEIGHT: 156 LBS | HEIGHT: 70 IN | DIASTOLIC BLOOD PRESSURE: 82 MMHG

## 2024-09-30 DIAGNOSIS — K94.29 IRRITATION AROUND PERCUTANEOUS ENDOSCOPIC GASTROSTOMY (PEG) TUBE SITE (HCC): Primary | ICD-10-CM

## 2024-09-30 PROCEDURE — 99213 OFFICE O/P EST LOW 20 MIN: CPT

## 2024-09-30 PROCEDURE — 87106 FUNGI IDENTIFICATION YEAST: CPT

## 2024-09-30 PROCEDURE — G2211 COMPLEX E/M VISIT ADD ON: HCPCS

## 2024-09-30 PROCEDURE — 87147 CULTURE TYPE IMMUNOLOGIC: CPT

## 2024-09-30 PROCEDURE — 87205 SMEAR GRAM STAIN: CPT

## 2024-09-30 PROCEDURE — 87070 CULTURE OTHR SPECIMN AEROBIC: CPT

## 2024-09-30 RX ORDER — MUPIROCIN 20 MG/G
OINTMENT TOPICAL 2 TIMES DAILY
Qty: 60 G | Refills: 0 | Status: SHIPPED | OUTPATIENT
Start: 2024-09-30

## 2024-09-30 NOTE — TELEPHONE ENCOUNTER
Mi, wife of pt, called in returning missed call.     Relayed the following message to Mi:  Nedra Shirley PA-C    9/30/24 12:36 PM  Note     Please let patient know I sent him in topical mupirocin to use instead of the neosporin for the irritation around the PEG tube. I have a message sent to his GI provider and am awaiting a response.         Mi understood, and will await to hear from Nedra on next update from GI provider.     To please send message via [facility] or call. Thank you!    Mi Swain   [phone]

## 2024-09-30 NOTE — TELEPHONE ENCOUNTER
Dr. Boston reviewed photo - advised it looks like normal granulation tissue and agreed with my plan. Continue keeping gauze for barrier and follow up outpt with GI as scheduled on 10/8

## 2024-09-30 NOTE — TELEPHONE ENCOUNTER
Please let patient know I sent him in topical mupirocin to use instead of the neosporin for the irritation around the PEG tube. I have a message sent to his GI provider and am awaiting a response.

## 2024-09-30 NOTE — PROGRESS NOTES
"Ambulatory Visit  Name: Sven Swain      : 1945      MRN: [id number]  Encounter Provider: Nedra Shirley PA-C  Encounter Date: 2024   Encounter department: [facility]    Assessment & Plan  Irritation around percutaneous endoscopic gastrostomy (PEG) tube site (HCC)    - patient presents for PEG tube check after one episode of small amt of bloody discharge from the site four days ago, none since and denies any pain at the site  - on exam, slightly erythematous around insertion which appears from local irritation however no discharge or bleeding present; patient is afebrile today and reports no recent fevers or chills; no evidence of infection   - he has an appointment with GI on 10/8 to further evaluate the PEG tube, however they advised he should see PCP sooner for evaluation   - in my opinion, no suspected skin infection present, suspect local irritation -- sent in topical mupirocin to use instead of neosporin   - also took culture of the site to rule out bacterial growth such as MRSA however low suspicion   - did send a message to GI provider, Dr. Boston to see if he had any further recommendation, awaiting response   - in the mean time, continue good hygiene; keep area clean, continue using gauze for barrier  - advised ER if he develops any pain at the PEG site, increased redness, swelling, or discharge -- pt agreeable and will monitor closely  - to call with any questions/concerns    Orders:    Wound culture and Gram stain; Future    Wound culture and Gram stain    mupirocin (BACTROBAN) 2 % ointment; Apply topically 2 (two) times a day       History of Present Illness       CC: PEG tube check     Patient presents for PEG tube check. Patient has had PEG tube in place since . He follows with GI - Dr. Boston for management. Patient reports 4 days ago he had one episode of a small amount of \"bloody discharge\" from the peg. He thought it may have been from " "irritation. He reached out to GI provider who recommended using neosporin on the area; GI unable to see him until 10/8 so they advised follow up with PCP in the mean time. Patient reports he has not had any further episodes of bloody discharge. He denies any pain at the area of his PEG. Mild redness but reports he always has a \"little redness\" from irritation. No fevers or chills.       History obtained from : patient  Review of Systems   Constitutional:  Negative for chills, diaphoresis and fever.   Respiratory:  Negative for cough, chest tightness, shortness of breath and wheezing.    Cardiovascular:  Negative for chest pain and palpitations.   Neurological:  Negative for dizziness, light-headedness and headaches.     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[facility] surgery and XRT    Chronic kidney disease     COVID-19 with hypoxic respiratory failure 12/31/2021    Essential hypertension 05/06/2019    Hematuria 10/15/2019    Hyperlipidemia     Left anterior fascicular block     Stage 3 chronic kidney disease (HCC)     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HCC) 02/14/2019     Past Surgical History:   Procedure Laterality Date    CYSTOSCOPY  08/30/2021    EYELID CLOSURE Left     to prevent eyelashes from rubbing cornea    HERNIA REPAIR Left     MODIFIED RADICAL NECK DISSECTION Left 03/03/1999    [facility] - with mandibulotomy and resection SCCA left tongue base - Dr. Artur Balderas    MYRINGOTOMY W/ TUBES Left 06/07/2023    office procedure - Dr. Wilkerson    TONGUE SURGERY      TONSILLECTOMY       Family History   Problem Relation Age of Onset    Hypertension Mother     Stroke Mother      Current Outpatient Medications on File Prior to Visit   Medication Sig Dispense " Refill    aspirin 81 MG tablet       ciprofloxacin-dexamethasone (CIPRODEX) otic suspension Administer 4 drops into the left ear 2 (two) times a day for 7 days 3 mL 0    fluticasone (FLONASE) 50 mcg/act nasal spray 1 spray into each nostril daily 16 g 1    glycopyrrolate (ROBINUL) 1 mg tablet TAKE ONE TABLET BY MOUTH THREE TIMES A DAY 90 tablet 1    Multiple Vitamins-Minerals (MULTIVITAMIN ADULTS 50+ PO) Take by mouth      Nutritional Supplements (Jevity 1.5 Marcos/Fiber) LIQD Take 1600 calories daily 1500 mL 3    pravastatin (PRAVACHOL) 20 mg tablet Take 1 tablet (20 mg total) by mouth every other day 90 tablet 1    VITAMIN D, CHOLECALCIFEROL, PO Take by mouth       No current facility-administered medications on file prior to visit.     Allergies   Allergen Reactions    Iodinated Contrast Media Other (See Comments)    Iodine - Food Allergy       Current Outpatient Medications on File Prior to Visit   Medication Sig Dispense Refill    aspirin 81 MG tablet       ciprofloxacin-dexamethasone (CIPRODEX) otic suspension Administer 4 drops into the left ear 2 (two) times a day for 7 days 3 mL 0    fluticasone (FLONASE) 50 mcg/act nasal spray 1 spray into each nostril daily 16 g 1    glycopyrrolate (ROBINUL) 1 mg tablet TAKE ONE TABLET BY MOUTH THREE TIMES A DAY 90 tablet 1    Multiple Vitamins-Minerals (MULTIVITAMIN ADULTS 50+ PO) Take by mouth      Nutritional Supplements (Jevity 1.5 Marcos/Fiber) LIQD Take 1600 calories daily 1500 mL 3    pravastatin (PRAVACHOL) 20 mg tablet Take 1 tablet (20 mg total) by mouth every other day 90 tablet 1    VITAMIN D, CHOLECALCIFEROL, PO Take by mouth       No current facility-administered medications on file prior to visit.      Social History     Tobacco Use    Smoking status: Former     Passive exposure: Current    Smokeless tobacco: Former    Tobacco comments:     quit 20 years ago   Vaping Use    Vaping status: Never Used   Substance and Sexual Activity    Alcohol use: Yes     Comment:  "occasional    Drug use: Never    Sexual activity: Not Currently     Partners: Female     Objective     /82 (BP Location: Left arm, Patient Position: Sitting, Cuff Size: Large)   Pulse 89   Temp 97.9 °F (36.6 °C)   Ht 5' 10\" (1.778 m)   Wt 70.8 kg (156 lb)   SpO2 96%   BMI 22.38 kg/m²     Physical Exam  Vitals reviewed.   Constitutional:       General: He is not in acute distress.     Appearance: Normal appearance. He is not ill-appearing or diaphoretic.   HENT:      Head: Normocephalic and atraumatic.      Right Ear: External ear normal.      Left Ear: External ear normal.      Nose: Nose normal.      Mouth/Throat:      Mouth: Mucous membranes are moist.   Eyes:      General:         Right eye: No discharge.         Left eye: No discharge.      Conjunctiva/sclera: Conjunctivae normal.   Cardiovascular:      Rate and Rhythm: Normal rate and regular rhythm.      Pulses: Normal pulses.      Heart sounds: Normal heart sounds. No murmur heard.  Pulmonary:      Effort: Pulmonary effort is normal. No respiratory distress.      Breath sounds: Normal breath sounds. No wheezing, rhonchi or rales.   Abdominal:      General: Bowel sounds are normal. There is no distension.      Palpations: Abdomen is soft.      Tenderness: There is no abdominal tenderness.      Comments: PEG tube in place -- see photo attached   Musculoskeletal:      Cervical back: Normal range of motion.      Right lower leg: No edema.      Left lower leg: No edema.   Skin:     General: Skin is warm.   Neurological:      General: No focal deficit present.      Mental Status: He is alert.      Gait: Gait normal.   Psychiatric:         Mood and Affect: Mood normal.         "

## 2024-10-02 ENCOUNTER — TELEPHONE (OUTPATIENT)
Dept: FAMILY MEDICINE CLINIC | Facility: CLINIC | Age: 79
End: 2024-10-02

## 2024-10-02 DIAGNOSIS — K94.22: ICD-10-CM

## 2024-10-02 DIAGNOSIS — L08.9: ICD-10-CM

## 2024-10-02 DIAGNOSIS — L08.9 SKIN INFECTION: Primary | ICD-10-CM

## 2024-10-02 LAB
BACTERIA WND AEROBE CULT: ABNORMAL
GRAM STN SPEC: ABNORMAL
GRAM STN SPEC: ABNORMAL

## 2024-10-02 RX ORDER — AMOXICILLIN 400 MG/5ML
875 POWDER, FOR SUSPENSION ORAL 2 TIMES DAILY
Qty: 109 ML | Refills: 0 | Status: SHIPPED | OUTPATIENT
Start: 2024-10-02 | End: 2024-10-07

## 2024-10-02 NOTE — TELEPHONE ENCOUNTER
LMOMTCB    ----- Message from Nedra Shirley PA-C sent at 10/2/2024  3:26 PM EDT -----  It can be either - does she prefer pill or liquid?

## 2024-10-08 ENCOUNTER — OFFICE VISIT (OUTPATIENT)
Age: 79
End: 2024-10-08
Payer: MEDICARE

## 2024-10-08 VITALS
HEART RATE: 87 BPM | BODY MASS INDEX: 22.19 KG/M2 | WEIGHT: 155 LBS | SYSTOLIC BLOOD PRESSURE: 150 MMHG | RESPIRATION RATE: 18 BRPM | DIASTOLIC BLOOD PRESSURE: 90 MMHG | HEIGHT: 70 IN | OXYGEN SATURATION: 98 %

## 2024-10-08 DIAGNOSIS — K94.23 PEG TUBE MALFUNCTION (HCC): Primary | ICD-10-CM

## 2024-10-08 PROCEDURE — 99214 OFFICE O/P EST MOD 30 MIN: CPT | Performed by: PHYSICIAN ASSISTANT

## 2024-10-08 NOTE — PROGRESS NOTES
[facility] - Outpatient Follow-up Note  Sven Swain 79 y.o. male MRN: [id number]  Encounter: [id number]          ASSESSMENT AND PLAN:      1. PEG tube cellulitis    Patient recently was seen by his PCP on 9/30 and diagnosed with a PEG tube site cellulitis.   Patient reports some erythema around the site and bloody discharge.  He was given an antibiotic course according to culture.  The site appears improved today without any significant erythema.  He reports some additional scant bloody discharge.  No pus. No fevers.  He reports no issues with the tube feeds.    Will check a STAT CT Scan A/P with contrast through the PEG tube to assess good placement, ensure no complications, abscess, etc.  Continue PEG care.  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  Patient recently was seen by his PCP on 9/30 and diagnosed with a PEG tube site cellulitis patient reports he had some erythema around the site and bloody discharge.  He was given an antibiotic course according to culture sensitivity results.  The site appears improved today without any significant erythema.  He reports some additional scant bloody discharge.  No pus.  No fevers.  He reports no issues with the tube feeds.      REVIEW OF SYSTEMS IS OTHERWISE NEGATIVE.      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 [place] - with mandibulotomy and resection SCCA left tongue base - Dr. Artur Balderas    MYRINGOTOMY W/ TUBES Left 06/07/2023    office procedure - Dr. Wilkerson    TONGUE SURGERY      TONSILLECTOMY       Social History   Social History     Substance and Sexual Activity   Alcohol Use Yes    Comment: occasional     Social History     Substance and Sexual Activity   Drug Use Never     Social History     Tobacco Use   Smoking Status Former    Passive exposure: Current   Smokeless Tobacco Former   Tobacco Comments    quit 20 years ago     Family History   Problem Relation Age of Onset    Hypertension Mother     Stroke Mother        Meds/Allergies       Current Outpatient Medications:     aspirin 81 MG tablet    ciprofloxacin-dexamethasone (CIPRODEX) otic suspension    fluticasone (FLONASE) 50 mcg/act nasal spray    glycopyrrolate (ROBINUL) 1 mg tablet    Multiple Vitamins-Minerals (MULTIVITAMIN ADULTS 50+ PO)    mupirocin (BACTROBAN) 2 % ointment    Nutritional Supplements (Jevity 1.5 Marcos/Fiber) LIQD    pravastatin (PRAVACHOL) 20 mg tablet    VITAMIN D, CHOLECALCIFEROL, PO    Allergies   Allergen Reactions    Iodinated Contrast Media Other (See Comments)    Iodine - Food Allergy            Objective     Blood pressure 150/90, pulse 87, resp. rate 18, height 5' 10\" (1.778 m), weight 70.3 kg (155 lb), SpO2 98%. Body mass index is 22.24 kg/m².      PHYSICAL EXAM:      General Appearance:   Alert, cooperative, no distress   HEENT:   Normocephalic, atraumatic, anicteric     Neck:  Supple, symmetrical, trachea midline   Lungs:   Clear to auscultation bilaterally; no rales, rhonchi or wheezing; respirations " unlabored    Heart::   Regular rate and rhythm; no murmur, rub, or gallop.   Abdomen:   Soft, non-tender, non-distended; normal bowel sounds; no masses, no organomegaly; +PEG C/D/I    Genitalia:   Deferred    Rectal:   Deferred    Extremities:  No cyanosis, clubbing or edema    Pulses:  2+ and symmetric    Skin:  No jaundice, rashes, or lesions    Lymph nodes:  No palpable cervical lymphadenopathy        Lab Results:   No visits with results within 1 Day(s) from this visit.   Latest known visit with results is:   Office Visit on 09/30/2024   Component Date Value    Wound Culture 09/30/2024 1+ Growth of Candida parapsilosis (A)     Wound Culture 09/30/2024 Few Colonies of Beta Hemolytic Streptococcus Group C (A)     Wound Culture 09/30/2024 1+ Growth of     Gram Stain Result 09/30/2024 No polys seen (A)     Gram Stain Result 09/30/2024 Rare Budding yeast (A)          Radiology Results:   VAS carotid complete study    Result Date: 9/18/2024  Narrative:  [facility] REPORT CLINICAL: Indications: Yearly surveillance of carotid artery disease.  Patient is asymptomatic at this time. Operative History: left radical neck dissection Risk Factors The patient has history of Hyperlipidemia, CKD and previous smoking (quit >10yrs ago). Clinical Right Pressure:  120/ mm Hg, Left Pressure:  120/ mm Hg.  FINDINGS:  Right        Impression  PSV  EDV (cm/s)  Direction of Flow  Ratio  Dist. ICA                 60          16                      0.61  Mid. ICA                  67          25                      0.68  Prox. ICA    1 - 49%      94          25                      0.96  Dist CCA                  94          20                            Mid CCA                   98          22                      1.25  Prox CCA                  78          16                      0.81  Ext Carotid               98          10                      1.00  Prox Vert                 45          11  Antegrade                  Subclavian               114           0                            Innominate                96           0                             Left         Impression  PSV  EDV (cm/s)  Direction of Flow  Ratio  Dist. ICA                 74          27                      1.02  Mid. ICA                  89          30                      1.24  Prox. ICA    1 - 49%      65          18                      0.90  Dist CCA                 183          50                            Mid CCA                   72          19                      0.93  Prox CCA                  78          20                            Ext Carotid               95          13                      1.31  Prox Vert                 38          14  Antegrade                 Subclavian               131           0                               CONCLUSION: Impression RIGHT: There is <50% stenosis noted in the internal carotid artery. Plaque is heterogenous and irregular. Vertebral artery flow is antegrade. There is no significant subclavian artery disease. LEFT: There is <50% stenosis noted in the internal carotid artery. Plaque is heterogenous and irregular. There is hemodynamically significant plaque noted in the distal common carotid artery, there is post stenotic waveforms noted in the proximal ICA and ECA Vertebral artery flow is antegrade. There is no significant subclavian artery disease.  Compared to previous study on 6/20/23, there is in increase in velocity in the left distal common carotid artery.  SIGNATURE: Electronically Signed by: DELFINA PLAZA on 2024-09-18 04:40:26 PM    VAS abdominal aorta/iliacs; complete study    Result Date: 9/17/2024  Narrative:  [facility] REPORT CLINICAL: Indications: Yearly surveillance of known AAA. Patient is asymptomatic at this time. Operative History: left radical neck dissection Risk Factors The patient has history of Hyperlipidemia, CKD, AAA, [place], Malignancy and previous smoking (quit  >10yrs ago).  FINDINGS:  Unilateral     Impression  PSV (cm/s)  EDV (cm/s)  AP (cm)  TRV (cm)  Sup-Dedra Ao                         59                  1.7            Px Inf-Bairon Ao  Aneurysm            93                  2.8       3.2  Ds Inf-Bairon Ao                     211                  1.7            Celiac                            120          27                     Prox. SMA                         120                                  Right       Impression      PSV (cm/s)  AP (cm)  Prox COLTEN                           123      1.0  Dist COLTEN                           162      0.8  Prox. EIA                           97      0.9  Dist EIA                           106      1.0  Prox Renal  Not Visualized                        Left        Impression      PSV (cm/s)  AP (cm)  Prox COLTEN                            97      1.0  Dist COLTEN                            59      1.0  Prox. EIA                           78      1.0  Dist EIA                            99      0.9  Prox Renal  Not Visualized                          CONCLUSION:  Impression  There is an infrarenal fusiform abdominal aortic aneurysm measuring 2.8 cm x 3.2 cm, (Prior: 2.8 cm x 3.2 cm). The common and external iliac arteries are patent and normal in caliber bilaterally. The celiac and superior mesenteric arteries are patent. The bilateral proximal renal arteries were not visualized secondary to feeding tube and overlaying bowel gas.  Compared to previous study on 6/20/2023, there is no significant progression of disease.  SIGNATURE: Electronically Signed by: OBEY VALDEZ MD on 2024-09-17 09:18:28 PM

## 2024-10-09 ENCOUNTER — HOSPITAL ENCOUNTER (OUTPATIENT)
Dept: CT IMAGING | Facility: HOSPITAL | Age: 79
Discharge: HOME | End: 2024-10-09
Payer: MEDICARE

## 2024-10-09 DIAGNOSIS — K94.23 PEG TUBE MALFUNCTION (HCC): ICD-10-CM

## 2024-10-09 PROCEDURE — 74176 CT ABD & PELVIS W/O CONTRAST: CPT

## 2024-10-09 RX ADMIN — IOHEXOL 50 ML: 240 INJECTION, SOLUTION INTRATHECAL; INTRAVASCULAR; INTRAVENOUS; ORAL at 15:57

## 2024-10-10 ENCOUNTER — TELEPHONE (OUTPATIENT)
Age: 79
End: 2024-10-10

## 2024-10-10 DIAGNOSIS — K44.9 HIATAL HERNIA: Primary | ICD-10-CM

## 2024-10-10 NOTE — TELEPHONE ENCOUNTER
----- Message from Serena Bailey PA-C sent at 10/10/2024  1:40 PM EDT -----  Reviewed results with patient.  Good PEG placement - no complications.   He does have a large HH with organioaxial rotation of the stomach.  Reviewed with Dr. Boston. There is a risk of volvulus although the PEG anchoring the stomach may mitigate against that but would recommend getting an opinion from thoracic surgery.  Will refer to Dr. Wilkinson.    Can you please give patient the number for thoracic surgery? Thanks!

## 2024-10-21 DIAGNOSIS — R09.81 NASAL CONGESTION: ICD-10-CM

## 2024-10-22 RX ORDER — FLUTICASONE PROPIONATE 50 MCG
1 SPRAY, SUSPENSION (ML) NASAL DAILY
Qty: 9.9 ML | Refills: 1 | Status: SHIPPED | OUTPATIENT
Start: 2024-10-22

## 2024-11-05 ENCOUNTER — CONSULT (OUTPATIENT)
Dept: CARDIAC SURGERY | Facility: CLINIC | Age: 79
End: 2024-11-05
Payer: MEDICARE

## 2024-11-05 VITALS
RESPIRATION RATE: 16 BRPM | TEMPERATURE: 97.3 F | SYSTOLIC BLOOD PRESSURE: 116 MMHG | HEIGHT: 70 IN | DIASTOLIC BLOOD PRESSURE: 70 MMHG | HEART RATE: 80 BPM | OXYGEN SATURATION: 98 % | WEIGHT: 157 LBS | BODY MASS INDEX: 22.48 KG/M2

## 2024-11-05 DIAGNOSIS — K44.9 HIATAL HERNIA: ICD-10-CM

## 2024-11-05 PROCEDURE — 99205 OFFICE O/P NEW HI 60 MIN: CPT | Performed by: THORACIC SURGERY (CARDIOTHORACIC VASCULAR SURGERY)

## 2024-11-05 NOTE — PROGRESS NOTES
Thoracic Consult  Assessment/Plan:    Hiatal hernia  Mr. Swain is a very pleasant 79-year-old male who presents to me with a moderate size hiatal hernia.  I have personally reviewed his CT scan in PACS.  He has a hiatal hernia with organoaxial rotation of the stomach.     Today in the office, we discussed his hiatal hernia.  We discussed the risks and benefits of fixing this.  I explained to him that the repair would be robotic and that of his symptoms, this could potentially fix the reflux of his tube feeds up into his mouth.  I did explain that I would have to take down his PEG tube and then replace it at the end of the surgery.  We did discuss that while some of the reflux of the tube feeds could be fixed, if I were to proceed with a gastropexy, then he would likely still experience tube feed reflux.  The patient would like to think about it and is not convinced that he would like surgery at this time.  I remain available for him if he changes his mind.    Ambika Wilkinson MD  Thoracic Surgery  Office: [phone]       Diagnoses and all orders for this visit:    Hiatal hernia  -     Ambulatory Referral to Thoracic Surgery          Thoracic History   Diagnosis:    Procedures/Surgeries:    Pathology:    Adjuvant Therapy:       Subjective:    Patient ID: Sven Swain is a 79 y.o. male.    HPI  Mr. Swain is a very pleasant 79-year-old male who presents to me with a moderate size hiatal hernia.  I have personally reviewed his CT scan in PACS.  He has a hiatal hernia with organoaxial rotation of the stomach.  He also has a PEG tube in place.  The PEG tube was placed approximately a year and a half ago.  This patient had a base of tongue carcinoma approximately 20 years ago.  He had radical neck dissection and radiation and since that time, has had progressive dysphagia.  He is fully dependent on his PEG tube I am does not swallow any food or drink.  He does not experience heartburn symptoms.  He does report  occasional reflux of tube feeds.  He complains of excessive saliva but that this is secondary to his profound dysphagia    He has not had any abdominal surgeries    I have read all the most relevant notes in the chart including from otolaryngology as well as gastroenterology    The following portions of the patient's history were reviewed and updated as appropriate: allergies, current medications, past family history, past medical history, past social history, past surgical history and problem list.    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 Artur Balderas    MYRINGOTOMY W/ TUBES Left 06/07/2023    office procedure - Dr. Wilkerson    TONGUE SURGERY      TONSILLECTOMY        Family History   Problem Relation Age of Onset    Hypertension Mother     Stroke Mother       Social History     Socioeconomic History    Marital status: /Civil Union     Spouse name: Not on file    Number of children: Not on file    Years of education: Not on file    Highest education level: Not on file   Occupational History    Not on file   Tobacco Use    Smoking status: Former     Passive exposure: Current    Smokeless tobacco: Former    Tobacco comments:      quit 20 years ago   Vaping Use    Vaping status: Never Used   Substance and Sexual Activity    Alcohol use: Yes     Comment: occasional    Drug use: Never    Sexual activity: Not Currently     Partners: Female   Other Topics Concern    Not on file   Social History Narrative    Not on file     Social Determinants of Health     Financial Resource Strain: Patient Declined (1/9/2024)    Overall Financial Resource Strain (CARDIA)     Difficulty of Paying Living Expenses: Patient declined   Food Insecurity: No Food Insecurity (7/20/2023)    Hunger Vital Sign     Worried About Running Out of Food in the Last Year: Never true     Ran Out of Food in the Last Year: Never true   Transportation Needs: No Transportation Needs (1/9/2024)    PRAPARE - Transportation     Lack of Transportation (Medical): No     Lack of Transportation (Non-Medical): No   Physical Activity: Not on file   Stress: Not on file   Social Connections: Not on file   Intimate Partner Violence: Not on file   Housing Stability: Low Risk  (7/20/2023)    Housing Stability Vital Sign     Unable to Pay for Housing in the Last Year: No     Number of Places Lived in the Last Year: 1     Unstable Housing in the Last Year: No      Review of Systems   Constitutional: Negative.  Negative for activity change, chills, fatigue, fever and unexpected weight change.   HENT:  Positive for trouble swallowing. Negative for sore throat and voice change.         Excessive saliva   Eyes: Negative.  Negative for visual disturbance.   Respiratory:  Negative for cough, chest tightness, shortness of breath, wheezing and stridor.    Cardiovascular:  Negative for chest pain and leg swelling.   Gastrointestinal: Negative.    Endocrine: Negative.    Genitourinary: Negative.    Musculoskeletal: Negative.    Skin: Negative.    Allergic/Immunologic: Negative.    Neurological:  Negative for seizures, syncope, speech difficulty, weakness, light-headedness and headaches.   Hematological:   "Negative for adenopathy.   Psychiatric/Behavioral: Negative.           Objective:   Physical Exam  Vitals and nursing note reviewed.   Constitutional:       Appearance: Normal appearance. He is well-developed and normal weight. He is not diaphoretic.   HENT:      Head: Normocephalic and atraumatic.      Nose: Nose normal. No congestion.      Mouth/Throat:      Mouth: Mucous membranes are moist.      Pharynx: Oropharynx is clear.   Eyes:      Extraocular Movements: Extraocular movements intact.      Pupils: Pupils are equal, round, and reactive to light.   Neck:      Trachea: No tracheal deviation.   Cardiovascular:      Rate and Rhythm: Normal rate and regular rhythm.      Heart sounds: Normal heart sounds. No murmur heard.  Pulmonary:      Effort: Pulmonary effort is normal. No respiratory distress.      Breath sounds: Normal breath sounds. No stridor. No wheezing or rales.   Chest:      Chest wall: No tenderness.   Abdominal:      General: Bowel sounds are normal. There is no distension.      Palpations: Abdomen is soft.      Tenderness: There is no abdominal tenderness.      Comments: PEG tube in place   Musculoskeletal:         General: Normal range of motion.      Cervical back: Normal range of motion.   Lymphadenopathy:      Cervical: No cervical adenopathy.   Skin:     General: Skin is warm and dry.      Coloration: Skin is not pale.      Findings: No erythema or rash.   Neurological:      Mental Status: He is alert and oriented to person, place, and time.   Psychiatric:         Behavior: Behavior normal.         Thought Content: Thought content normal.         Judgment: Judgment normal.     /70 (BP Location: Left arm, Patient Position: Sitting, Cuff Size: Standard)   Pulse 80   Temp (!) 97.3 °F (36.3 °C) (Temporal)   Resp 16   Ht 5' 10\" (1.778 m)   Wt 71.2 kg (157 lb)   SpO2 98%   BMI 22.53 kg/m²     No Chest XR results available for this patient.   No CT Chest results available for this " patient.  No CT Chest,Abdomen,Pelvis results available for this patient.   No NM PET CT results available for this patient.   No Barium Swallow results available for this patient.

## 2024-11-05 NOTE — ASSESSMENT & PLAN NOTE
Mr. Swain is a very pleasant 79-year-old male who presents to me with a moderate size hiatal hernia.  I have personally reviewed his CT scan in PACS.  He has a hiatal hernia with organoaxial rotation of the stomach.     Today in the office, we discussed his hiatal hernia.  We discussed the risks and benefits of fixing this.  I explained to him that the repair would be robotic and that of his symptoms, this could potentially fix the reflux of his tube feeds up into his mouth.  I did explain that I would have to take down his PEG tube and then replace it at the end of the surgery.  We did discuss that while some of the reflux of the tube feeds could be fixed, if I were to proceed with a gastropexy, then he would likely still experience tube feed reflux.  The patient would like to think about it and is not convinced that he would like surgery at this time.  I remain available for him if he changes his mind.    Ambika Wilkinson MD  Thoracic Surgery  Office: [phone]

## 2024-11-18 ENCOUNTER — OFFICE VISIT (OUTPATIENT)
Dept: VASCULAR SURGERY | Facility: CLINIC | Age: 79
End: 2024-11-18
Payer: MEDICARE

## 2024-11-18 VITALS
HEIGHT: 70 IN | WEIGHT: 157 LBS | DIASTOLIC BLOOD PRESSURE: 70 MMHG | SYSTOLIC BLOOD PRESSURE: 134 MMHG | HEART RATE: 86 BPM | BODY MASS INDEX: 22.48 KG/M2

## 2024-11-18 DIAGNOSIS — I71.43 INFRARENAL ABDOMINAL AORTIC ANEURYSM (AAA) WITHOUT RUPTURE (HCC): ICD-10-CM

## 2024-11-18 DIAGNOSIS — I65.23 BILATERAL CAROTID ARTERY STENOSIS: Primary | ICD-10-CM

## 2024-11-18 PROCEDURE — 99213 OFFICE O/P EST LOW 20 MIN: CPT | Performed by: NURSE PRACTITIONER

## 2024-11-18 NOTE — PATIENT INSTRUCTIONS
Carotid duplex and abdominal iliac duplex in 1 year with return office visit    Continue aspirin and statin

## 2024-11-18 NOTE — ASSESSMENT & PLAN NOTE
- asymptomatic  - BP well controlled,    AOIL 9/17/2024 no significant change or progression of disease  There is an infrarenal fusiform abdominal aortic aneurysm measuring 2.8 cm x 3.2 cm, (Prior: 2.8 cm x 3.2 cm).  The common and external iliac arteries are patent and normal in caliber bilaterally.  The celiac and superior mesenteric arteries are patent.  The bilateral proximal renal arteries were not visualized secondary to feeding tube and overlaying bowel gas.    Plan:   - AOIL in 1 year  - continue ASA  - BP control    Orders:    VAS abdominal aorta/iliac duplex; Future

## 2024-11-18 NOTE — PROGRESS NOTES
Name: Sven Swain      : 1945      MRN: [id number]  Encounter Provider: EDGAR Jeewll  Encounter Date: 2024   Encounter department: [facility]  :  Assessment & Plan  Bilateral carotid artery stenosis  79-year-old male, former smoker with HLD, h/o tongue ca s/p resection and radiation >20yrs ago w/ excessive salivation, PEG tube, asymptomatic bilateral carotid artery stenosis and AAA presents to review imaging and [facility] visit.    -Presents without complaints. He denies TIA or stroke-like symptoms.    - baseline difficulty swallowing, speech disturbance and excessive salivation 2/2 h/o base of tongue ca with resection  -Denies visual disturbance, amaurosis fugax, or unilateral weakness  - Carotid Duplex 2024:  < 50% ICA stenosis bilaterally    Plan:  - Continue ASA  - Continue statin   - carotid duplex in 1 year with return office visit  - call 911 or go to ED with TIA or stroke-like symptoms    Orders:    VAS carotid complete study; Future    Infrarenal abdominal aortic aneurysm (AAA) without rupture (HCC)  - asymptomatic  - BP well controlled,    AOIL 2024 no significant change or progression of disease  There is an infrarenal fusiform abdominal aortic aneurysm measuring 2.8 cm x 3.2 cm, (Prior: 2.8 cm x 3.2 cm).  The common and external iliac arteries are patent and normal in caliber bilaterally.  The celiac and superior mesenteric arteries are patent.  The bilateral proximal renal arteries were not visualized secondary to feeding tube and overlaying bowel gas.    Plan:   - AOIL in 1 year  - continue ASA  - BP control    Orders:    VAS abdominal aorta/iliac duplex; Future        History of Present Illness   Cc: Patient presents to review AOIL/ CV. Patient is asymptomatic.   HPI  Sven Swain is a 79 y.o. male who presents to review routine surveillance imaging.  Presents without complaints.  See assessment and plan.    History obtained from:  patient    Review of Systems   Constitutional: Negative.    HENT: Negative.     Eyes: Negative.    Respiratory: Negative.     Cardiovascular: Negative.    Gastrointestinal: Negative.    Endocrine: Negative.    Genitourinary: Negative.    Musculoskeletal: Negative.    Skin: Negative.    Allergic/Immunologic: Negative.    Neurological: Negative.    Hematological: Negative.    Psychiatric/Behavioral: Negative.     I have reviewed and made appropriate changes to the review of systems input by the medical assistant.    Medical History Reviewed by provider this encounter:     .  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 Artur Balderas    MYRINGOTOMY W/ TUBES Left 06/07/2023    office procedure - Dr. Wilkerson    TONGUE SURGERY      TONSILLECTOMY       Family History   Problem Relation Age of Onset    Hypertension Mother     Stroke Mother       reports that he has quit smoking. He has been exposed to tobacco smoke. He has quit using smokeless tobacco. He reports current alcohol use. He reports that he does not use drugs.  Current Outpatient Medications on File Prior to Visit   Medication Sig Dispense Refill    aspirin 81 MG  tablet       fluticasone (FLONASE) 50 mcg/act nasal spray USE 1 SPRAY IN EACH NOSTRIL ONCE A DAY 9.9 mL 1    glycopyrrolate (ROBINUL) 1 mg tablet TAKE ONE TABLET BY MOUTH THREE TIMES A DAY 90 tablet 1    Multiple Vitamins-Minerals (MULTIVITAMIN ADULTS 50+ PO) Take by mouth      mupirocin (BACTROBAN) 2 % ointment Apply topically 2 (two) times a day 60 g 0    Nutritional Supplements (Jevity 1.5 Marcos/Fiber) LIQD Take 1600 calories daily 1500 mL 3    pravastatin (PRAVACHOL) 20 mg tablet Take 1 tablet (20 mg total) by mouth every other day 90 tablet 1    VITAMIN D, CHOLECALCIFEROL, PO Take by mouth      ciprofloxacin-dexamethasone (CIPRODEX) otic suspension Administer 4 drops into the left ear 2 (two) times a day for 7 days (Patient not taking: Reported on 11/5/2024) 3 mL 0     No current facility-administered medications on file prior to visit.     Allergies   Allergen Reactions    Iodinated Contrast Media Other (See Comments)    Iodine - Food Allergy       Current Outpatient Medications on File Prior to Visit   Medication Sig Dispense Refill    aspirin 81 MG tablet       fluticasone (FLONASE) 50 mcg/act nasal spray USE 1 SPRAY IN EACH NOSTRIL ONCE A DAY 9.9 mL 1    glycopyrrolate (ROBINUL) 1 mg tablet TAKE ONE TABLET BY MOUTH THREE TIMES A DAY 90 tablet 1    Multiple Vitamins-Minerals (MULTIVITAMIN ADULTS 50+ PO) Take by mouth      mupirocin (BACTROBAN) 2 % ointment Apply topically 2 (two) times a day 60 g 0    Nutritional Supplements (Jevity 1.5 Marcos/Fiber) LIQD Take 1600 calories daily 1500 mL 3    pravastatin (PRAVACHOL) 20 mg tablet Take 1 tablet (20 mg total) by mouth every other day 90 tablet 1    VITAMIN D, CHOLECALCIFEROL, PO Take by mouth      ciprofloxacin-dexamethasone (CIPRODEX) otic suspension Administer 4 drops into the left ear 2 (two) times a day for 7 days (Patient not taking: Reported on 11/5/2024) 3 mL 0     No current facility-administered medications on file prior to visit.      Social History  "    Tobacco Use    Smoking status: Former     Passive exposure: Current    Smokeless tobacco: Former    Tobacco comments:     quit 20 years ago   Vaping Use    Vaping status: Never Used   Substance and Sexual Activity    Alcohol use: Yes     Comment: occasional    Drug use: Never    Sexual activity: Not Currently     Partners: Female        Objective   /70 (BP Location: Left arm, Patient Position: Sitting, Cuff Size: Standard)   Pulse 86   Ht 5' 10\" (1.778 m)   Wt 71.2 kg (157 lb)   BMI 22.53 kg/m²      Physical Exam  Vitals reviewed.   Constitutional:       General: He is not in acute distress.  HENT:      Mouth/Throat:      Comments: S/p base of tongue carcinoma with resection and radiation  - increased saliva production  - baseline difficulty swallowing   - no PO intake  Cardiovascular:      Pulses:           Radial pulses are 2+ on the right side and 2+ on the left side.        Posterior tibial pulses are 2+ on the right side and 2+ on the left side.   Pulmonary:      Effort: Pulmonary effort is normal. No respiratory distress.   Abdominal:      Comments: PEG   Musculoskeletal:         General: Normal range of motion.      Right lower leg: No edema.      Left lower leg: No edema.   Skin:     General: Skin is warm.   Neurological:      General: No focal deficit present.      Mental Status: He is alert and oriented to person, place, and time.      Cranial Nerves: No cranial nerve deficit.      Sensory: No sensory deficit.      Motor: No weakness.      Comments: Baseline speech difficulty 2/2 above   Psychiatric:         Behavior: Behavior normal.         Administrative Statements   I have spent a total time of 22 minutes in caring for this patient on the day of the visit/encounter including Diagnostic results, Instructions for management, Patient and family education, Impressions, Documenting in the medical record, Reviewing / ordering tests, medicine, procedures  , and Obtaining or reviewing history  . "

## 2024-11-18 NOTE — ASSESSMENT & PLAN NOTE
79-year-old male, former smoker with HLD, h/o tongue ca s/p resection and radiation >20yrs ago w/ excessive salivation, PEG tube, asymptomatic bilateral carotid artery stenosis and AAA presents to review imaging and [facility] visit.    -Presents without complaints. He denies TIA or stroke-like symptoms.    - baseline difficulty swallowing, speech disturbance and excessive salivation 2/2 h/o base of tongue ca with resection  -Denies visual disturbance, amaurosis fugax, or unilateral weakness  - Carotid Duplex 9/18/2024:  < 50% ICA stenosis bilaterally    Plan:  - Continue ASA  - Continue statin   - carotid duplex in 1 year with return office visit  - call 911 or go to ED with TIA or stroke-like symptoms    Orders:    VAS carotid complete study; Future

## 2024-12-13 ENCOUNTER — OFFICE VISIT (OUTPATIENT)
Dept: GASTROENTEROLOGY | Facility: CLINIC | Age: 79
End: 2024-12-13
Payer: MEDICARE

## 2024-12-13 VITALS
HEIGHT: 70 IN | WEIGHT: 157 LBS | DIASTOLIC BLOOD PRESSURE: 72 MMHG | BODY MASS INDEX: 22.48 KG/M2 | SYSTOLIC BLOOD PRESSURE: 116 MMHG

## 2024-12-13 DIAGNOSIS — Z93.1 STATUS POST INSERTION OF PERCUTANEOUS ENDOSCOPIC GASTROSTOMY (PEG) TUBE (HCC): Primary | ICD-10-CM

## 2024-12-13 PROCEDURE — 99213 OFFICE O/P EST LOW 20 MIN: CPT | Performed by: INTERNAL MEDICINE

## 2024-12-13 NOTE — PROGRESS NOTES
[facility]      Chief Complaint: PEG tube check    HPI:  Sven Swain is a 79 y.o.  male who presents with recent PEG tube infection.  CT scan done reveals good placement.  Patient was treated with antibiotics.  He has absolutely no complaints at the present time.  No PEG pain.  Tube functioning well.  No exudate.  No PEG complaints at all.  He is currently suffering from a cold.  He has no ability to swallow.  He has managed to gain 30 pounds..      Review of Systems:   Constitutional: As per HPI  HENT: As per HPI  Eyes: No complaints of eye pain, no red eyes, no discharge from eyes, no itchy eyes.  Cardiovascular: No complaints of slow heart rate, no fast heart rate, no chest pain, no palpitations, no leg claudication, no lower extremity edema.   Respiratory: No complaints of shortness of breath, no wheezing, no cough, no SOB on exertion, no orthopnea.   Gastrointestinal: As noted in HPI  Genitourinary: No complaints of dysuria, no incontinence, no hesitancy, no nocturia.   Musculoskeletal: No complaints of arthralgia, no myalgias, no joint swelling or stiffness, no limb pain or swelling.   Neurological: No complaints of headache, no confusion, no convulsions, no numbness or tingling, no dizziness or fainting, no limb weakness, no difficulty walking.    Skin: No complaints of skin rash or skin lesions, no itching, no skin wound, no dry skin.    Hematological/Lymphatic: No complaints of swollen glands, does not bleed easy.   Allergic/Immunologic: No immunocompromised state.  Endocrine:  No complaints of polyuria, no polydipsia.   Psychiatric/Behavioral: is not suicidal, no sleep disturbances, no anxiety or depression, no change in personality, no emotional problems.       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 - had surgery and XRT    Chronic kidney disease     COVID-19 with hypoxic respiratory failure 12/31/2021    Essential hypertension 05/06/2019    Hematuria 10/15/2019    Hyperlipidemia     Left anterior fascicular block     Stage 3 chronic kidney disease (HCC)     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HCC) 02/14/2019     Past Surgical History:   Procedure Laterality Date    CYSTOSCOPY  08/30/2021    EYELID CLOSURE Left     to prevent eyelashes from rubbing cornea    HERNIA REPAIR Left     MODIFIED RADICAL NECK DISSECTION Left 03/03/1999    [facility] - with mandibulotomy and resection SCCA left tongue base - Dr. Artur Balderas    MYRINGOTOMY W/ TUBES Left 06/07/2023    office procedure - Dr. Wilkerson    TONGUE SURGERY      TONSILLECTOMY       Social History   Social History     Substance and Sexual Activity   Alcohol Use Yes    Comment: occasional     Social History     Substance and Sexual Activity   Drug Use Never     Social History     Tobacco Use   Smoking Status Former    Passive exposure: Current   Smokeless Tobacco Former   Tobacco Comments    quit 20 years ago     Family History   Problem Relation Age of Onset    Hypertension Mother     Stroke Mother          Current Medications: has a current medication list which includes the following prescription(s): aspirin, ciprofloxacin-dexamethasone, fluticasone, glycopyrrolate, multiple vitamins-minerals, mupirocin, jevity 1.5 xander/fiber, pravastatin, and cholecalciferol.        Vital Signs: /72   Ht 5' 10\" (1.778 m)   Wt 71.2 kg (157 lb)   BMI 22.53 kg/m²       Physical Exam:   Constitutional  General Appearance: No acute distress, well appearing and well nourished  Head  Normocephalic  Eyes  Conjunctivae and lids: No swelling, erythema, or discharge.    Pupils and irises: Equal, round and reactive to light.   Ears, Nose, Mouth, and Throat  External inspection of ears and nose: Normal  Nasal mucosa, septum and turbinates: Normal " without edema or erythema/   Oropharynx: Postop  Neck  Normal range of motion. Neck supple.   Cardiovascular  Auscultation of the heart: Normal rate and rhythm, normal S1 and S2 without murmurs.  Examination of the extremities for edema and/or varicosities: Normal  Pulmonary/Chest  Respiratory effort: No increased work of breathing or signs of respiratory distress.   Auscultation of lungs: Clear to auscultation, equal breath sounds bilaterally, no wheezes, rales, no rhonchi.   Abdomen  Abdomen: Non-tender, no masses.  PEG site completely normal  Liver and spleen: No hepatomegaly or splenomegaly.   Musculoskeletal  Gait and station: normal.  Digits and Nails: normal without clubbing or cyanosis.  Inspection/palpation of joints, bones, and muscles: Normal  Neurological  No nystagmus or asterixis.   Skin  Skin and subcutaneous tissue: Normal without rashes or lesions.   Lymphatic  Palpation of the lymph nodes in neck: No lymphadenopathy.   Psychiatric  Orientation to person, place and time: Normal.  Mood and affect: Normal.         Labs:  Lab Results   Component Value Date    ALT 12 02/12/2024    AST 18 02/12/2024    BUN 31 (H) 07/19/2024    CALCIUM 10.0 07/19/2024     07/19/2024    CO2 33 (H) 07/19/2024    CREATININE 1.42 (H) 07/19/2024    HDL 49 02/12/2024    HCT 45.1 07/19/2024    HGB 14.9 07/19/2024    MG 2.3 07/15/2023    PHOS 3.4 07/19/2024     07/19/2024    K 4.9 07/19/2024    PSA 1.7 02/16/2022    TRIG 129 02/12/2024    WBC 6.79 07/19/2024         X-Rays & Procedures:   No orders to display           ______________________________________________________________________      Assessment & Plan:     Diagnoses and all orders for this visit:    Status post insertion of percutaneous endoscopic gastrostomy (PEG) tube (HCC)      PEG appears perfectly normal  Functioning well  Patient may take cold medication through the PEG  He will call me if there is a problem

## 2024-12-14 DIAGNOSIS — E78.5 HYPERLIPIDEMIA: ICD-10-CM

## 2024-12-16 RX ORDER — PRAVASTATIN SODIUM 20 MG
20 TABLET ORAL EVERY OTHER DAY
Qty: 90 TABLET | Refills: 1 | Status: SHIPPED | OUTPATIENT
Start: 2024-12-16

## 2025-01-05 ENCOUNTER — APPOINTMENT (EMERGENCY)
Dept: RADIOLOGY | Facility: HOSPITAL | Age: 80
End: 2025-01-05
Payer: MEDICARE

## 2025-01-05 ENCOUNTER — HOSPITAL ENCOUNTER (EMERGENCY)
Facility: HOSPITAL | Age: 80
Discharge: HOME | End: 2025-01-05
Attending: EMERGENCY MEDICINE
Payer: MEDICARE

## 2025-01-05 VITALS
DIASTOLIC BLOOD PRESSURE: 69 MMHG | TEMPERATURE: 98.1 F | RESPIRATION RATE: 16 BRPM | HEART RATE: 97 BPM | SYSTOLIC BLOOD PRESSURE: 115 MMHG | OXYGEN SATURATION: 94 %

## 2025-01-05 DIAGNOSIS — R50.9 FEVER: Primary | ICD-10-CM

## 2025-01-05 LAB
ALBUMIN SERPL BCG-MCNC: 4 G/DL (ref 3.5–5)
ALP SERPL-CCNC: 60 U/L (ref 34–104)
ALT SERPL W P-5'-P-CCNC: 16 U/L (ref 7–52)
ANION GAP SERPL CALCULATED.3IONS-SCNC: 6 MMOL/L (ref 4–13)
APTT PPP: 25 SECONDS (ref 23–34)
AST SERPL W P-5'-P-CCNC: 20 U/L (ref 13–39)
ATRIAL RATE: 98 BPM
BASOPHILS # BLD AUTO: 0.03 THOUSANDS/ΜL (ref 0–0.1)
BASOPHILS NFR BLD AUTO: 0 % (ref 0–1)
BILIRUB DIRECT SERPL-MCNC: 0.11 MG/DL (ref 0–0.2)
BILIRUB SERPL-MCNC: 0.52 MG/DL (ref 0.2–1)
BILIRUB UR QL STRIP: NEGATIVE
BUN SERPL-MCNC: 34 MG/DL (ref 5–25)
CALCIUM SERPL-MCNC: 9.6 MG/DL (ref 8.4–10.2)
CARDIAC TROPONIN I PNL SERPL HS: 6 NG/L (ref ?–50)
CHLORIDE SERPL-SCNC: 103 MMOL/L (ref 96–108)
CLARITY UR: NORMAL
CO2 SERPL-SCNC: 28 MMOL/L (ref 21–32)
COLOR UR: YELLOW
CREAT SERPL-MCNC: 1.24 MG/DL (ref 0.6–1.3)
EOSINOPHIL # BLD AUTO: 0.01 THOUSAND/ΜL (ref 0–0.61)
EOSINOPHIL NFR BLD AUTO: 0 % (ref 0–6)
ERYTHROCYTE [DISTWIDTH] IN BLOOD BY AUTOMATED COUNT: 12.9 % (ref 11.6–15.1)
FLUAV AG UPPER RESP QL IA.RAPID: NEGATIVE
FLUBV AG UPPER RESP QL IA.RAPID: NEGATIVE
GFR SERPL CREATININE-BSD FRML MDRD: 55 ML/MIN/1.73SQ M
GLUCOSE SERPL-MCNC: 122 MG/DL (ref 65–140)
GLUCOSE UR STRIP-MCNC: NEGATIVE MG/DL
HCT VFR BLD AUTO: 42.6 % (ref 36.5–49.3)
HGB BLD-MCNC: 14 G/DL (ref 12–17)
HGB UR QL STRIP.AUTO: NEGATIVE
IMM GRANULOCYTES # BLD AUTO: 0.05 THOUSAND/UL (ref 0–0.2)
IMM GRANULOCYTES NFR BLD AUTO: 0 % (ref 0–2)
INR PPP: 1.05 (ref 0.85–1.19)
KETONES UR STRIP-MCNC: NEGATIVE MG/DL
LACTATE SERPL-SCNC: 1.6 MMOL/L (ref 0.5–2)
LEUKOCYTE ESTERASE UR QL STRIP: NEGATIVE
LYMPHOCYTES # BLD AUTO: 0.42 THOUSANDS/ΜL (ref 0.6–4.47)
LYMPHOCYTES NFR BLD AUTO: 3 % (ref 14–44)
MCH RBC QN AUTO: 30 PG (ref 26.8–34.3)
MCHC RBC AUTO-ENTMCNC: 32.9 G/DL (ref 31.4–37.4)
MCV RBC AUTO: 91 FL (ref 82–98)
MONOCYTES # BLD AUTO: 0.5 THOUSAND/ΜL (ref 0.17–1.22)
MONOCYTES NFR BLD AUTO: 4 % (ref 4–12)
NEUTROPHILS # BLD AUTO: 12.78 THOUSANDS/ΜL (ref 1.85–7.62)
NEUTS SEG NFR BLD AUTO: 93 % (ref 43–75)
NITRITE UR QL STRIP: NEGATIVE
NRBC BLD AUTO-RTO: 0 /100 WBCS
P AXIS: 43 DEGREES
PH UR STRIP.AUTO: 8 [PH]
PLATELET # BLD AUTO: 229 THOUSANDS/UL (ref 149–390)
PMV BLD AUTO: 10 FL (ref 8.9–12.7)
POTASSIUM SERPL-SCNC: 4.2 MMOL/L (ref 3.5–5.3)
PR INTERVAL: 182 MS
PROT SERPL-MCNC: 6.9 G/DL (ref 6.4–8.4)
PROT UR STRIP-MCNC: NEGATIVE MG/DL
PROTHROMBIN TIME: 14.4 SECONDS (ref 12.3–15)
QRS AXIS: -64 DEGREES
QRSD INTERVAL: 98 MS
QT INTERVAL: 360 MS
QTC INTERVAL: 459 MS
RBC # BLD AUTO: 4.66 MILLION/UL (ref 3.88–5.62)
SARS-COV+SARS-COV-2 AG RESP QL IA.RAPID: NEGATIVE
SODIUM SERPL-SCNC: 137 MMOL/L (ref 135–147)
SP GR UR STRIP.AUTO: 1.02 (ref 1–1.03)
T WAVE AXIS: 40 DEGREES
UROBILINOGEN UR STRIP-ACNC: <2 MG/DL
VENTRICULAR RATE: 98 BPM
WBC # BLD AUTO: 13.79 THOUSAND/UL (ref 4.31–10.16)

## 2025-01-05 PROCEDURE — 87804 INFLUENZA ASSAY W/OPTIC: CPT | Performed by: EMERGENCY MEDICINE

## 2025-01-05 PROCEDURE — 84484 ASSAY OF TROPONIN QUANT: CPT | Performed by: EMERGENCY MEDICINE

## 2025-01-05 PROCEDURE — 36415 COLL VENOUS BLD VENIPUNCTURE: CPT | Performed by: EMERGENCY MEDICINE

## 2025-01-05 PROCEDURE — 93005 ELECTROCARDIOGRAM TRACING: CPT

## 2025-01-05 PROCEDURE — 80048 BASIC METABOLIC PNL TOTAL CA: CPT | Performed by: EMERGENCY MEDICINE

## 2025-01-05 PROCEDURE — 83605 ASSAY OF LACTIC ACID: CPT | Performed by: EMERGENCY MEDICINE

## 2025-01-05 PROCEDURE — 99284 EMERGENCY DEPT VISIT MOD MDM: CPT | Performed by: EMERGENCY MEDICINE

## 2025-01-05 PROCEDURE — 87040 BLOOD CULTURE FOR BACTERIA: CPT | Performed by: EMERGENCY MEDICINE

## 2025-01-05 PROCEDURE — 71046 X-RAY EXAM CHEST 2 VIEWS: CPT

## 2025-01-05 PROCEDURE — 87811 SARS-COV-2 COVID19 W/OPTIC: CPT | Performed by: EMERGENCY MEDICINE

## 2025-01-05 PROCEDURE — 85025 COMPLETE CBC W/AUTO DIFF WBC: CPT | Performed by: EMERGENCY MEDICINE

## 2025-01-05 PROCEDURE — 99284 EMERGENCY DEPT VISIT MOD MDM: CPT

## 2025-01-05 PROCEDURE — 96365 THER/PROPH/DIAG IV INF INIT: CPT

## 2025-01-05 PROCEDURE — 85730 THROMBOPLASTIN TIME PARTIAL: CPT | Performed by: EMERGENCY MEDICINE

## 2025-01-05 PROCEDURE — 80076 HEPATIC FUNCTION PANEL: CPT | Performed by: EMERGENCY MEDICINE

## 2025-01-05 PROCEDURE — 96361 HYDRATE IV INFUSION ADD-ON: CPT

## 2025-01-05 PROCEDURE — 85610 PROTHROMBIN TIME: CPT | Performed by: EMERGENCY MEDICINE

## 2025-01-05 PROCEDURE — 81003 URINALYSIS AUTO W/O SCOPE: CPT | Performed by: EMERGENCY MEDICINE

## 2025-01-05 RX ADMIN — SODIUM CHLORIDE 1000 ML: 0.9 INJECTION, SOLUTION INTRAVENOUS at 15:09

## 2025-01-05 RX ADMIN — CEFTRIAXONE SODIUM 2000 MG: 10 INJECTION, POWDER, FOR SOLUTION INTRAVENOUS at 17:13

## 2025-01-05 NOTE — ED PROVIDER NOTES
Time reflects when diagnosis was documented in both MDM as applicable and the Disposition within this note       Time User Action Codes Description Comment    1/5/2025  5:48 PM Matthew Tenorio Add [R50.9] Fever           ED Disposition       ED Disposition   Discharge    Condition   Stable    Date/Time   Sun Jan 5, 2025  5:48 PM    Comment   Sven VALDEZ Modawar discharge to home/self care.                   Assessment & Plan       Medical Decision Making  Problems Addressed:  Fever: complicated acute illness or injury with systemic symptoms    Amount and/or Complexity of Data Reviewed  Labs: ordered.  Radiology: ordered and independent interpretation performed.    Risk  Prescription drug management.        ED Course as of 01/05/25 2207   [place] Jan 05, 2025   1705 Reexamined. Remains well appearing. Discussed labs and test results. Pt comfortable with plan to d/c home at this time.        Medications   sodium chloride 0.9 % bolus 1,000 mL (0 mL Intravenous Stopped 1/5/25 1609)   ceftriaxone (ROCEPHIN) 2 g/50 mL in dextrose IVPB (0 mg Intravenous Stopped 1/5/25 1743)       ED Risk Strat Scores                          SBIRT 22yo+      Flowsheet Row Most Recent Value   Initial Alcohol Screen: US AUDIT-C     1. How often do you have a drink containing alcohol? 0 Filed at: 01/05/2025 1520   2. How many drinks containing alcohol do you have on a typical day you are drinking?  0 Filed at: 01/05/2025 1520   3b. FEMALE Any Age, or MALE 65+: How often do you have 4 or more drinks on one occassion? 0 Filed at: 01/05/2025 1520   Audit-C Score 0 Filed at: 01/05/2025 1520   LAURA: How many times in the past year have you...    Used an illegal drug or used a prescription medication for non-medical reasons? 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 Artur Balderas    MYRINGOTOMY W/ TUBES Left 06/07/2023    office procedure - Dr. Wilkerson    TONGUE SURGERY      TONSILLECTOMY        Family History   Problem Relation Age of Onset    Hypertension Mother     Stroke Mother       Social History     Tobacco Use    Smoking status: Former     Passive exposure: Current    Smokeless tobacco: Former    Tobacco comments:     quit 20 years ago   Vaping Use    Vaping status: Never Used   Substance Use Topics    Alcohol use: Yes     Comment: occasional    Drug use: Never      E-Cigarette/Vaping    E-Cigarette Use Never User       E-Cigarette/Vaping Substances    Nicotine No     THC No     CBD No     Flavoring No     Other No     Unknown No       I have reviewed and agree with the history as documented.     Fever, headache, malaise and cough since this morning. EMS notes hypotension, tachycardia and tachypnea. H/o ENT malignancy s/p radical neck dissection and peg tube. No report of vomiting. No abd pain. No urinary sxs. No CP or SOB.         Review of Systems   Constitutional:  Positive for chills, fatigue and fever.   Respiratory:  Positive for cough.    Neurological:   Positive for headaches.           Objective       ED Triage Vitals   Temperature Pulse Blood Pressure Respirations SpO2 Patient Position - Orthostatic VS   01/05/25 1455 01/05/25 1458 01/05/25 1455 01/05/25 1455 01/05/25 1458 01/05/25 1455   98.1 °F (36.7 °C) 105 121/67 16 98 % Lying      Temp Source Heart Rate Source BP Location FiO2 (%) Pain Score    01/05/25 1455 01/05/25 1531 01/05/25 1455 -- --    Oral Monitor Right arm        Vitals      Date and Time Temp Pulse SpO2 Resp BP Pain Score FACES Pain Rating User   01/05/25 1800 -- 97 94 % 16 115/69 -- -- [facility]   01/05/25 1531 -- 98 94 % -- -- -- -- [facility]   01/05/25 1458 -- 105 98 % -- -- -- -- [facility]   01/05/25 1455 98.1 °F (36.7 °C) -- -- 16 121/67 -- -- [facility]            Physical Exam  Vitals and nursing note reviewed.   Constitutional:       General: He is not in acute distress.     Appearance: He is well-developed. He is not ill-appearing, toxic-appearing or diaphoretic.   HENT:      Head: Normocephalic and atraumatic.      Mouth/Throat:      Mouth: Mucous membranes are moist.      Pharynx: Oropharynx is clear.   Eyes:      Conjunctiva/sclera: Conjunctivae normal.      Pupils: Pupils are equal, round, and reactive to light.   Neck:      Vascular: No JVD.   Cardiovascular:      Rate and Rhythm: Normal rate and regular rhythm.      Pulses: Normal pulses.      Heart sounds: Normal heart sounds. No murmur heard.     No friction rub. No gallop.   Pulmonary:      Effort: Pulmonary effort is normal. No respiratory distress.      Breath sounds: No stridor. Rhonchi present. No wheezing.   Abdominal:      General: There is no distension.      Palpations: Abdomen is soft.      Tenderness: There is no abdominal tenderness. There is no guarding or rebound.   Musculoskeletal:         General: No swelling, tenderness, deformity or signs of injury. Normal range of motion.      Cervical back: Normal range of motion and neck supple. No rigidity.   Skin:     General: Skin is warm and dry.       Capillary Refill: Capillary refill takes less than 2 seconds.      Coloration: Skin is not jaundiced or pale.      Findings: No bruising or erythema.   Neurological:      General: No focal deficit present.      Mental Status: He is alert and oriented to person, place, and time.      Cranial Nerves: No cranial nerve deficit.      Sensory: No sensory deficit.      Motor: No weakness or abnormal muscle tone.      Coordination: Coordination normal.      Gait: Gait normal.         Results Reviewed       Procedure Component Value Units Date/Time    UA (URINE) with reflex to Scope [[id number]] Collected: 01/05/25 1628    Lab Status: Final result Specimen: Urine, Clean Catch Updated: 01/05/25 1637     Color, UA Yellow     Clarity, UA Turbid     Specific Gravity, UA 1.017     pH, UA 8.0     Leukocytes, UA Negative     Nitrite, UA Negative     Protein, UA Negative mg/dl      Glucose, UA Negative mg/dl      Ketones, UA Negative mg/dl      Urobilinogen, UA <2.0 mg/dl      Bilirubin, UA Negative     Occult Blood, UA Negative    CBC and differential [[id number]]  (Abnormal) Collected: 01/05/25 1504    Lab Status: Final result Specimen: Blood from Arm, Left Updated: 01/05/25 1614     WBC 13.79 Thousand/uL      RBC 4.66 Million/uL      Hemoglobin 14.0 g/dL      Hematocrit 42.6 %      MCV 91 fL      MCH 30.0 pg      MCHC 32.9 g/dL      RDW 12.9 %      MPV 10.0 fL      Platelets 229 Thousands/uL      nRBC 0 /100 WBCs      Segmented % 93 %      Immature Grans % 0 %      Lymphocytes % 3 %      Monocytes % 4 %      Eosinophils Relative 0 %      Basophils Relative 0 %      Absolute Neutrophils 12.78 Thousands/µL      Absolute Immature Grans 0.05 Thousand/uL      Absolute Lymphocytes 0.42 Thousands/µL      Absolute Monocytes 0.50 Thousand/µL      Eosinophils Absolute 0.01 Thousand/µL      Basophils Absolute 0.03 Thousands/µL     Narrative:      This is an appended report.  These results have been appended to a previously verified  report.    HS Troponin 0hr (reflex protocol) [[id number]]  (Normal) Collected: 01/05/25 1504    Lab Status: Final result Specimen: Blood from Arm, Left Updated: 01/05/25 1538     hs TnI 0hr 6 ng/L     Protime-INR [[id number]]  (Normal) Collected: 01/05/25 1504    Lab Status: Final result Specimen: Blood from Arm, Left Updated: 01/05/25 1533     Protime 14.4 seconds      INR 1.05    Narrative:      INR Therapeutic Range    Indication                                             INR Range      Atrial Fibrillation                                               2.0-3.0  Hypercoagulable State                                    2.0.2.3  Left Ventricular Asist Device                            2.0-3.0  Mechanical Heart Valve                                  -    Aortic(with afib, MI, embolism, HF, LA enlargement,    and/or coagulopathy)                                     2.0-3.0 (2.5-3.5)     Mitral                                                             2.5-3.5  Prosthetic/Bioprosthetic Heart Valve               2.0-3.0  Venous thromboembolism (VTE: VT, PE        2.0-3.0    APTT [[id number]]  (Normal) Collected: 01/05/25 1504    Lab Status: Final result Specimen: Blood from Arm, Left Updated: 01/05/25 1533     PTT 25 seconds     FLU/COVID Rapid Antigen (30 min. TAT) - Preferred screening test in ED [[id number]]  (Normal) Collected: 01/05/25 1504    Lab Status: Final result Specimen: Nares from Nose Updated: 01/05/25 1532     SARS COV Rapid Antigen Negative     Influenza A Rapid Antigen Negative     Influenza B Rapid Antigen Negative    Narrative:      This test has been performed using the DocSendidel Sandra 2 FLU+SARS Antigen test under the Emergency Use Authorization (EUA). This test has been validated by the  and verified by the performing laboratory. The Sandra uses lateral flow immunofluorescent sandwich assay to detect SARS-COV, Influenza A and Influenza B Antigen.     The Quidel Sandra 2 SARS Antigen test does  not differentiate between SARS-CoV and SARS-CoV-2.     Negative results are presumptive and may be confirmed with a molecular assay, if necessary, for patient management. Negative results do not rule out SARS-CoV-2 or influenza infection and should not be used as the sole basis for treatment or patient management decisions. A negative test result may occur if the level of antigen in a sample is below the limit of detection of this test.     Positive results are indicative of the presence of viral antigens, but do not rule out bacterial infection or co-infection with other viruses.     All test results should be used as an adjunct to clinical observations and other information available to the provider.    FOR PEDIATRIC PATIENTS - copy/paste COVID Guidelines URL to browser: [email or web address]    Basic metabolic panel [[id number]]  (Abnormal) Collected: 01/05/25 1500    Lab Status: Final result Specimen: Blood from Arm, Left Updated: 01/05/25 1531     Sodium 137 mmol/L      Potassium 4.2 mmol/L      Chloride 103 mmol/L      CO2 28 mmol/L      ANION GAP 6 mmol/L      BUN 34 mg/dL      Creatinine 1.24 mg/dL      Glucose 122 mg/dL      Calcium 9.6 mg/dL      eGFR 55 ml/min/1.73sq m     Narrative:      National Kidney Disease Foundation guidelines for Chronic Kidney Disease (CKD):     Stage 1 with normal or high GFR (GFR > 90 mL/min/1.73 square meters)    Stage 2 Mild CKD (GFR = 60-89 mL/min/1.73 square meters)    Stage 3A Moderate CKD (GFR = 45-59 mL/min/1.73 square meters)    Stage 3B Moderate CKD (GFR = 30-44 mL/min/1.73 square meters)    Stage 4 Severe CKD (GFR = 15-29 mL/min/1.73 square meters)    Stage 5 End Stage CKD (GFR <15 mL/min/1.73 square meters)  Note: GFR calculation is accurate only with a steady state creatinine    Hepatic function panel [[id number]]  (Normal) Collected: 01/05/25 1504    Lab Status: Final result Specimen: Blood from Arm, Left Updated:  01/05/25 1531     Total Bilirubin 0.52 mg/dL      Bilirubin, Direct 0.11 mg/dL      Alkaline Phosphatase 60 U/L      AST 20 U/L      ALT 16 U/L      Total Protein 6.9 g/dL      Albumin 4.0 g/dL     Lactic acid, plasma (w/reflex if result > 2.0) [[id number]]  (Normal) Collected: 01/05/25 1504    Lab Status: Final result Specimen: Blood from Arm, Left Updated: 01/05/25 1531     LACTIC ACID 1.6 mmol/L     Narrative:      Result may be elevated if tourniquet was used during collection.    Blood culture #2 [[id number]] Collected: 01/05/25 1504    Lab Status: In process Specimen: Blood from Arm, Left Updated: 01/05/25 1514    Blood culture #1 [[id number]] Collected: 01/05/25 1508    Lab Status: In process Specimen: Blood from Arm, Left Updated: 01/05/25 1514            XR chest 2 views   ED Interpretation by Matthew Tenorio MD (01/05 1529)   No acute process          Procedures    ED Medication and Procedure Management   Prior to Admission Medications   Prescriptions Last Dose Informant Patient Reported? Taking?   Multiple Vitamins-Minerals (MULTIVITAMIN ADULTS 50+ PO)  Self Yes No   Sig: Take by mouth   Nutritional Supplements (Jevity 1.5 Marcos/Fiber) LIQD  Self No No   Sig: Take 1600 calories daily   VITAMIN D, CHOLECALCIFEROL, PO  Self Yes No   Sig: Take by mouth   aspirin 81 MG tablet  Self Yes No   ciprofloxacin-dexamethasone (CIPRODEX) otic suspension  Self No No   Sig: Administer 4 drops into the left ear 2 (two) times a day for 7 days   Patient not taking: Reported on 11/5/2024   fluticasone (FLONASE) 50 mcg/act nasal spray  Self No No   Sig: USE 1 SPRAY IN EACH NOSTRIL ONCE A DAY   glycopyrrolate (ROBINUL) 1 mg tablet  Self No No   Sig: TAKE ONE TABLET BY MOUTH THREE TIMES A DAY   mupirocin (BACTROBAN) 2 % ointment  Self No No   Sig: Apply topically 2 (two) times a day   pravastatin (PRAVACHOL) 20 mg tablet   No No   Sig: TAKE ONE TABLET BY MOUTH EVERY OTHER DAY      Facility-Administered Medications: None      Discharge Medication List as of 1/5/2025  5:48 PM        START taking these medications    Details   amoxicillin-clavulanate (AUGMENTIN) 875-125 mg per tablet Take 1 tablet by mouth every 12 (twelve) hours for 7 days, Starting Sun 1/5/2025, Until Sun 1/12/2025, Normal           CONTINUE these medications which have NOT CHANGED    Details   aspirin 81 MG tablet Historical Med      ciprofloxacin-dexamethasone (CIPRODEX) otic suspension Administer 4 drops into the left ear 2 (two) times a day for 7 days, Starting Mon 7/22/2024, Until Mon 7/29/2024, Normal      fluticasone (FLONASE) 50 mcg/act nasal spray USE 1 SPRAY IN EACH NOSTRIL ONCE A DAY, Starting Tue 10/22/2024, Normal      glycopyrrolate (ROBINUL) 1 mg tablet TAKE ONE TABLET BY MOUTH THREE TIMES A DAY, Starting Sat 9/21/2024, Normal      Multiple Vitamins-Minerals (MULTIVITAMIN ADULTS 50+ PO) Take by mouth, Historical Med      mupirocin (BACTROBAN) 2 % ointment Apply topically 2 (two) times a day, Starting Mon 9/30/2024, Normal      Nutritional Supplements (Jevity 1.5 Marcos/Fiber) LIQD Take 1600 calories daily, Print      pravastatin (PRAVACHOL) 20 mg tablet TAKE ONE TABLET BY MOUTH EVERY OTHER DAY, Starting Mon 12/16/2024, Normal      VITAMIN D, CHOLECALCIFEROL, PO Take by mouth, Historical Med           No discharge procedures on file.  ED SEPSIS DOCUMENTATION   Time reflects when diagnosis was documented in both MDM as applicable and the Disposition within this note       Time User Action Codes Description Comment    1/5/2025  5:48 PM Matthew eTnorio Add [R50.9] Fever                  Matthew Tenorio MD  01/05/25 1841

## 2025-01-08 ENCOUNTER — TELEPHONE (OUTPATIENT)
Age: 80
End: 2025-01-08

## 2025-01-08 DIAGNOSIS — R50.9 FEVER, UNSPECIFIED FEVER CAUSE: Primary | ICD-10-CM

## 2025-01-08 RX ORDER — AMOXICILLIN AND CLAVULANATE POTASSIUM 600; 42.9 MG/5ML; MG/5ML
90 POWDER, FOR SUSPENSION ORAL 2 TIMES DAILY
Qty: 400 ML | Refills: 0 | Status: SHIPPED | OUTPATIENT
Start: 2025-01-08 | End: 2025-01-15

## 2025-01-08 NOTE — TELEPHONE ENCOUNTER
Patients wife called in regarding him being seen in the er on Sunday for an infection. Patient was give amoxicillin in pill form, but he has a feeding tube so they've been having to cut up the pills. The wife was asking if Dr. Liu could send in a liquid form of the amoxicillin that would be easier for pt to take.    Please advise

## 2025-01-08 NOTE — TELEPHONE ENCOUNTER
Patient's wife calling back to check the status of the request for a liquid form antibiotic.  She states what she was using to break up the pills has now broken so she is unable to use that.  She states the [facility] is closing at 6 p.m. and they would need this sent over asa if possible.  She would like a call back.

## 2025-01-09 ENCOUNTER — TELEPHONE (OUTPATIENT)
Age: 80
End: 2025-01-09

## 2025-01-09 DIAGNOSIS — E43 SEVERE PROTEIN-CALORIE MALNUTRITION (HCC): ICD-10-CM

## 2025-01-09 NOTE — TELEPHONE ENCOUNTER
Wife calling (consent verfied), lost cap for peg tube.  Call warm transferred to [facility].

## 2025-01-09 NOTE — TELEPHONE ENCOUNTER
Patients GI provider:  Dr. STANLEY    Number to return call: ([phone]    Reason for call: Pts wife contacted office to speak with Phoebe. Phoebe was unavailable.   Please return patients call to further assist regarding patients tube.

## 2025-01-09 NOTE — TELEPHONE ENCOUNTER
Spoke with pt wife he lost the end cap to his gtube. They will send me a picture in my chart. I will call them back in the morning after I receive the picture.

## 2025-01-09 NOTE — TELEPHONE ENCOUNTER
Patients wife called stating they need new scripts for Jevity 1.5, enfit syringes and pads. She mentioned she will have the pharmacy fax the request of the exact medications needed tomorrow. Please advise back if needed.

## 2025-01-10 ENCOUNTER — TELEPHONE (OUTPATIENT)
Age: 80
End: 2025-01-10

## 2025-01-10 DIAGNOSIS — E43 SEVERE PROTEIN-CALORIE MALNUTRITION (HCC): ICD-10-CM

## 2025-01-10 LAB
DME PARACHUTE DELIVERY DATE REQUESTED: NORMAL
DME PARACHUTE DELIVERY DATE REQUESTED: NORMAL
DME PARACHUTE ITEM DESCRIPTION: NORMAL
DME PARACHUTE ORDER STATUS: NORMAL
DME PARACHUTE ORDER STATUS: NORMAL
DME PARACHUTE SUPPLIER NAME: NORMAL
DME PARACHUTE SUPPLIER NAME: NORMAL
DME PARACHUTE SUPPLIER PHONE: NORMAL
DME PARACHUTE SUPPLIER PHONE: NORMAL

## 2025-01-10 NOTE — TELEPHONE ENCOUNTER
Pt wife is calling back to see where the caps for the G-tube have been sent. Pt wife would like a call back to confirm product was sent.

## 2025-01-11 LAB
BACTERIA BLD CULT: NORMAL
BACTERIA BLD CULT: NORMAL

## 2025-01-13 ENCOUNTER — TELEPHONE (OUTPATIENT)
Age: 80
End: 2025-01-13

## 2025-01-13 NOTE — TELEPHONE ENCOUNTER
Spoke with pt wife let her know I interoffice mailed an adapter set and a side clamp. To the [place] office. It has the pt name and instructions to call pt when it arrives.  The adapter has enfit caps on it. The caps can be removed from the adapter and they should fit the pt gtube connectors. Side clamp is just for them to have on hand.

## 2025-01-14 ENCOUNTER — OFFICE VISIT (OUTPATIENT)
Dept: CARDIOLOGY CLINIC | Facility: CLINIC | Age: 80
End: 2025-01-14
Payer: MEDICARE

## 2025-01-14 VITALS
SYSTOLIC BLOOD PRESSURE: 140 MMHG | DIASTOLIC BLOOD PRESSURE: 82 MMHG | BODY MASS INDEX: 22.62 KG/M2 | WEIGHT: 158 LBS | HEIGHT: 70 IN | HEART RATE: 65 BPM

## 2025-01-14 DIAGNOSIS — Z93.1 S/P PERCUTANEOUS ENDOSCOPIC GASTROSTOMY (PEG) TUBE PLACEMENT (HCC): ICD-10-CM

## 2025-01-14 DIAGNOSIS — I25.10 CORONARY ARTERY CALCIFICATION: Primary | ICD-10-CM

## 2025-01-14 DIAGNOSIS — I44.4 LEFT ANTERIOR FASCICULAR BLOCK: ICD-10-CM

## 2025-01-14 DIAGNOSIS — E78.2 MIXED HYPERLIPIDEMIA: ICD-10-CM

## 2025-01-14 LAB
ATRIAL RATE: 98 BPM
P AXIS: 43 DEGREES
PR INTERVAL: 182 MS
QRS AXIS: -64 DEGREES
QRSD INTERVAL: 98 MS
QT INTERVAL: 360 MS
QTC INTERVAL: 459 MS
T WAVE AXIS: 40 DEGREES
VENTRICULAR RATE: 98 BPM

## 2025-01-14 PROCEDURE — 99213 OFFICE O/P EST LOW 20 MIN: CPT | Performed by: INTERNAL MEDICINE

## 2025-01-14 PROCEDURE — 93010 ELECTROCARDIOGRAM REPORT: CPT | Performed by: STUDENT IN AN ORGANIZED HEALTH CARE EDUCATION/TRAINING PROGRAM

## 2025-01-14 NOTE — PROGRESS NOTES
[facility] [address] [address] [phone]                                                Cardiology Office Follow up  Sven Swain, 79 y.o. male  YOB: 1945  MRN: [id number] Encounter: [id number]      PCP - Salomón Liu MD    Assessment and Plan  Coronary artery calcifications  Hyperlipidemia  Hypertension  Stress echo - 7/2020 - 6m17s, 96% MPHR, stress ECG and echo normal. 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 He has been on pravastatin 20 mg every other day, and has been tolerating it well.   Plan  Continue aspirin 81 mg daily  Continue pravastatin 20 mg every other day  Goal LDL < 70  Monitor for exertional chest pain / shortness of breath symptoms    Left anterior fascicular block  Stable, asymptomatic  Last ECG 7/2023 - sinus tachycardia , c/r/o old septal infarct  Reviewed ECG from 1/5/25 - NSR, LAFB  No dizziness or lightheadedness, near-syncope or syncope  Continue to monitor clinically for symptoms and follow-up annually with EKG     No orders of the defined types were placed in this encounter.      No results found for this visit on 01/14/25.    Return in about 1 year (around 1/14/2026), or if symptoms worsen or fail to improve.      History of Present Illness   79 y.o.  gentleman comes in as a new patient for evaluation of  new onset shortness of breath over the past 2 weeks.  At baseline, he is very active for his age, and mows several acres of his land with a self-propelled push mower.  But over the last week, he noticed getting out of breath with lifting up a chainsaw and trying to cut a tree.  He feels he is still able to climb up a flight of stair, but shortness of breath with this level of exertion was unusual for him.  No complains of chest pain, palpitations, dizziness or lightheadedness.  No prior history of CAD.    He had tongue cancer back in 1999, and underwent radical neck dissection and radiation therapy at [facility].  He has remained in remission, and has been getting routine follow-up.    Interval history - 9/30/2020  He returns for follow-up, and has been doing well overall.  He does not report any active symptoms of chest pain or shortness of breath.  He has been fairly active, and takes care of his 4 acres, including cutting, trimming lawn mowing.    Interval history - 9/15/2021   he comes back for follow-up after 1 year.  He continues to do well in terms of which shortness of breath, and remains very active taking care of his for acres.  No current complains of chest pain, palpitations, dizziness or lightheadedness.  No recent history of near-syncope or syncope.   He has had dysphagia, and as a result recently underwent EGD with esophageal dilatation    Interval history - 10/19/2022  He comes back for follow-up after 1 year.  He has been doing very well overall from the cardiac standpoint and has not had any symptoms of chest pain shortness of breath, palpitations or dizziness or lightheadedness.  He continues to have issues with dysphagia even after his esophageal dilation.  As a result of not being able to eat much, he has lost another 10 lb and is now down 20+ lb over the last 2 years    Interval history - 1/16/2024  He returns for follow-up after about 15 months.  In the interim, he had PEG tube placement  and is now using Jevity.  With this he has ended up gaining about 15 pounds.  Denies any symptoms of orthostatic dizziness or lightheadedness, near-syncope or syncope.  No current complaints of chest pain, shortness of breath, palpitations or dizziness.  He is overall feeling better since being on nutrition through PEg tube.    Interval history - 1/14/2025  He returns for 1 year followup.  He has been doing well overall from a cardiac perspective and has not had any issues with chest pain or shortness of breath or palpitations.  Continues to get nutrition through his PEG tube.  He was in ED last week with fever, generalized weakness, and was treated for antibiotics (Amox-clav) and he is doing better.     Historical Information   Past Medical History:   Diagnosis Date   • Acute renal failure superimposed on chronic kidney disease  (HCC) 12/31/2021   • 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HCC) 03/1999    SCCA left tongue base - T2N1M0- treated at [facility] surgery and XRT   • Chronic kidney disease    • COVID-19 with hypoxic respiratory failure 12/31/2021   • Essential hypertension 05/06/2019   • Hematuria 10/15/2019   • Hyperlipidemia    • Left anterior fascicular block    • Stage 3 chronic kidney disease (HCC)    • 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HCC) 02/14/2019     Past Surgical History:   Procedure Laterality Date   • CYSTOSCOPY  08/30/2021   • EYELID CLOSURE Left     to prevent eyelashes from rubbing cornea   • HERNIA REPAIR Left    • MODIFIED RADICAL NECK DISSECTION Left 03/03/1999    [facility] - with mandibulotomy and resection SCCA left tongue base - Dr. Artur Balderas   • MYRINGOTOMY W/ TUBES Left 06/07/2023    office procedure - Dr. Wilkerson   • TONGUE SURGERY     • TONSILLECTOMY       Family History   Problem Relation Age of Onset   • Hypertension Mother    • Stroke Mother      Current Outpatient Medications on File Prior to Visit   Medication Sig Dispense Refill   • amoxicillin-clavulanate (Augmentin ES)  600-42.9 mg/5 mL oral suspension Take 26.7 mL (3,204 mg total) by mouth 2 (two) times a day for 7 days 400 mL 0   • aspirin 81 MG tablet      • fluticasone (FLONASE) 50 mcg/act nasal spray USE 1 SPRAY IN EACH NOSTRIL ONCE A DAY 9.9 mL 1   • glycopyrrolate (ROBINUL) 1 mg tablet TAKE ONE TABLET BY MOUTH THREE TIMES A DAY 90 tablet 1   • Multiple Vitamins-Minerals (MULTIVITAMIN ADULTS 50+ PO) Take by mouth     • mupirocin (BACTROBAN) 2 % ointment Apply topically 2 (two) times a day 60 g 0   • Nutritional Supplements (Jevity 1.5 Marcos/Fiber) LIQD Take 1600 calories daily 1500 mL 3   • pravastatin (PRAVACHOL) 20 mg tablet TAKE ONE TABLET BY MOUTH EVERY OTHER DAY 90 tablet 1   • VITAMIN D, CHOLECALCIFEROL, PO Take by mouth     • ciprofloxacin-dexamethasone (CIPRODEX) otic suspension Administer 4 drops into the left ear 2 (two) times a day for 7 days (Patient not taking: Reported on 11/5/2024) 3 mL 0     No current facility-administered medications on file prior to visit.     Allergies   Allergen Reactions   • Iodinated Contrast Media Other (See Comments)   • Iodine - Food Allergy      Social History     Socioeconomic History   • Marital status: /Civil Union     Spouse name: None   • Number of children: None   • Years of education: None   • Highest education level: None   Occupational History   • None   Tobacco Use   • Smoking status: Former     Passive exposure: Current   • Smokeless tobacco: Former   • Tobacco comments:     quit 20 years ago   Vaping Use   • Vaping status: Never Used   Substance and Sexual Activity   • Alcohol use: Yes     Comment: occasional   • Drug use: Never   • Sexual activity: Not Currently     Partners: Female   Other Topics Concern   • None   Social History Narrative   • None     Social Drivers of Health     Financial Resource Strain: Patient Declined (1/9/2024)    Overall Financial Resource Strain (CARDIA)    • Difficulty of Paying Living Expenses: Patient declined   Food Insecurity: No  "Food Insecurity (7/20/2023)    Hunger Vital Sign    • Worried About Running Out of Food in the Last Year: Never true    • Ran Out of Food in the Last Year: Never true   Transportation Needs: No Transportation Needs (1/9/2024)    PRAPARE - Transportation    • Lack of Transportation (Medical): No    • Lack of Transportation (Non-Medical): No   Physical Activity: Not on file   Stress: Not on file   Social Connections: Not on file   Intimate Partner Violence: Not on file   Housing Stability: Low Risk  (7/20/2023)    Housing Stability Vital Sign    • Unable to Pay for Housing in the Last Year: No    • Number of Places Lived in the Last Year: 1    • Unstable Housing in the Last Year: No        Review of Systems   HENT:  Positive for voice change.    All other systems reviewed and are negative.      Vitals:  Vitals:    01/14/25 1404   BP: 140/82   Pulse: 65   Weight: 71.7 kg (158 lb)   Height: 5' 10\" (1.778 m)     BMI - Body mass index is 22.67 kg/m².  Wt Readings from Last 7 Encounters:   01/14/25 71.7 kg (158 lb)   12/13/24 71.2 kg (157 lb)   11/18/24 71.2 kg (157 lb)   11/05/24 71.2 kg (157 lb)   10/08/24 70.3 kg (155 lb)   09/30/24 70.8 kg (156 lb)   07/29/24 69 kg (152 lb 3.2 oz)       Physical Exam  Vitals and nursing note reviewed.   Constitutional:       General: He is not in acute distress.     Appearance: He is well-developed. He is not ill-appearing or diaphoretic.   HENT:      Head: Normocephalic and atraumatic.   Eyes:      General: No scleral icterus.     Conjunctiva/sclera: Conjunctivae normal.   Neck:      Vascular: No carotid bruit or JVD.   Cardiovascular:      Rate and Rhythm: Normal rate and regular rhythm.      Heart sounds: Normal heart sounds. No murmur heard.     No friction rub. No gallop.   Pulmonary:      Effort: Pulmonary effort is normal. No respiratory distress.      Breath sounds: Normal breath sounds. No wheezing or rales.   Chest:      Chest wall: No tenderness.   Abdominal:      General: " "There is no distension.      Palpations: Abdomen is soft.      Tenderness: There is no abdominal tenderness.      Comments: PEG tube noted in place   Musculoskeletal:         General: No deformity.   Skin:     General: Skin is warm.   Neurological:      General: No focal deficit present.      Mental Status: He is alert and oriented to person, place, and time. Mental status is at baseline.      Comments: hoarseness   Psychiatric:         Mood and Affect: Mood normal.         Behavior: Behavior normal.         Thought Content: Thought content normal.         Labs:  CBC:   Lab Results   Component Value Date    WBC 13.79 (H) 01/05/2025    RBC 4.66 01/05/2025    HGB 14.0 01/05/2025    HCT 42.6 01/05/2025    MCV 91 01/05/2025     01/05/2025    RDW 12.9 01/05/2025       CMP:   Lab Results   Component Value Date    K 4.2 01/05/2025     01/05/2025    CO2 28 01/05/2025    BUN 34 (H) 01/05/2025    CREATININE 1.24 01/05/2025    EGFR 55 01/05/2025    CALCIUM 9.6 01/05/2025    AST 20 01/05/2025    ALT 16 01/05/2025    ALKPHOS 60 01/05/2025       Magnesium:  Lab Results   Component Value Date    MG 2.3 07/15/2023       Lipid Profile:   Lab Results   Component Value Date    HDL 49 02/12/2024    TRIG 129 02/12/2024    LDLCALC 70 02/12/2024       Thyroid Studies:   Lab Results   Component Value Date    ZES2PZSVGPSK 1.807 07/21/2023       No components found for: \"HGA1C\"    Lab Results   Component Value Date    INR 1.05 01/05/2025    INR 1.31 (H) 07/24/2023    INR 1.09 07/19/2023   5    Imaging: Xr Chest Pa & Lateral    Result Date: 6/17/2020  Narrative: CHEST INDICATION:   R06.02: Shortness of breath. COMPARISON:  6/25/2013 EXAM PERFORMED/VIEWS:  XR CHEST PA & LATERAL Images: 3 FINDINGS: Retrocardiac air-fluid level consistent with moderate to large hiatal hernia Strand-like opacities left lung base likely scarring, unchanged Cardiomediastinal silhouette appears unremarkable. The lungs are otherwise clear.  No " pneumothorax or pleural effusion. Osseous structures appear within normal limits for patient age.     Impression: Persistent left lung base linear strand-like scarring Increased conspicuity of moderate to large hiatal hernia with air-fluid level No acute cardiopulmonary disease. Workstation performed: [id number]       Cardiac testing:   No results found for this or any previous visit.  No results found for this or any previous visit.  No results found for this or any previous visit.  No results found for this or any previous visit.

## 2025-01-14 NOTE — TELEPHONE ENCOUNTER
Called patient  spoke with Mi - she will stop by the office this week to  the adapter.  Confirmed our address with Mi.

## 2025-01-22 ENCOUNTER — TELEPHONE (OUTPATIENT)
Dept: NEPHROLOGY | Facility: CLINIC | Age: 80
End: 2025-01-22

## 2025-01-30 ENCOUNTER — APPOINTMENT (OUTPATIENT)
Dept: LAB | Facility: CLINIC | Age: 80
End: 2025-01-30
Payer: MEDICARE

## 2025-01-30 DIAGNOSIS — M89.9 CHRONIC KIDNEY DISEASE-MINERAL AND BONE DISORDER: ICD-10-CM

## 2025-01-30 DIAGNOSIS — E83.9 CHRONIC KIDNEY DISEASE-MINERAL AND BONE DISORDER: ICD-10-CM

## 2025-01-30 DIAGNOSIS — N18.30 STAGE 3 CHRONIC KIDNEY DISEASE, UNSPECIFIED WHETHER STAGE 3A OR 3B CKD (HCC): ICD-10-CM

## 2025-01-30 DIAGNOSIS — N18.9 CHRONIC KIDNEY DISEASE-MINERAL AND BONE DISORDER: ICD-10-CM

## 2025-01-30 LAB
25(OH)D3 SERPL-MCNC: 57.5 NG/ML (ref 30–100)
ANION GAP SERPL CALCULATED.3IONS-SCNC: 8 MMOL/L (ref 4–13)
BACTERIA UR QL AUTO: ABNORMAL /HPF
BASOPHILS # BLD AUTO: 0.05 THOUSANDS/ΜL (ref 0–0.1)
BASOPHILS NFR BLD AUTO: 1 % (ref 0–1)
BILIRUB UR QL STRIP: NEGATIVE
BUN SERPL-MCNC: 29 MG/DL (ref 5–25)
CALCIUM SERPL-MCNC: 10.1 MG/DL (ref 8.4–10.2)
CHLORIDE SERPL-SCNC: 100 MMOL/L (ref 96–108)
CLARITY UR: CLEAR
CO2 SERPL-SCNC: 30 MMOL/L (ref 21–32)
COLOR UR: YELLOW
CREAT SERPL-MCNC: 1.25 MG/DL (ref 0.6–1.3)
CREAT UR-MCNC: 93.6 MG/DL
EOSINOPHIL # BLD AUTO: 0.4 THOUSAND/ΜL (ref 0–0.61)
EOSINOPHIL NFR BLD AUTO: 8 % (ref 0–6)
ERYTHROCYTE [DISTWIDTH] IN BLOOD BY AUTOMATED COUNT: 13 % (ref 11.6–15.1)
GFR SERPL CREATININE-BSD FRML MDRD: 55 ML/MIN/1.73SQ M
GLUCOSE P FAST SERPL-MCNC: 97 MG/DL (ref 65–99)
GLUCOSE UR STRIP-MCNC: NEGATIVE MG/DL
HCT VFR BLD AUTO: 47.8 % (ref 36.5–49.3)
HGB BLD-MCNC: 15.6 G/DL (ref 12–17)
HGB UR QL STRIP.AUTO: ABNORMAL
IMM GRANULOCYTES # BLD AUTO: 0.02 THOUSAND/UL (ref 0–0.2)
IMM GRANULOCYTES NFR BLD AUTO: 0 % (ref 0–2)
KETONES UR STRIP-MCNC: NEGATIVE MG/DL
LEUKOCYTE ESTERASE UR QL STRIP: NEGATIVE
LYMPHOCYTES # BLD AUTO: 1.26 THOUSANDS/ΜL (ref 0.6–4.47)
LYMPHOCYTES NFR BLD AUTO: 26 % (ref 14–44)
MCH RBC QN AUTO: 30.9 PG (ref 26.8–34.3)
MCHC RBC AUTO-ENTMCNC: 32.6 G/DL (ref 31.4–37.4)
MCV RBC AUTO: 95 FL (ref 82–98)
MONOCYTES # BLD AUTO: 0.4 THOUSAND/ΜL (ref 0.17–1.22)
MONOCYTES NFR BLD AUTO: 8 % (ref 4–12)
MUCOUS THREADS UR QL AUTO: ABNORMAL
NEUTROPHILS # BLD AUTO: 2.82 THOUSANDS/ΜL (ref 1.85–7.62)
NEUTS SEG NFR BLD AUTO: 57 % (ref 43–75)
NITRITE UR QL STRIP: NEGATIVE
NON-SQ EPI CELLS URNS QL MICRO: ABNORMAL /HPF
NRBC BLD AUTO-RTO: 0 /100 WBCS
PH UR STRIP.AUTO: 7 [PH]
PHOSPHATE SERPL-MCNC: 3.1 MG/DL (ref 2.3–4.1)
PLATELET # BLD AUTO: 243 THOUSANDS/UL (ref 149–390)
PMV BLD AUTO: 10.8 FL (ref 8.9–12.7)
POTASSIUM SERPL-SCNC: 4.2 MMOL/L (ref 3.5–5.3)
PROT UR STRIP-MCNC: ABNORMAL MG/DL
PROT UR-MCNC: 13.3 MG/DL
PROT/CREAT UR: 0.1 MG/G{CREAT} (ref 0–0.1)
PTH-INTACT SERPL-MCNC: 47.9 PG/ML (ref 12–88)
RBC # BLD AUTO: 5.05 MILLION/UL (ref 3.88–5.62)
RBC #/AREA URNS AUTO: ABNORMAL /HPF
SODIUM SERPL-SCNC: 138 MMOL/L (ref 135–147)
SP GR UR STRIP.AUTO: 1.02 (ref 1–1.03)
UROBILINOGEN UR STRIP-ACNC: <2 MG/DL
WBC # BLD AUTO: 4.95 THOUSAND/UL (ref 4.31–10.16)
WBC #/AREA URNS AUTO: ABNORMAL /HPF

## 2025-01-30 PROCEDURE — 36415 COLL VENOUS BLD VENIPUNCTURE: CPT

## 2025-01-30 PROCEDURE — 80048 BASIC METABOLIC PNL TOTAL CA: CPT

## 2025-01-30 PROCEDURE — 84156 ASSAY OF PROTEIN URINE: CPT

## 2025-01-30 PROCEDURE — 83970 ASSAY OF PARATHORMONE: CPT

## 2025-01-30 PROCEDURE — 82306 VITAMIN D 25 HYDROXY: CPT

## 2025-01-30 PROCEDURE — 82570 ASSAY OF URINE CREATININE: CPT

## 2025-01-30 PROCEDURE — 85025 COMPLETE CBC W/AUTO DIFF WBC: CPT

## 2025-01-30 PROCEDURE — 81001 URINALYSIS AUTO W/SCOPE: CPT

## 2025-01-30 PROCEDURE — 84100 ASSAY OF PHOSPHORUS: CPT

## 2025-02-05 ENCOUNTER — OFFICE VISIT (OUTPATIENT)
Dept: NEPHROLOGY | Facility: CLINIC | Age: 80
End: 2025-02-05
Payer: MEDICARE

## 2025-02-05 VITALS
OXYGEN SATURATION: 100 % | BODY MASS INDEX: 22.9 KG/M2 | SYSTOLIC BLOOD PRESSURE: 140 MMHG | HEIGHT: 70 IN | HEART RATE: 90 BPM | DIASTOLIC BLOOD PRESSURE: 80 MMHG | WEIGHT: 160 LBS | TEMPERATURE: 97.7 F | RESPIRATION RATE: 16 BRPM

## 2025-02-05 DIAGNOSIS — D47.2 MONOCLONAL GAMMOPATHY OF UNKNOWN SIGNIFICANCE: ICD-10-CM

## 2025-02-05 DIAGNOSIS — E83.9 CHRONIC KIDNEY DISEASE-MINERAL AND BONE DISORDER: ICD-10-CM

## 2025-02-05 DIAGNOSIS — M89.9 CHRONIC KIDNEY DISEASE-MINERAL AND BONE DISORDER: ICD-10-CM

## 2025-02-05 DIAGNOSIS — N18.31 STAGE 3A CHRONIC KIDNEY DISEASE (HCC): Primary | ICD-10-CM

## 2025-02-05 DIAGNOSIS — N18.9 CHRONIC KIDNEY DISEASE-MINERAL AND BONE DISORDER: ICD-10-CM

## 2025-02-05 PROCEDURE — 99214 OFFICE O/P EST MOD 30 MIN: CPT | Performed by: INTERNAL MEDICINE

## 2025-02-05 NOTE — PROGRESS NOTES
Name: Sven Swain      : 1945      MRN: [id number]  Encounter Provider: Saroj Welch MD  Encounter Date: 2025   Encounter department: [facility]  :  Assessment & Plan  Stage 3a chronic kidney disease (HCC)  Lab Results   Component Value Date    EGFR 55 2025    EGFR 55 2025    EGFR 47 2024    CREATININE 1.25 2025    CREATININE 1.24 2025    CREATININE 1.42 (H) 2024     Patient renal function is stable overall.  Advise hydration and avoiding nephrotoxic medication  Orders:    Basic metabolic panel; Future    CBC and differential; Future    Phosphorus; Future    Protein / creatinine ratio, urine; Future    PTH, intact; Future    UA (URINE) with reflex to Scope; Future    Vitamin D 25 hydroxy; Future    Chronic kidney disease-mineral and bone disorder  Lab Results   Component Value Date    EGFR 55 2025    EGFR 55 2025    EGFR 47 2024    CREATININE 1.25 2025    CREATININE 1.24 2025    CREATININE 1.42 (H) 2024     PTH and phosphorus along with vitamin D are within acceptable range and will continue to monitor  Orders:    Basic metabolic panel; Future    CBC and differential; Future    Phosphorus; Future    Protein / creatinine ratio, urine; Future    PTH, intact; Future    UA (URINE) with reflex to Scope; Future    Vitamin D 25 hydroxy; Future    Monoclonal gammopathy of unknown significance    Orders:    Basic metabolic panel; Future    CBC and differential; Future    Phosphorus; Future    Protein / creatinine ratio, urine; Future    PTH, intact; Future    UA (URINE) with reflex to Scope; Future    Vitamin D 25 hydroxy; Future    Stable and being monitored by primary doctor    History of Present Illness   HPI  Sven Swain is a 79 y.o. male who presents CKD follow-up    Overall doing well    In between was hospitalized with PEG tube infection    Better now    Denies any acute  "complaint    No chest pain no palpitation no shortness of breath    No nausea no vomiting    Denies any urinary complaint      Review of Systems   Constitutional:  Negative for fatigue.   HENT:  Negative for congestion.    Eyes:  Negative for photophobia and pain.   Respiratory:  Negative for chest tightness and shortness of breath.    Cardiovascular:  Negative for chest pain and palpitations.   Gastrointestinal:  Negative for abdominal distention, abdominal pain and blood in stool.   Endocrine: Negative for polydipsia.   Genitourinary:  Negative for difficulty urinating, dysuria, flank pain, hematuria and urgency.   Musculoskeletal:  Negative for arthralgias and back pain.   Skin:  Negative for rash.   Neurological:  Negative for dizziness, light-headedness and headaches.   Hematological:  Does not bruise/bleed easily.   Psychiatric/Behavioral:  Negative for behavioral problems. The patient is not nervous/anxious.           Objective   /80 (BP Location: Right arm, Patient Position: Sitting, Cuff Size: Standard)   Pulse 90   Temp 97.7 °F (36.5 °C) (Temporal)   Resp 16   Ht 5' 10\" (1.778 m)   Wt 72.6 kg (160 lb)   SpO2 100%   BMI 22.96 kg/m²      Physical Exam  Constitutional:       General: He is not in acute distress.     Appearance: He is well-developed.   HENT:      Head: Normocephalic.      Mouth/Throat:      Mouth: Mucous membranes are moist.   Eyes:      General: No scleral icterus.     Conjunctiva/sclera: Conjunctivae normal.   Neck:      Vascular: No JVD.   Cardiovascular:      Rate and Rhythm: Normal rate.      Heart sounds: Normal heart sounds.   Pulmonary:      Effort: Pulmonary effort is normal.      Breath sounds: No wheezing.   Abdominal:      Palpations: Abdomen is soft.      Tenderness: There is no abdominal tenderness.   Musculoskeletal:         General: Normal range of motion.      Cervical back: Neck supple.   Skin:     General: Skin is warm.      Findings: No rash.   Neurological:    "   Mental Status: He is alert and oriented to person, place, and time.   Psychiatric:         Behavior: Behavior normal.

## 2025-02-05 NOTE — ASSESSMENT & PLAN NOTE
Lab Results   Component Value Date    EGFR 55 01/30/2025    EGFR 55 01/05/2025    EGFR 47 07/19/2024    CREATININE 1.25 01/30/2025    CREATININE 1.24 01/05/2025    CREATININE 1.42 (H) 07/19/2024     PTH and phosphorus along with vitamin D are within acceptable range and will continue to monitor  Orders:    Basic metabolic panel; Future    CBC and differential; Future    Phosphorus; Future    Protein / creatinine ratio, urine; Future    PTH, intact; Future    UA (URINE) with reflex to Scope; Future    Vitamin D 25 hydroxy; Future

## 2025-02-05 NOTE — ASSESSMENT & PLAN NOTE
Orders:    Basic metabolic panel; Future    CBC and differential; Future    Phosphorus; Future    Protein / creatinine ratio, urine; Future    PTH, intact; Future    UA (URINE) with reflex to Scope; Future    Vitamin D 25 hydroxy; Future

## 2025-02-05 NOTE — ASSESSMENT & PLAN NOTE
Lab Results   Component Value Date    EGFR 55 01/30/2025    EGFR 55 01/05/2025    EGFR 47 07/19/2024    CREATININE 1.25 01/30/2025    CREATININE 1.24 01/05/2025    CREATININE 1.42 (H) 07/19/2024     Patient renal function is stable overall.  Advise hydration and avoiding nephrotoxic medication  Orders:    Basic metabolic panel; Future    CBC and differential; Future    Phosphorus; Future    Protein / creatinine ratio, urine; Future    PTH, intact; Future    UA (URINE) with reflex to Scope; Future    Vitamin D 25 hydroxy; Future

## 2025-02-08 DIAGNOSIS — K11.7 EXCESSIVE SALIVATION: ICD-10-CM

## 2025-02-10 RX ORDER — GLYCOPYRROLATE 1 MG/1
1 TABLET ORAL 3 TIMES DAILY
Qty: 90 TABLET | Refills: 1 | Status: SHIPPED | OUTPATIENT
Start: 2025-02-10

## 2025-02-10 NOTE — TELEPHONE ENCOUNTER
Patient called to request a refill for their glycopyrrolate (ROBINUL) 1 mg tablet  advised a refill was requested on 02/08/25 and is pending approval. Patient verbalized understanding and is in agreement.     Does the patient have enough for 3 days?   [] Yes   [x] No - Send as HP to POD  Marking High Priority

## 2025-03-26 ENCOUNTER — TELEPHONE (OUTPATIENT)
Dept: FAMILY MEDICINE CLINIC | Facility: CLINIC | Age: 80
End: 2025-03-26

## 2025-04-08 ENCOUNTER — APPOINTMENT (OUTPATIENT)
Dept: RADIOLOGY | Facility: CLINIC | Age: 80
End: 2025-04-08
Payer: MEDICARE

## 2025-04-08 ENCOUNTER — OFFICE VISIT (OUTPATIENT)
Dept: FAMILY MEDICINE CLINIC | Facility: CLINIC | Age: 80
End: 2025-04-08
Payer: MEDICARE

## 2025-04-08 VITALS
DIASTOLIC BLOOD PRESSURE: 82 MMHG | WEIGHT: 160.6 LBS | SYSTOLIC BLOOD PRESSURE: 124 MMHG | OXYGEN SATURATION: 97 % | BODY MASS INDEX: 22.99 KG/M2 | HEIGHT: 70 IN | TEMPERATURE: 97.8 F | HEART RATE: 70 BPM

## 2025-04-08 DIAGNOSIS — M25.552 LEFT HIP PAIN: ICD-10-CM

## 2025-04-08 DIAGNOSIS — Z00.00 MEDICARE ANNUAL WELLNESS VISIT, SUBSEQUENT: ICD-10-CM

## 2025-04-08 DIAGNOSIS — E78.5 HYPERLIPIDEMIA, UNSPECIFIED HYPERLIPIDEMIA TYPE: ICD-10-CM

## 2025-04-08 DIAGNOSIS — M25.552 LEFT HIP PAIN: Primary | ICD-10-CM

## 2025-04-08 PROCEDURE — 99213 OFFICE O/P EST LOW 20 MIN: CPT | Performed by: FAMILY MEDICINE

## 2025-04-08 PROCEDURE — G2211 COMPLEX E/M VISIT ADD ON: HCPCS | Performed by: FAMILY MEDICINE

## 2025-04-08 PROCEDURE — G0439 PPPS, SUBSEQ VISIT: HCPCS | Performed by: FAMILY MEDICINE

## 2025-04-08 PROCEDURE — 73502 X-RAY EXAM HIP UNI 2-3 VIEWS: CPT

## 2025-04-08 NOTE — PATIENT INSTRUCTIONS
Medicare Preventive Visit Patient Instructions  Thank you for completing your Welcome to Medicare Visit or Medicare Annual Wellness Visit today. Your next wellness visit will be due in one year (4/9/2026).  The screening/preventive services that you may require over the next 5-10 years are detailed below. Some tests may not apply to you based off risk factors and/or age. Screening tests ordered at today's visit but not completed yet may show as past due. Also, please note that scanned in results may not display below.  Preventive Screenings:  Service Recommendations Previous Testing/Comments   Colorectal Cancer Screening  Colonoscopy    Fecal Occult Blood Test (FOBT)/Fecal Immunochemical Test (FIT)  Fecal DNA/Cologuard Test  Flexible Sigmoidoscopy Age: 45-75 years old   Colonoscopy: every 10 years (May be performed more frequently if at higher risk)  OR  FOBT/FIT: every 1 year  OR  Cologuard: every 3 years  OR  Sigmoidoscopy: every 5 years  Screening may be recommended earlier than age 45 if at higher risk for colorectal cancer. Also, an individualized decision between you and your healthcare provider will decide whether screening between the ages of 76-85 would be appropriate. Colonoscopy: Not on file  FOBT/FIT: Not on file  Cologuard: Not on file  Sigmoidoscopy: Not on file          Prostate Cancer Screening Individualized decision between patient and health care provider in men between ages of 55-69   Medicare will cover every 12 months beginning on the day after your 50th birthday PSA: 1.7 ng/mL     Screening Not Indicated     Hepatitis C Screening Once for adults born between 1945 and 1965  More frequently in patients at high risk for Hepatitis C Hep C Antibody: 04/16/2019    Screening Current   Diabetes Screening 1-2 times per year if you're at risk for diabetes or have pre-diabetes Fasting glucose: 97 mg/dL (1/30/2025)  A1C: No results in last 5 years (No results in last 5 years)  Screening Current    Cholesterol Screening Once every 5 years if you don't have a lipid disorder. May order more often based on risk factors. Lipid panel: 02/12/2024  Screening Not Indicated  History Lipid Disorder      Other Preventive Screenings Covered by Medicare:  Abdominal Aortic Aneurysm (AAA) Screening: covered once if your at risk. You're considered to be at risk if you have a family history of AAA or a male between the age of 65-75 who smoking at least 100 cigarettes in your lifetime.  Lung Cancer Screening: covers low dose CT scan once per year if you meet all of the following conditions: (1) Age 55-77; (2) No signs or symptoms of lung cancer; (3) Current smoker or have quit smoking within the last 15 years; (4) You have a tobacco smoking history of at least 20 pack years (packs per day x number of years you smoked); (5) You get a written order from a healthcare provider.  Glaucoma Screening: covered annually if you're considered high risk: (1) You have diabetes OR (2) Family history of glaucoma OR (3)  aged 50 and older OR (4)  American aged 65 and older  Osteoporosis Screening: covered every 2 years if you meet one of the following conditions: (1) Have a vertebral abnormality; (2) On glucocorticoid therapy for more than 3 months; (3) Have primary hyperparathyroidism; (4) On osteoporosis medications and need to assess response to drug therapy.  HIV Screening: covered annually if you're between the age of 15-65. Also covered annually if you are younger than 15 and older than 65 with risk factors for HIV infection. For pregnant patients, it is covered up to 3 times per pregnancy.    Immunizations:  Immunization Recommendations   Influenza Vaccine Annual influenza vaccination during flu season is recommended for all persons aged >= 6 months who do not have contraindications   Pneumococcal Vaccine   * Pneumococcal conjugate vaccine = PCV13 (Prevnar 13), PCV15 (Vaxneuvance), PCV20 (Prevnar 20)  *  Pneumococcal polysaccharide vaccine = PPSV23 (Pneumovax) Adults 19-63 yo with certain risk factors or if 65+ yo  If never received any pneumonia vaccine: recommend Prevnar 20 (PCV20)  Give PCV20 if previously received 1 dose of PCV13 or PPSV23   Hepatitis B Vaccine 3 dose series if at intermediate or high risk (ex: diabetes, end stage renal disease, liver disease)   Respiratory syncytial virus (RSV) Vaccine - COVERED BY MEDICARE PART D  * RSVPreF3 (Arexvy) CDC recommends that adults 60 years of age and older may receive a single dose of RSV vaccine using shared clinical decision-making (SCDM)   Tetanus (Td) Vaccine - COST NOT COVERED BY MEDICARE PART B Following completion of primary series, a booster dose should be given every 10 years to maintain immunity against tetanus. Td may also be given as tetanus wound prophylaxis.   Tdap Vaccine - COST NOT COVERED BY MEDICARE PART B Recommended at least once for all adults. For pregnant patients, recommended with each pregnancy.   Shingles Vaccine (Shingrix) - COST NOT COVERED BY MEDICARE PART B  2 shot series recommended in those 19 years and older who have or will have weakened immune systems or those 50 years and older     Health Maintenance Due:      Topic Date Due   • Hepatitis C Screening  Completed   • Colorectal Cancer Screening  Discontinued     Immunizations Due:      Topic Date Due   • Pneumococcal Vaccine: 65+ Years (1 of 1 - PCV) Never done   • COVID-19 Vaccine (3 - 2024-25 season) 09/01/2024     Advance Directives   What are advance directives?  Advance directives are legal documents that state your wishes and plans for medical care. These plans are made ahead of time in case you lose your ability to make decisions for yourself. Advance directives can apply to any medical decision, such as the treatments you want, and if you want to donate organs.   What are the types of advance directives?  There are many types of advance directives, and each state has rules  about how to use them. You may choose a combination of any of the following:  Living will:  This is a written record of the treatment you want. You can also choose which treatments you do not want, which to limit, and which to stop at a certain time. This includes surgery, medicine, IV fluid, and tube feedings.   Durable power of  for healthcare (DPAHC):  This is a written record that states who you want to make healthcare choices for you when you are unable to make them for yourself. This person, called a proxy, is usually a family member or a friend. You may choose more than 1 proxy.  Do not resuscitate (DNR) order:  A DNR order is used in case your heart stops beating or you stop breathing. It is a request not to have certain forms of treatment, such as CPR. A DNR order may be included in other types of advance directives.  Medical directive:  This covers the care that you want if you are in a coma, near death, or unable to make decisions for yourself. You can list the treatments you want for each condition. Treatment may include pain medicine, surgery, blood transfusions, dialysis, IV or tube feedings, and a ventilator (breathing machine).  Values history:  This document has questions about your views, beliefs, and how you feel and think about life. This information can help others choose the care that you would choose.  Why are advance directives important?  An advance directive helps you control your care. Although spoken wishes may be used, it is better to have your wishes written down. Spoken wishes can be misunderstood, or not followed. Treatments may be given even if you do not want them. An advance directive may make it easier for your family to make difficult choices about your care.       © Copyright [employer] 2018 Information is for End User's use only and may not be sold, redistributed or otherwise used for commercial purposes. All illustrations and images included in CareNotes® are the  copyrighted property of [email or web address] or [employer]

## 2025-04-08 NOTE — ASSESSMENT & PLAN NOTE
Recommend tylenol or ibuprofen as needed  Not interested in orthopedic referral    Orders:  •  XR hip/pelv 2-3 vws left if performed; Future

## 2025-04-08 NOTE — PROGRESS NOTES
Name: Sven Swain      : 1945      MRN: [id number]  Encounter Provider: Salomón Liu MD  Encounter Date: 2025   Encounter department: [facility]  :  Assessment & Plan  Left hip pain  Recommend tylenol or ibuprofen as needed  Not interested in orthopedic referral    Orders:  •  XR hip/pelv 2-3 vws left if performed; Future    Medicare annual wellness visit, subsequent  See medicare wellness note    Follow up in 1 year       Hyperlipidemia, unspecified hyperlipidemia type    Orders:  •  Lipid panel; Future       Preventive health issues were discussed with patient, and age appropriate screening tests were ordered as noted in patient's After Visit Summary. Personalized health advice and appropriate referrals for health education or preventive services given if needed, as noted in patient's After Visit Summary.    History of Present Illness     Patient is here due to left hip pain. Has sharp pain more so when getting up from the floor. Denies any recent injuries to the left hip.       Patient Care Team:  Salomón Liu MD as PCP - General (Family Medicine)  Huang Allen MD (Nephrology)  Saroj Welch MD (Nephrology)    Review of Systems   Constitutional:  Negative for activity change, appetite change, fatigue and fever.   HENT:  Negative for congestion and ear discharge.    Respiratory:  Negative for cough and shortness of breath.    Cardiovascular:  Negative for chest pain and palpitations.   Gastrointestinal:  Negative for diarrhea and nausea.   Musculoskeletal:  Positive for arthralgias and myalgias. Negative for back pain.   Skin:  Negative for color change and rash.   Neurological:  Negative for dizziness and headaches.   Psychiatric/Behavioral:  Negative for agitation and behavioral problems.      Medical History Reviewed by provider this encounter:  Tobacco  Allergies  Meds  Problems  Med Hx  Surg Hx  Fam Hx       Annual Wellness Visit Questionnaire       Health  Risk Assessment:   Patient rates overall health as fair. Patient feels that their physical health rating is same. Patient is very satisfied with their life. Eyesight was rated as same. Hearing was rated as same. Patient feels that their emotional and mental health rating is same. Patients states they are never, rarely angry. Patient states they are sometimes unusually tired/fatigued. Pain experienced in the last 7 days has been none. Patient states that he has experienced no weight loss or gain in last 6 months.     Depression Screening:   PHQ-2 Score: 0      Fall Risk Screening:   In the past year, patient has experienced: no history of falling in past year      Home Safety:  Patient does not have trouble with stairs inside or outside of their home. Patient has working smoke alarms and has working carbon monoxide detector. Home safety hazards include: none.     Nutrition:   Current diet is Regular.     Medications:   Patient is not currently taking any over-the-counter supplements. Patient is able to manage medications.     Activities of Daily Living (ADLs)/Instrumental Activities of Daily Living (IADLs):   Walk and transfer into and out of bed and chair?: Yes  Dress and groom yourself?: Yes    Bathe or shower yourself?: Yes    Feed yourself? Yes  Do your laundry/housekeeping?: Yes  Manage your money, pay your bills and track your expenses?: Yes  Make your own meals?: Yes    Do your own shopping?: Yes    Preventive Screenings      Cardiovascular Screening:    General: History Lipid Disorder    Due for: Lipid Panel      Diabetes Screening:     General: Screening Current      Colorectal Cancer Screening:     General: Screening Not Indicated      Prostate Cancer Screening:    General: Screening Not Indicated      Osteoporosis Screening:    General: Screening Not Indicated      Abdominal Aortic Aneurysm (AAA) Screening:    Risk factors include: tobacco use        General: Screening Not Indicated and History AAA       "Lung Cancer Screening:     General: Screening Not Indicated      Hepatitis C Screening:    General: Screening Current    Immunizations:  - Immunizations due: Prevnar 20 and Zoster (Shingrix)    Screening, Brief Intervention, and Referral to Treatment (SBIRT)     Screening  Typical number of drinks in a day: 0    Single Item Drug Screening:  How often have you used an illegal drug (including marijuana) or a prescription medication for non-medical reasons in the past year? never    Single Item Drug Screen Score: 0  Interpretation: Negative screen for possible drug use disorder    Social Drivers of Health     Financial Resource Strain: Patient Declined (1/9/2024)    Overall Financial Resource Strain (CARDIA)    • Difficulty of Paying Living Expenses: Patient declined   Food Insecurity: Patient Declined (4/8/2025)    Hunger Vital Sign    • Worried About Running Out of Food in the Last Year: Patient declined    • Ran Out of Food in the Last Year: Patient declined   Transportation Needs: No Transportation Needs (4/8/2025)    PRAPARE - Transportation    • Lack of Transportation (Medical): No    • Lack of Transportation (Non-Medical): No   Housing Stability: Low Risk  (4/8/2025)    Housing Stability Vital Sign    • Unable to Pay for Housing in the Last Year: No    • Number of Times Moved in the Last Year: 0    • Homeless in the Last Year: No   Utilities: Not At Risk (4/8/2025)    [facility] Utilities    • Threatened with loss of utilities: No     No results found.    Objective   /82 (BP Location: Left arm, Patient Position: Sitting)   Pulse 70   Temp 97.8 °F (36.6 °C) (Temporal)   Ht 5' 10\" (1.778 m)   Wt 72.8 kg (160 lb 9.6 oz)   SpO2 97%   BMI 23.04 kg/m²     Physical Exam  Constitutional:       General: He is not in acute distress.     Appearance: He is well-developed. He is not diaphoretic.   Eyes:      General: No scleral icterus.     Pupils: Pupils are equal, round, and reactive to light.   Cardiovascular:      " Rate and Rhythm: Normal rate and regular rhythm.      Heart sounds: Normal heart sounds. No murmur heard.  Pulmonary:      Effort: Pulmonary effort is normal. No respiratory distress.      Breath sounds: Normal breath sounds. No wheezing.   Abdominal:      General: Bowel sounds are normal. There is no distension.      Palpations: Abdomen is soft.      Tenderness: There is no abdominal tenderness.   Skin:     General: Skin is warm and dry.      Findings: No rash.   Neurological:      Mental Status: He is alert and oriented to person, place, and time.

## 2025-04-10 ENCOUNTER — RESULTS FOLLOW-UP (OUTPATIENT)
Dept: FAMILY MEDICINE CLINIC | Facility: CLINIC | Age: 80
End: 2025-04-10

## 2025-04-18 DIAGNOSIS — K11.7 EXCESSIVE SALIVATION: ICD-10-CM

## 2025-04-18 RX ORDER — GLYCOPYRROLATE 1 MG/1
1 TABLET ORAL 3 TIMES DAILY
Qty: 90 TABLET | Refills: 1 | Status: SHIPPED | OUTPATIENT
Start: 2025-04-18

## 2025-05-08 PROBLEM — Z00.00 MEDICARE ANNUAL WELLNESS VISIT, SUBSEQUENT: Status: RESOLVED | Noted: 2025-04-08 | Resolved: 2025-05-08

## 2025-05-19 ENCOUNTER — TELEPHONE (OUTPATIENT)
Age: 80
End: 2025-05-19

## 2025-05-19 DIAGNOSIS — Z12.5 SCREENING FOR PROSTATE CANCER: Primary | ICD-10-CM

## 2025-05-19 NOTE — TELEPHONE ENCOUNTER
PSA and lipid profile ordered, he also has blood work that was ordered from Nephrology.  Last PSA was 3 years ago and normal.

## 2025-05-19 NOTE — TELEPHONE ENCOUNTER
Patients spouse called in wanting to know if doctor Noe could place blood work orders or patient to have a prostate check. Wife wanted to know when patients last PSA was as well. I was not able to find it going back even a year? Please advise.

## 2025-05-20 ENCOUNTER — APPOINTMENT (OUTPATIENT)
Dept: LAB | Facility: CLINIC | Age: 80
End: 2025-05-20
Payer: MEDICARE

## 2025-05-20 DIAGNOSIS — Z12.5 SCREENING FOR PROSTATE CANCER: ICD-10-CM

## 2025-05-20 DIAGNOSIS — E78.5 HYPERLIPIDEMIA, UNSPECIFIED HYPERLIPIDEMIA TYPE: ICD-10-CM

## 2025-05-20 LAB — PSA SERPL-MCNC: 1.69 NG/ML (ref 0–4)

## 2025-05-20 PROCEDURE — 36415 COLL VENOUS BLD VENIPUNCTURE: CPT

## 2025-05-20 PROCEDURE — G0103 PSA SCREENING: HCPCS

## 2025-05-22 ENCOUNTER — RESULTS FOLLOW-UP (OUTPATIENT)
Dept: FAMILY MEDICINE CLINIC | Facility: CLINIC | Age: 80
End: 2025-05-22

## 2025-06-26 DIAGNOSIS — K11.7 EXCESSIVE SALIVATION: ICD-10-CM

## 2025-06-27 ENCOUNTER — APPOINTMENT (OUTPATIENT)
Dept: LAB | Facility: CLINIC | Age: 80
End: 2025-06-27

## 2025-06-27 LAB
CHOLEST SERPL-MCNC: 151 MG/DL (ref ?–200)
HDLC SERPL-MCNC: 43 MG/DL
LDLC SERPL CALC-MCNC: 80 MG/DL (ref 0–100)
NONHDLC SERPL-MCNC: 108 MG/DL
TRIGL SERPL-MCNC: 138 MG/DL (ref ?–150)

## 2025-06-27 RX ORDER — GLYCOPYRROLATE 1 MG/1
1 TABLET ORAL 3 TIMES DAILY
Qty: 90 TABLET | Refills: 1 | Status: SHIPPED | OUTPATIENT
Start: 2025-06-27

## 2025-07-09 ENCOUNTER — APPOINTMENT (OUTPATIENT)
Dept: LAB | Facility: CLINIC | Age: 80
End: 2025-07-09
Payer: MEDICARE

## 2025-07-09 DIAGNOSIS — E83.9 CHRONIC KIDNEY DISEASE-MINERAL AND BONE DISORDER: ICD-10-CM

## 2025-07-09 DIAGNOSIS — N18.9 CHRONIC KIDNEY DISEASE-MINERAL AND BONE DISORDER: ICD-10-CM

## 2025-07-09 DIAGNOSIS — M89.9 CHRONIC KIDNEY DISEASE-MINERAL AND BONE DISORDER: ICD-10-CM

## 2025-07-09 DIAGNOSIS — N18.31 STAGE 3A CHRONIC KIDNEY DISEASE (HCC): ICD-10-CM

## 2025-07-09 DIAGNOSIS — D47.2 MONOCLONAL GAMMOPATHY OF UNKNOWN SIGNIFICANCE: ICD-10-CM

## 2025-07-09 LAB
25(OH)D3 SERPL-MCNC: 52.7 NG/ML (ref 30–100)
ANION GAP SERPL CALCULATED.3IONS-SCNC: 8 MMOL/L (ref 4–13)
BACTERIA UR QL AUTO: ABNORMAL /HPF
BASOPHILS # BLD AUTO: 0.03 THOUSANDS/ÂΜL (ref 0–0.1)
BASOPHILS NFR BLD AUTO: 1 % (ref 0–1)
BILIRUB UR QL STRIP: NEGATIVE
BUN SERPL-MCNC: 26 MG/DL (ref 5–25)
CALCIUM SERPL-MCNC: 9.4 MG/DL (ref 8.4–10.2)
CHLORIDE SERPL-SCNC: 104 MMOL/L (ref 96–108)
CLARITY UR: CLEAR
CO2 SERPL-SCNC: 30 MMOL/L (ref 21–32)
COLOR UR: YELLOW
CREAT SERPL-MCNC: 1.33 MG/DL (ref 0.6–1.3)
CREAT UR-MCNC: 96.7 MG/DL
EOSINOPHIL # BLD AUTO: 0.18 THOUSAND/ÂΜL (ref 0–0.61)
EOSINOPHIL NFR BLD AUTO: 3 % (ref 0–6)
ERYTHROCYTE [DISTWIDTH] IN BLOOD BY AUTOMATED COUNT: 12.8 % (ref 11.6–15.1)
GFR SERPL CREATININE-BSD FRML MDRD: 50 ML/MIN/1.73SQ M
GLUCOSE P FAST SERPL-MCNC: 89 MG/DL (ref 65–99)
GLUCOSE UR STRIP-MCNC: NEGATIVE MG/DL
HCT VFR BLD AUTO: 44.8 % (ref 36.5–49.3)
HGB BLD-MCNC: 15.4 G/DL (ref 12–17)
HGB UR QL STRIP.AUTO: ABNORMAL
IMM GRANULOCYTES # BLD AUTO: 0.01 THOUSAND/UL (ref 0–0.2)
IMM GRANULOCYTES NFR BLD AUTO: 0 % (ref 0–2)
KETONES UR STRIP-MCNC: NEGATIVE MG/DL
LEUKOCYTE ESTERASE UR QL STRIP: NEGATIVE
LYMPHOCYTES # BLD AUTO: 1.19 THOUSANDS/ÂΜL (ref 0.6–4.47)
LYMPHOCYTES NFR BLD AUTO: 19 % (ref 14–44)
MCH RBC QN AUTO: 31.9 PG (ref 26.8–34.3)
MCHC RBC AUTO-ENTMCNC: 34.4 G/DL (ref 31.4–37.4)
MCV RBC AUTO: 93 FL (ref 82–98)
MONOCYTES # BLD AUTO: 0.58 THOUSAND/ÂΜL (ref 0.17–1.22)
MONOCYTES NFR BLD AUTO: 10 % (ref 4–12)
NEUTROPHILS # BLD AUTO: 4.13 THOUSANDS/ÂΜL (ref 1.85–7.62)
NEUTS SEG NFR BLD AUTO: 67 % (ref 43–75)
NITRITE UR QL STRIP: NEGATIVE
NON-SQ EPI CELLS URNS QL MICRO: ABNORMAL /HPF
NRBC BLD AUTO-RTO: 0 /100 WBCS
PH UR STRIP.AUTO: 7 [PH]
PHOSPHATE SERPL-MCNC: 3 MG/DL (ref 2.3–4.1)
PLATELET # BLD AUTO: 241 THOUSANDS/UL (ref 149–390)
PMV BLD AUTO: 10.4 FL (ref 8.9–12.7)
POTASSIUM SERPL-SCNC: 4.3 MMOL/L (ref 3.5–5.3)
PROT UR STRIP-MCNC: NEGATIVE MG/DL
PROT UR-MCNC: 11 MG/DL
PROT/CREAT UR: 0.1 MG/G{CREAT}
PTH-INTACT SERPL-MCNC: 59.4 PG/ML (ref 12–88)
RBC # BLD AUTO: 4.83 MILLION/UL (ref 3.88–5.62)
RBC #/AREA URNS AUTO: ABNORMAL /HPF
SODIUM SERPL-SCNC: 142 MMOL/L (ref 135–147)
SP GR UR STRIP.AUTO: 1.02 (ref 1–1.03)
UROBILINOGEN UR STRIP-ACNC: <2 MG/DL
WBC # BLD AUTO: 6.12 THOUSAND/UL (ref 4.31–10.16)
WBC #/AREA URNS AUTO: ABNORMAL /HPF

## 2025-07-09 PROCEDURE — 84156 ASSAY OF PROTEIN URINE: CPT

## 2025-07-09 PROCEDURE — 36415 COLL VENOUS BLD VENIPUNCTURE: CPT

## 2025-07-09 PROCEDURE — 85025 COMPLETE CBC W/AUTO DIFF WBC: CPT

## 2025-07-09 PROCEDURE — 82306 VITAMIN D 25 HYDROXY: CPT

## 2025-07-09 PROCEDURE — 83970 ASSAY OF PARATHORMONE: CPT

## 2025-07-09 PROCEDURE — 82570 ASSAY OF URINE CREATININE: CPT

## 2025-07-09 PROCEDURE — 80048 BASIC METABOLIC PNL TOTAL CA: CPT

## 2025-07-09 PROCEDURE — 84100 ASSAY OF PHOSPHORUS: CPT

## 2025-07-09 PROCEDURE — 81001 URINALYSIS AUTO W/SCOPE: CPT

## 2025-08-05 ENCOUNTER — TELEPHONE (OUTPATIENT)
Dept: FAMILY MEDICINE CLINIC | Facility: CLINIC | Age: 80
End: 2025-08-05

## 2025-08-06 ENCOUNTER — DOCUMENTATION (OUTPATIENT)
Dept: NUTRITION | Facility: HOSPITAL | Age: 80
End: 2025-08-06

## 2025-08-06 ENCOUNTER — TELEPHONE (OUTPATIENT)
Age: 80
End: 2025-08-06

## 2025-08-06 ENCOUNTER — NURSE TRIAGE (OUTPATIENT)
Age: 80
End: 2025-08-06

## 2025-08-06 ENCOUNTER — OFFICE VISIT (OUTPATIENT)
Dept: NEPHROLOGY | Facility: CLINIC | Age: 80
End: 2025-08-06
Payer: MEDICARE

## 2025-08-06 VITALS
SYSTOLIC BLOOD PRESSURE: 110 MMHG | DIASTOLIC BLOOD PRESSURE: 80 MMHG | WEIGHT: 161 LBS | BODY MASS INDEX: 23.05 KG/M2 | OXYGEN SATURATION: 97 % | RESPIRATION RATE: 18 BRPM | TEMPERATURE: 97.4 F | HEIGHT: 70 IN | HEART RATE: 87 BPM

## 2025-08-06 DIAGNOSIS — M54.16 LUMBAR RADICULOPATHY: ICD-10-CM

## 2025-08-06 DIAGNOSIS — N18.31 STAGE 3A CHRONIC KIDNEY DISEASE (HCC): Primary | ICD-10-CM

## 2025-08-06 DIAGNOSIS — R13.19 ESOPHAGEAL DYSPHAGIA: ICD-10-CM

## 2025-08-06 DIAGNOSIS — K94.23 PEG TUBE MALFUNCTION (HCC): ICD-10-CM

## 2025-08-06 DIAGNOSIS — N18.9 CHRONIC KIDNEY DISEASE-MINERAL AND BONE DISORDER: ICD-10-CM

## 2025-08-06 DIAGNOSIS — E46 PROTEIN-CALORIE MALNUTRITION, UNSPECIFIED SEVERITY (HCC): Primary | ICD-10-CM

## 2025-08-06 DIAGNOSIS — M89.9 CHRONIC KIDNEY DISEASE-MINERAL AND BONE DISORDER: ICD-10-CM

## 2025-08-06 DIAGNOSIS — E83.9 CHRONIC KIDNEY DISEASE-MINERAL AND BONE DISORDER: ICD-10-CM

## 2025-08-06 LAB

## 2025-08-06 PROCEDURE — 99214 OFFICE O/P EST MOD 30 MIN: CPT | Performed by: INTERNAL MEDICINE

## 2025-08-08 LAB

## 2025-08-19 ENCOUNTER — APPOINTMENT (OUTPATIENT)
Dept: LAB | Facility: CLINIC | Age: 80
End: 2025-08-19
Payer: MEDICARE

## 2025-08-19 DIAGNOSIS — D47.2 MONOCLONAL PARAPROTEINEMIA: ICD-10-CM

## 2025-08-19 LAB
ALBUMIN SERPL BCG-MCNC: 4.1 G/DL (ref 3.5–5)
ALP SERPL-CCNC: 63 U/L (ref 34–104)
ALT SERPL W P-5'-P-CCNC: 16 U/L (ref 7–52)
ANION GAP SERPL CALCULATED.3IONS-SCNC: 9 MMOL/L (ref 4–13)
AST SERPL W P-5'-P-CCNC: 22 U/L (ref 13–39)
BASOPHILS # BLD AUTO: 0.03 THOUSANDS/ÂΜL (ref 0–0.1)
BASOPHILS NFR BLD AUTO: 1 % (ref 0–1)
BILIRUB SERPL-MCNC: 0.45 MG/DL (ref 0.2–1)
BUN SERPL-MCNC: 30 MG/DL (ref 5–25)
CALCIUM SERPL-MCNC: 9.5 MG/DL (ref 8.4–10.2)
CHLORIDE SERPL-SCNC: 104 MMOL/L (ref 96–108)
CO2 SERPL-SCNC: 29 MMOL/L (ref 21–32)
CREAT SERPL-MCNC: 1.25 MG/DL (ref 0.6–1.3)
EOSINOPHIL # BLD AUTO: 0.25 THOUSAND/ÂΜL (ref 0–0.61)
EOSINOPHIL NFR BLD AUTO: 5 % (ref 0–6)
ERYTHROCYTE [DISTWIDTH] IN BLOOD BY AUTOMATED COUNT: 12.8 % (ref 11.6–15.1)
GFR SERPL CREATININE-BSD FRML MDRD: 54 ML/MIN/1.73SQ M
GLUCOSE P FAST SERPL-MCNC: 75 MG/DL (ref 65–99)
HCT VFR BLD AUTO: 46.1 % (ref 36.5–49.3)
HGB BLD-MCNC: 15.3 G/DL (ref 12–17)
IGA SERPL-MCNC: 54 MG/DL (ref 66–433)
IGG SERPL-MCNC: 1307 MG/DL (ref 635–1741)
IGM SERPL-MCNC: <20 MG/DL (ref 45–281)
IMM GRANULOCYTES # BLD AUTO: 0.02 THOUSAND/UL (ref 0–0.2)
IMM GRANULOCYTES NFR BLD AUTO: 0 % (ref 0–2)
LYMPHOCYTES # BLD AUTO: 0.97 THOUSANDS/ÂΜL (ref 0.6–4.47)
LYMPHOCYTES NFR BLD AUTO: 18 % (ref 14–44)
MCH RBC QN AUTO: 31.1 PG (ref 26.8–34.3)
MCHC RBC AUTO-ENTMCNC: 33.2 G/DL (ref 31.4–37.4)
MCV RBC AUTO: 94 FL (ref 82–98)
MONOCYTES # BLD AUTO: 0.45 THOUSAND/ÂΜL (ref 0.17–1.22)
MONOCYTES NFR BLD AUTO: 8 % (ref 4–12)
NEUTROPHILS # BLD AUTO: 3.66 THOUSANDS/ÂΜL (ref 1.85–7.62)
NEUTS SEG NFR BLD AUTO: 68 % (ref 43–75)
NRBC BLD AUTO-RTO: 0 /100 WBCS
PLATELET # BLD AUTO: 254 THOUSANDS/UL (ref 149–390)
PMV BLD AUTO: 11.1 FL (ref 8.9–12.7)
POTASSIUM SERPL-SCNC: 4.4 MMOL/L (ref 3.5–5.3)
PROT SERPL-MCNC: 7.1 G/DL (ref 6.4–8.4)
RBC # BLD AUTO: 4.92 MILLION/UL (ref 3.88–5.62)
SODIUM SERPL-SCNC: 142 MMOL/L (ref 135–147)
WBC # BLD AUTO: 5.38 THOUSAND/UL (ref 4.31–10.16)

## 2025-08-19 PROCEDURE — 36415 COLL VENOUS BLD VENIPUNCTURE: CPT

## 2025-08-19 PROCEDURE — 82784 ASSAY IGA/IGD/IGG/IGM EACH: CPT

## 2025-08-19 PROCEDURE — 83521 IG LIGHT CHAINS FREE EACH: CPT

## 2025-08-19 PROCEDURE — 80053 COMPREHEN METABOLIC PANEL: CPT

## 2025-08-19 PROCEDURE — 85025 COMPLETE CBC W/AUTO DIFF WBC: CPT

## 2025-08-20 LAB
KAPPA LC FREE SER-MCNC: 26.5 MG/L (ref 3.3–19.4)
KAPPA LC FREE/LAMBDA FREE SER: 0.58 {RATIO} (ref 0.26–1.65)
LAMBDA LC FREE SERPL-MCNC: 45.6 MG/L (ref 5.7–26.3)